# Patient Record
Sex: FEMALE | Race: WHITE | Employment: OTHER | URBAN - METROPOLITAN AREA
[De-identification: names, ages, dates, MRNs, and addresses within clinical notes are randomized per-mention and may not be internally consistent; named-entity substitution may affect disease eponyms.]

---

## 2017-02-23 ENCOUNTER — HOSPITAL ENCOUNTER (OUTPATIENT)
Dept: LAB | Age: 77
Discharge: HOME | End: 2017-02-23
Payer: MEDICARE

## 2017-02-23 DIAGNOSIS — D50.8 OTHER IRON DEFICIENCY ANEMIA: ICD-10-CM

## 2017-02-23 LAB
ALBUMIN SERPL BCP-MCNC: 3.5 G/DL (ref 3.2–4.6)
ALBUMIN/GLOB SERPL: 1 {RATIO} (ref 1.2–3.5)
ALP SERPL-CCNC: 78 U/L (ref 50–136)
ALT SERPL-CCNC: 17 U/L (ref 12–65)
ANION GAP BLD CALC-SCNC: 5 MMOL/L (ref 7–16)
AST SERPL W P-5'-P-CCNC: 17 U/L (ref 15–37)
BASOPHILS # BLD AUTO: 0 K/UL (ref 0–0.2)
BASOPHILS # BLD: 0 % (ref 0–2)
BILIRUB SERPL-MCNC: 0.5 MG/DL (ref 0.2–1.1)
BUN SERPL-MCNC: 15 MG/DL (ref 8–23)
CALCIUM SERPL-MCNC: 9 MG/DL (ref 8.3–10.4)
CHLORIDE SERPL-SCNC: 104 MMOL/L (ref 98–107)
CO2 SERPL-SCNC: 31 MMOL/L (ref 23–32)
CREAT SERPL-MCNC: 1.03 MG/DL (ref 0.6–1)
DIFFERENTIAL METHOD BLD: ABNORMAL
EOSINOPHIL # BLD: 0.1 K/UL (ref 0–0.8)
EOSINOPHIL NFR BLD: 1 % (ref 0.5–7.8)
ERYTHROCYTE [DISTWIDTH] IN BLOOD BY AUTOMATED COUNT: 14 % (ref 11.9–14.6)
FERRITIN SERPL-MCNC: 7 NG/ML (ref 8–388)
GLOBULIN SER CALC-MCNC: 3.5 G/DL (ref 2.3–3.5)
GLUCOSE SERPL-MCNC: 89 MG/DL (ref 65–100)
HCT VFR BLD AUTO: 36.6 % (ref 35.8–46.3)
HGB BLD-MCNC: 11.8 G/DL (ref 11.7–15.4)
IRON SATN MFR SERPL: 20 %
IRON SERPL-MCNC: 91 UG/DL (ref 35–150)
LYMPHOCYTES # BLD AUTO: 30 % (ref 13–44)
LYMPHOCYTES # BLD: 1.8 K/UL (ref 0.5–4.6)
MCH RBC QN AUTO: 30.2 PG (ref 26.1–32.9)
MCHC RBC AUTO-ENTMCNC: 32.2 G/DL (ref 31.4–35)
MCV RBC AUTO: 93.6 FL (ref 79.6–97.8)
MONOCYTES # BLD: 0.5 K/UL (ref 0.1–1.3)
MONOCYTES NFR BLD AUTO: 9 % (ref 4–12)
NEUTS SEG # BLD: 3.5 K/UL (ref 1.7–8.2)
NEUTS SEG NFR BLD AUTO: 59 % (ref 43–78)
NRBC # BLD: 0 K/UL (ref 0–0.2)
PLATELET # BLD AUTO: 233 K/UL (ref 150–450)
PMV BLD AUTO: 10.4 FL (ref 10.8–14.1)
POTASSIUM SERPL-SCNC: 3.8 MMOL/L (ref 3.5–5.1)
PROT SERPL-MCNC: 7 G/DL (ref 6.3–8.2)
RBC # BLD AUTO: 3.91 M/UL (ref 4.05–5.25)
SODIUM SERPL-SCNC: 140 MMOL/L (ref 136–145)
TIBC SERPL-MCNC: 455 UG/DL (ref 250–450)
WBC # BLD AUTO: 6 K/UL (ref 4.3–11.1)

## 2017-02-23 PROCEDURE — 36415 COLL VENOUS BLD VENIPUNCTURE: CPT | Performed by: INTERNAL MEDICINE

## 2017-02-23 PROCEDURE — 82728 ASSAY OF FERRITIN: CPT | Performed by: INTERNAL MEDICINE

## 2017-02-23 PROCEDURE — 83540 ASSAY OF IRON: CPT | Performed by: INTERNAL MEDICINE

## 2017-02-23 PROCEDURE — 85025 COMPLETE CBC W/AUTO DIFF WBC: CPT | Performed by: INTERNAL MEDICINE

## 2017-02-23 PROCEDURE — 80053 COMPREHEN METABOLIC PANEL: CPT | Performed by: INTERNAL MEDICINE

## 2017-04-26 PROBLEM — R60.0 BILATERAL LEG EDEMA: Status: ACTIVE | Noted: 2017-04-26

## 2017-04-26 PROBLEM — R73.01 IFG (IMPAIRED FASTING GLUCOSE): Status: ACTIVE | Noted: 2017-04-26

## 2017-10-26 PROBLEM — E66.01 MORBID OBESITY (HCC): Status: ACTIVE | Noted: 2017-10-26

## 2018-04-26 PROBLEM — M17.0 PRIMARY OSTEOARTHRITIS OF BOTH KNEES: Status: ACTIVE | Noted: 2018-04-26

## 2018-04-26 PROBLEM — F51.01 PRIMARY INSOMNIA: Status: ACTIVE | Noted: 2018-04-26

## 2019-07-17 PROBLEM — N18.30 STAGE 3 CHRONIC KIDNEY DISEASE (HCC): Status: ACTIVE | Noted: 2019-07-17

## 2022-03-19 PROBLEM — R73.01 IFG (IMPAIRED FASTING GLUCOSE): Status: ACTIVE | Noted: 2017-04-26

## 2022-03-19 PROBLEM — R60.0 BILATERAL LEG EDEMA: Status: ACTIVE | Noted: 2017-04-26

## 2022-03-19 PROBLEM — M17.0 PRIMARY OSTEOARTHRITIS OF BOTH KNEES: Status: ACTIVE | Noted: 2018-04-26

## 2022-03-19 PROBLEM — E66.01 MORBID OBESITY (HCC): Status: ACTIVE | Noted: 2017-10-26

## 2022-03-20 PROBLEM — N18.30 STAGE 3 CHRONIC KIDNEY DISEASE (HCC): Status: ACTIVE | Noted: 2019-07-17

## 2022-03-20 PROBLEM — F51.01 PRIMARY INSOMNIA: Status: ACTIVE | Noted: 2018-04-26

## 2022-07-06 ENCOUNTER — OFFICE VISIT (OUTPATIENT)
Dept: INTERNAL MEDICINE CLINIC | Facility: CLINIC | Age: 82
End: 2022-07-06
Payer: MEDICARE

## 2022-07-06 VITALS
DIASTOLIC BLOOD PRESSURE: 70 MMHG | HEART RATE: 66 BPM | RESPIRATION RATE: 16 BRPM | SYSTOLIC BLOOD PRESSURE: 128 MMHG | WEIGHT: 202 LBS | OXYGEN SATURATION: 98 % | BODY MASS INDEX: 38.14 KG/M2 | HEIGHT: 61 IN

## 2022-07-06 DIAGNOSIS — E78.2 MIXED HYPERLIPIDEMIA: ICD-10-CM

## 2022-07-06 DIAGNOSIS — R60.0 BILATERAL LEG EDEMA: ICD-10-CM

## 2022-07-06 DIAGNOSIS — I10 BENIGN ESSENTIAL HYPERTENSION: ICD-10-CM

## 2022-07-06 DIAGNOSIS — Z00.00 MEDICARE ANNUAL WELLNESS VISIT, SUBSEQUENT: Primary | ICD-10-CM

## 2022-07-06 DIAGNOSIS — J30.1 SEASONAL ALLERGIC RHINITIS DUE TO POLLEN: ICD-10-CM

## 2022-07-06 PROBLEM — N18.30 STAGE 3 CHRONIC KIDNEY DISEASE (HCC): Status: RESOLVED | Noted: 2019-07-17 | Resolved: 2022-07-06

## 2022-07-06 LAB
ANION GAP SERPL CALC-SCNC: 5 MMOL/L (ref 7–16)
BUN SERPL-MCNC: 18 MG/DL (ref 8–23)
CALCIUM SERPL-MCNC: 9.7 MG/DL (ref 8.3–10.4)
CHLORIDE SERPL-SCNC: 106 MMOL/L (ref 98–107)
CO2 SERPL-SCNC: 30 MMOL/L (ref 21–32)
CREAT SERPL-MCNC: 0.9 MG/DL (ref 0.6–1)
GLUCOSE SERPL-MCNC: 104 MG/DL (ref 65–100)
POTASSIUM SERPL-SCNC: 4.4 MMOL/L (ref 3.5–5.1)
SODIUM SERPL-SCNC: 141 MMOL/L (ref 136–145)

## 2022-07-06 PROCEDURE — G0439 PPPS, SUBSEQ VISIT: HCPCS | Performed by: FAMILY MEDICINE

## 2022-07-06 PROCEDURE — 1123F ACP DISCUSS/DSCN MKR DOCD: CPT | Performed by: FAMILY MEDICINE

## 2022-07-06 RX ORDER — POTASSIUM CHLORIDE 750 MG/1
10 TABLET, EXTENDED RELEASE ORAL DAILY
Qty: 90 TABLET | Refills: 1 | Status: SHIPPED | OUTPATIENT
Start: 2022-07-06

## 2022-07-06 RX ORDER — AMLODIPINE BESYLATE 5 MG/1
5 TABLET ORAL DAILY
Qty: 90 TABLET | Refills: 1 | Status: SHIPPED | OUTPATIENT
Start: 2022-07-06

## 2022-07-06 RX ORDER — FUROSEMIDE 20 MG/1
20 TABLET ORAL DAILY
Qty: 90 TABLET | Refills: 1 | Status: SHIPPED | OUTPATIENT
Start: 2022-07-06

## 2022-07-06 RX ORDER — LOSARTAN POTASSIUM AND HYDROCHLOROTHIAZIDE 12.5; 1 MG/1; MG/1
1 TABLET ORAL DAILY
Qty: 90 TABLET | Refills: 1 | Status: SHIPPED | OUTPATIENT
Start: 2022-07-06

## 2022-07-06 RX ORDER — ROSUVASTATIN CALCIUM 10 MG/1
10 TABLET, COATED ORAL DAILY
Qty: 90 TABLET | Refills: 1 | Status: SHIPPED | OUTPATIENT
Start: 2022-07-06

## 2022-07-06 RX ORDER — CETIRIZINE HYDROCHLORIDE 10 MG/1
10 TABLET ORAL DAILY PRN
Qty: 90 TABLET | Refills: 1 | Status: SHIPPED | OUTPATIENT
Start: 2022-07-06

## 2022-07-06 ASSESSMENT — PATIENT HEALTH QUESTIONNAIRE - PHQ9
SUM OF ALL RESPONSES TO PHQ9 QUESTIONS 1 & 2: 0
2. FEELING DOWN, DEPRESSED OR HOPELESS: 0
SUM OF ALL RESPONSES TO PHQ QUESTIONS 1-9: 0
1. LITTLE INTEREST OR PLEASURE IN DOING THINGS: 0

## 2022-07-06 ASSESSMENT — LIFESTYLE VARIABLES: HOW OFTEN DO YOU HAVE A DRINK CONTAINING ALCOHOL: NEVER

## 2022-07-06 NOTE — PATIENT INSTRUCTIONS
Personalized Preventive Plan for Tyler Gardiner - 7/6/2022  Medicare offers a range of preventive health benefits. Some of the tests and screenings are paid in full while other may be subject to a deductible, co-insurance, and/or copay. Some of these benefits include a comprehensive review of your medical history including lifestyle, illnesses that may run in your family, and various assessments and screenings as appropriate. After reviewing your medical record and screening and assessments performed today your provider may have ordered immunizations, labs, imaging, and/or referrals for you. A list of these orders (if applicable) as well as your Preventive Care list are included within your After Visit Summary for your review. Other Preventive Recommendations:    · A preventive eye exam performed by an eye specialist is recommended every 1-2 years to screen for glaucoma; cataracts, macular degeneration, and other eye disorders. · A preventive dental visit is recommended every 6 months. · Try to get at least 150 minutes of exercise per week or 10,000 steps per day on a pedometer . · Order or download the FREE \"Exercise & Physical Activity: Your Everyday Guide\" from [employer]. Call 6-786.609.4778 or search [employer] online. · You need 2503-4098 mg of calcium and 6298-9698 IU of vitamin D per day. It is possible to meet your calcium requirement with diet alone, but a vitamin D supplement is usually necessary to meet this goal.  · When exposed to the sun, use a sunscreen that protects against both UVA and UVB radiation with an SPF of 30 or greater. Reapply every 2 to 3 hours or after sweating, drying off with a towel, or swimming. · Always wear a seat belt when traveling in a car. Always wear a helmet when riding a bicycle or motorcycle.

## 2022-07-06 NOTE — PROGRESS NOTES
Medicare Annual Wellness Visit    Alexia Bryson is here for Hypertension and Medicare AWV    Assessment & Plan   Medicare annual wellness visit, subsequent  Benign essential hypertension  -     amLODIPine (NORVASC) 5 MG tablet; Take 1 tablet by mouth daily TAKE 1 TABLET DAILY, Disp-90 tablet, R-1Normal  -     losartan-hydroCHLOROthiazide (HYZAAR) 100-12.5 MG per tablet; Take 1 tablet by mouth daily TAKE 1 TABLET DAILY, Disp-90 tablet, R-1Normal  -     potassium chloride (KLOR-CON M) 10 MEQ extended release tablet; Take 1 tablet by mouth daily, Disp-90 tablet, R-1Normal  -     Basic Metabolic Panel; Future  Mixed hyperlipidemia  -     rosuvastatin (CRESTOR) 10 MG tablet; Take 1 tablet by mouth daily TAKE 1 TABLET DAILY, Disp-90 tablet, R-1Normal  Bilateral leg edema  -     furosemide (LASIX) 20 MG tablet; Take 1 tablet by mouth daily, Disp-90 tablet, R-1Normal  Seasonal allergic rhinitis due to pollen  -     cetirizine (ZYRTEC) 10 MG tablet; Take 1 tablet by mouth daily as needed for Allergies, Disp-90 tablet, R-1Normal         Chronic medical problems stable. Refills and labs as ordered. Recommendations for Preventive Services Due: see orders and patient instructions/AVS.  Recommended screening schedule for the next 5-10 years is provided to the patient in written form: see Patient Instructions/AVS.     Return for Medicare Annual Wellness Visit in 1 year. Subjective   Patient's complete Health Risk Assessment and screening values have been reviewed and are found in Flowsheets. The following problems were reviewed today and where indicated follow up appointments were made and/or referrals ordered.     Positive Risk Factor Screenings with Interventions:               General Health and ACP:  General  In general, how would you say your health is?: Fair  In the past 7 days, have you experienced any of the following: New or Increased Pain, New or Increased Fatigue, Loneliness, Social Isolation, Stress or Anger?: No  Do you get the social and emotional support that you need?: Yes  Do you have a Living Will?: Yes    Advance Directives     Power of  Living Will ACP-Advance Directive ACP-Power of     Not on File Not on File Not on File Not on File      ·     Health Habits/Nutrition:     Physical Activity: Inactive    Days of Exercise per Week: 0 days    Minutes of Exercise per Session: 0 min     Have you lost any weight without trying in the past 3 months?: No  Body mass index: (!) 38.8  Have you seen the dentist within the past year?: (!) No    Health Habits/Nutrition Interventions:  · Dental exam overdue:  patient encouraged to make appointment with his/her dentist   Encourage healthy eating, exercise. Suggest decreasing carbohydrate intake and increasing fresh vegetables. Will continue to periodically follow progress of weight loss. Objective   Vitals:    07/06/22 0922   BP: 128/70   Site: Left Upper Arm   Position: Sitting   Pulse: 66   Resp: 16   SpO2: 98%   Weight: 202 lb (91.6 kg)   Height: 5' 0.5\" (1.537 m)      Body mass index is 38.8 kg/m². Physical Exam  Vitals reviewed. Cardiovascular:      Rate and Rhythm: Normal rate and regular rhythm. Comments: -chronic LE edema  Pulmonary:      Effort: Pulmonary effort is normal.      Breath sounds: Normal breath sounds. Skin:     General: Skin is warm and dry. Neurological:      Mental Status: She is alert. No Known Allergies  Prior to Visit Medications    Medication Sig Taking?  Authorizing Provider   amLODIPine (NORVASC) 5 MG tablet Take 1 tablet by mouth daily TAKE 1 TABLET DAILY Yes Nicho VERMA Brothers, DO   furosemide (LASIX) 20 MG tablet Take 1 tablet by mouth daily Yes Ricky García, DO   losartan-hydroCHLOROthiazide (HYZAAR) 100-12.5 MG per tablet Take 1 tablet by mouth daily TAKE 1 TABLET DAILY Yes Nicho García, DO   potassium chloride (KLOR-CON M) 10 MEQ extended release tablet Take 1 tablet

## 2022-09-29 ENCOUNTER — NURSE TRIAGE (OUTPATIENT)
Dept: OTHER | Facility: CLINIC | Age: 82
End: 2022-09-29

## 2022-09-29 NOTE — TELEPHONE ENCOUNTER
Received call from [facility] at [facility] with Red Flag Complaint. Subjective: Caller states \"having abdominal pain, did worsen last night. Has had a couple of days. \"     Current Symptoms: right side abdominal pain. Pain down low. Throbbing pain. Last night pain was worse. Moving around makes it worse  Denies vomiting or diarrhea  Able to stand up and walk. Onset: couple days  ago; worsening    Associated Symptoms: NA    Pain Severity: 7/10; throbbing; constant    Temperature: denies     What has been tried: tylenol yesterday    LMP: NA Pregnant: NA    Recommended disposition: Go to ED/UCC Now (Or to Office with PCP Approval)    Care advice provided, patient verbalizes understanding; denies any other questions or concerns; instructed to call back for any new or worsening symptoms. Writer provided warm transfer to [facility] at [address] [place] [address] for 2nd level triage. Attention Provider: Thank you for allowing me to participate in the care of your patient. The patient was connected to triage in response to information provided to the ECC/PSC. Please do not respond through this encounter as the response is not directed to a shared pool.     Reminders:     Call [facility] Provider Office    Care Advice - both instruction and documentation    Routing - PCP (and NP for 2nd level triage)    PLEASE DELETE ALL RED TEXT PRIOR TO SIGNING NOTE      Reason for Disposition   Constant abdominal pain lasting > 2 hours    Protocols used: Abdominal Pain - Female-ADULT-[place]

## 2022-12-08 ENCOUNTER — NURSE ONLY (OUTPATIENT)
Dept: INTERNAL MEDICINE CLINIC | Facility: CLINIC | Age: 82
End: 2022-12-08
Payer: MEDICARE

## 2022-12-08 DIAGNOSIS — Z23 ENCOUNTER FOR IMMUNIZATION: Primary | ICD-10-CM

## 2022-12-08 PROCEDURE — G0008 ADMIN INFLUENZA VIRUS VAC: HCPCS | Performed by: FAMILY MEDICINE

## 2022-12-08 PROCEDURE — 90694 VACC AIIV4 NO PRSRV 0.5ML IM: CPT | Performed by: FAMILY MEDICINE

## 2022-12-24 DIAGNOSIS — E78.2 MIXED HYPERLIPIDEMIA: ICD-10-CM

## 2022-12-27 RX ORDER — ROSUVASTATIN CALCIUM 10 MG/1
TABLET, COATED ORAL
Qty: 90 TABLET | Refills: 1 | Status: SHIPPED | OUTPATIENT
Start: 2022-12-27

## 2023-01-06 ENCOUNTER — OFFICE VISIT (OUTPATIENT)
Dept: INTERNAL MEDICINE CLINIC | Facility: CLINIC | Age: 83
End: 2023-01-06
Payer: MEDICARE

## 2023-01-06 VITALS
HEART RATE: 72 BPM | RESPIRATION RATE: 16 BRPM | DIASTOLIC BLOOD PRESSURE: 70 MMHG | OXYGEN SATURATION: 95 % | WEIGHT: 197 LBS | SYSTOLIC BLOOD PRESSURE: 120 MMHG | BODY MASS INDEX: 37.19 KG/M2 | HEIGHT: 61 IN

## 2023-01-06 DIAGNOSIS — R60.0 BILATERAL LEG EDEMA: ICD-10-CM

## 2023-01-06 DIAGNOSIS — E78.2 MIXED HYPERLIPIDEMIA: ICD-10-CM

## 2023-01-06 DIAGNOSIS — I10 BENIGN ESSENTIAL HYPERTENSION: Primary | ICD-10-CM

## 2023-01-06 DIAGNOSIS — J30.1 SEASONAL ALLERGIC RHINITIS DUE TO POLLEN: ICD-10-CM

## 2023-01-06 DIAGNOSIS — R73.01 IFG (IMPAIRED FASTING GLUCOSE): ICD-10-CM

## 2023-01-06 DIAGNOSIS — M17.0 PRIMARY OSTEOARTHRITIS OF BOTH KNEES: ICD-10-CM

## 2023-01-06 PROBLEM — F51.01 PRIMARY INSOMNIA: Status: RESOLVED | Noted: 2018-04-26 | Resolved: 2023-01-06

## 2023-01-06 PROCEDURE — 1123F ACP DISCUSS/DSCN MKR DOCD: CPT | Performed by: FAMILY MEDICINE

## 2023-01-06 PROCEDURE — 3078F DIAST BP <80 MM HG: CPT | Performed by: FAMILY MEDICINE

## 2023-01-06 PROCEDURE — 3074F SYST BP LT 130 MM HG: CPT | Performed by: FAMILY MEDICINE

## 2023-01-06 PROCEDURE — 99214 OFFICE O/P EST MOD 30 MIN: CPT | Performed by: FAMILY MEDICINE

## 2023-01-06 RX ORDER — LOSARTAN POTASSIUM AND HYDROCHLOROTHIAZIDE 12.5; 1 MG/1; MG/1
1 TABLET ORAL DAILY
Qty: 90 TABLET | Refills: 1 | Status: SHIPPED | OUTPATIENT
Start: 2023-01-06

## 2023-01-06 RX ORDER — AMLODIPINE BESYLATE 5 MG/1
5 TABLET ORAL DAILY
Qty: 90 TABLET | Refills: 1 | Status: SHIPPED | OUTPATIENT
Start: 2023-01-06

## 2023-01-06 RX ORDER — CETIRIZINE HYDROCHLORIDE 10 MG/1
10 TABLET ORAL DAILY PRN
Qty: 90 TABLET | Refills: 1 | Status: SHIPPED | OUTPATIENT
Start: 2023-01-06

## 2023-01-06 RX ORDER — FUROSEMIDE 20 MG/1
20 TABLET ORAL DAILY
Qty: 90 TABLET | Refills: 1 | Status: SHIPPED | OUTPATIENT
Start: 2023-01-06

## 2023-01-06 RX ORDER — POTASSIUM CHLORIDE 750 MG/1
10 TABLET, EXTENDED RELEASE ORAL DAILY
Qty: 90 TABLET | Refills: 1 | Status: SHIPPED | OUTPATIENT
Start: 2023-01-06

## 2023-01-06 RX ORDER — ROSUVASTATIN CALCIUM 10 MG/1
TABLET, COATED ORAL
Qty: 90 TABLET | Refills: 1 | Status: SHIPPED | OUTPATIENT
Start: 2023-01-06

## 2023-01-06 ASSESSMENT — PATIENT HEALTH QUESTIONNAIRE - PHQ9
2. FEELING DOWN, DEPRESSED OR HOPELESS: 0
SUM OF ALL RESPONSES TO PHQ QUESTIONS 1-9: 0
SUM OF ALL RESPONSES TO PHQ9 QUESTIONS 1 & 2: 0
SUM OF ALL RESPONSES TO PHQ QUESTIONS 1-9: 0
SUM OF ALL RESPONSES TO PHQ QUESTIONS 1-9: 0
1. LITTLE INTEREST OR PLEASURE IN DOING THINGS: 0
SUM OF ALL RESPONSES TO PHQ QUESTIONS 1-9: 0

## 2023-01-06 NOTE — PROGRESS NOTES
Sridhar Rubalcava DO  Diplomate of the [employer] [facility]      Inder Emery (: 1940) is a [de-identified] y.o. female, here for evaluation of the following chief complaint(s):  Hypertension (/)       ASSESSMENT/PLAN:  1. Benign essential hypertension  -     amLODIPine (NORVASC) 5 MG tablet; Take 1 tablet by mouth daily TAKE 1 TABLET DAILY, Disp-90 tablet, R-1Normal  -     losartan-hydroCHLOROthiazide (HYZAAR) 100-12.5 MG per tablet; Take 1 tablet by mouth daily TAKE 1 TABLET DAILY, Disp-90 tablet, R-1Normal  -     potassium chloride (KLOR-CON M) 10 MEQ extended release tablet; Take 1 tablet by mouth daily, Disp-90 tablet, R-1Normal  -     Basic Metabolic Panel; Future  -     CBC; Future  2. Seasonal allergic rhinitis due to pollen  -     cetirizine (ZYRTEC) 10 MG tablet; Take 1 tablet by mouth daily as needed for Allergies, Disp-90 tablet, R-1Normal  3. Bilateral leg edema  -     furosemide (LASIX) 20 MG tablet; Take 1 tablet by mouth daily, Disp-90 tablet, R-1Normal  4. Mixed hyperlipidemia  -     rosuvastatin (CRESTOR) 10 MG tablet; TAKE 1 TABLET DAILY, Disp-90 tablet, R-1Normal  -     Hepatic Function Panel; Future  -     Lipid Panel; Future  5. IFG (impaired fasting glucose)  -     Hemoglobin A1C; Future  6. Primary osteoarthritis of both knees       Tolerating medication well for HTN. Achieving desired therapeutic response with   Key Anti-Hypertensive Meds            amLODIPine (NORVASC) 5 MG tablet (Taking)    Sig - Route: Take 1 tablet by mouth daily TAKE 1 TABLET DAILY - Oral    furosemide (LASIX) 20 MG tablet (Taking)    Sig - Route: Take 1 tablet by mouth daily - Oral    losartan-hydroCHLOROthiazide (HYZAAR) 100-12.5 MG per tablet (Taking)    Sig - Route: Take 1 tablet by mouth daily TAKE 1 TABLET DAILY - Oral         --will continue. Will periodically review and adjust if needed. Encourage home monitoring.    Will recheck lipids and continue with rosuvastatin as tolerating well and providing good clinical benefit. Will recheck A1c, continue to periodically monitor. Encourage healthy eating/exercise. May use Tylenol on prn basis for severe knee pain. SUBJECTIVE/OBJECTIVE:  HPI:  HTN: Home BP Monitoring: no. taking medications as instructed, no medication side effects noted. She denies chest pain, palpitations, exertional pain or pressure. Leg edema remains stable, takes lasix on prn basis. Tolerating rosuvastatin without significant adverse effects. IFG: She denies any symptoms of diabetes. Has not had any symptoms of polyuria, polydipsia, or hypoglycemia. Continues to have severe knee pain and stiffness, difficult to go from sitting to standing. Uses walker at home. Would like to try to get lift chair if possible. ROS negative except as noted above today. Social History     Tobacco Use    Smoking status: Never    Smokeless tobacco: Former    Tobacco comments:     Quit smoking: dip snuff   Vaping Use    Vaping Use: Never used   Substance Use Topics    Alcohol use: No     Alcohol/week: 0.0 standard drinks    Drug use: No     Vitals:    01/06/23 0904   BP: 120/70   Site: Left Upper Arm   Position: Sitting   Pulse: 72   Resp: 16   SpO2: 95%   Weight: 197 lb (89.4 kg)   Height: 5' 0.5\" (1.537 m)      Body mass index is 37.84 kg/m². Physical Exam  Vitals reviewed. HENT:      Head: Normocephalic and atraumatic. Mouth/Throat:      Mouth: Mucous membranes are moist.      Pharynx: Oropharynx is clear. Eyes:      Extraocular Movements: Extraocular movements intact. Conjunctiva/sclera: Conjunctivae normal.      Pupils: Pupils are equal, round, and reactive to light. Cardiovascular:      Rate and Rhythm: Normal rate and regular rhythm. Pulmonary:      Effort: Pulmonary effort is normal.      Breath sounds: Normal breath sounds. Abdominal:      General: Abdomen is flat. Palpations: Abdomen is soft.    Musculoskeletal:         General: Normal range of motion. Cervical back: Normal range of motion and neck supple. Comments: +stiff/antalgic gait with cane   Skin:     General: Skin is warm and dry. Neurological:      General: No focal deficit present. Mental Status: She is alert. An electronic signature was used to authenticate this note.   Desire Falling, DO

## 2023-01-10 ENCOUNTER — NURSE ONLY (OUTPATIENT)
Dept: INTERNAL MEDICINE CLINIC | Facility: CLINIC | Age: 83
End: 2023-01-10

## 2023-01-10 DIAGNOSIS — I10 BENIGN ESSENTIAL HYPERTENSION: ICD-10-CM

## 2023-01-10 DIAGNOSIS — R73.01 IFG (IMPAIRED FASTING GLUCOSE): ICD-10-CM

## 2023-01-10 DIAGNOSIS — E78.2 MIXED HYPERLIPIDEMIA: ICD-10-CM

## 2023-01-10 LAB
ALBUMIN SERPL-MCNC: 3.9 G/DL (ref 3.2–4.6)
ALBUMIN/GLOB SERPL: 1.3 (ref 0.4–1.6)
ALP SERPL-CCNC: 70 U/L (ref 50–136)
ALT SERPL-CCNC: 21 U/L (ref 12–65)
ANION GAP SERPL CALC-SCNC: 5 MMOL/L (ref 2–11)
AST SERPL-CCNC: 17 U/L (ref 15–37)
BILIRUB DIRECT SERPL-MCNC: 0.2 MG/DL
BILIRUB SERPL-MCNC: 0.6 MG/DL (ref 0.2–1.1)
BUN SERPL-MCNC: 29 MG/DL (ref 8–23)
CALCIUM SERPL-MCNC: 9.8 MG/DL (ref 8.3–10.4)
CHLORIDE SERPL-SCNC: 101 MMOL/L (ref 101–110)
CHOLEST SERPL-MCNC: 163 MG/DL
CO2 SERPL-SCNC: 32 MMOL/L (ref 21–32)
CREAT SERPL-MCNC: 1.2 MG/DL (ref 0.6–1)
ERYTHROCYTE [DISTWIDTH] IN BLOOD BY AUTOMATED COUNT: 13.5 % (ref 11.9–14.6)
GLOBULIN SER CALC-MCNC: 3.1 G/DL (ref 2.8–4.5)
GLUCOSE SERPL-MCNC: 81 MG/DL (ref 65–100)
HCT VFR BLD AUTO: 36.6 % (ref 35.8–46.3)
HDLC SERPL-MCNC: 64 MG/DL (ref 40–60)
HDLC SERPL: 2.5
HGB BLD-MCNC: 11.5 G/DL (ref 11.7–15.4)
LDLC SERPL CALC-MCNC: 84 MG/DL
MCH RBC QN AUTO: 32.6 PG (ref 26.1–32.9)
MCHC RBC AUTO-ENTMCNC: 31.4 G/DL (ref 31.4–35)
MCV RBC AUTO: 103.7 FL (ref 82–102)
NRBC # BLD: 0 K/UL (ref 0–0.2)
PLATELET # BLD AUTO: 176 K/UL (ref 150–450)
PMV BLD AUTO: 11.4 FL (ref 9.4–12.3)
POTASSIUM SERPL-SCNC: 4.3 MMOL/L (ref 3.5–5.1)
PROT SERPL-MCNC: 7 G/DL (ref 6.3–8.2)
RBC # BLD AUTO: 3.53 M/UL (ref 4.05–5.2)
SODIUM SERPL-SCNC: 138 MMOL/L (ref 133–143)
TRIGL SERPL-MCNC: 75 MG/DL (ref 35–150)
VLDLC SERPL CALC-MCNC: 15 MG/DL (ref 6–23)
WBC # BLD AUTO: 4 K/UL (ref 4.3–11.1)

## 2023-01-11 DIAGNOSIS — N28.9 FUNCTION KIDNEY DECREASED: Primary | ICD-10-CM

## 2023-01-11 LAB
EST. AVERAGE GLUCOSE BLD GHB EST-MCNC: 126 MG/DL
HBA1C MFR BLD: 6 % (ref 4.8–5.6)

## 2023-02-07 ENCOUNTER — NURSE ONLY (OUTPATIENT)
Dept: INTERNAL MEDICINE CLINIC | Facility: CLINIC | Age: 83
End: 2023-02-07

## 2023-02-07 DIAGNOSIS — D64.9 ANEMIA, UNSPECIFIED TYPE: ICD-10-CM

## 2023-02-07 DIAGNOSIS — N28.9 FUNCTION KIDNEY DECREASED: ICD-10-CM

## 2023-02-08 LAB
ANION GAP SERPL CALC-SCNC: 8 MMOL/L (ref 2–11)
BUN SERPL-MCNC: 19 MG/DL (ref 8–23)
CALCIUM SERPL-MCNC: 9.7 MG/DL (ref 8.3–10.4)
CHLORIDE SERPL-SCNC: 104 MMOL/L (ref 101–110)
CO2 SERPL-SCNC: 26 MMOL/L (ref 21–32)
CREAT SERPL-MCNC: 1 MG/DL (ref 0.6–1)
FERRITIN SERPL-MCNC: 40 NG/ML (ref 8–388)
GLUCOSE SERPL-MCNC: 100 MG/DL (ref 65–100)
IRON SERPL-MCNC: 124 UG/DL (ref 35–150)
POTASSIUM SERPL-SCNC: 4.2 MMOL/L (ref 3.5–5.1)
SODIUM SERPL-SCNC: 138 MMOL/L (ref 133–143)
TIBC SERPL-MCNC: 418 UG/DL (ref 250–450)

## 2023-07-12 ENCOUNTER — OFFICE VISIT (OUTPATIENT)
Dept: INTERNAL MEDICINE CLINIC | Facility: CLINIC | Age: 83
End: 2023-07-12
Payer: MEDICARE

## 2023-07-12 VITALS
BODY MASS INDEX: 37 KG/M2 | RESPIRATION RATE: 16 BRPM | WEIGHT: 196 LBS | OXYGEN SATURATION: 98 % | HEIGHT: 61 IN | DIASTOLIC BLOOD PRESSURE: 60 MMHG | HEART RATE: 70 BPM | SYSTOLIC BLOOD PRESSURE: 124 MMHG

## 2023-07-12 DIAGNOSIS — R60.0 BILATERAL LEG EDEMA: ICD-10-CM

## 2023-07-12 DIAGNOSIS — E78.2 MIXED HYPERLIPIDEMIA: ICD-10-CM

## 2023-07-12 DIAGNOSIS — J30.1 SEASONAL ALLERGIC RHINITIS DUE TO POLLEN: ICD-10-CM

## 2023-07-12 DIAGNOSIS — R07.81 PLEURITIC CHEST PAIN: Primary | ICD-10-CM

## 2023-07-12 DIAGNOSIS — I10 BENIGN ESSENTIAL HYPERTENSION: ICD-10-CM

## 2023-07-12 DIAGNOSIS — Z00.00 MEDICARE ANNUAL WELLNESS VISIT, SUBSEQUENT: ICD-10-CM

## 2023-07-12 LAB
ALBUMIN SERPL-MCNC: 3.9 G/DL (ref 3.2–4.6)
ALBUMIN/GLOB SERPL: 1.2 (ref 0.4–1.6)
ALP SERPL-CCNC: 73 U/L (ref 50–136)
ALT SERPL-CCNC: 15 U/L (ref 12–65)
ANION GAP SERPL CALC-SCNC: 6 MMOL/L (ref 2–11)
AST SERPL-CCNC: 18 U/L (ref 15–37)
BILIRUB SERPL-MCNC: 0.7 MG/DL (ref 0.2–1.1)
BUN SERPL-MCNC: 14 MG/DL (ref 8–23)
CALCIUM SERPL-MCNC: 10.2 MG/DL (ref 8.3–10.4)
CHLORIDE SERPL-SCNC: 106 MMOL/L (ref 101–110)
CO2 SERPL-SCNC: 29 MMOL/L (ref 21–32)
CREAT SERPL-MCNC: 1 MG/DL (ref 0.6–1)
ERYTHROCYTE [DISTWIDTH] IN BLOOD BY AUTOMATED COUNT: 13.4 % (ref 11.9–14.6)
GLOBULIN SER CALC-MCNC: 3.2 G/DL (ref 2.8–4.5)
GLUCOSE SERPL-MCNC: 93 MG/DL (ref 65–100)
HCT VFR BLD AUTO: 34.6 % (ref 35.8–46.3)
HGB BLD-MCNC: 11 G/DL (ref 11.7–15.4)
MCH RBC QN AUTO: 32.6 PG (ref 26.1–32.9)
MCHC RBC AUTO-ENTMCNC: 31.8 G/DL (ref 31.4–35)
MCV RBC AUTO: 102.7 FL (ref 82–102)
NRBC # BLD: 0 K/UL (ref 0–0.2)
PLATELET # BLD AUTO: 170 K/UL (ref 150–450)
PMV BLD AUTO: 10.6 FL (ref 9.4–12.3)
POTASSIUM SERPL-SCNC: 4.3 MMOL/L (ref 3.5–5.1)
PROT SERPL-MCNC: 7.1 G/DL (ref 6.3–8.2)
RBC # BLD AUTO: 3.37 M/UL (ref 4.05–5.2)
SODIUM SERPL-SCNC: 141 MMOL/L (ref 133–143)
WBC # BLD AUTO: 5.3 K/UL (ref 4.3–11.1)

## 2023-07-12 PROCEDURE — 99214 OFFICE O/P EST MOD 30 MIN: CPT | Performed by: FAMILY MEDICINE

## 2023-07-12 PROCEDURE — 3078F DIAST BP <80 MM HG: CPT | Performed by: FAMILY MEDICINE

## 2023-07-12 PROCEDURE — 1123F ACP DISCUSS/DSCN MKR DOCD: CPT | Performed by: FAMILY MEDICINE

## 2023-07-12 PROCEDURE — G0439 PPPS, SUBSEQ VISIT: HCPCS | Performed by: FAMILY MEDICINE

## 2023-07-12 PROCEDURE — 3074F SYST BP LT 130 MM HG: CPT | Performed by: FAMILY MEDICINE

## 2023-07-12 RX ORDER — FUROSEMIDE 20 MG/1
20 TABLET ORAL DAILY
Qty: 90 TABLET | Refills: 1 | Status: SHIPPED | OUTPATIENT
Start: 2023-07-12

## 2023-07-12 RX ORDER — METHYLPREDNISOLONE 4 MG/1
TABLET ORAL
Qty: 1 KIT | Refills: 0 | Status: SHIPPED | OUTPATIENT
Start: 2023-07-12 | End: 2023-07-12 | Stop reason: SDUPTHER

## 2023-07-12 RX ORDER — CETIRIZINE HYDROCHLORIDE 10 MG/1
10 TABLET ORAL DAILY PRN
Qty: 90 TABLET | Refills: 1 | Status: SHIPPED | OUTPATIENT
Start: 2023-07-12

## 2023-07-12 RX ORDER — AMLODIPINE BESYLATE 5 MG/1
5 TABLET ORAL DAILY
Qty: 90 TABLET | Refills: 1 | Status: SHIPPED | OUTPATIENT
Start: 2023-07-12

## 2023-07-12 RX ORDER — LOSARTAN POTASSIUM AND HYDROCHLOROTHIAZIDE 12.5; 1 MG/1; MG/1
1 TABLET ORAL DAILY
Qty: 90 TABLET | Refills: 1 | Status: SHIPPED | OUTPATIENT
Start: 2023-07-12

## 2023-07-12 RX ORDER — METHYLPREDNISOLONE 4 MG/1
TABLET ORAL
Qty: 1 KIT | Refills: 0 | Status: SHIPPED | OUTPATIENT
Start: 2023-07-12

## 2023-07-12 RX ORDER — POTASSIUM CHLORIDE 750 MG/1
10 TABLET, EXTENDED RELEASE ORAL DAILY
Qty: 90 TABLET | Refills: 1 | Status: SHIPPED | OUTPATIENT
Start: 2023-07-12

## 2023-07-12 RX ORDER — ROSUVASTATIN CALCIUM 10 MG/1
TABLET, COATED ORAL
Qty: 90 TABLET | Refills: 1 | Status: SHIPPED | OUTPATIENT
Start: 2023-07-12

## 2023-07-12 SDOH — ECONOMIC STABILITY: HOUSING INSECURITY
IN THE LAST 12 MONTHS, WAS THERE A TIME WHEN YOU DID NOT HAVE A STEADY PLACE TO SLEEP OR SLEPT IN A SHELTER (INCLUDING NOW)?: NO

## 2023-07-12 SDOH — ECONOMIC STABILITY: FOOD INSECURITY: WITHIN THE PAST 12 MONTHS, YOU WORRIED THAT YOUR FOOD WOULD RUN OUT BEFORE YOU GOT MONEY TO BUY MORE.: NEVER TRUE

## 2023-07-12 SDOH — ECONOMIC STABILITY: FOOD INSECURITY: WITHIN THE PAST 12 MONTHS, THE FOOD YOU BOUGHT JUST DIDN'T LAST AND YOU DIDN'T HAVE MONEY TO GET MORE.: NEVER TRUE

## 2023-07-12 SDOH — ECONOMIC STABILITY: INCOME INSECURITY: HOW HARD IS IT FOR YOU TO PAY FOR THE VERY BASICS LIKE FOOD, HOUSING, MEDICAL CARE, AND HEATING?: NOT HARD AT ALL

## 2023-07-12 ASSESSMENT — LIFESTYLE VARIABLES
HOW MANY STANDARD DRINKS CONTAINING ALCOHOL DO YOU HAVE ON A TYPICAL DAY: PATIENT DOES NOT DRINK
HOW OFTEN DO YOU HAVE A DRINK CONTAINING ALCOHOL: NEVER

## 2023-07-12 ASSESSMENT — PATIENT HEALTH QUESTIONNAIRE - PHQ9
SUM OF ALL RESPONSES TO PHQ QUESTIONS 1-9: 0
1. LITTLE INTEREST OR PLEASURE IN DOING THINGS: 0
SUM OF ALL RESPONSES TO PHQ9 QUESTIONS 1 & 2: 0
SUM OF ALL RESPONSES TO PHQ QUESTIONS 1-9: 0
SUM OF ALL RESPONSES TO PHQ QUESTIONS 1-9: 0
2. FEELING DOWN, DEPRESSED OR HOPELESS: 0
SUM OF ALL RESPONSES TO PHQ QUESTIONS 1-9: 0

## 2023-07-12 NOTE — PROGRESS NOTES
Medicare Annual Wellness Visit    Ashly Nguyen is here for Hypertension and Medicare AWV    Assessment & Plan   Pleuritic chest pain  -     XR CHEST PA LAT (2 VIEWS); Future  -     methylPREDNISolone (MEDROL DOSEPACK) 4 MG tablet; Take by mouth., Disp-1 kit, R-0Normal  Benign essential hypertension  -     amLODIPine (NORVASC) 5 MG tablet; Take 1 tablet by mouth daily TAKE 1 TABLET DAILY, Disp-90 tablet, R-1Normal  -     losartan-hydroCHLOROthiazide (HYZAAR) 100-12.5 MG per tablet; Take 1 tablet by mouth daily TAKE 1 TABLET DAILY, Disp-90 tablet, R-1Normal  -     potassium chloride (KLOR-CON M) 10 MEQ extended release tablet; Take 1 tablet by mouth daily, Disp-90 tablet, R-1Normal  -     CBC; Future  -     Comprehensive Metabolic Panel; Future  Seasonal allergic rhinitis due to pollen  -     cetirizine (ZYRTEC) 10 MG tablet; Take 1 tablet by mouth daily as needed for Allergies, Disp-90 tablet, R-1Normal  Bilateral leg edema  -     furosemide (LASIX) 20 MG tablet; Take 1 tablet by mouth daily, Disp-90 tablet, R-1Normal  Mixed hyperlipidemia  -     rosuvastatin (CRESTOR) 10 MG tablet; TAKE 1 TABLET DAILY, Disp-90 tablet, R-1Normal  Medicare annual wellness visit, subsequent    Will check CXR. -Instructed on how to take the steroids properly as well as potential side effects of the medication. Advised to let me know for any problems. Tolerating medication well for HTN. Achieving desired therapeutic response with   Key Anti-Hypertensive Meds            amLODIPine (NORVASC) 5 MG tablet (Taking)    Sig - Route: Take 1 tablet by mouth daily TAKE 1 TABLET DAILY - Oral    losartan-hydroCHLOROthiazide (HYZAAR) 100-12.5 MG per tablet (Taking)    Sig - Route: Take 1 tablet by mouth daily TAKE 1 TABLET DAILY - Oral    furosemide (LASIX) 20 MG tablet (Taking)    Sig - Route: Take 1 tablet by mouth daily - Oral         --will continue. Will periodically review and adjust if needed. Encourage home monitoring.    Continue

## 2023-07-13 ENCOUNTER — TELEPHONE (OUTPATIENT)
Dept: INTERNAL MEDICINE CLINIC | Facility: CLINIC | Age: 83
End: 2023-07-13

## 2023-07-13 DIAGNOSIS — R07.81 PLEURITIC CHEST PAIN: ICD-10-CM

## 2023-07-14 DIAGNOSIS — K44.9 HIATAL HERNIA: Primary | ICD-10-CM

## 2023-07-14 LAB
FERRITIN SERPL-MCNC: 53 NG/ML (ref 8–388)
IRON SERPL-MCNC: 96 UG/DL (ref 35–150)

## 2023-07-21 ENCOUNTER — HOSPITAL ENCOUNTER (OUTPATIENT)
Dept: CT IMAGING | Age: 83
End: 2023-07-21
Attending: FAMILY MEDICINE
Payer: MEDICARE

## 2023-07-21 DIAGNOSIS — K44.9 HIATAL HERNIA: ICD-10-CM

## 2023-07-21 PROCEDURE — 74160 CT ABDOMEN W/CONTRAST: CPT

## 2023-07-21 PROCEDURE — 2580000003 HC RX 258: Performed by: FAMILY MEDICINE

## 2023-07-21 PROCEDURE — 6360000004 HC RX CONTRAST MEDICATION: Performed by: FAMILY MEDICINE

## 2023-07-21 RX ORDER — 0.9 % SODIUM CHLORIDE 0.9 %
100 INTRAVENOUS SOLUTION INTRAVENOUS
Status: COMPLETED | OUTPATIENT
Start: 2023-07-21 | End: 2023-07-21

## 2023-07-21 RX ORDER — SODIUM CHLORIDE 0.9 % (FLUSH) 0.9 %
10 SYRINGE (ML) INJECTION
Status: COMPLETED | OUTPATIENT
Start: 2023-07-21 | End: 2023-07-21

## 2023-07-21 RX ADMIN — SODIUM CHLORIDE, PRESERVATIVE FREE 10 ML: 5 INJECTION INTRAVENOUS at 15:44

## 2023-07-21 RX ADMIN — SODIUM CHLORIDE 100 ML: 9 INJECTION, SOLUTION INTRAVENOUS at 15:44

## 2023-07-21 RX ADMIN — DIATRIZOATE MEGLUMINE AND DIATRIZOATE SODIUM 15 ML: 660; 100 LIQUID ORAL; RECTAL at 15:44

## 2023-07-21 RX ADMIN — IOPAMIDOL 100 ML: 755 INJECTION, SOLUTION INTRAVENOUS at 15:44

## 2023-08-17 ENCOUNTER — OFFICE VISIT (OUTPATIENT)
Dept: INTERNAL MEDICINE CLINIC | Facility: CLINIC | Age: 83
End: 2023-08-17
Payer: MEDICARE

## 2023-08-17 VITALS
BODY MASS INDEX: 36.25 KG/M2 | TEMPERATURE: 99.1 F | HEART RATE: 81 BPM | OXYGEN SATURATION: 96 % | DIASTOLIC BLOOD PRESSURE: 70 MMHG | HEIGHT: 61 IN | SYSTOLIC BLOOD PRESSURE: 104 MMHG | WEIGHT: 192 LBS

## 2023-08-17 DIAGNOSIS — R35.0 FREQUENCY OF MICTURITION: ICD-10-CM

## 2023-08-17 DIAGNOSIS — R30.0 DYSURIA: ICD-10-CM

## 2023-08-17 DIAGNOSIS — M54.50 ACUTE BILATERAL LOW BACK PAIN WITHOUT SCIATICA: Primary | ICD-10-CM

## 2023-08-17 DIAGNOSIS — R31.29 OTHER MICROSCOPIC HEMATURIA: ICD-10-CM

## 2023-08-17 DIAGNOSIS — M54.50 ACUTE BILATERAL LOW BACK PAIN WITHOUT SCIATICA: ICD-10-CM

## 2023-08-17 LAB
BILIRUBIN, URINE, POC: NEGATIVE
BLOOD URINE, POC: ABNORMAL
GLUCOSE URINE, POC: NEGATIVE
KETONES, URINE, POC: NEGATIVE
LEUKOCYTE ESTERASE, URINE, POC: NEGATIVE
NITRITE, URINE, POC: NEGATIVE
PH, URINE, POC: 6 (ref 4.6–8)
PROTEIN,URINE, POC: ABNORMAL
SPECIFIC GRAVITY, URINE, POC: 1.02 (ref 1–1.03)
URINALYSIS CLARITY, POC: CLEAR
URINALYSIS COLOR, POC: YELLOW
UROBILINOGEN, POC: ABNORMAL

## 2023-08-17 PROCEDURE — 1123F ACP DISCUSS/DSCN MKR DOCD: CPT | Performed by: NURSE PRACTITIONER

## 2023-08-17 PROCEDURE — 99214 OFFICE O/P EST MOD 30 MIN: CPT | Performed by: NURSE PRACTITIONER

## 2023-08-17 PROCEDURE — 3078F DIAST BP <80 MM HG: CPT | Performed by: NURSE PRACTITIONER

## 2023-08-17 PROCEDURE — 81003 URINALYSIS AUTO W/O SCOPE: CPT | Performed by: NURSE PRACTITIONER

## 2023-08-17 PROCEDURE — 3074F SYST BP LT 130 MM HG: CPT | Performed by: NURSE PRACTITIONER

## 2023-08-17 RX ORDER — CEPHALEXIN 500 MG/1
500 CAPSULE ORAL 2 TIMES DAILY
Qty: 10 CAPSULE | Refills: 0 | Status: SHIPPED | OUTPATIENT
Start: 2023-08-17 | End: 2023-08-22

## 2023-08-17 RX ORDER — TRAMADOL HYDROCHLORIDE 50 MG/1
50 TABLET ORAL EVERY 8 HOURS PRN
Qty: 21 TABLET | Refills: 0 | Status: SHIPPED | OUTPATIENT
Start: 2023-08-17 | End: 2023-08-24

## 2023-08-17 ASSESSMENT — ENCOUNTER SYMPTOMS
BOWEL INCONTINENCE: 0
ABDOMINAL PAIN: 0

## 2023-08-17 NOTE — PROGRESS NOTES
Kayleen Ortega (:  1940) is a 82 y.o. female,Established patient, here for evaluation of the following chief complaint(s):  Back Pain and Neck Pain         ASSESSMENT/PLAN:  1. Acute bilateral low back pain without sciatica  -     XR LUMBAR SPINE (MIN 4 VIEWS); Future  -     traMADol (ULTRAM) 50 MG tablet; Take 1 tablet by mouth every 8 hours as needed for Pain for up to 7 days. Intended supply: 7 days. Take lowest dose possible to manage pain Max Daily Amount: 150 mg, Disp-21 tablet, R-0Normal  -     [facility], [facility]  -     XR ABDOMEN (KUB) (SINGLE AP VIEW); Future  2. Frequency of micturition  -     AMB POC URINALYSIS DIP STICK AUTO W/O MICRO  3. Dysuria  -     AMB POC URINALYSIS DIP STICK AUTO W/O MICRO  -     Culture, Urine; Future  -     XR ABDOMEN (KUB) (SINGLE AP VIEW); Future  4. Other microscopic hematuria  -     XR ABDOMEN (KUB) (SINGLE AP VIEW); Future      No follow-ups on file. Low back pain for a week, worse with standing, ok if sitting in chair  Also with frequency of urination, trace blood, remote hx of stones  Culture urine, start keflex to cover infection  Had recent CT w/o stones, check KUB and lumbar xray  Hydrate well  Tramadol for pain, discussed med risks/side effects      Subjective   SUBJECTIVE/OBJECTIVE:  Patient is here for back pain for a week. She thought it was from sitting a lot in an uncomfortable chair. It felt like it was getting better but then started hurting again. She can hardly stand. Then she has difficulty getting going with walking. She uses cane. She has noted burning with urination and frequency and wonders if it is from UTI    She states no neck pain    Back Pain  This is a new problem. The current episode started 1 to 4 weeks ago. The problem has been waxing and waning since onset. The pain is present in the lumbar spine and gluteal. The pain does not radiate. The pain is severe.  The symptoms are aggravated by

## 2023-08-20 LAB
BACTERIA SPEC CULT: NORMAL
SERVICE CMNT-IMP: NORMAL

## 2023-08-23 ENCOUNTER — OFFICE VISIT (OUTPATIENT)
Dept: INTERNAL MEDICINE CLINIC | Facility: CLINIC | Age: 83
End: 2023-08-23
Payer: MEDICARE

## 2023-08-23 ENCOUNTER — HOSPITAL ENCOUNTER (OUTPATIENT)
Dept: PHYSICAL THERAPY | Age: 83
Setting detail: RECURRING SERIES
Discharge: HOME | End: 2023-08-26
Payer: MEDICARE

## 2023-08-23 VITALS
HEART RATE: 80 BPM | RESPIRATION RATE: 16 BRPM | OXYGEN SATURATION: 93 % | DIASTOLIC BLOOD PRESSURE: 80 MMHG | BODY MASS INDEX: 36.44 KG/M2 | WEIGHT: 193 LBS | HEIGHT: 61 IN | SYSTOLIC BLOOD PRESSURE: 138 MMHG

## 2023-08-23 DIAGNOSIS — M54.50 ACUTE BILATERAL LOW BACK PAIN WITHOUT SCIATICA: Primary | ICD-10-CM

## 2023-08-23 PROCEDURE — 1123F ACP DISCUSS/DSCN MKR DOCD: CPT | Performed by: FAMILY MEDICINE

## 2023-08-23 PROCEDURE — 97110 THERAPEUTIC EXERCISES: CPT

## 2023-08-23 PROCEDURE — 99213 OFFICE O/P EST LOW 20 MIN: CPT | Performed by: FAMILY MEDICINE

## 2023-08-23 PROCEDURE — 3075F SYST BP GE 130 - 139MM HG: CPT | Performed by: FAMILY MEDICINE

## 2023-08-23 PROCEDURE — 97530 THERAPEUTIC ACTIVITIES: CPT

## 2023-08-23 PROCEDURE — 97161 PT EVAL LOW COMPLEX 20 MIN: CPT

## 2023-08-23 PROCEDURE — 3079F DIAST BP 80-89 MM HG: CPT | Performed by: FAMILY MEDICINE

## 2023-08-23 ASSESSMENT — PAIN SCALES - GENERAL: PAINLEVEL_OUTOF10: 0

## 2023-08-23 NOTE — PROGRESS NOTES
Lavern Escobar  : 1940  Primary: Clare Berrios Ppo (Medicare Managed)  Secondary:  [address] @ [address] [address] [facility] [address]  Phone: [phone]  Fax: [phone] Plan Frequency: 2x/week for 4 weeks  Plan of Care/Certification Expiration Date: 23            >PT Visit Info:  Plan Frequency: 2x/week for 4 weeks  Plan of Care/Certification Expiration Date: 23  Total # of Visits to Date: 1  Progress Note Counter: 1      Visit Count: 1    OUTPATIENT PHYSICAL THERAPY:  OP NOTE TYPE: Treatment Note 2023       Episode (LBP) Appt Desk             Treatment Diagnoses:    Low back pain (M54.5)  Difficulty in walking, not elsewhere classified (R26.2)  Unsteadiness on feet (R26.81)  Muscle weakness (generalized) (M62.81)  Abnormal posture (R29.3)    Medical/Referring Diagnosis:   Acute bilateral low back pain without sciatica [M54.50]  Referring Physician: RAQUEL Al CNP, MD Orders: PT Eval and Treat  Date of Onset: Onset Date: 23       Allergies: Patient has no known allergies. Restrictions/Precautions:  Restrictions/Precautions: Fall Risk  No data recorded       Interventions Planned (Treatment may consist of any combination of the following):    Current Treatment Recommendations: Strengthening; ROM; Endurance training; Gait training; Neuromuscular re-education; Manual; Home exercise program; Modalities; Therapeutic activities       >Subjective Comments: See evaluation    >Initial:     0 (when sitting)/10     >Post Session:       0/10    Medications Last Reviewed: 2023    Updated Objective Findings: N/A    Treatment     GAIT TRAINING: (0 minutes): Gait training to improve and/or restore physical functioning as related to mobility, strength, balance and coordination. Ambulated with minimal visual, verbal, and tactile cueing related to stance phase, stride length, push off and heel strike.      THERAPEUTIC

## 2023-08-23 NOTE — THERAPY EVALUATION
functional mobility ; Decreased ADL status; Decreased ROM; Decreased body mechanics; Decreased strength; Decreased endurance; Decreased balance; Increased pain; Decreased posture         Therapy Prognosis:   Therapy Prognosis: Good         Initial Assessment Complexity:   Decision Making: Low Complexity      PLAN   Effective Dates: 8/23/23 TO Plan of Care/Certification Expiration Date: 11/21/23     Frequency/Duration: Plan Frequency: 2x/week for 4 weeks     Interventions Planned: (Treatment may consist of any combination of the following):    Current Treatment Recommendations: Strengthening; ROM; Endurance training; Gait training; Neuromuscular re-education; Manual; Home exercise program; Modalities; Therapeutic activities         GOALS: (Goals have been discussed and agreed upon with patient.)  Short-Term Functional Goals: Time Frame: 30 days  Patient will be independent with home exercise program without exacerbation of symptoms or cueing needed. Patient will be able to perform 5xSTS test in < 19 sec for improvement in functional lower extremity strength and decreased strain to lumbar spine. Patient will be able to perform TUG test in < 22 sec for improvement in functional mobility and decreasing risk of falls as her strength improves. Patient will improve her B hip extensor ROM to at least -5 degrees, for improvement in gait and reduced strain to lumbar spine. Discharge Goals: Time Frame: 90 days  Patient will demonstrate understanding of a home exercise program independently. Patient will be able to perform 5xSTS test in < 12 sec for improvement in functional lower extremity strength and improved ability to perform transfers. Patient will improve her B hip extensor ROM to at least neutral, for improvement in gait and reduced strain to lumbar spine. Patient will demonstrate improved BLE strength upon MMT to at least 4/5 for greater independence with ADL and occupational task performance.           Outcome

## 2023-08-30 ENCOUNTER — HOSPITAL ENCOUNTER (OUTPATIENT)
Dept: PHYSICAL THERAPY | Age: 83
Setting detail: RECURRING SERIES
Discharge: HOME | End: 2023-09-02
Payer: MEDICARE

## 2023-08-30 PROCEDURE — 97110 THERAPEUTIC EXERCISES: CPT

## 2023-08-30 PROCEDURE — 97112 NEUROMUSCULAR REEDUCATION: CPT

## 2023-08-30 NOTE — PROGRESS NOTES
Marquis Nahid Escobar  : 1940  Primary: Ronak Bishop Ppo (Medicare Managed)  Secondary:  Shabnam To  [facility]  [address] [facility] [address]  Phone: [phone]  Fax: [phone] Plan Frequency: 2x/week for 4 weeks  Plan of Care/Certification Expiration Date: 23            >PT Visit Info:  Plan Frequency: 2x/week for 4 weeks  Plan of Care/Certification Expiration Date: 23  Total # of Visits to Date: 2  Progress Note Counter: 2      Visit Count: 2    OUTPATIENT PHYSICAL THERAPY:  OP NOTE TYPE: Treatment Note 2023       Episode (LBP) Appt Desk             Treatment Diagnoses:    Low back pain (M54.5)  Difficulty in walking, not elsewhere classified (R26.2)  Unsteadiness on feet (R26.81)  Muscle weakness (generalized) (M62.81)  Abnormal posture (R29.3)    Medical/Referring Diagnosis:   Acute bilateral low back pain without sciatica [M54.50]  Referring Physician: RAQUEL Plunkett CNP, MD Orders: PT Eval and Treat  Date of Onset: Onset Date: 23       Allergies: Patient has no known allergies. Restrictions/Precautions:  Restrictions/Precautions: Fall Risk  No data recorded       Interventions Planned (Treatment may consist of any combination of the following):    Current Treatment Recommendations: Strengthening; ROM; Endurance training; Gait training; Neuromuscular re-education; Manual; Home exercise program; Modalities; Therapeutic activities       >Subjective Comments: She has been working on her exercises, but then she is sore the next day. >Initial:      (6-7)/10     >Post Session:        (tingly but not too bad)/10    Medications Last Reviewed: 2023    Updated Objective Findings: N/A    Treatment     GAIT TRAINING: (0 minutes): Gait training to improve and/or restore physical functioning as related to mobility, strength, balance and coordination.  Ambulated with minimal visual, verbal, and tactile cueing related to

## 2023-09-01 ENCOUNTER — HOSPITAL ENCOUNTER (OUTPATIENT)
Dept: PHYSICAL THERAPY | Age: 83
Setting detail: RECURRING SERIES
Discharge: HOME | End: 2023-09-04
Payer: MEDICARE

## 2023-09-01 PROCEDURE — 97110 THERAPEUTIC EXERCISES: CPT

## 2023-09-01 PROCEDURE — 97112 NEUROMUSCULAR REEDUCATION: CPT

## 2023-09-01 NOTE — PROGRESS NOTES
Donna Escobar  : 1940  Primary: Bisi Jo Ppo (Medicare Managed)  Secondary:  [address] [facility] [address]  Phone: [phone]  Fax: [phone] Plan Frequency: 2x/week for 4 weeks  Plan of Care/Certification Expiration Date: 23            >PT Visit Info:  Plan Frequency: 2x/week for 4 weeks  Plan of Care/Certification Expiration Date: 23  Total # of Visits to Date: 3  Progress Note Counter: 3      Visit Count: 3    OUTPATIENT PHYSICAL THERAPY:  OP NOTE TYPE: Treatment Note 2023       Episode (LBP) Appt Desk             Treatment Diagnoses:    Low back pain (M54.5)  Difficulty in walking, not elsewhere classified (R26.2)  Unsteadiness on feet (R26.81)  Muscle weakness (generalized) (M62.81)  Abnormal posture (R29.3)    Medical/Referring Diagnosis:   Acute bilateral low back pain without sciatica [M54.50]  Referring Physician: RAQUEL Jefferson CNP, MD Orders: PT Eval and Treat  Date of Onset: Onset Date: 23       Allergies: Patient has no known allergies. Restrictions/Precautions:  Restrictions/Precautions: Fall Risk  No data recorded       Interventions Planned (Treatment may consist of any combination of the following):    Current Treatment Recommendations: Strengthening; ROM; Endurance training; Gait training; Neuromuscular re-education; Manual; Home exercise program; Modalities; Therapeutic activities       >Subjective Comments: Sore for about a day after last visit, couldn't really move too much afterwards. >Initial:      (5-6)/10     >Post Session:       0/10    Medications Last Reviewed: 2023    Updated Objective Findings: N/A    Treatment     GAIT TRAINING: (0 minutes): Gait training to improve and/or restore physical functioning as related to mobility, strength, balance and coordination.  Ambulated with minimal visual, verbal, and tactile cueing related to stance phase,

## 2023-09-06 ENCOUNTER — HOSPITAL ENCOUNTER (OUTPATIENT)
Dept: PHYSICAL THERAPY | Age: 83
Setting detail: RECURRING SERIES
Discharge: HOME | End: 2023-09-09
Payer: MEDICARE

## 2023-09-06 PROCEDURE — 97112 NEUROMUSCULAR REEDUCATION: CPT

## 2023-09-06 PROCEDURE — 97110 THERAPEUTIC EXERCISES: CPT

## 2023-09-06 ASSESSMENT — PAIN SCALES - GENERAL: PAINLEVEL_OUTOF10: 0

## 2023-09-06 NOTE — PROGRESS NOTES
Lavern Escobar  : 1940  Primary: Clare Berrios Ppo (Medicare Managed)  Secondary:  [address] [facility]  [facility] [address]  Phone: [phone]  Fax: [phone] Plan Frequency: 2x/week for 4 weeks  Plan of Care/Certification Expiration Date: 23            >PT Visit Info:  Plan Frequency: 2x/week for 4 weeks  Plan of Care/Certification Expiration Date: 23  Total # of Visits to Date: 4  Progress Note Counter: 4      Visit Count: 4    OUTPATIENT PHYSICAL THERAPY:  OP NOTE TYPE: Treatment Note 2023       Episode (LBP) Appt Desk             Treatment Diagnoses:    Low back pain (M54.5)  Difficulty in walking, not elsewhere classified (R26.2)  Unsteadiness on feet (R26.81)  Muscle weakness (generalized) (M62.81)  Abnormal posture (R29.3)    Medical/Referring Diagnosis:   Acute bilateral low back pain without sciatica [M54.50]  Referring Physician: RAQUEL Al CNP, MD Orders: PT Eval and Treat  Date of Onset: Onset Date: 23       Allergies: Patient has no known allergies. Restrictions/Precautions:  Restrictions/Precautions: Fall Risk  No data recorded       Interventions Planned (Treatment may consist of any combination of the following):    Current Treatment Recommendations: Strengthening; ROM; Endurance training; Gait training; Neuromuscular re-education; Manual; Home exercise program; Modalities; Therapeutic activities       >Subjective Comments: Notes she's just been hurting. Limited on her subjective information. >Initial:     0/10     >Post Session:       0/10    Medications Last Reviewed: 2023    Updated Objective Findings: N/A    Treatment     GAIT TRAINING: (0 minutes): Gait training to improve and/or restore physical functioning as related to mobility, strength, balance and coordination.  Ambulated with minimal visual, verbal, and tactile cueing related to stance phase, stride length, push

## 2023-09-08 ENCOUNTER — HOSPITAL ENCOUNTER (OUTPATIENT)
Dept: PHYSICAL THERAPY | Age: 83
Setting detail: RECURRING SERIES
Discharge: HOME | End: 2023-09-11
Payer: MEDICARE

## 2023-09-08 PROCEDURE — 97112 NEUROMUSCULAR REEDUCATION: CPT

## 2023-09-08 PROCEDURE — 97110 THERAPEUTIC EXERCISES: CPT

## 2023-09-08 ASSESSMENT — PAIN SCALES - GENERAL: PAINLEVEL_OUTOF10: 0

## 2023-09-08 NOTE — PROGRESS NOTES
Jose Trippuggs  : 1940  Primary: Polly Lockhart Ppo (Medicare Managed)  Secondary:  [address] @ [address] [place] [address] Way  [address] [place] [address]  Phone: [phone]  Fax: [phone] Plan Frequency: 2x/week for 4 weeks  Plan of Care/Certification Expiration Date: 23            >PT Visit Info:  Plan Frequency: 2x/week for 4 weeks  Plan of Care/Certification Expiration Date: 23  Total # of Visits to Date: 5  Progress Note Counter: 5      Visit Count: 5    OUTPATIENT PHYSICAL THERAPY:  OP NOTE TYPE: Treatment Note 2023       Episode (LBP) Appt Desk             Treatment Diagnoses:    Low back pain (M54.5)  Difficulty in walking, not elsewhere classified (R26.2)  Unsteadiness on feet (R26.81)  Muscle weakness (generalized) (M62.81)  Abnormal posture (R29.3)    Medical/Referring Diagnosis:   Acute bilateral low back pain without sciatica [M54.50]  Referring Physician: RAQUEL Alfred CNP, MD Orders: PT Eval and Treat  Date of Onset: Onset Date: 23       Allergies: Patient has no known allergies. Restrictions/Precautions:  Restrictions/Precautions: Fall Risk  No data recorded       Interventions Planned (Treatment may consist of any combination of the following):    Current Treatment Recommendations: Strengthening; ROM; Endurance training; Gait training; Neuromuscular re-education; Manual; Home exercise program; Modalities; Therapeutic activities       >Subjective Comments: feels shaky this morning, she did eat breakfast (slice of pound cake) and coffee.     >Initial:     0/10       >Post Session:        (none stated but feeling shaky still)/10    Medications Last Reviewed: 2023    Updated Objective Findings:   Blood pressure taken first thing in sitting: right upper arm - 128/69 mmHg    Treatment     GAIT TRAINING: (0 minutes): Gait training to improve and/or restore physical functioning as related to mobility, strength,

## 2023-09-13 ENCOUNTER — HOSPITAL ENCOUNTER (OUTPATIENT)
Dept: PHYSICAL THERAPY | Age: 83
Setting detail: RECURRING SERIES
End: 2023-09-13
Payer: MEDICARE

## 2023-09-13 NOTE — PROGRESS NOTES
Trinh Escobar  : 1940  Primary: Dipti Segura Ppo  Secondary:  [address] @ [address] [address]  Phone: [phone]  Fax: [phone] Plan Frequency: 2x/week for 4 weeks    Plan of Care/Certification Expiration Date: 23      PT Visit Info: Total # of Visits to Date: 5  Progress Note Counter: 5  Canceled Appointment: 1         OUTPATIENT PHYSICAL THERAPY 2023     Appt Desk   Episode   MyChart      Ms. Escobar was a same-day cancellation for today's appointment due to not feeling well.      Cancellation Number: 1       Elliot Chakraborty, PT, DPT    Future Appointments   Date Time Provider [address]   2023 10:00 AM Elliot Chakraborty PT [facility]   9/15/2023 10:00 AM Elliot Chakraborty PT [facility]   2023 10:00 AM Elliot Chakraborty PT [facility]   2023 10:00 AM Elliot Chakraborty, PT [facility]   2023 11:00 AM Maria Del Carmen García, DO [facility]

## 2023-09-15 ENCOUNTER — HOSPITAL ENCOUNTER (OUTPATIENT)
Dept: PHYSICAL THERAPY | Age: 83
Setting detail: RECURRING SERIES
Discharge: HOME | End: 2023-09-18
Payer: MEDICARE

## 2023-09-15 PROCEDURE — 97140 MANUAL THERAPY 1/> REGIONS: CPT

## 2023-09-15 PROCEDURE — 97110 THERAPEUTIC EXERCISES: CPT

## 2023-09-15 ASSESSMENT — PAIN SCALES - GENERAL: PAINLEVEL_OUTOF10: 10

## 2023-09-15 NOTE — PROGRESS NOTES
B HHA 2\" 2x10 B, B HHA   Rows in // bars     1x20, red tubing >>>   B Shld Ext in // bars     1x20, red tubing >>>     HEP   [facility] Access Code: [email or web address]  Exercises: lying prone, supine glute set, heel slide with sheet, STS with hands at thighs  9/6: s/l clams, SKTC stretch, bridges, hooklying marches    Treatment/Session Summary:    >Treatment Assessment: Trialed manual tx today to reduce mms tightness and improve pain, no changes in pain following. Pt noting back pain stays intense and sore. Cont with exercises to her tolerance, she has dec O2 sat during session with quick return to [facility] [address] [facility]. Sx of pain improved with standing exercises. Discussed pt moving up her next MD appt to inquire about ruling out kidney stones / involvement as she has a hx of this. Communication/Consultation: PT encouraged patient to performing HEP consistently. Equipment provided: None  Recommendations/Intent for next treatment session: Next visit will focus on advancements to more challenging activities. Progress repetitions, weight, and complexity of functional movement per pt tolerance and as indicated. Progress note next visit.     >Total Treatment Billable Duration: 44 minutes  Time In: 1000  Time Out: 1045    Erik Olguin, PT, DPT    Charge Capture  Post Session Pain  PT Visit Info  [facility] Portal  MD Guidelines  Scanned Media  Benefits  MyChart    Future Appointments   Date Time Provider [id number]  [facility]   9/20/2023 10:00 AM Erik Olguin [facility]   9/22/2023 10:00 AM Erik Olguin PT [facility]   11/27/2023 11:00 AM Simi García, DO [facility]

## 2023-09-18 ENCOUNTER — HOSPITAL ENCOUNTER (OUTPATIENT)
Dept: CT IMAGING | Age: 83
Discharge: HOME | End: 2023-09-21
Payer: MEDICARE

## 2023-09-18 ENCOUNTER — OFFICE VISIT (OUTPATIENT)
Dept: INTERNAL MEDICINE CLINIC | Facility: CLINIC | Age: 83
End: 2023-09-18
Payer: MEDICARE

## 2023-09-18 VITALS
OXYGEN SATURATION: 97 % | SYSTOLIC BLOOD PRESSURE: 120 MMHG | BODY MASS INDEX: 35.87 KG/M2 | WEIGHT: 190 LBS | HEIGHT: 61 IN | HEART RATE: 72 BPM | TEMPERATURE: 98 F | DIASTOLIC BLOOD PRESSURE: 78 MMHG

## 2023-09-18 DIAGNOSIS — R31.29 OTHER MICROSCOPIC HEMATURIA: ICD-10-CM

## 2023-09-18 DIAGNOSIS — M54.50 ACUTE BILATERAL LOW BACK PAIN WITHOUT SCIATICA: Primary | ICD-10-CM

## 2023-09-18 DIAGNOSIS — R31.29 OTHER MICROSCOPIC HEMATURIA: Primary | ICD-10-CM

## 2023-09-18 LAB
BILIRUBIN, URINE, POC: NEGATIVE
BLOOD URINE, POC: ABNORMAL
GLUCOSE URINE, POC: NEGATIVE
KETONES, URINE, POC: NEGATIVE
LEUKOCYTE ESTERASE, URINE, POC: NEGATIVE
NITRITE, URINE, POC: NEGATIVE
PH, URINE, POC: 5.5 (ref 4.6–8)
PROTEIN,URINE, POC: ABNORMAL
SPECIFIC GRAVITY, URINE, POC: 1.03 (ref 1–1.03)
URINALYSIS CLARITY, POC: ABNORMAL
URINALYSIS COLOR, POC: YELLOW
UROBILINOGEN, POC: ABNORMAL

## 2023-09-18 PROCEDURE — 3078F DIAST BP <80 MM HG: CPT | Performed by: NURSE PRACTITIONER

## 2023-09-18 PROCEDURE — 99214 OFFICE O/P EST MOD 30 MIN: CPT | Performed by: NURSE PRACTITIONER

## 2023-09-18 PROCEDURE — 3074F SYST BP LT 130 MM HG: CPT | Performed by: NURSE PRACTITIONER

## 2023-09-18 PROCEDURE — 1123F ACP DISCUSS/DSCN MKR DOCD: CPT | Performed by: NURSE PRACTITIONER

## 2023-09-18 PROCEDURE — 81003 URINALYSIS AUTO W/O SCOPE: CPT | Performed by: NURSE PRACTITIONER

## 2023-09-18 PROCEDURE — 74176 CT ABD & PELVIS W/O CONTRAST: CPT

## 2023-09-18 RX ORDER — POTASSIUM CHLORIDE 750 MG/1
10 TABLET, EXTENDED RELEASE ORAL DAILY
Qty: 90 TABLET | Refills: 1 | Status: CANCELLED | OUTPATIENT
Start: 2023-09-18

## 2023-09-18 RX ORDER — ROSUVASTATIN CALCIUM 10 MG/1
TABLET, COATED ORAL
Qty: 90 TABLET | Refills: 1 | Status: CANCELLED | OUTPATIENT
Start: 2023-09-18

## 2023-09-18 RX ORDER — ACETAMINOPHEN 500 MG
500 TABLET ORAL EVERY 6 HOURS PRN
COMMUNITY

## 2023-09-18 RX ORDER — AMLODIPINE BESYLATE 5 MG/1
5 TABLET ORAL DAILY
Qty: 90 TABLET | Refills: 1 | Status: CANCELLED | OUTPATIENT
Start: 2023-09-18

## 2023-09-18 RX ORDER — FUROSEMIDE 20 MG/1
20 TABLET ORAL DAILY
Qty: 90 TABLET | Refills: 1 | Status: CANCELLED | OUTPATIENT
Start: 2023-09-18

## 2023-09-18 RX ORDER — LOSARTAN POTASSIUM AND HYDROCHLOROTHIAZIDE 12.5; 1 MG/1; MG/1
1 TABLET ORAL DAILY
Qty: 90 TABLET | Refills: 1 | Status: CANCELLED | OUTPATIENT
Start: 2023-09-18

## 2023-09-18 ASSESSMENT — ENCOUNTER SYMPTOMS: BACK PAIN: 1

## 2023-09-18 NOTE — PROGRESS NOTES
Sveta Dutton (:  1940) is a 82 y.o. female,Established patient, here for evaluation of the following chief complaint(s):  Back Pain         ASSESSMENT/PLAN:  1. Acute bilateral low back pain without sciatica  -     AMB POC URINALYSIS DIP STICK AUTO W/O MICRO  2. Other microscopic hematuria  -     CT KIDNEY WO CONTRAST; Future      No follow-ups on file. Still with trace hematuria  Low back pain, no pain with CVA palpation  Still hx of stone and concern for stone, get ct stone protocol  Refer urology for further work up of hematuria  No oxygen significant change with exercise, 95-98% with ambulating in office  No shortness of breath, encourage to continue PT for strength, balance, and back pain        Subjective   SUBJECTIVE/OBJECTIVE:  Patient is here for follow up. No changes in pain in back. She has been going to PT 2x a week. On Friday she had some lower oxygen level with exercise, 90%, but recovered   The back pain is in middle and left lower back. Back Pain  The current episode started more than 1 month ago. The problem is unchanged. The pain is present in the lumbar spine. The pain does not radiate. Review of Systems   Musculoskeletal:  Positive for back pain. Objective   Physical Exam  Vitals reviewed. Constitutional:       Appearance: Normal appearance. She is obese. HENT:      Head: Normocephalic. Right Ear: External ear normal.      Left Ear: External ear normal.   Eyes:      Extraocular Movements: Extraocular movements intact. Pupils: Pupils are equal, round, and reactive to light. Cardiovascular:      Rate and Rhythm: Normal rate and regular rhythm. Pulmonary:      Effort: Pulmonary effort is normal.   Abdominal:      Palpations: Abdomen is soft. Tenderness: There is no right CVA tenderness or left CVA tenderness. Musculoskeletal:      Cervical back: Neck supple. Lumbar back: Tenderness present. No bony tenderness.  Decreased range of

## 2023-09-20 ENCOUNTER — HOSPITAL ENCOUNTER (OUTPATIENT)
Dept: PHYSICAL THERAPY | Age: 83
Setting detail: RECURRING SERIES
Discharge: HOME | End: 2023-09-23
Payer: MEDICARE

## 2023-09-20 PROCEDURE — 97110 THERAPEUTIC EXERCISES: CPT

## 2023-09-20 ASSESSMENT — PAIN SCALES - GENERAL: PAINLEVEL_OUTOF10: 0

## 2023-09-20 NOTE — PROGRESS NOTES
mat table, tactile cues at glutes       Clamshells Hooklying: 2x10, simultaneous    S/L: reg 2x10 B Hooklying: 2x12, simultaneous    S/L: reg 2x10 B      Marching Hooklying: 4x5, cues to inc height of lift      Standing in // bars: 4x5, B HHA Hooklying: 3x6, cues to inc height of lift      Standing in // bars: 4x5, B HHA - asynchronously         Standing in // bars: 2x10, B HHA - asynchronously         Standing in // bars: 2x10, B HHA - asynchronously         2x10, B HHA - alternating LEs   SKTC - supine 3x30\"H B 3x30\"H B      Piriformis stretch - supine with therapist manually performing  3x30\"H B      Hamstring Stretch - manually performed   4x30\"H B     Hip Abd - Std 2x10 B, B HHA 2x12 B, B HHA 2x12 B, B HHA 2x12 B, B HHA 3x10 B, B HHA   Hip Ext - Std 2x10 B, B HHA 2x12 B, B HHA 2x12 B, B HHA 2x12 B, B HHA 3x10 B, B HHA   Side Stepping in // bars 3 laps, 1 HHA 3 laps, 1 HHA 1 lap then no HHA 2+3rd >>>  3 laps, B HHA RTB at knees   HSC Std: 1x15, B HHA Std: 1x15, B HHA Std: 1x20, B HHA     Fwd Step Ups   2\" 2x10 B, B HHA 2\" 2x10 B, B HHA 2\" 2x12 B, B HHA   Lat Step Ups   2\" 1x10 B, B HHA 2\" 2x10 B, B HHA 2\" 2x10 B, B HHA   Rows in // bars   1x20, red tubing >>> 2x15, red tubing   B Shld Ext in // bars   1x20, red tubing >>> 2x15, red tubing   Double Leg Press     2x10 40# - emphasis on controlled lowering  Seat at 10             HEP   [employer] Access Code: [email or web address]  Exercises: lying prone, supine glute set, heel slide with sheet, STS with hands at thighs  9/6: s/l clams, SKTC stretch, bridges, hooklying marches 9/20: std hip abd + ext; std marching; side stepping RTB at knees    Treatment/Session Summary:    >Treatment Assessment: Demonstrating improved energy levels today with less difficulty noted when performing standing hip abd/ext and step ups. Able to perform leg press today with seat all the way back due to limited knee flex ROM.   Communication/Consultation: PT encouraged patient to performing HEP

## 2023-09-21 LAB
BACTERIA SPEC CULT: NORMAL
SERVICE CMNT-IMP: NORMAL

## 2023-09-22 ENCOUNTER — APPOINTMENT (OUTPATIENT)
Dept: PHYSICAL THERAPY | Age: 83
End: 2023-09-22
Payer: MEDICARE

## 2023-10-15 ENCOUNTER — HOSPITAL ENCOUNTER (OUTPATIENT)
Age: 83
Setting detail: OBSERVATION
LOS: 1 days | Discharge: HOME | End: 2023-10-18
Attending: STUDENT IN AN ORGANIZED HEALTH CARE EDUCATION/TRAINING PROGRAM | Admitting: INTERNAL MEDICINE
Payer: MEDICARE

## 2023-10-15 DIAGNOSIS — I26.99 PULMONARY EMBOLISM WITHOUT ACUTE COR PULMONALE, UNSPECIFIED CHRONICITY, UNSPECIFIED PULMONARY EMBOLISM TYPE (HCC): Primary | ICD-10-CM

## 2023-10-15 PROBLEM — R60.0 BILATERAL LEG EDEMA: Status: ACTIVE | Noted: 2017-04-26

## 2023-10-15 PROBLEM — R73.01 IFG (IMPAIRED FASTING GLUCOSE): Status: ACTIVE | Noted: 2017-04-26

## 2023-10-15 PROBLEM — E66.01 MORBID OBESITY (HCC): Status: ACTIVE | Noted: 2017-10-26

## 2023-10-15 PROCEDURE — 6370000000 HC RX 637 (ALT 250 FOR IP): Performed by: INTERNAL MEDICINE

## 2023-10-15 PROCEDURE — G0378 HOSPITAL OBSERVATION PER HR: HCPCS

## 2023-10-15 PROCEDURE — 2580000003 HC RX 258: Performed by: INTERNAL MEDICINE

## 2023-10-15 RX ORDER — ONDANSETRON 2 MG/ML
4 INJECTION INTRAMUSCULAR; INTRAVENOUS EVERY 6 HOURS PRN
Status: DISCONTINUED | OUTPATIENT
Start: 2023-10-15 | End: 2023-10-18 | Stop reason: HOSPADM

## 2023-10-15 RX ORDER — AMLODIPINE BESYLATE 5 MG/1
5 TABLET ORAL DAILY
Status: DISCONTINUED | OUTPATIENT
Start: 2023-10-15 | End: 2023-10-18 | Stop reason: HOSPADM

## 2023-10-15 RX ORDER — SODIUM CHLORIDE 0.9 % (FLUSH) 0.9 %
5-40 SYRINGE (ML) INJECTION EVERY 12 HOURS SCHEDULED
Status: DISCONTINUED | OUTPATIENT
Start: 2023-10-15 | End: 2023-10-18 | Stop reason: HOSPADM

## 2023-10-15 RX ORDER — POTASSIUM CHLORIDE 20 MEQ/1
10 TABLET, EXTENDED RELEASE ORAL DAILY
Status: DISCONTINUED | OUTPATIENT
Start: 2023-10-16 | End: 2023-10-18 | Stop reason: HOSPADM

## 2023-10-15 RX ORDER — ACETAMINOPHEN 325 MG/1
650 TABLET ORAL EVERY 6 HOURS PRN
Status: DISCONTINUED | OUTPATIENT
Start: 2023-10-15 | End: 2023-10-18 | Stop reason: HOSPADM

## 2023-10-15 RX ORDER — HYDROCHLOROTHIAZIDE 25 MG/1
12.5 TABLET ORAL DAILY
Status: DISCONTINUED | OUTPATIENT
Start: 2023-10-16 | End: 2023-10-18 | Stop reason: HOSPADM

## 2023-10-15 RX ORDER — CETIRIZINE HYDROCHLORIDE 10 MG/1
10 TABLET ORAL DAILY PRN
Status: DISCONTINUED | OUTPATIENT
Start: 2023-10-15 | End: 2023-10-18 | Stop reason: HOSPADM

## 2023-10-15 RX ORDER — ROSUVASTATIN CALCIUM 10 MG/1
10 TABLET, COATED ORAL NIGHTLY
Status: DISCONTINUED | OUTPATIENT
Start: 2023-10-15 | End: 2023-10-18 | Stop reason: HOSPADM

## 2023-10-15 RX ORDER — POLYETHYLENE GLYCOL 3350 17 G/17G
17 POWDER, FOR SOLUTION ORAL DAILY PRN
Status: DISCONTINUED | OUTPATIENT
Start: 2023-10-15 | End: 2023-10-18 | Stop reason: HOSPADM

## 2023-10-15 RX ORDER — LOSARTAN POTASSIUM 50 MG/1
100 TABLET ORAL DAILY
Status: DISCONTINUED | OUTPATIENT
Start: 2023-10-16 | End: 2023-10-18 | Stop reason: HOSPADM

## 2023-10-15 RX ORDER — FUROSEMIDE 20 MG/1
20 TABLET ORAL DAILY
Status: DISCONTINUED | OUTPATIENT
Start: 2023-10-16 | End: 2023-10-18 | Stop reason: HOSPADM

## 2023-10-15 RX ORDER — LOSARTAN POTASSIUM AND HYDROCHLOROTHIAZIDE 12.5; 1 MG/1; MG/1
1 TABLET ORAL DAILY
Status: DISCONTINUED | OUTPATIENT
Start: 2023-10-15 | End: 2023-10-15 | Stop reason: CLARIF

## 2023-10-15 RX ORDER — ONDANSETRON 4 MG/1
4 TABLET, ORALLY DISINTEGRATING ORAL EVERY 8 HOURS PRN
Status: DISCONTINUED | OUTPATIENT
Start: 2023-10-15 | End: 2023-10-18 | Stop reason: HOSPADM

## 2023-10-15 RX ORDER — ACETAMINOPHEN 650 MG/1
650 SUPPOSITORY RECTAL EVERY 6 HOURS PRN
Status: DISCONTINUED | OUTPATIENT
Start: 2023-10-15 | End: 2023-10-18 | Stop reason: HOSPADM

## 2023-10-15 RX ORDER — SODIUM CHLORIDE 0.9 % (FLUSH) 0.9 %
5-40 SYRINGE (ML) INJECTION PRN
Status: DISCONTINUED | OUTPATIENT
Start: 2023-10-15 | End: 2023-10-18 | Stop reason: HOSPADM

## 2023-10-15 RX ORDER — SODIUM CHLORIDE 9 MG/ML
INJECTION, SOLUTION INTRAVENOUS PRN
Status: DISCONTINUED | OUTPATIENT
Start: 2023-10-15 | End: 2023-10-18 | Stop reason: HOSPADM

## 2023-10-15 RX ADMIN — AMLODIPINE BESYLATE 5 MG: 5 TABLET ORAL at 15:27

## 2023-10-15 RX ADMIN — APIXABAN 10 MG: 5 TABLET, FILM COATED ORAL at 20:55

## 2023-10-15 RX ADMIN — ROSUVASTATIN 10 MG: 10 TABLET, FILM COATED ORAL at 20:55

## 2023-10-15 RX ADMIN — APIXABAN 10 MG: 5 TABLET, FILM COATED ORAL at 15:28

## 2023-10-15 RX ADMIN — SODIUM CHLORIDE, PRESERVATIVE FREE 10 ML: 5 INJECTION INTRAVENOUS at 20:58

## 2023-10-15 ASSESSMENT — PAIN SCALES - GENERAL: PAINLEVEL_OUTOF10: 0

## 2023-10-15 NOTE — PROGRESS NOTES
If you need to see a   -  just ask the nurse to call us. We look forward to serving your family!!         Amador Adrian

## 2023-10-15 NOTE — CARE COORDINATION
Pt chart reviewed for discharge planning. CM met with pt and family at bedside, verified demographic information/ health insurance. Pt lives with family in one level home, is independent with ADLs, ambulates with a cane or walker when needed, and drives . PCP was confirmed, last seen in the office three weeks ago. Pt reports no outside services in the home at this time. CM will follow pt plan of care and assist with supportive care referrals pending pt clinical progress. Please consult case management if specific needs arise. 10/15/23 6695   Service Assessment   Patient Orientation Alert and Oriented   Cognition Alert   History Provided By Patient   Primary [facility]   Patient's Healthcare Decision Maker is: Legal Next of Kin   PCP Verified by CM Yes   Last Visit to PCP Within last 3 months   Prior Functional Level Independent in ADLs/IADLs   Current Functional Level Independent in ADLs/IADLs   Can patient return to prior living arrangement Yes   Ability to make needs known: Good   Family able to assist with home care needs: Yes   Would you like for me to discuss the discharge plan with any other family members/significant others, and if so, who? No   Financial Resources Medicare   Community Resources None   Social/Functional History   Lives With Family   Type of [address] One level   Home Equipment Cane;Walker, standard   Receives Help From Family   ADL Assistance Independent   Ambulation Assistance Independent   Active  Yes   Occupation Retired   Discharge Planning   Type of [address] Prior To Admission None   Potential Assistance Needed N/A   DME Ordered?  Cane;Walker   Potential Assistance Purchasing Medications No   Type of Home Care Services None   Patient expects to be discharged to: House   Follow Up Appointment: Best Day/Time  Wednesday AM   One/Two Story Residence One story   History of falls? 0   Condition of Participation: Discharge Planning   The Plan for Transition of Care is related to the following treatment goals: Pt will return home at discharge.

## 2023-10-15 NOTE — H&P
unspecified chronicity, unspecified pulmonary embolism type [facility])  Patient currently not tachycardic. She is saturating 94-98% on room air on my exam.  However, she does have history of dyspnea on exertion that has been worsening.  -Start Eliquis with initiation dosing and transition to maintenance dosing  -Monitor off oxygen  -Follow-up echo    Hypertension  No documented vitals as of now. We will continue with home medications and monitor  -Continue with amlodipine   -Continue with losartan/hydrochlorothiazide 100 mg / 12.5 mg daily    Lower extremity edema  Patient on Lasix. We will continue home dosing for now with potassium    Hyperlipidemia  Continue with Crestor    Obesity  BMI noted to be 36.5. Patient counseled regarding weight loss. Complicates all aspects of care. PT/OT evals and PPD needed/ordered? No  Diet: ADULT DIET; Regular  VTE prophylaxis: Already on anticoagulation  Code status: DNR      Non-peripheral Lines and Tubes (if present):             Hospital Problems:  Principal Problem:    Pulmonary embolism without acute cor pulmonale, unspecified chronicity, unspecified pulmonary embolism type ([address])  Active Problems:    Mixed hyperlipidemia    Benign essential hypertension    Bilateral leg edema    IFG (impaired fasting glucose)    Morbid obesity ([address])  Resolved Problems:    * No resolved hospital problems.  *      Past History:     Past Medical History:   Diagnosis Date    Benign essential hypertension 2/11/2015    Bilateral leg edema 4/26/2017    Cancer ([address])     skin    Hypercholesteremia 2/11/2015    IFG (impaired fasting glucose) 4/26/2017    Iron deficiency anemia 5/12/2016    Mixed hyperlipidemia 2/11/2015    Morbid obesity ([address]) 10/26/2017    Nipple discharge     not current as of 7/17/13    Primary insomnia 4/26/2018    Primary osteoarthritis of both knees 4/26/2018    Stage 3 chronic kidney disease ([address]) 7/17/2019       Past Surgical History:   Procedure Laterality Date MALIGNANT SKIN LESION EXCISION      OTHER SURGICAL HISTORY      skin cancer        Social History     Tobacco Use    Smoking status: Never    Smokeless tobacco: Former    Tobacco comments:     Quit smoking: dip snuff   Substance Use Topics    Alcohol use: No     Alcohol/week: 0.0 standard drinks of alcohol      Social History     Substance and Sexual Activity   Drug Use No       Family History   Problem Relation Age of Onset    No Known Problems Sister     Ovarian Cancer Neg Hx     Cancer Sister         lung    Breast Cancer Neg Hx     Hypertension Mother     Arrhythmia Mother     Cancer Sister     Cancer Brother         lung    Cancer Father         Immunization History   Administered Date(s) Administered    Influenza Trivalent 10/01/2015    Influenza Virus Vaccine 11/21/2008, 11/12/2014, 10/01/2015    Influenza, FLUAD, (age 72 y+), Adjuvanted, 0.5mL 09/22/2020, 12/08/2022    Influenza, High Dose (Fluzone 65 yrs and older) 11/03/2015, 11/07/2016, 10/26/2017, 10/25/2018    Influenza, Triv, inactivated, subunit, adjuvanted, IM (Fluad 65 yrs and older) 10/18/2019    Pneumococcal, PCV-13, PREVNAR 15, (age 6w+), IM, 0.5mL 05/27/2015    Pneumococcal, PPSV23, PNEUMOVAX 21, (age 2y+), SC/IM, 0.5mL 10/01/2015, 10/26/2017    Td vaccine (adult) 11/21/2008     No Known Allergies  Prior to Admit Medications:  Current Outpatient Medications   Medication Instructions    acetaminophen (TYLENOL) 500 mg, Oral, EVERY 6 HOURS PRN    amLODIPine (NORVASC) 5 mg, Oral, DAILY, TAKE 1 TABLET DAILY    cetirizine (ZYRTEC) 10 mg, Oral, DAILY PRN    diclofenac sodium (VOLTAREN) 4 g, Topical, 4 TIMES DAILY    furosemide (LASIX) 20 mg, Oral, DAILY    losartan-hydroCHLOROthiazide (HYZAAR) 100-12.5 MG per tablet 1 tablet, Oral, DAILY, TAKE 1 TABLET DAILY    potassium chloride (KLOR-CON M) 10 MEQ extended release tablet 10 mEq, Oral, DAILY    rosuvastatin (CRESTOR) 10 MG tablet TAKE 1 TABLET DAILY    vitamin D (CHOLECALCIFEROL) 25 MCG (1000 UT) TABS

## 2023-10-15 NOTE — PROGRESS NOTES
Pt arrived to room 605 via EMS, walked to bed with assist X 1. On 2L oxygen with O2 sats of 98%.  Hospitialist notified of patient's arrival. 199.9

## 2023-10-15 NOTE — ACP (ADVANCE CARE PLANNING)
[facility] Hospitalist Service  At the heart of better care     Advance Care Planning   Admit Date:  10/15/2023 12:52 PM   Name:  Katelyn Wolf   Age:  82 y.o. Sex:  female  :  1940   MRN:  [id number]   Room:  [facility] [address] has capacity to make her own decisions:   Yes    If pt unable to make decisions, POA/surrogate decision maker:  Daughter    Other people present:   None and Daughter    Patient / surrogate decision-maker directed code status:  DNR/DNI    Other ACP topics discussed, if applicable:   None    Patient or surrogate consented to discussion of the current conditions, workup, management plans, prognosis, and the risk for further deterioration. Time spent: 16 minutes in direct discussion.       Signed:  Eliana Thomas MD

## 2023-10-15 NOTE — PLAN OF CARE
Problem: Discharge Planning  Goal: Discharge to home or other facility with appropriate resources  Outcome: Progressing  Flowsheets (Taken 10/15/2023 2164 by Markus Clifford RN)  Discharge to home or other facility with appropriate resources: Identify barriers to discharge with patient and caregiver     Problem: ABCDS Injury Assessment  Goal: Absence of physical injury  Outcome: Progressing     Problem: Safety - Adult  Goal: Free from fall injury  Outcome: Progressing

## 2023-10-16 ENCOUNTER — APPOINTMENT (OUTPATIENT)
Dept: ULTRASOUND IMAGING | Age: 83
End: 2023-10-16
Attending: STUDENT IN AN ORGANIZED HEALTH CARE EDUCATION/TRAINING PROGRAM
Payer: MEDICARE

## 2023-10-16 ENCOUNTER — APPOINTMENT (OUTPATIENT)
Dept: NON INVASIVE DIAGNOSTICS | Age: 83
End: 2023-10-16
Attending: INTERNAL MEDICINE
Payer: MEDICARE

## 2023-10-16 LAB
ANION GAP SERPL CALC-SCNC: 5 MMOL/L (ref 2–11)
APPEARANCE UR: ABNORMAL
BACTERIA URNS QL MICRO: ABNORMAL /HPF
BASOPHILS # BLD: 0 K/UL (ref 0–0.2)
BASOPHILS NFR BLD: 0 % (ref 0–2)
BILIRUB UR QL: ABNORMAL
BUN SERPL-MCNC: 10 MG/DL (ref 8–23)
CALCIUM SERPL-MCNC: 9.3 MG/DL (ref 8.3–10.4)
CASTS URNS QL MICRO: 0 /LPF
CHLORIDE SERPL-SCNC: 109 MMOL/L (ref 101–110)
CO2 SERPL-SCNC: 31 MMOL/L (ref 21–32)
COLOR UR: ABNORMAL
CREAT SERPL-MCNC: 0.9 MG/DL (ref 0.6–1)
CRYSTALS URNS QL MICRO: 0 /LPF
DIFFERENTIAL METHOD BLD: ABNORMAL
ECHO AO ASC DIAM: 3.6 CM
ECHO AO ASCENDING AORTA INDEX: 1.86 CM/M2
ECHO AO ROOT DIAM: 3.7 CM
ECHO AO ROOT INDEX: 1.91 CM/M2
ECHO AV AREA PEAK VELOCITY: 2.5 CM2
ECHO AV AREA VTI: 2.9 CM2
ECHO AV AREA/BSA PEAK VELOCITY: 1.3 CM2/M2
ECHO AV AREA/BSA VTI: 1.5 CM2/M2
ECHO AV MEAN GRADIENT: 6 MMHG
ECHO AV MEAN VELOCITY: 1.2 M/S
ECHO AV PEAK GRADIENT: 13 MMHG
ECHO AV PEAK VELOCITY: 1.8 M/S
ECHO AV VELOCITY RATIO: 0.72
ECHO AV VTI: 38.8 CM
ECHO BSA: 1.99 M2
ECHO EST RA PRESSURE: 8 MMHG
ECHO IVC PROX: 2.4 CM
ECHO LA AREA 2C: 23.8 CM2
ECHO LA AREA 4C: 23.5 CM2
ECHO LA DIAMETER INDEX: 1.55 CM/M2
ECHO LA DIAMETER: 3 CM
ECHO LA MAJOR AXIS: 7.5 CM
ECHO LA MINOR AXIS: 7.1 CM
ECHO LA TO AORTIC ROOT RATIO: 0.81
ECHO LA VOL 2C: 67 ML (ref 22–52)
ECHO LA VOL 4C: 58 ML (ref 22–52)
ECHO LA VOL BP: 64 ML (ref 22–52)
ECHO LA VOL/BSA BIPLANE: 33 ML/M2 (ref 16–34)
ECHO LA VOLUME INDEX A2C: 35 ML/M2 (ref 16–34)
ECHO LA VOLUME INDEX A4C: 30 ML/M2 (ref 16–34)
ECHO LV E' LATERAL VELOCITY: 11 CM/S
ECHO LV E' SEPTAL VELOCITY: 7 CM/S
ECHO LV FRACTIONAL SHORTENING: 33 % (ref 28–44)
ECHO LV INTERNAL DIMENSION DIASTOLE INDEX: 2.22 CM/M2
ECHO LV INTERNAL DIMENSION DIASTOLIC: 4.3 CM (ref 3.9–5.3)
ECHO LV INTERNAL DIMENSION SYSTOLIC INDEX: 1.49 CM/M2
ECHO LV INTERNAL DIMENSION SYSTOLIC: 2.9 CM
ECHO LV IVSD: 0.9 CM (ref 0.6–0.9)
ECHO LV MASS 2D: 114.2 G (ref 67–162)
ECHO LV MASS INDEX 2D: 58.8 G/M2 (ref 43–95)
ECHO LV POSTERIOR WALL DIASTOLIC: 0.8 CM (ref 0.6–0.9)
ECHO LV RELATIVE WALL THICKNESS RATIO: 0.37
ECHO LVOT AREA: 3.5 CM2
ECHO LVOT AV VTI INDEX: 0.83
ECHO LVOT DIAM: 2.1 CM
ECHO LVOT MEAN GRADIENT: 4 MMHG
ECHO LVOT PEAK GRADIENT: 7 MMHG
ECHO LVOT PEAK VELOCITY: 1.3 M/S
ECHO LVOT STROKE VOLUME INDEX: 57.5 ML/M2
ECHO LVOT SV: 111.5 ML
ECHO LVOT VTI: 32.2 CM
ECHO MV A VELOCITY: 1.29 M/S
ECHO MV AREA VTI: 2.9 CM2
ECHO MV E DECELERATION TIME (DT): 172 MS
ECHO MV E VELOCITY: 1.31 M/S
ECHO MV E/A RATIO: 1.02
ECHO MV E/E' LATERAL: 11.91
ECHO MV E/E' RATIO (AVERAGED): 15.31
ECHO MV E/E' SEPTAL: 18.71
ECHO MV LVOT VTI INDEX: 1.19
ECHO MV MAX VELOCITY: 1.3 M/S
ECHO MV MEAN GRADIENT: 3 MMHG
ECHO MV MEAN VELOCITY: 0.9 M/S
ECHO MV PEAK GRADIENT: 7 MMHG
ECHO MV VTI: 38.2 CM
ECHO PV ACCELERATION TIME (AT): 55 MS
ECHO PV MAX VELOCITY: 1.1 M/S
ECHO PV PEAK GRADIENT: 5 MMHG
ECHO RIGHT VENTRICULAR SYSTOLIC PRESSURE (RVSP): 53 MMHG
ECHO RV BASAL DIMENSION: 5 CM
ECHO RV FREE WALL PEAK S': 19 CM/S
ECHO RV INTERNAL DIMENSION: 3.3 CM
ECHO RV LONGITUDINAL DIMENSION: 7.6 CM
ECHO RV MID DIMENSION: 2.9 CM
ECHO RV TAPSE: 3 CM (ref 1.7–?)
ECHO TV REGURGITANT MAX VELOCITY: 3.35 M/S
ECHO TV REGURGITANT PEAK GRADIENT: 45 MMHG
EOSINOPHIL # BLD: 0.1 K/UL (ref 0–0.8)
EOSINOPHIL NFR BLD: 2 % (ref 0.5–7.8)
EPI CELLS #/AREA URNS HPF: ABNORMAL /HPF
ERYTHROCYTE [DISTWIDTH] IN BLOOD BY AUTOMATED COUNT: 13.2 % (ref 11.9–14.6)
GLUCOSE SERPL-MCNC: 99 MG/DL (ref 65–100)
GLUCOSE UR STRIP.AUTO-MCNC: NEGATIVE MG/DL
HCT VFR BLD AUTO: 32.6 % (ref 35.8–46.3)
HGB BLD-MCNC: 10.2 G/DL (ref 11.7–15.4)
HGB UR QL STRIP: ABNORMAL
IMM GRANULOCYTES # BLD AUTO: 0 K/UL (ref 0–0.5)
IMM GRANULOCYTES NFR BLD AUTO: 1 % (ref 0–5)
KETONES UR QL STRIP.AUTO: NEGATIVE MG/DL
LEUKOCYTE ESTERASE UR QL STRIP.AUTO: ABNORMAL
LYMPHOCYTES # BLD: 1.3 K/UL (ref 0.5–4.6)
LYMPHOCYTES NFR BLD: 30 % (ref 13–44)
MCH RBC QN AUTO: 32.6 PG (ref 26.1–32.9)
MCHC RBC AUTO-ENTMCNC: 31.3 G/DL (ref 31.4–35)
MCV RBC AUTO: 104.2 FL (ref 82–102)
MONOCYTES # BLD: 0.4 K/UL (ref 0.1–1.3)
MONOCYTES NFR BLD: 9 % (ref 4–12)
MUCOUS THREADS URNS QL MICRO: 0 /LPF
NEUTS SEG # BLD: 2.5 K/UL (ref 1.7–8.2)
NEUTS SEG NFR BLD: 58 % (ref 43–78)
NITRITE UR QL STRIP.AUTO: NEGATIVE
NRBC # BLD: 0 K/UL (ref 0–0.2)
OTHER OBSERVATIONS: ABNORMAL
PH UR STRIP: 5 (ref 5–9)
PLATELET # BLD AUTO: 151 K/UL (ref 150–450)
PMV BLD AUTO: 10.5 FL (ref 9.4–12.3)
POTASSIUM SERPL-SCNC: 4.1 MMOL/L (ref 3.5–5.1)
PROT UR STRIP-MCNC: 100 MG/DL
RBC # BLD AUTO: 3.13 M/UL (ref 4.05–5.2)
RBC #/AREA URNS HPF: >100 /HPF
SODIUM SERPL-SCNC: 145 MMOL/L (ref 133–143)
SP GR UR REFRACTOMETRY: 1.03 (ref 1–1.02)
URINE CULTURE IF INDICATED: ABNORMAL
UROBILINOGEN UR QL STRIP.AUTO: 1 EU/DL (ref 0.2–1)
WBC # BLD AUTO: 4.3 K/UL (ref 4.3–11.1)
WBC URNS QL MICRO: >100 /HPF
YEAST URNS QL MICRO: ABNORMAL

## 2023-10-16 PROCEDURE — 80048 BASIC METABOLIC PNL TOTAL CA: CPT

## 2023-10-16 PROCEDURE — 93970 EXTREMITY STUDY: CPT

## 2023-10-16 PROCEDURE — 85025 COMPLETE CBC W/AUTO DIFF WBC: CPT

## 2023-10-16 PROCEDURE — 97162 PT EVAL MOD COMPLEX 30 MIN: CPT

## 2023-10-16 PROCEDURE — 6360000002 HC RX W HCPCS: Performed by: HOSPITALIST

## 2023-10-16 PROCEDURE — 93306 TTE W/DOPPLER COMPLETE: CPT | Performed by: INTERNAL MEDICINE

## 2023-10-16 PROCEDURE — 36415 COLL VENOUS BLD VENIPUNCTURE: CPT

## 2023-10-16 PROCEDURE — 6370000000 HC RX 637 (ALT 250 FOR IP): Performed by: INTERNAL MEDICINE

## 2023-10-16 PROCEDURE — 97165 OT EVAL LOW COMPLEX 30 MIN: CPT

## 2023-10-16 PROCEDURE — G0378 HOSPITAL OBSERVATION PER HR: HCPCS

## 2023-10-16 PROCEDURE — 97530 THERAPEUTIC ACTIVITIES: CPT

## 2023-10-16 PROCEDURE — 87086 URINE CULTURE/COLONY COUNT: CPT

## 2023-10-16 PROCEDURE — 2580000003 HC RX 258: Performed by: HOSPITALIST

## 2023-10-16 PROCEDURE — 81001 URINALYSIS AUTO W/SCOPE: CPT

## 2023-10-16 PROCEDURE — 97112 NEUROMUSCULAR REEDUCATION: CPT

## 2023-10-16 PROCEDURE — 6370000000 HC RX 637 (ALT 250 FOR IP): Performed by: HOSPITALIST

## 2023-10-16 PROCEDURE — 93306 TTE W/DOPPLER COMPLETE: CPT

## 2023-10-16 PROCEDURE — 2580000003 HC RX 258: Performed by: INTERNAL MEDICINE

## 2023-10-16 PROCEDURE — 99222 1ST HOSP IP/OBS MODERATE 55: CPT | Performed by: NURSE PRACTITIONER

## 2023-10-16 RX ORDER — POLYETHYLENE GLYCOL 3350 17 G/17G
17 POWDER, FOR SOLUTION ORAL DAILY
Status: DISCONTINUED | OUTPATIENT
Start: 2023-10-16 | End: 2023-10-18 | Stop reason: HOSPADM

## 2023-10-16 RX ORDER — SODIUM CHLORIDE, SODIUM LACTATE, POTASSIUM CHLORIDE, CALCIUM CHLORIDE 600; 310; 30; 20 MG/100ML; MG/100ML; MG/100ML; MG/100ML
INJECTION, SOLUTION INTRAVENOUS CONTINUOUS
Status: DISCONTINUED | OUTPATIENT
Start: 2023-10-16 | End: 2023-10-17

## 2023-10-16 RX ORDER — KETOROLAC TROMETHAMINE 15 MG/ML
15 INJECTION, SOLUTION INTRAMUSCULAR; INTRAVENOUS EVERY 6 HOURS PRN
Status: DISCONTINUED | OUTPATIENT
Start: 2023-10-16 | End: 2023-10-18 | Stop reason: HOSPADM

## 2023-10-16 RX ORDER — PROCHLORPERAZINE EDISYLATE 5 MG/ML
10 INJECTION INTRAMUSCULAR; INTRAVENOUS EVERY 6 HOURS PRN
Status: DISCONTINUED | OUTPATIENT
Start: 2023-10-16 | End: 2023-10-18 | Stop reason: HOSPADM

## 2023-10-16 RX ADMIN — ONDANSETRON 4 MG: 4 TABLET, ORALLY DISINTEGRATING ORAL at 07:27

## 2023-10-16 RX ADMIN — SODIUM CHLORIDE, PRESERVATIVE FREE 10 ML: 5 INJECTION INTRAVENOUS at 21:40

## 2023-10-16 RX ADMIN — APIXABAN 10 MG: 5 TABLET, FILM COATED ORAL at 09:10

## 2023-10-16 RX ADMIN — KETOROLAC TROMETHAMINE 15 MG: 15 INJECTION, SOLUTION INTRAMUSCULAR; INTRAVENOUS at 09:30

## 2023-10-16 RX ADMIN — KETOROLAC TROMETHAMINE 15 MG: 15 INJECTION, SOLUTION INTRAMUSCULAR; INTRAVENOUS at 16:02

## 2023-10-16 RX ADMIN — SODIUM CHLORIDE, POTASSIUM CHLORIDE, SODIUM LACTATE AND CALCIUM CHLORIDE: 600; 310; 30; 20 INJECTION, SOLUTION INTRAVENOUS at 10:23

## 2023-10-16 RX ADMIN — POTASSIUM CHLORIDE 10 MEQ: 1500 TABLET, EXTENDED RELEASE ORAL at 09:11

## 2023-10-16 RX ADMIN — ROSUVASTATIN 10 MG: 10 TABLET, FILM COATED ORAL at 21:39

## 2023-10-16 RX ADMIN — POLYETHYLENE GLYCOL 3350 17 G: 17 POWDER, FOR SOLUTION ORAL at 10:20

## 2023-10-16 RX ADMIN — AMLODIPINE BESYLATE 5 MG: 5 TABLET ORAL at 09:11

## 2023-10-16 RX ADMIN — SODIUM CHLORIDE, PRESERVATIVE FREE 10 ML: 5 INJECTION INTRAVENOUS at 09:16

## 2023-10-16 RX ADMIN — LOSARTAN POTASSIUM 100 MG: 50 TABLET, FILM COATED ORAL at 09:11

## 2023-10-16 RX ADMIN — ACETAMINOPHEN 650 MG: 325 TABLET ORAL at 07:26

## 2023-10-16 RX ADMIN — APIXABAN 10 MG: 5 TABLET, FILM COATED ORAL at 21:39

## 2023-10-16 ASSESSMENT — PAIN DESCRIPTION - ORIENTATION
ORIENTATION: LEFT

## 2023-10-16 ASSESSMENT — PAIN DESCRIPTION - DESCRIPTORS
DESCRIPTORS: ACHING
DESCRIPTORS: THROBBING
DESCRIPTORS: ACHING;SHARP

## 2023-10-16 ASSESSMENT — PAIN SCALES - GENERAL
PAINLEVEL_OUTOF10: 0
PAINLEVEL_OUTOF10: 8
PAINLEVEL_OUTOF10: 8
PAINLEVEL_OUTOF10: 0
PAINLEVEL_OUTOF10: 3

## 2023-10-16 ASSESSMENT — PAIN DESCRIPTION - LOCATION
LOCATION: BACK;FLANK
LOCATION: FLANK
LOCATION: FLANK

## 2023-10-16 ASSESSMENT — PAIN - FUNCTIONAL ASSESSMENT
PAIN_FUNCTIONAL_ASSESSMENT: ACTIVITIES ARE NOT PREVENTED
PAIN_FUNCTIONAL_ASSESSMENT: PREVENTS OR INTERFERES SOME ACTIVE ACTIVITIES AND ADLS
PAIN_FUNCTIONAL_ASSESSMENT: PREVENTS OR INTERFERES SOME ACTIVE ACTIVITIES AND ADLS

## 2023-10-16 NOTE — PROGRESS NOTES
Pt resting in bed at present time without complaints. Daughter at bedside. Hourly rounds completed, all needs met this shift. Bed locked and low, call light within reach. Will give report to oncoming night shift nurse.

## 2023-10-16 NOTE — PROGRESS NOTES
LV Mass 2D 114.2 67 - 162 g    LV Mass 2D Index 58.8 43 - 95 g/m2    MV E/A 1.02     E/E' Ratio (Averaged) 15.31     E/E' Lateral 11.91     E/E' Septal 18.71     LA Volume Index BP 33 16 - 34 ml/m2    LVOT Stroke Volume Index 57.5 mL/m2    LA Volume Index 2C 35 (A) 16 - 34 mL/m2    LA Volume Index 4C 30 16 - 34 mL/m2    LA Size Index 1.55 cm/m2    LA/AO Root Ratio 0.81     Ao Root Index 1.91 cm/m2    Ascending Aorta Index 1.86 cm/m2    AV Velocity Ratio 0.72     LVOT:AV VTI Index 0.83     SHAQ/BSA VTI 1.5 cm2/m2    SHAQ/BSA Peak Velocity 1.3 cm2/m2    MV:LVOT VTI Index 1.19     RVSP 53 mmHg       Current Meds:  Current Facility-Administered Medications   Medication Dose Route Frequency    ketorolac (TORADOL) injection 15 mg  15 mg IntraVENous Q6H PRN    polyethylene glycol (GLYCOLAX) packet 17 g  17 g Oral Daily    lactated ringers IV soln infusion   IntraVENous Continuous    prochlorperazine (COMPAZINE) injection 10 mg  10 mg IntraVENous Q6H PRN    sodium chloride flush 0.9 % injection 5-40 mL  5-40 mL IntraVENous 2 times per day    sodium chloride flush 0.9 % injection 5-40 mL  5-40 mL IntraVENous PRN    0.9 % sodium chloride infusion   IntraVENous PRN    ondansetron (ZOFRAN-ODT) disintegrating tablet 4 mg  4 mg Oral Q8H PRN    Or    ondansetron (ZOFRAN) injection 4 mg  4 mg IntraVENous Q6H PRN    polyethylene glycol (GLYCOLAX) packet 17 g  17 g Oral Daily PRN    acetaminophen (TYLENOL) tablet 650 mg  650 mg Oral Q6H PRN    Or    acetaminophen (TYLENOL) suppository 650 mg  650 mg Rectal Q6H PRN    apixaban (ELIQUIS) tablet 10 mg  10 mg Oral BID    Followed by    Herbie Ground ON 10/22/2023] apixaban (ELIQUIS) tablet 5 mg  5 mg Oral BID    amLODIPine (NORVASC) tablet 5 mg  5 mg Oral Daily    cetirizine (ZYRTEC) tablet 10 mg  10 mg Oral Daily PRN    [Held by provider] furosemide (LASIX) tablet 20 mg  20 mg Oral Daily    potassium chloride (KLOR-CON M) extended release tablet 10 mEq  10 mEq Oral Daily    rosuvastatin

## 2023-10-16 NOTE — CONSULTS
Urology Consult    Requesting MD:     Patient: Mari Fernandez MRN: [id number]  SSN: [id number]    YOB: 1940  Age: 82 y.o. Sex: female      Subjective:      Mari Fernandez is a 82 y.o. female with medical history of hypertension, hyperlipidemia who presented with left-sided acute flank pain that started last night. Patient reports that she was in her normal state of health when she felt like she may have had a kidney stone. She reported intermittent left-sided flank pain associated with some nausea. She went to a freestanding ER earlier this morning which she was evaluated with CT. She was also noted to be hypoxic as per ER records requiring 2 L nasal cannula to maintain saturation greater than 92%. Initial CT abdomen pelvis did not show any acute pathology. However, CTA chest did show pulmonary embolisms on the right lung involving upper lobe and right lower lobe segmental and subsegmental arteries. Given patient's oxygen requirement, request was made to transfer to [facility] for further evaluation management. Patient reports some dyspnea and fatigue has been worsening over the past several months. She has not participated physical therapy to get stronger. Patient denies any recent long travel history including long plane rides, car rides, train rides. She has no family history of clotting disorders. She denies any recent fevers, chills, chest pain, headaches, blurred vision. Patient currently lives with her son at home. She is a never smoker, nondrinker, nondrug user. She is retired  with no history of chemical exposures. Urology consulted for L flank pain. Patient seen with daughter at bedside. Reports on going left sided flank pain. Endorses history of previous kidney stones.      Past Medical History:   Diagnosis Date    Benign essential hypertension 2/11/2015    Bilateral leg edema 4/26/2017    Cancer ([facility])     skin    Hypercholesteremia next 2-4 days. Lindsay Carr.  Chadwick Church MD

## 2023-10-16 NOTE — CARE COORDINATION
Pt chart reviewed. During IDT rounds, reported PT/OT eval. are pending for [facility] vs [facility]. CM met with pt at bedside to get choices for home health per PT/OT evaluation for home health at discharge. Pt is agreeable with [facility] [place]. CM sent referral to [facility] and will continue to follow pt for care.

## 2023-10-16 NOTE — PROGRESS NOTES
Pt resting in bed at present time without complaints. Daughter at bedside. MD notified for pt's sodium lab result. Hourly rounds completed, all needs met this shift. Bed locked and low, call light within reach. Will give report to oncoming day shift nurse.

## 2023-10-16 NOTE — PROGRESS NOTES
ACUTE PHYSICAL THERAPY GOALS:   (Developed with and agreed upon by patient and/or caregiver.)  Pt will perform supine to/from sit with mod I in 7 treatment days. Pt will perform sit to/from stand with mod I and LRAD in 7 treatment days. Pt will ambulate 150 ft with mod I and LRAD in 7 treatment days. Pt will be independent with HEP in 7 days. PHYSICAL THERAPY Initial Assessment and AM  (Link to Caseload Tracking: PT Visit Days : 1  Acknowledge Orders  Time In/Out  PT Charge Capture  Rehab Caseload Tracker    Yonatan Moreno is a 82 y.o. female   PRIMARY DIAGNOSIS: Pulmonary embolism without acute cor pulmonale, unspecified chronicity, unspecified pulmonary embolism type (HCC)  Pulmonary embolism without acute cor pulmonale, unspecified chronicity, unspecified pulmonary embolism type ([address]) [I26.99]       Reason for Referral: Generalized Muscle Weakness (M62.81)  Other lack of cordination (R27.8)  Difficulty in walking, Not elsewhere classified (R26.2)  Other abnormalities of gait and mobility (R26.89)  Observation: Payor: Pavan Hammonds / Plan: Ronak Bishop PPO / Product Type: Medicare /     ASSESSMENT:     REHAB RECOMMENDATIONS:   Recommendation to date pending progress:  Setting:  Home Health Therapy    Equipment:    None     ASSESSMENT:  Ms. Kerrie Nunez is a 82 y.o. female admitted with PE, now on Eliquis. Upon PT evaluation, pt exhibits reduced activity endurance, impaired respiratory reserve, impaired balance, and reduced gross strength resulting in limited independence with functional mobility. At baseline, pt is mod I for all mobility with RW. Pt is now requiring SBA to ambulate with RW, with SpO2 maintained around 88-89% throughout mobility tasks. Pt will require HHPT at discharge. Pt will continue to benefit from skilled PT to address above impairments and maximize functional independence prior to discharge.   .     [facility] [] [] []    Supine to Sit [] [] [] [x] [] [] [] [] [] [] []    Scooting [] [] [] [x] [] [] [] [] [] [] []    Sit to Supine [] [] [] [] [] [] [] [] [] [x] []    Transfers    Sit to Stand [] [] [] [x] [x] [] [] [] [] [] []    Bed to Chair [] [] [] [x] [] [] [] [] [] [] []    Stand to Sit [] [] [] [x] [] [] [] [] [] [] []     [] [] [] [] [] [] [] [] [] [] []    I=Independent, Mod I=Modified Independent, S=Supervision, SBA=Standby Assistance, CGA=Contact Guard Assistance,   Min=Minimal Assistance, Mod=Moderate Assistance, Max=Maximal Assistance, Total=Total Assistance, NT=Not Tested    GAIT: I Mod I S SBA CGA Min Mod Max Total  NT x2 Comments:   Level of Assistance [] [] [] [x] [] [] [] [] [] [] []    Distance 150 feet    DME Rolling Walker    Gait Quality Decreased burke , Decreased step clearance, Decreased step length, Trunk sway increased, and Wide base of support    Weightbearing Status Restrictions/Precautions  Restrictions/Precautions: Fall Risk    Stairs      I=Independent, Mod I=Modified Independent, S=Supervision, SBA=Standby Assistance, CGA=Contact Guard Assistance,   Min=Minimal Assistance, Mod=Moderate Assistance, Max=Maximal Assistance, Total=Total Assistance, NT=Not Tested    PLAN:   FREQUENCY AND DURATION: 3 times/week for duration of hospital stay or until stated goals are met, whichever comes first.    THERAPY PROGNOSIS: Good    PROBLEM LIST:   (Skilled intervention is medically necessary to address:)  Decreased Activity Tolerance  Decreased Balance  Decreased Gait Ability  Decreased Strength  Decreased Transfer Abilities  Increased Pain INTERVENTIONS PLANNED:   (Benefits and precautions of physical therapy have been discussed with the patient.)  Therapeutic Activity  Therapeutic Exercise/HEP  Neuromuscular Re-education  Gait Training  Education       TREATMENT:   EVALUATION: MODERATE COMPLEXITY: (Untimed Charge)    TREATMENT:   Therapeutic Activity (23 Minutes):  Therapeutic activity included

## 2023-10-16 NOTE — PROGRESS NOTES
Pt admitted today from  [facility]. Pt has exertional SOB, was on 2L oxygen on arrival and now on room air with oxygen sats 95%. Pt denies needs at this time, NAD, family at bedside. Hourly rounds completed. Bed in low and locked position, call light and personal items within reach. Will give bedside report to oncoming nurse.

## 2023-10-17 LAB
ANION GAP SERPL CALC-SCNC: 4 MMOL/L (ref 2–11)
BASOPHILS # BLD: 0 K/UL (ref 0–0.2)
BASOPHILS NFR BLD: 0 % (ref 0–2)
BUN SERPL-MCNC: 10 MG/DL (ref 8–23)
CALCIUM SERPL-MCNC: 9 MG/DL (ref 8.3–10.4)
CHLORIDE SERPL-SCNC: 107 MMOL/L (ref 101–110)
CO2 SERPL-SCNC: 32 MMOL/L (ref 21–32)
CREAT SERPL-MCNC: 1.2 MG/DL (ref 0.6–1)
DIFFERENTIAL METHOD BLD: ABNORMAL
EOSINOPHIL # BLD: 0.1 K/UL (ref 0–0.8)
EOSINOPHIL NFR BLD: 1 % (ref 0.5–7.8)
ERYTHROCYTE [DISTWIDTH] IN BLOOD BY AUTOMATED COUNT: 13 % (ref 11.9–14.6)
GLUCOSE SERPL-MCNC: 135 MG/DL (ref 65–100)
HCT VFR BLD AUTO: 32.9 % (ref 35.8–46.3)
HGB BLD-MCNC: 10.4 G/DL (ref 11.7–15.4)
IMM GRANULOCYTES # BLD AUTO: 0 K/UL (ref 0–0.5)
IMM GRANULOCYTES NFR BLD AUTO: 1 % (ref 0–5)
LYMPHOCYTES # BLD: 1.2 K/UL (ref 0.5–4.6)
LYMPHOCYTES NFR BLD: 23 % (ref 13–44)
MCH RBC QN AUTO: 32.6 PG (ref 26.1–32.9)
MCHC RBC AUTO-ENTMCNC: 31.6 G/DL (ref 31.4–35)
MCV RBC AUTO: 103.1 FL (ref 82–102)
MONOCYTES # BLD: 0.4 K/UL (ref 0.1–1.3)
MONOCYTES NFR BLD: 7 % (ref 4–12)
NEUTS SEG # BLD: 3.6 K/UL (ref 1.7–8.2)
NEUTS SEG NFR BLD: 68 % (ref 43–78)
NRBC # BLD: 0 K/UL (ref 0–0.2)
PLATELET # BLD AUTO: 152 K/UL (ref 150–450)
PMV BLD AUTO: 10.5 FL (ref 9.4–12.3)
POTASSIUM SERPL-SCNC: 4 MMOL/L (ref 3.5–5.1)
RBC # BLD AUTO: 3.19 M/UL (ref 4.05–5.2)
SODIUM SERPL-SCNC: 143 MMOL/L (ref 133–143)
WBC # BLD AUTO: 5.2 K/UL (ref 4.3–11.1)

## 2023-10-17 PROCEDURE — 2580000003 HC RX 258: Performed by: INTERNAL MEDICINE

## 2023-10-17 PROCEDURE — 6370000000 HC RX 637 (ALT 250 FOR IP): Performed by: HOSPITALIST

## 2023-10-17 PROCEDURE — 97530 THERAPEUTIC ACTIVITIES: CPT

## 2023-10-17 PROCEDURE — G0378 HOSPITAL OBSERVATION PER HR: HCPCS

## 2023-10-17 PROCEDURE — 2580000003 HC RX 258: Performed by: HOSPITALIST

## 2023-10-17 PROCEDURE — 6370000000 HC RX 637 (ALT 250 FOR IP): Performed by: INTERNAL MEDICINE

## 2023-10-17 PROCEDURE — 97110 THERAPEUTIC EXERCISES: CPT

## 2023-10-17 PROCEDURE — 85025 COMPLETE CBC W/AUTO DIFF WBC: CPT

## 2023-10-17 PROCEDURE — 36415 COLL VENOUS BLD VENIPUNCTURE: CPT

## 2023-10-17 PROCEDURE — 80048 BASIC METABOLIC PNL TOTAL CA: CPT

## 2023-10-17 RX ORDER — SENNA AND DOCUSATE SODIUM 50; 8.6 MG/1; MG/1
2 TABLET, FILM COATED ORAL DAILY
Status: DISCONTINUED | OUTPATIENT
Start: 2023-10-17 | End: 2023-10-18 | Stop reason: HOSPADM

## 2023-10-17 RX ADMIN — AMLODIPINE BESYLATE 5 MG: 5 TABLET ORAL at 08:45

## 2023-10-17 RX ADMIN — APIXABAN 10 MG: 5 TABLET, FILM COATED ORAL at 08:45

## 2023-10-17 RX ADMIN — LOSARTAN POTASSIUM 100 MG: 50 TABLET, FILM COATED ORAL at 08:45

## 2023-10-17 RX ADMIN — DOCUSATE SODIUM 50 MG AND SENNOSIDES 8.6 MG 2 TABLET: 8.6; 5 TABLET, FILM COATED ORAL at 10:07

## 2023-10-17 RX ADMIN — SODIUM CHLORIDE, PRESERVATIVE FREE 10 ML: 5 INJECTION INTRAVENOUS at 08:45

## 2023-10-17 RX ADMIN — APIXABAN 10 MG: 5 TABLET, FILM COATED ORAL at 20:45

## 2023-10-17 RX ADMIN — SODIUM CHLORIDE, PRESERVATIVE FREE 10 ML: 5 INJECTION INTRAVENOUS at 20:46

## 2023-10-17 RX ADMIN — SODIUM CHLORIDE, POTASSIUM CHLORIDE, SODIUM LACTATE AND CALCIUM CHLORIDE: 600; 310; 30; 20 INJECTION, SOLUTION INTRAVENOUS at 01:24

## 2023-10-17 RX ADMIN — POTASSIUM CHLORIDE 10 MEQ: 1500 TABLET, EXTENDED RELEASE ORAL at 08:45

## 2023-10-17 RX ADMIN — POLYETHYLENE GLYCOL 3350 17 G: 17 POWDER, FOR SOLUTION ORAL at 08:44

## 2023-10-17 RX ADMIN — ROSUVASTATIN 10 MG: 10 TABLET, FILM COATED ORAL at 20:46

## 2023-10-17 ASSESSMENT — PAIN SCALES - GENERAL: PAINLEVEL_OUTOF10: 0

## 2023-10-17 NOTE — PROGRESS NOTES
Hospitalist Progress Note   Admit Date:  10/15/2023 12:52 PM   Name:  Ashley Dukes   Age:  82 y.o. Sex:  female  :  1940   MRN:  [id number]   Room:  [facility]/    Presenting/Chief Complaint: No chief complaint on file. Reason(s) for Admission: Pulmonary embolism without acute cor pulmonale, unspecified chronicity, unspecified pulmonary embolism type [facility]) [I26.99]     Hospital Course:   Ashley Dukes is a 82 y.o. female with medical history of hypertension, dyslipidemia presented to the ER with left-sided acute flank pain that started last night. She went to a freestanding ER and had CT abdomen and pelvis which showed mild left hydronephrosis and mildly dilated ureter. She was also found to be hypoxic and needing 2 L oxygen nasal cannula. She had a CTA chest which showed pulmonary embolism in the right lung involving the right upper and lower lobes. Patient was admitted to [facility] for further evaluation management. She was started on Eliquis. Duplex ultrasound bilateral lower extremities negative. Echocardiogram shows preserved left ventricular ejection fraction with EF of 60 to 65%. Normal wall thickness. Abnormal diastolic function. Mildly dilated right atrium. .  Urology was consulted. She is able to void well and has normal renal function and therefore no acute intervention indicated per urology. Subjective & 24hr Events: 10/17  Patient is resting in bed. No fever no chills. No chest pain. Flank pain improved. No nausea no vomiting. Reports she has not had a bowel movement in the last 2 to 3 days. Assessment & Plan: This is a 21-year-old female with:    Acute PE without acute cor pulmonale present on admission  Continue Eliquis. Monitor for bleeding. Hemoglobin stable at 10.4 from 10.2 yesterday. Duplex ultrasound bilateral lower extremities negative. Echo shows preserved left ventricular ejection fraction with EF of 60 to 65%.   Normal wall Oral Q6H PRN    Or    acetaminophen (TYLENOL) suppository 650 mg  650 mg Rectal Q6H PRN    apixaban (ELIQUIS) tablet 10 mg  10 mg Oral BID    Followed by    Darrold Camera ON 10/22/2023] apixaban (ELIQUIS) tablet 5 mg  5 mg Oral BID    amLODIPine (NORVASC) tablet 5 mg  5 mg Oral Daily    cetirizine (ZYRTEC) tablet 10 mg  10 mg Oral Daily PRN    [Held by provider] furosemide (LASIX) tablet 20 mg  20 mg Oral Daily    potassium chloride (KLOR-CON M) extended release tablet 10 mEq  10 mEq Oral Daily    rosuvastatin (CRESTOR) tablet 10 mg  10 mg Oral Nightly    losartan (COZAAR) tablet 100 mg  100 mg Oral Daily    And    [Held by provider] hydroCHLOROthiazide (HYDRODIURIL) tablet 12.5 mg  12.5 mg Oral Daily       Signed:  Dianelys Rivas MD    Part of this note may have been written by using a voice dictation software. The note has been proof read but may still contain some grammatical/other typographical errors.

## 2023-10-17 NOTE — PROGRESS NOTES
Pt resting in bed at present time without complaints. Reinforced patient to call before getting up. Hourly rounds completed, all needs met this shift. Bed locked and low, call light within reach. Will give report to oncoming night shift nurse.

## 2023-10-17 NOTE — PROGRESS NOTES
ACUTE PHYSICAL THERAPY GOALS:   (Developed with and agreed upon by patient and/or caregiver.)  Pt will perform supine to/from sit with mod I in 7 treatment days. Pt will perform sit to/from stand with mod I and LRAD in 7 treatment days. Pt will ambulate 150 ft with mod I and LRAD in 7 treatment days. Pt will be independent with HEP in 7 days. PHYSICAL THERAPY: Daily Note AM   (Link to Caseload Tracking: PT Visit Days : 2  Time In/Out PT Charge Capture  Rehab Caseload Tracker  Orders    Andrés Giraldo is a 82 y.o. female   PRIMARY DIAGNOSIS: Pulmonary embolism without acute cor pulmonale, unspecified chronicity, unspecified pulmonary embolism type (HCC)  Pulmonary embolism without acute cor pulmonale, unspecified chronicity, unspecified pulmonary embolism type ([address]) [I26.99]       Observation: Payor: Puma Ribera / Plan: June Araujo PPO / Product Type: Medicare /     ASSESSMENT:     REHAB RECOMMENDATIONS:   Recommendation to date pending progress:  Setting:  Home Health Therapy    Equipment:    None     ASSESSMENT:  Ms. Scotty Zamora presents sitting up in chair on contact, agreeable to therapy, daughter in room. She ambulated 450' using rolling walker and SBA. She ambulated on RA with sats 92% during ambulation. She returned to chair and performed below LE exercises. She was given an IS and instructed in use with pt demonstrating proper technique. She was left up with needs in reach. Good progress towards goals. Will continue with POC. SUBJECTIVE:   Ms. Scotty Zamora states, \"My knee bothers me but I'm not having surgery on it. \"     Social/Functional Lives With: Family  Type of Home: House  Home Layout: One level  Home Equipment: noel Ley  Receives Help From: Family  ADL Assistance: Independent  Ambulation Assistance: Independent  Active : Yes  Occupation: Retired  OBJECTIVE:     PAIN: VITALS / O2: Bianca Breaker / Slater Bishop / [place]:   Pre Treatment: 0         Post

## 2023-10-18 ENCOUNTER — TELEPHONE (OUTPATIENT)
Dept: INTERNAL MEDICINE CLINIC | Facility: CLINIC | Age: 83
End: 2023-10-18

## 2023-10-18 VITALS
SYSTOLIC BLOOD PRESSURE: 127 MMHG | RESPIRATION RATE: 19 BRPM | TEMPERATURE: 97.7 F | BODY MASS INDEX: 31.65 KG/M2 | DIASTOLIC BLOOD PRESSURE: 59 MMHG | HEIGHT: 65 IN | OXYGEN SATURATION: 93 % | WEIGHT: 190 LBS | HEART RATE: 93 BPM

## 2023-10-18 PROBLEM — J96.01 ACUTE HYPOXEMIC RESPIRATORY FAILURE (HCC): Status: ACTIVE | Noted: 2023-10-18

## 2023-10-18 LAB
ANION GAP SERPL CALC-SCNC: 3 MMOL/L (ref 2–11)
BASOPHILS # BLD: 0 K/UL (ref 0–0.2)
BASOPHILS NFR BLD: 0 % (ref 0–2)
BUN SERPL-MCNC: 9 MG/DL (ref 8–23)
CALCIUM SERPL-MCNC: 8.9 MG/DL (ref 8.3–10.4)
CHLORIDE SERPL-SCNC: 110 MMOL/L (ref 101–110)
CO2 SERPL-SCNC: 31 MMOL/L (ref 21–32)
CREAT SERPL-MCNC: 1 MG/DL (ref 0.6–1)
DIFFERENTIAL METHOD BLD: ABNORMAL
EOSINOPHIL # BLD: 0.1 K/UL (ref 0–0.8)
EOSINOPHIL NFR BLD: 2 % (ref 0.5–7.8)
ERYTHROCYTE [DISTWIDTH] IN BLOOD BY AUTOMATED COUNT: 13.1 % (ref 11.9–14.6)
GLUCOSE SERPL-MCNC: 93 MG/DL (ref 65–100)
HCT VFR BLD AUTO: 30.2 % (ref 35.8–46.3)
HGB BLD-MCNC: 9.5 G/DL (ref 11.7–15.4)
IMM GRANULOCYTES # BLD AUTO: 0 K/UL (ref 0–0.5)
IMM GRANULOCYTES NFR BLD AUTO: 1 % (ref 0–5)
LYMPHOCYTES # BLD: 1.1 K/UL (ref 0.5–4.6)
LYMPHOCYTES NFR BLD: 29 % (ref 13–44)
MCH RBC QN AUTO: 33.1 PG (ref 26.1–32.9)
MCHC RBC AUTO-ENTMCNC: 31.5 G/DL (ref 31.4–35)
MCV RBC AUTO: 105.2 FL (ref 82–102)
MONOCYTES # BLD: 0.4 K/UL (ref 0.1–1.3)
MONOCYTES NFR BLD: 10 % (ref 4–12)
NEUTS SEG # BLD: 2.2 K/UL (ref 1.7–8.2)
NEUTS SEG NFR BLD: 58 % (ref 43–78)
NRBC # BLD: 0 K/UL (ref 0–0.2)
PLATELET # BLD AUTO: 141 K/UL (ref 150–450)
PMV BLD AUTO: 10.5 FL (ref 9.4–12.3)
POTASSIUM SERPL-SCNC: 4.3 MMOL/L (ref 3.5–5.1)
RBC # BLD AUTO: 2.87 M/UL (ref 4.05–5.2)
SODIUM SERPL-SCNC: 144 MMOL/L (ref 133–143)
WBC # BLD AUTO: 3.8 K/UL (ref 4.3–11.1)

## 2023-10-18 PROCEDURE — G0378 HOSPITAL OBSERVATION PER HR: HCPCS

## 2023-10-18 PROCEDURE — 2580000003 HC RX 258: Performed by: INTERNAL MEDICINE

## 2023-10-18 PROCEDURE — 94761 N-INVAS EAR/PLS OXIMETRY MLT: CPT

## 2023-10-18 PROCEDURE — 85025 COMPLETE CBC W/AUTO DIFF WBC: CPT

## 2023-10-18 PROCEDURE — 6370000000 HC RX 637 (ALT 250 FOR IP): Performed by: HOSPITALIST

## 2023-10-18 PROCEDURE — 36415 COLL VENOUS BLD VENIPUNCTURE: CPT

## 2023-10-18 PROCEDURE — 80048 BASIC METABOLIC PNL TOTAL CA: CPT

## 2023-10-18 PROCEDURE — 97530 THERAPEUTIC ACTIVITIES: CPT

## 2023-10-18 PROCEDURE — 6370000000 HC RX 637 (ALT 250 FOR IP): Performed by: INTERNAL MEDICINE

## 2023-10-18 PROCEDURE — 2700000000 HC OXYGEN THERAPY PER DAY

## 2023-10-18 PROCEDURE — 97110 THERAPEUTIC EXERCISES: CPT

## 2023-10-18 RX ADMIN — AMLODIPINE BESYLATE 5 MG: 5 TABLET ORAL at 09:09

## 2023-10-18 RX ADMIN — POLYETHYLENE GLYCOL 3350 17 G: 17 POWDER, FOR SOLUTION ORAL at 09:09

## 2023-10-18 RX ADMIN — POTASSIUM CHLORIDE 10 MEQ: 1500 TABLET, EXTENDED RELEASE ORAL at 09:09

## 2023-10-18 RX ADMIN — LOSARTAN POTASSIUM 100 MG: 50 TABLET, FILM COATED ORAL at 09:09

## 2023-10-18 RX ADMIN — APIXABAN 10 MG: 5 TABLET, FILM COATED ORAL at 09:09

## 2023-10-18 RX ADMIN — SODIUM CHLORIDE, PRESERVATIVE FREE 10 ML: 5 INJECTION INTRAVENOUS at 09:10

## 2023-10-18 ASSESSMENT — PAIN SCALES - GENERAL: PAINLEVEL_OUTOF10: 0

## 2023-10-18 NOTE — DISCHARGE SUMMARY
Hospitalist Discharge Summary   Admit Date:  10/15/2023 12:52 PM   DC Note date: 10/18/2023  Name:  Gerson Perez   Age:  82 y.o. Sex:  female  :  1940   MRN:  [id number]   Room:  [facility]  PCP:  Barndin English DO    Presenting Complaint: No chief complaint on file. Initial Admission Diagnosis: Pulmonary embolism without acute cor pulmonale, unspecified chronicity, unspecified pulmonary embolism type (HCC) [I26.99]     Problem List for this Hospitalization (present on admission):    Principal Problem:    Pulmonary embolism without acute cor pulmonale, unspecified chronicity, unspecified pulmonary embolism type ([address])  Active Problems:    Mixed hyperlipidemia    Benign essential hypertension    Bilateral leg edema    IFG (impaired fasting glucose)    Morbid obesity ([address])    Acute hypoxemic respiratory failure ([address])  Resolved Problems:    * No resolved hospital problems. [facility] Course:  Mrs. Tio Byrd is a nice 81 y/o WF with a h/o HTN and HLD who was admitted to our service on 10/15 with acute hypoxemic respiratory failure 2/2 R sided PE. She presented to a freestanding ER earlier that day with c/o L flank pain. CT a/p showed a large hiatal hernia, b/l inguinal hernias, mild L hydroureter without evidence of stone. CT chest was done due to acute hypoxemic respiratory failure and showed acute RULE and RLL PEs. She was admitted to our service and started on Eliquis. She was evaluated by Urology and felt no intervention was warranted. Echo was unremarkable. She is on RA and requires 2L O2 with exertion based on her ambulatory oximetry. I discussed with patient and daughter the risks/benefits of Eliquis and signs/symptoms that would prompt return to ER or call to PCP and they are all understanding. Her hospital course was otherwise unremarkable and she is medically stable for discharge home with [facility] today. Disposition: Home with Home Health  Diet: ADULT DIET;  Regular  Code Status: REQUESTS  Reflexed from [id number]       Culture       10,000 to 50,000 COLONIES/mL MIXED SKIN VIANEY ISOLATED                  All Labs from Last 24 Hrs:  Recent Results (from the past 24 hour(s))   CBC with Auto Differential    Collection Time: 10/18/23  5:14 AM   Result Value Ref Range    WBC 3.8 (L) 4.3 - 11.1 K/uL    RBC 2.87 (L) 4.05 - 5.2 M/uL    Hemoglobin 9.5 (L) 11.7 - 15.4 g/dL    Hematocrit 30.2 (L) 35.8 - 46.3 %    .2 (H) 82 - 102 FL    MCH 33.1 (H) 26.1 - 32.9 PG    MCHC 31.5 31.4 - 35.0 g/dL    RDW 13.1 11.9 - 14.6 %    Platelets 503 (L) 009 - 450 K/uL    MPV 10.5 9.4 - 12.3 FL    nRBC 0.00 0.0 - 0.2 K/uL    Differential Type AUTOMATED      Neutrophils % 58 43 - 78 %    Lymphocytes % 29 13 - 44 %    Monocytes % 10 4.0 - 12.0 %    Eosinophils % 2 0.5 - 7.8 %    Basophils % 0 0.0 - 2.0 %    Immature Granulocytes 1 0.0 - 5.0 %    Neutrophils Absolute 2.2 1.7 - 8.2 K/UL    Lymphocytes Absolute 1.1 0.5 - 4.6 K/UL    Monocytes Absolute 0.4 0.1 - 1.3 K/UL    Eosinophils Absolute 0.1 0.0 - 0.8 K/UL    Basophils Absolute 0.0 0.0 - 0.2 K/UL    Absolute Immature Granulocyte 0.0 0.0 - 0.5 K/UL   Basic Metabolic Panel w/ Reflex to MG    Collection Time: 10/18/23  5:14 AM   Result Value Ref Range    Sodium 144 (H) 133 - 143 mmol/L    Potassium 4.3 3.5 - 5.1 mmol/L    Chloride 110 101 - 110 mmol/L    CO2 31 21 - 32 mmol/L    Anion Gap 3 2 - 11 mmol/L    Glucose 93 65 - 100 mg/dL    BUN 9 8 - 23 MG/DL    Creatinine 1.00 0.6 - 1.0 MG/DL    Est, Glom Filt Rate 57 (L) >60 ml/min/1.73m2    Calcium 8.9 8.3 - 10.4 MG/DL       No Known Allergies  Immunization History   Administered Date(s) Administered    Influenza Trivalent 10/01/2015    Influenza Virus Vaccine 11/21/2008, 11/12/2014, 10/01/2015    Influenza, FLUAD, (age 72 y+), Adjuvanted, 0.5mL 09/22/2020, 12/08/2022    Influenza, High Dose (Fluzone 65 yrs and older) 11/03/2015, 11/07/2016, 10/26/2017, 10/25/2018    Influenza, Triv, inactivated, subunit, adjuvanted,

## 2023-10-18 NOTE — CARE COORDINATION
Pt is for discharge home today with Darin RYDER. Referral called/faxed to [facility] for follow up home care as ordered. CM provided pt with oxygen cannula through [address] (ambulation rec's for 2L) and  Eliquis Voucher at bedside. No additional CM orders received or supportive care needs expressed at this time. 10/15/23 5340   Service Assessment   Patient Orientation Alert and Oriented   Cognition Alert   History Provided By Patient   Primary [facility]   Patient's Healthcare Decision Maker is: Legal Next of Kin   PCP Verified by CM Yes   Last Visit to PCP Within last 3 months   Prior Functional Level Independent in ADLs/IADLs   Current Functional Level Independent in ADLs/IADLs   Can patient return to prior living arrangement Yes   Ability to make needs known: Good   Family able to assist with home care needs: Yes   Would you like for me to discuss the discharge plan with any other family members/significant others, and if so, who? No   Financial Resources Medicare   Community Resources None   Social/Functional History   Lives With Family   Type of [address] [place] [address]  One level   Home Equipment Cane;Walker, standard   Receives Help From Family   ADL Assistance Independent   Ambulation Assistance Independent   Active  Yes   Occupation Retired   Discharge Planning   Type of [address] [place] [address] Dr Prior To Admission None   Potential Assistance Needed N/A   DME Ordered? Cane;Walker   Potential Assistance Purchasing Medications No   Type of Home Care Services None   Patient expects to be discharged to: House   Follow Up Appointment: Best Day/Time  Wednesday AM   One/Two Story Residence One story   History of falls?  0   Services At/After Discharge   Transition of Care Consult (CM Consult) Home Health;Discharge Planning   Internal Home Health No   Reason Outside Agency Chosen Script used patient chose alternate agency

## 2023-10-18 NOTE — PROGRESS NOTES
10/18/23 0757   Resting (Room Air)   SpO2 92   HR 77   Resting (On O2)   SpO2 95   HR 76   O2 Device Nasal cannula   O2 Flow Rate (l/min) 2 l/min   During Walk (Room Air)   SpO2 84   HR 95   Walk/Assistance Device Ambulation   Rate of Dyspnea 0   During Walk (On O2)   SpO2 91   HR 95   O2 Device Nasal cannula   O2 Flow Rate (l/min) 2 l/min   Walk/Assistance Device Ambulation   Rate of Dyspnea 0   After Walk   SpO2 93   HR 86   O2 Device Nasal cannula   O2 Flow Rate (l/min) 1 l/min   Rate of Dyspnea 0   Does the Patient Qualify for Home O2 Yes   Liter Flow at Rest 1   Liter Flow on Exertion 2     Patient initially on RA left on 1L until HR decreases back to resting baseline. Needs 2L while ambulating.

## 2023-10-18 NOTE — PROGRESS NOTES
ACUTE PHYSICAL THERAPY GOALS:   (Developed with and agreed upon by patient and/or caregiver.)  Pt will perform supine to/from sit with mod I in 7 treatment days. Pt will perform sit to/from stand with mod I and LRAD in 7 treatment days. Pt will ambulate 150 ft with mod I and LRAD in 7 treatment days. Pt will be independent with HEP in 7 days. PHYSICAL THERAPY: Daily Note AM   (Link to Caseload Tracking: PT Visit Days : 2  Time In/Out PT Charge Capture  Rehab Caseload Tracker  Orders    Yuli Malone is a 82 y.o. female   PRIMARY DIAGNOSIS: Pulmonary embolism without acute cor pulmonale, unspecified chronicity, unspecified pulmonary embolism type (HCC)  Pulmonary embolism without acute cor pulmonale, unspecified chronicity, unspecified pulmonary embolism type ([address]) [I26.99]       Observation: Payor: Teofilo Sprain / Plan: Polly Lockhart PPO / Product Type: Medicare /     ASSESSMENT:     REHAB RECOMMENDATIONS:   Recommendation to date pending progress:  Setting:  Home Health Therapy    Equipment:    None     ASSESSMENT:  Ms. Rosa De presents sitting up in chair on contact, agreeable to therapy, daughter in room. She ambulated 450' using rolling walker and SBA. Her sats were 93-94% throughout ambulation. She returned to room, rested, then performed standing LE exercises as below. She sat back down and her sats were 95%. She is making good progress and is expecting to go home later today. SUBJECTIVE:   Ms. Rosa De states, \"I'm going home. \"     Social/Functional Lives With: Family  Type of Home: House  Home Layout: One level  Home Equipment: noel Hall  Receives Help From: Family  ADL Assistance: Independent  Ambulation Assistance: Independent  Active : Yes  Occupation: Retired  OBJECTIVE:     PAIN: VITALS / O2: Equilla Camps / Florecita Galo / DRAINS:   Pre Treatment: 0         Post Treatment: 0 Vitals        Oxygen

## 2023-10-18 NOTE — PROGRESS NOTES
Pt  bed in lowest position, wheels locked, and call light within reach. Hourly rounds completed. VSS. IV is patent and dressing is clean, dry, and intact. Pt walked from chair to bed with help of daughter.

## 2023-10-18 NOTE — TELEPHONE ENCOUNTER
Care Transitions Initial Follow Up Call    Outreach made within 2 business days of discharge: Yes    Patient: Kaylee Jamison Patient : 1940   MRN: [id number]  Reason for Admission: PE  Discharge Date: 10/18/23       Spoke with: patient    Discharge department/facility: [facility]    TCM Interactive Patient Contact:  Was patient able to fill all prescriptions: Yes  Was patient instructed to bring all medications to the follow-up visit: Yes  Is patient taking all medications as directed in the discharge summary?  Yes  Does patient understand their discharge instructions: Yes  Does patient have questions or concerns that need addressed prior to 7-14 day follow up office visit: no    Scheduled appointment with PCP within 7-14 days    Follow Up  Future Appointments   Date Time Provider [address] [place] [address]   10/25/2023 10:00 AM Ezell Gottron, PT [place]   2023  9:00 AM RAQUEL Wang CNP [facility]   2023 11:00 AM Piyush García,  [facility]       Gorman Claude, MA

## 2023-10-19 LAB
BACTERIA SPEC CULT: NORMAL
BACTERIA SPEC CULT: NORMAL
SERVICE CMNT-IMP: NORMAL

## 2023-11-01 ENCOUNTER — TELEPHONE (OUTPATIENT)
Dept: INTERNAL MEDICINE CLINIC | Facility: CLINIC | Age: 83
End: 2023-11-01

## 2023-11-01 ENCOUNTER — OFFICE VISIT (OUTPATIENT)
Dept: INTERNAL MEDICINE CLINIC | Facility: CLINIC | Age: 83
End: 2023-11-01

## 2023-11-01 VITALS
TEMPERATURE: 98.9 F | HEIGHT: 65 IN | HEART RATE: 70 BPM | WEIGHT: 187 LBS | SYSTOLIC BLOOD PRESSURE: 122 MMHG | DIASTOLIC BLOOD PRESSURE: 70 MMHG | BODY MASS INDEX: 31.16 KG/M2 | OXYGEN SATURATION: 93 %

## 2023-11-01 DIAGNOSIS — R31.29 OTHER MICROSCOPIC HEMATURIA: ICD-10-CM

## 2023-11-01 DIAGNOSIS — Z09 HOSPITAL DISCHARGE FOLLOW-UP: ICD-10-CM

## 2023-11-01 DIAGNOSIS — D64.9 ANEMIA, UNSPECIFIED TYPE: ICD-10-CM

## 2023-11-01 DIAGNOSIS — I26.99 ACUTE PULMONARY EMBOLISM WITHOUT ACUTE COR PULMONALE, UNSPECIFIED PULMONARY EMBOLISM TYPE (HCC): Primary | ICD-10-CM

## 2023-11-01 LAB
ALBUMIN SERPL-MCNC: 4 G/DL (ref 3.2–4.6)
ALBUMIN/GLOB SERPL: 1.4 (ref 0.4–1.6)
ALP SERPL-CCNC: 71 U/L (ref 50–136)
ALT SERPL-CCNC: 14 U/L (ref 12–65)
ANION GAP SERPL CALC-SCNC: 4 MMOL/L (ref 2–11)
AST SERPL-CCNC: 16 U/L (ref 15–37)
BASOPHILS # BLD: 0 K/UL (ref 0–0.2)
BASOPHILS NFR BLD: 1 % (ref 0–2)
BILIRUB SERPL-MCNC: 0.7 MG/DL (ref 0.2–1.1)
BUN SERPL-MCNC: 21 MG/DL (ref 8–23)
CALCIUM SERPL-MCNC: 9.8 MG/DL (ref 8.3–10.4)
CHLORIDE SERPL-SCNC: 106 MMOL/L (ref 101–110)
CO2 SERPL-SCNC: 30 MMOL/L (ref 21–32)
CREAT SERPL-MCNC: 1.2 MG/DL (ref 0.6–1)
DIFFERENTIAL METHOD BLD: ABNORMAL
EOSINOPHIL # BLD: 0.1 K/UL (ref 0–0.8)
EOSINOPHIL NFR BLD: 1 % (ref 0.5–7.8)
ERYTHROCYTE [DISTWIDTH] IN BLOOD BY AUTOMATED COUNT: 13 % (ref 11.9–14.6)
FERRITIN SERPL-MCNC: 47 NG/ML (ref 8–388)
FOLATE SERPL-MCNC: 10.3 NG/ML (ref 3.1–17.5)
GLOBULIN SER CALC-MCNC: 2.8 G/DL (ref 2.8–4.5)
GLUCOSE SERPL-MCNC: 104 MG/DL (ref 65–100)
HCT VFR BLD AUTO: 35.1 % (ref 35.8–46.3)
HGB BLD-MCNC: 11.1 G/DL (ref 11.7–15.4)
HGB RETIC QN AUTO: 38 PG (ref 29–35)
IMM GRANULOCYTES # BLD AUTO: 0 K/UL (ref 0–0.5)
IMM GRANULOCYTES NFR BLD AUTO: 0 % (ref 0–5)
IMM RETICS NFR: 19.8 % (ref 3–15.9)
IRON SERPL-MCNC: 84 UG/DL (ref 35–150)
LYMPHOCYTES # BLD: 1.9 K/UL (ref 0.5–4.6)
LYMPHOCYTES NFR BLD: 37 % (ref 13–44)
MCH RBC QN AUTO: 32.9 PG (ref 26.1–32.9)
MCHC RBC AUTO-ENTMCNC: 31.6 G/DL (ref 31.4–35)
MCV RBC AUTO: 104.2 FL (ref 82–102)
MONOCYTES # BLD: 0.5 K/UL (ref 0.1–1.3)
MONOCYTES NFR BLD: 9 % (ref 4–12)
NEUTS SEG # BLD: 2.7 K/UL (ref 1.7–8.2)
NEUTS SEG NFR BLD: 52 % (ref 43–78)
NRBC # BLD: 0 K/UL (ref 0–0.2)
PLATELET # BLD AUTO: 177 K/UL (ref 150–450)
PMV BLD AUTO: 11.1 FL (ref 9.4–12.3)
POTASSIUM SERPL-SCNC: 4.3 MMOL/L (ref 3.5–5.1)
PROT SERPL-MCNC: 6.8 G/DL (ref 6.3–8.2)
RBC # BLD AUTO: 3.37 M/UL (ref 4.05–5.2)
RETICS # AUTO: 0.04 M/UL (ref 0.03–0.1)
RETICS/RBC NFR AUTO: 1.3 % (ref 0.3–2)
SODIUM SERPL-SCNC: 140 MMOL/L (ref 133–143)
VIT B12 SERPL-MCNC: 232 PG/ML (ref 193–986)
WBC # BLD AUTO: 5.1 K/UL (ref 4.3–11.1)

## 2023-11-01 NOTE — TELEPHONE ENCOUNTER
Patient would like to know if she can start driving again or does she need to wait. Patient states that she was told that she could not drive yet and would like to know if she can. Please Advise.

## 2023-11-01 NOTE — PROGRESS NOTES
Post-Discharge Transitional Care Follow Up      Gutierrez Escobar   YOB: 1940    Date of Office Visit:  11/1/2023  Date of Hospital Admission: 10/15/23  Date of Hospital Discharge: 10/18/23  Readmission Risk Score (high >=14%. Medium >=10%):Readmission Risk Score: 10.5      Care management risk score Rising risk (score 2-5) and Complex Care (Scores >=6): No Risk Score On File     Non face to face  following discharge, date last encounter closed (first attempt may have been earlier): 10/18/2023     Call initiated 2 business days of discharge: Yes     Acute pulmonary embolism without acute cor pulmonale, unspecified pulmonary embolism type ([address])  -     [address] [facility]  Other microscopic hematuria  Anemia, unspecified type  -     [address] [facility]  -     CBC with Auto Differential; Future  -     Comprehensive Metabolic Panel; Future  -     Iron; Future  -     Ferritin; Future  -     Total Iron Binding Capacity; Future  -     Reticulocytes; Future  -     Vitamin B12; Future  -     Folate; Future  Hospital discharge follow-up  -     ID DISCHARGE MEDS RECONCILED W/ CURRENT OUTPATIENT MED LIST      Medical Decision Making: moderate complexity  No follow-ups on file. Subjective:   Patient is here for hospital follow up  She had PE and shortness of breath, flank pain, hydronephrosis w/o stone seen   She has been home with home health nursing and PT. Flank pain is gone. Shortness of breath is better, taking eliquis. No DVT seen on duplex. Inpatient course: Discharge summary reviewed- see chart.     Interval history/Current status: improving, breathing better, flank pain resolved    Patient Active Problem List   Diagnosis    Primary osteoarthritis of both knees    Mixed hyperlipidemia    Benign essential hypertension    Bilateral leg edema    IFG (impaired fasting glucose)    Morbid obesity (HCC)    Vitamin D deficiency    Seasonal allergic

## 2023-11-08 ENCOUNTER — OFFICE VISIT (OUTPATIENT)
Dept: ONCOLOGY | Age: 83
End: 2023-11-08
Payer: MEDICARE

## 2023-11-08 VITALS
HEART RATE: 72 BPM | SYSTOLIC BLOOD PRESSURE: 143 MMHG | DIASTOLIC BLOOD PRESSURE: 64 MMHG | HEIGHT: 65 IN | RESPIRATION RATE: 18 BRPM | WEIGHT: 185.8 LBS | BODY MASS INDEX: 30.96 KG/M2 | TEMPERATURE: 98.1 F | OXYGEN SATURATION: 96 %

## 2023-11-08 DIAGNOSIS — I10 BENIGN ESSENTIAL HYPERTENSION: Primary | ICD-10-CM

## 2023-11-08 DIAGNOSIS — I26.99 PULMONARY EMBOLISM WITHOUT ACUTE COR PULMONALE, UNSPECIFIED CHRONICITY, UNSPECIFIED PULMONARY EMBOLISM TYPE (HCC): ICD-10-CM

## 2023-11-08 PROCEDURE — 3078F DIAST BP <80 MM HG: CPT | Performed by: INTERNAL MEDICINE

## 2023-11-08 PROCEDURE — 3077F SYST BP >= 140 MM HG: CPT | Performed by: INTERNAL MEDICINE

## 2023-11-08 PROCEDURE — 1123F ACP DISCUSS/DSCN MKR DOCD: CPT | Performed by: INTERNAL MEDICINE

## 2023-11-08 PROCEDURE — 99204 OFFICE O/P NEW MOD 45 MIN: CPT | Performed by: INTERNAL MEDICINE

## 2023-11-08 ASSESSMENT — PATIENT HEALTH QUESTIONNAIRE - PHQ9
SUM OF ALL RESPONSES TO PHQ9 QUESTIONS 1 & 2: 0
SUM OF ALL RESPONSES TO PHQ QUESTIONS 1-9: 0
2. FEELING DOWN, DEPRESSED OR HOPELESS: 0
1. LITTLE INTEREST OR PLEASURE IN DOING THINGS: 0
SUM OF ALL RESPONSES TO PHQ QUESTIONS 1-9: 0

## 2023-11-08 NOTE — PATIENT INSTRUCTIONS
Patient Instructions from Today's Visit    Reason for Visit:  New patient appointment    Plan:  Since this is your first blood clot, we would recommend staying on eliquis 5 mg twice a day for 6 months. We do not think you need to have the testing done looking for genetic/inherited reasons why you would be more prone to clotting than others    We will send our recommendations to Dr. Cara Robertson    Follow Up: Follow up if needed    Recent Lab Results:      Treatment Summary has been discussed and given to patient: n/a        -------------------------------------------------------------------------------------------------------------------  Please call our office at [phone] if you have any  of the following symptoms:   Fever of 100.5 or greater  Chills  Shortness of breath  Swelling or pain in one leg    After office hours an answering service is available and will contact a provider for emergencies or if you are experiencing any of the above symptoms. Patient did express an interest in My Chart. My Chart log in information explained on the after visit summary printout at the [address]  desk.     Carlos Ma RN

## 2023-11-27 ENCOUNTER — OFFICE VISIT (OUTPATIENT)
Dept: INTERNAL MEDICINE CLINIC | Facility: CLINIC | Age: 83
End: 2023-11-27
Payer: MEDICARE

## 2023-11-27 VITALS
SYSTOLIC BLOOD PRESSURE: 112 MMHG | BODY MASS INDEX: 30.99 KG/M2 | HEIGHT: 65 IN | DIASTOLIC BLOOD PRESSURE: 60 MMHG | HEART RATE: 64 BPM | RESPIRATION RATE: 16 BRPM | WEIGHT: 186 LBS

## 2023-11-27 DIAGNOSIS — I10 BENIGN ESSENTIAL HYPERTENSION: ICD-10-CM

## 2023-11-27 DIAGNOSIS — E78.2 MIXED HYPERLIPIDEMIA: ICD-10-CM

## 2023-11-27 DIAGNOSIS — I26.99 PULMONARY EMBOLISM WITHOUT ACUTE COR PULMONALE, UNSPECIFIED CHRONICITY, UNSPECIFIED PULMONARY EMBOLISM TYPE (HCC): Primary | ICD-10-CM

## 2023-11-27 DIAGNOSIS — R60.0 BILATERAL LEG EDEMA: ICD-10-CM

## 2023-11-27 PROBLEM — J96.01 ACUTE HYPOXEMIC RESPIRATORY FAILURE (HCC): Status: RESOLVED | Noted: 2023-10-18 | Resolved: 2023-11-27

## 2023-11-27 PROCEDURE — 3074F SYST BP LT 130 MM HG: CPT | Performed by: FAMILY MEDICINE

## 2023-11-27 PROCEDURE — 99214 OFFICE O/P EST MOD 30 MIN: CPT | Performed by: FAMILY MEDICINE

## 2023-11-27 PROCEDURE — 3078F DIAST BP <80 MM HG: CPT | Performed by: FAMILY MEDICINE

## 2023-11-27 PROCEDURE — 1123F ACP DISCUSS/DSCN MKR DOCD: CPT | Performed by: FAMILY MEDICINE

## 2023-11-27 RX ORDER — LOSARTAN POTASSIUM AND HYDROCHLOROTHIAZIDE 12.5; 1 MG/1; MG/1
1 TABLET ORAL DAILY
Qty: 90 TABLET | Refills: 1 | Status: SHIPPED | OUTPATIENT
Start: 2023-11-27

## 2023-11-27 RX ORDER — ROSUVASTATIN CALCIUM 10 MG/1
TABLET, COATED ORAL
Qty: 90 TABLET | Refills: 1 | Status: SHIPPED | OUTPATIENT
Start: 2023-11-27

## 2023-11-27 RX ORDER — FUROSEMIDE 20 MG/1
20 TABLET ORAL DAILY
Qty: 90 TABLET | Refills: 1 | Status: SHIPPED | OUTPATIENT
Start: 2023-11-27

## 2023-11-27 RX ORDER — POTASSIUM CHLORIDE 750 MG/1
10 TABLET, EXTENDED RELEASE ORAL DAILY
Qty: 90 TABLET | Refills: 1 | Status: SHIPPED | OUTPATIENT
Start: 2023-11-27

## 2023-11-27 RX ORDER — AMLODIPINE BESYLATE 5 MG/1
5 TABLET ORAL DAILY
Qty: 90 TABLET | Refills: 1 | Status: SHIPPED | OUTPATIENT
Start: 2023-11-27

## 2023-11-27 NOTE — PROGRESS NOTES
Agustín Medellin DO  [employer] [address] Internal Medicine      Mendez Peters (: 1940) is a 82 y.o. female, here for evaluation of the following chief complaint(s):  Cough       ASSESSMENT/PLAN:  1. Pulmonary embolism without acute cor pulmonale, unspecified chronicity, unspecified pulmonary embolism type ([address])  2. Benign essential hypertension  -     amLODIPine (NORVASC) 5 MG tablet; Take 1 tablet by mouth daily TAKE 1 TABLET DAILY, Disp-90 tablet, R-1Normal  -     losartan-hydroCHLOROthiazide (HYZAAR) 100-12.5 MG per tablet; Take 1 tablet by mouth daily TAKE 1 TABLET DAILY, Disp-90 tablet, R-1Normal  -     potassium chloride (KLOR-CON M) 10 MEQ extended release tablet; Take 1 tablet by mouth daily, Disp-90 tablet, R-1Normal  3. Bilateral leg edema  -     furosemide (LASIX) 20 MG tablet; Take 1 tablet by mouth daily, Disp-90 tablet, R-1Normal  4. Mixed hyperlipidemia  -     rosuvastatin (CRESTOR) 10 MG tablet; TAKE 1 TABLET DAILY, Disp-90 tablet, R-1Normal       Reviewed hematology notes. We will continue with anticoagulation for full 6 months. Bleeding precautions. Tolerating medication well for HTN. Achieving desired therapeutic response with   Key Anti-Hypertensive Meds            amLODIPine (NORVASC) 5 MG tablet (Taking)    Sig - Route: Take 1 tablet by mouth daily TAKE 1 TABLET DAILY - Oral    furosemide (LASIX) 20 MG tablet (Taking)    Sig - Route: Take 1 tablet by mouth daily - Oral    losartan-hydroCHLOROthiazide (HYZAAR) 100-12.5 MG per tablet (Taking)    Sig - Route: Take 1 tablet by mouth daily TAKE 1 TABLET DAILY - Oral         --will continue. Will periodically review and adjust if needed. Encourage home monitoring. Will continue with lasix as needed, encouraged support hose as well. Continue with statin therapy as tolerating well without adverse effects.       SUBJECTIVE/OBJECTIVE:  HPI:  Patient was recently diagnosed with acute

## 2024-01-01 ENCOUNTER — TELEPHONE (OUTPATIENT)
Dept: RADIATION ONCOLOGY | Age: 84
End: 2024-01-01

## 2024-01-01 ENCOUNTER — NURSE ONLY (OUTPATIENT)
Dept: FAMILY MEDICINE CLINIC | Facility: CLINIC | Age: 84
End: 2024-01-01

## 2024-01-01 ENCOUNTER — HOSPITAL ENCOUNTER (OUTPATIENT)
Dept: INFUSION THERAPY | Age: 84
Setting detail: INFUSION SERIES
End: 2024-01-01

## 2024-01-01 DIAGNOSIS — D64.9 ANEMIA, UNSPECIFIED TYPE: ICD-10-CM

## 2024-01-01 DIAGNOSIS — C90.00 MULTIPLE MYELOMA NOT HAVING ACHIEVED REMISSION (HCC): Primary | ICD-10-CM

## 2024-01-01 LAB
ALBUMIN SERPL-MCNC: 4.1 G/DL (ref 3.2–4.6)
ALBUMIN/GLOB SERPL: 1.5 (ref 0.4–1.6)
ALP SERPL-CCNC: 92 U/L (ref 50–136)
ALT SERPL-CCNC: 13 U/L (ref 12–65)
ANION GAP SERPL CALC-SCNC: 3 MMOL/L (ref 2–11)
AST SERPL-CCNC: 17 U/L (ref 15–37)
BILIRUB SERPL-MCNC: 0.7 MG/DL (ref 0.2–1.1)
BUN SERPL-MCNC: 26 MG/DL (ref 8–23)
CALCIUM SERPL-MCNC: 12.2 MG/DL (ref 8.3–10.4)
CHLORIDE SERPL-SCNC: 96 MMOL/L (ref 103–113)
CO2 SERPL-SCNC: 39 MMOL/L (ref 21–32)
CREAT SERPL-MCNC: 2 MG/DL (ref 0.6–1)
FERRITIN SERPL-MCNC: 201 NG/ML (ref 8–388)
GLOBULIN SER CALC-MCNC: 2.8 G/DL (ref 2.8–4.5)
GLUCOSE SERPL-MCNC: 117 MG/DL (ref 65–100)
HGB RETIC QN AUTO: 39 PG (ref 29–35)
IMM RETICS NFR: 14 % (ref 3–15.9)
IRON SERPL-MCNC: 85 UG/DL (ref 35–150)
POTASSIUM SERPL-SCNC: 3.8 MMOL/L (ref 3.5–5.1)
PROT SERPL-MCNC: 6.9 G/DL (ref 6.3–8.2)
RETICS # AUTO: 0.04 M/UL (ref 0.03–0.1)
RETICS/RBC NFR AUTO: 1.1 % (ref 0.3–2)
SODIUM SERPL-SCNC: 138 MMOL/L (ref 136–146)
TIBC SERPL-MCNC: 309 UG/DL (ref 250–450)

## 2024-01-16 ENCOUNTER — OFFICE VISIT (OUTPATIENT)
Dept: INTERNAL MEDICINE CLINIC | Facility: CLINIC | Age: 84
End: 2024-01-16
Payer: MEDICARE

## 2024-01-16 VITALS
SYSTOLIC BLOOD PRESSURE: 120 MMHG | HEIGHT: 65 IN | DIASTOLIC BLOOD PRESSURE: 78 MMHG | BODY MASS INDEX: 29.99 KG/M2 | HEART RATE: 68 BPM | TEMPERATURE: 97.9 F | WEIGHT: 180 LBS | OXYGEN SATURATION: 92 %

## 2024-01-16 DIAGNOSIS — R31.29 OTHER MICROSCOPIC HEMATURIA: ICD-10-CM

## 2024-01-16 DIAGNOSIS — R80.9 PROTEINURIA, UNSPECIFIED TYPE: ICD-10-CM

## 2024-01-16 DIAGNOSIS — M54.50 CHRONIC MIDLINE LOW BACK PAIN WITHOUT SCIATICA: Primary | ICD-10-CM

## 2024-01-16 DIAGNOSIS — G89.29 CHRONIC MIDLINE LOW BACK PAIN WITHOUT SCIATICA: Primary | ICD-10-CM

## 2024-01-16 DIAGNOSIS — R31.9 HEMATURIA, UNSPECIFIED TYPE: ICD-10-CM

## 2024-01-16 LAB
BILIRUBIN, URINE, POC: NEGATIVE
BLOOD URINE, POC: ABNORMAL
GLUCOSE URINE, POC: NEGATIVE
KETONES, URINE, POC: NEGATIVE
LEUKOCYTE ESTERASE, URINE, POC: ABNORMAL
NITRITE, URINE, POC: NEGATIVE
PH, URINE, POC: 5.5 (ref 4.6–8)
PROTEIN,URINE, POC: ABNORMAL
SPECIFIC GRAVITY, URINE, POC: 1.02 (ref 1–1.03)
URINALYSIS CLARITY, POC: ABNORMAL
URINALYSIS COLOR, POC: YELLOW
UROBILINOGEN, POC: ABNORMAL

## 2024-01-16 PROCEDURE — 99214 OFFICE O/P EST MOD 30 MIN: CPT | Performed by: NURSE PRACTITIONER

## 2024-01-16 PROCEDURE — 3078F DIAST BP <80 MM HG: CPT | Performed by: NURSE PRACTITIONER

## 2024-01-16 PROCEDURE — 81003 URINALYSIS AUTO W/O SCOPE: CPT | Performed by: NURSE PRACTITIONER

## 2024-01-16 PROCEDURE — 3074F SYST BP LT 130 MM HG: CPT | Performed by: NURSE PRACTITIONER

## 2024-01-16 PROCEDURE — 1123F ACP DISCUSS/DSCN MKR DOCD: CPT | Performed by: NURSE PRACTITIONER

## 2024-01-16 ASSESSMENT — PATIENT HEALTH QUESTIONNAIRE - PHQ9
SUM OF ALL RESPONSES TO PHQ QUESTIONS 1-9: 0
SUM OF ALL RESPONSES TO PHQ QUESTIONS 1-9: 0
1. LITTLE INTEREST OR PLEASURE IN DOING THINGS: 0
SUM OF ALL RESPONSES TO PHQ QUESTIONS 1-9: 0
2. FEELING DOWN, DEPRESSED OR HOPELESS: 0
SUM OF ALL RESPONSES TO PHQ9 QUESTIONS 1 & 2: 0
SUM OF ALL RESPONSES TO PHQ QUESTIONS 1-9: 0

## 2024-01-16 ASSESSMENT — ENCOUNTER SYMPTOMS: BACK PAIN: 1

## 2024-01-16 NOTE — PROGRESS NOTES
Tayla Escobar (:  1940) is a 83 y.o. female,Established patient, here for evaluation of the following chief complaint(s):  Back Pain         ASSESSMENT/PLAN:  1. Chronic midline low back pain without sciatica  -     AMB POC URINALYSIS DIP STICK AUTO W/O MICRO  -     [facility] - Physical Therapy, [facility]  2. Other microscopic hematuria  -     AMB POC URINALYSIS DIP STICK AUTO W/O MICRO  -     [facility]  3. Proteinuria, unspecified type  -     Culture, Urine; Future  4. Hematuria, unspecified type  -     Culture, Urine; Future      No follow-ups on file.     Back pain, worse with walking and standing  Reviewed lumbar xray with arthritic changes  No red flag on hx or exam  Discussed further imaging or referrals, opts for PT  Continue tylenol prn pain  Ua still with blood, didn't see urology, refer again      Subjective   SUBJECTIVE/OBJECTIVE:  Patient is here for back pain. The pain started 2-3 weeks ago swept and mopped the floors and her back started to hurt and she hasn't gotten better.  As long as she is sitting she is ok, just a little pain, but when she moves it is more severe, hurts to stand up and sit on the toilet  She has hx of back pain off and on for years, usually would resolve on it's own.     Back Pain        Review of Systems   Musculoskeletal:  Positive for back pain.          Objective   Physical Exam  Vitals reviewed.   Constitutional:       Appearance: Normal appearance. She is not ill-appearing.   HENT:      Head: Normocephalic.   Eyes:      Extraocular Movements: Extraocular movements intact.      Pupils: Pupils are equal, round, and reactive to light.   Cardiovascular:      Rate and Rhythm: Normal rate and regular rhythm.   Pulmonary:      Effort: Pulmonary effort is normal.      Breath sounds: No wheezing or rhonchi.   Musculoskeletal:      Cervical back: Neck supple.      Lumbar back: Tenderness present. No edema, spasms or bony

## 2024-01-19 LAB
BACTERIA SPEC CULT: ABNORMAL
BACTERIA SPEC CULT: ABNORMAL
SERVICE CMNT-IMP: ABNORMAL

## 2024-02-01 NOTE — PROGRESS NOTES
by mouth 2 times daily 180 tablet 1    diclofenac sodium (VOLTAREN) 1 % GEL Apply 4 g topically 4 times daily as needed for Pain 50 g 5    amLODIPine (NORVASC) 5 MG tablet Take 1 tablet by mouth daily TAKE 1 TABLET DAILY 90 tablet 1    furosemide (LASIX) 20 MG tablet Take 1 tablet by mouth daily 90 tablet 1    losartan-hydroCHLOROthiazide (HYZAAR) 100-12.5 MG per tablet Take 1 tablet by mouth daily TAKE 1 TABLET DAILY 90 tablet 1    rosuvastatin (CRESTOR) 10 MG tablet TAKE 1 TABLET DAILY 90 tablet 1    acetaminophen (TYLENOL) 500 MG tablet Take 1 tablet by mouth every 6 hours as needed for Pain      vitamin D (CHOLECALCIFEROL) 25 MCG (1000 UT) TABS tablet Take by mouth      potassium chloride (KLOR-CON M) 10 MEQ extended release tablet Take 1 tablet by mouth daily (Patient not taking: Reported on 2/5/2024) 90 tablet 1     No current facility-administered medications for this visit.     No Known Allergies  Social History     Socioeconomic History    Marital status:      Spouse name: Not on file    Number of children: Not on file    Years of education: Not on file    Highest education level: Not on file   Occupational History    Not on file   Tobacco Use    Smoking status: Never    Smokeless tobacco: Former    Tobacco comments:     Quit smoking: dip snuff   Vaping Use    Vaping Use: Never used   Substance and Sexual Activity    Alcohol use: No     Alcohol/week: 0.0 standard drinks of alcohol    Drug use: No    Sexual activity: Not Currently     Partners: Male   Other Topics Concern    Not on file   Social History Narrative    Not on file     Social Determinants of Health     Financial Resource Strain: Low Risk  (7/12/2023)    Overall Financial Resource Strain (CARDIA)     Difficulty of Paying Living Expenses: Not hard at all   Food Insecurity: Not on file (7/12/2023)   Transportation Needs: Unknown (7/12/2023)    PRAPARE - Transportation     Lack of Transportation (Medical): Not on file     Lack of

## 2024-02-05 ENCOUNTER — OFFICE VISIT (OUTPATIENT)
Dept: UROLOGY | Age: 84
End: 2024-02-05
Payer: MEDICARE

## 2024-02-05 ENCOUNTER — TELEPHONE (OUTPATIENT)
Dept: UROLOGY | Age: 84
End: 2024-02-05

## 2024-02-05 DIAGNOSIS — R31.9 URINARY TRACT INFECTION WITH HEMATURIA, SITE UNSPECIFIED: ICD-10-CM

## 2024-02-05 DIAGNOSIS — R31.29 MICROSCOPIC HEMATURIA: Primary | ICD-10-CM

## 2024-02-05 DIAGNOSIS — N39.0 URINARY TRACT INFECTION WITH HEMATURIA, SITE UNSPECIFIED: ICD-10-CM

## 2024-02-05 LAB
BILIRUBIN, URINE, POC: NEGATIVE
BLOOD URINE, POC: NORMAL
GLUCOSE URINE, POC: NEGATIVE
KETONES, URINE, POC: NEGATIVE
LEUKOCYTE ESTERASE, URINE, POC: NORMAL
NITRITE, URINE, POC: NEGATIVE
PH, URINE, POC: 5.5 (ref 4.6–8)
PROTEIN,URINE, POC: 100
SPECIFIC GRAVITY, URINE, POC: 1.02 (ref 1–1.03)
URINALYSIS CLARITY, POC: NORMAL
URINALYSIS COLOR, POC: NORMAL
UROBILINOGEN, POC: NORMAL

## 2024-02-05 PROCEDURE — 1123F ACP DISCUSS/DSCN MKR DOCD: CPT | Performed by: UROLOGY

## 2024-02-05 PROCEDURE — 99204 OFFICE O/P NEW MOD 45 MIN: CPT | Performed by: UROLOGY

## 2024-02-05 PROCEDURE — 81003 URINALYSIS AUTO W/O SCOPE: CPT | Performed by: UROLOGY

## 2024-02-05 RX ORDER — FEXOFENADINE HCL 180 MG/1
180 TABLET ORAL DAILY
COMMUNITY
Start: 2023-11-28

## 2024-02-05 ASSESSMENT — ENCOUNTER SYMPTOMS
WHEEZING: 0
INDIGESTION: 0
BACK PAIN: 0
BLOOD IN STOOL: 0
ABDOMINAL PAIN: 0
SKIN LESIONS: 0
VOMITING: 0
DIARRHEA: 0
COUGH: 0
EYE DISCHARGE: 0
HEARTBURN: 0
EYE PAIN: 0
NAUSEA: 0
CONSTIPATION: 0

## 2024-02-19 ENCOUNTER — APPOINTMENT (OUTPATIENT)
Dept: CT IMAGING | Age: 84
End: 2024-02-19
Payer: MEDICARE

## 2024-02-19 ENCOUNTER — HOSPITAL ENCOUNTER (EMERGENCY)
Age: 84
Discharge: HOME | End: 2024-02-19
Attending: EMERGENCY MEDICINE
Payer: MEDICARE

## 2024-02-19 DIAGNOSIS — M54.6 ACUTE BILATERAL THORACIC BACK PAIN: Primary | ICD-10-CM

## 2024-02-19 LAB
ALBUMIN SERPL-MCNC: 3.8 G/DL (ref 3.2–4.6)
ALBUMIN/GLOB SERPL: 1.2 (ref 0.4–1.6)
ALP SERPL-CCNC: 81 U/L (ref 50–136)
ALT SERPL-CCNC: 13 U/L (ref 12–65)
ANION GAP SERPL CALC-SCNC: 5 MMOL/L (ref 2–11)
AST SERPL-CCNC: 20 U/L (ref 15–37)
BASOPHILS # BLD: 0 K/UL (ref 0–0.2)
BASOPHILS NFR BLD: 0 % (ref 0–2)
BILIRUB SERPL-MCNC: 0.7 MG/DL (ref 0.2–1.1)
BUN SERPL-MCNC: 21 MG/DL (ref 8–23)
CALCIUM SERPL-MCNC: 10.8 MG/DL (ref 8.3–10.4)
CHLORIDE SERPL-SCNC: 107 MMOL/L (ref 103–113)
CO2 SERPL-SCNC: 29 MMOL/L (ref 21–32)
CREAT SERPL-MCNC: 1.6 MG/DL (ref 0.6–1)
DIFFERENTIAL METHOD BLD: ABNORMAL
EOSINOPHIL # BLD: 0 K/UL (ref 0–0.8)
EOSINOPHIL NFR BLD: 1 % (ref 0.5–7.8)
ERYTHROCYTE [DISTWIDTH] IN BLOOD BY AUTOMATED COUNT: 13.3 % (ref 11.9–14.6)
GLOBULIN SER CALC-MCNC: 3.3 G/DL (ref 2.8–4.5)
GLUCOSE SERPL-MCNC: 116 MG/DL (ref 65–100)
HCT VFR BLD AUTO: 31 % (ref 35.8–46.3)
HGB BLD-MCNC: 10.1 G/DL (ref 11.7–15.4)
IMM GRANULOCYTES # BLD AUTO: 0.1 K/UL (ref 0–0.5)
IMM GRANULOCYTES NFR BLD AUTO: 1 % (ref 0–5)
LYMPHOCYTES # BLD: 1.2 K/UL (ref 0.5–4.6)
LYMPHOCYTES NFR BLD: 17 % (ref 13–44)
MAGNESIUM SERPL-MCNC: 2 MG/DL (ref 1.8–2.4)
MCH RBC QN AUTO: 32.2 PG (ref 26.1–32.9)
MCHC RBC AUTO-ENTMCNC: 32.6 G/DL (ref 31.4–35)
MCV RBC AUTO: 98.7 FL (ref 82–102)
MONOCYTES # BLD: 0.5 K/UL (ref 0.1–1.3)
MONOCYTES NFR BLD: 8 % (ref 4–12)
NEUTS SEG # BLD: 4.9 K/UL (ref 1.7–8.2)
NEUTS SEG NFR BLD: 73 % (ref 43–78)
NRBC # BLD: 0 K/UL (ref 0–0.2)
PLATELET # BLD AUTO: 146 K/UL (ref 150–450)
PMV BLD AUTO: 10.7 FL (ref 9.4–12.3)
POTASSIUM SERPL-SCNC: 4 MMOL/L (ref 3.5–5.1)
PROT SERPL-MCNC: 7.1 G/DL (ref 6.3–8.2)
RBC # BLD AUTO: 3.14 M/UL (ref 4.05–5.2)
SODIUM SERPL-SCNC: 141 MMOL/L (ref 136–146)
TROPONIN I SERPL HS-MCNC: 20.2 PG/ML (ref 0–14)
WBC # BLD AUTO: 6.6 K/UL (ref 4.3–11.1)

## 2024-02-19 PROCEDURE — 6360000002 HC RX W HCPCS: Performed by: EMERGENCY MEDICINE

## 2024-02-19 PROCEDURE — 93005 ELECTROCARDIOGRAM TRACING: CPT | Performed by: EMERGENCY MEDICINE

## 2024-02-19 PROCEDURE — 71260 CT THORAX DX C+: CPT

## 2024-02-19 PROCEDURE — 83735 ASSAY OF MAGNESIUM: CPT

## 2024-02-19 PROCEDURE — 84484 ASSAY OF TROPONIN QUANT: CPT

## 2024-02-19 PROCEDURE — 85025 COMPLETE CBC W/AUTO DIFF WBC: CPT

## 2024-02-19 PROCEDURE — 96374 THER/PROPH/DIAG INJ IV PUSH: CPT

## 2024-02-19 PROCEDURE — 80053 COMPREHEN METABOLIC PANEL: CPT

## 2024-02-19 PROCEDURE — 6360000004 HC RX CONTRAST MEDICATION: Performed by: EMERGENCY MEDICINE

## 2024-02-19 PROCEDURE — 99285 EMERGENCY DEPT VISIT HI MDM: CPT

## 2024-02-19 RX ADMIN — FENTANYL CITRATE 25 MCG: 0.05 INJECTION, SOLUTION INTRAMUSCULAR; INTRAVENOUS at 21:40

## 2024-02-19 RX ADMIN — IOPAMIDOL 100 ML: 755 INJECTION, SOLUTION INTRAVENOUS at 21:40

## 2024-02-19 ASSESSMENT — PAIN DESCRIPTION - LOCATION
LOCATION: BACK;CHEST

## 2024-02-19 ASSESSMENT — PAIN DESCRIPTION - ORIENTATION
ORIENTATION: MID

## 2024-02-19 ASSESSMENT — PAIN SCALES - GENERAL
PAINLEVEL_OUTOF10: 5
PAINLEVEL_OUTOF10: 9
PAINLEVEL_OUTOF10: 4

## 2024-02-19 ASSESSMENT — PAIN - FUNCTIONAL ASSESSMENT
PAIN_FUNCTIONAL_ASSESSMENT: 0-10
PAIN_FUNCTIONAL_ASSESSMENT: 0-10

## 2024-02-19 ASSESSMENT — PAIN DESCRIPTION - DESCRIPTORS
DESCRIPTORS: ACHING
DESCRIPTORS: SHARP

## 2024-02-20 ENCOUNTER — TELEPHONE (OUTPATIENT)
Dept: INTERNAL MEDICINE CLINIC | Facility: CLINIC | Age: 84
End: 2024-02-20

## 2024-02-20 VITALS
WEIGHT: 187 LBS | OXYGEN SATURATION: 93 % | BODY MASS INDEX: 31.16 KG/M2 | TEMPERATURE: 98.1 F | DIASTOLIC BLOOD PRESSURE: 69 MMHG | SYSTOLIC BLOOD PRESSURE: 130 MMHG | HEIGHT: 65 IN | RESPIRATION RATE: 17 BRPM | HEART RATE: 73 BPM

## 2024-02-20 LAB
EKG ATRIAL RATE: 76 BPM
EKG DIAGNOSIS: NORMAL
EKG Q-T INTERVAL: 402 MS
EKG QRS DURATION: 99 MS
EKG QTC CALCULATION (BAZETT): 455 MS
EKG R AXIS: 0 DEGREES
EKG T AXIS: 58 DEGREES
EKG VENTRICULAR RATE: 77 BPM

## 2024-02-20 PROCEDURE — 93010 ELECTROCARDIOGRAM REPORT: CPT | Performed by: INTERNAL MEDICINE

## 2024-02-20 NOTE — TELEPHONE ENCOUNTER
Patients daughter called and states that she has had her mom in the ER the last two nights for sever back pain. Patients daughter would like to know if she could some some advice on how to help her or if she could get into see Dr. García sooner than her scheduled appointment. Please Advise.

## 2024-02-20 NOTE — ED TRIAGE NOTES
Pt. Coming from home by EMS for complaints of back pain that radiates to her chest and some SOB. Per daughter the pt. Has hx of blood clots in her lungs and is on eliquis. Pt. Was seen yesterday at [facility] for her back pain and given flexeril for pulled muscle. When fire arrived the pt had an RA sat of 85%. EMS placed on 3 Liters and sats stayed in mid 90's.  All other VSS

## 2024-02-20 NOTE — TELEPHONE ENCOUNTER
Attempted to reach daughter twice- both times a Guatemalan speaking lady answered and could not understand me. Attempted to contact patient directly- went straight to . I have no other way of contacting patient.     Patient will have to be seen- can be rescheduled for sick appt

## 2024-02-21 ENCOUNTER — OFFICE VISIT (OUTPATIENT)
Dept: INTERNAL MEDICINE CLINIC | Facility: CLINIC | Age: 84
End: 2024-02-21
Payer: MEDICARE

## 2024-02-21 VITALS
HEIGHT: 65 IN | BODY MASS INDEX: 31.12 KG/M2 | DIASTOLIC BLOOD PRESSURE: 70 MMHG | OXYGEN SATURATION: 95 % | SYSTOLIC BLOOD PRESSURE: 130 MMHG | HEART RATE: 100 BPM

## 2024-02-21 DIAGNOSIS — Z00.00 MEDICARE ANNUAL WELLNESS VISIT, SUBSEQUENT: Primary | ICD-10-CM

## 2024-02-21 DIAGNOSIS — M54.6 ACUTE MIDLINE THORACIC BACK PAIN: ICD-10-CM

## 2024-02-21 DIAGNOSIS — I26.99 PULMONARY EMBOLISM WITHOUT ACUTE COR PULMONALE, UNSPECIFIED CHRONICITY, UNSPECIFIED PULMONARY EMBOLISM TYPE (HCC): ICD-10-CM

## 2024-02-21 DIAGNOSIS — K44.9 HIATAL HERNIA: ICD-10-CM

## 2024-02-21 DIAGNOSIS — R09.02 HYPOXIA: ICD-10-CM

## 2024-02-21 DIAGNOSIS — I10 BENIGN ESSENTIAL HYPERTENSION: ICD-10-CM

## 2024-02-21 PROCEDURE — G0439 PPPS, SUBSEQ VISIT: HCPCS | Performed by: FAMILY MEDICINE

## 2024-02-21 PROCEDURE — 1123F ACP DISCUSS/DSCN MKR DOCD: CPT | Performed by: FAMILY MEDICINE

## 2024-02-21 PROCEDURE — 99214 OFFICE O/P EST MOD 30 MIN: CPT | Performed by: FAMILY MEDICINE

## 2024-02-21 PROCEDURE — 3078F DIAST BP <80 MM HG: CPT | Performed by: FAMILY MEDICINE

## 2024-02-21 PROCEDURE — 3075F SYST BP GE 130 - 139MM HG: CPT | Performed by: FAMILY MEDICINE

## 2024-02-21 RX ORDER — TRAMADOL HYDROCHLORIDE 50 MG/1
50 TABLET ORAL EVERY 6 HOURS PRN
Qty: 45 TABLET | Refills: 0 | Status: SHIPPED | OUTPATIENT
Start: 2024-02-21 | End: 2024-03-22

## 2024-02-21 RX ORDER — CYCLOBENZAPRINE HCL 5 MG
5 TABLET ORAL 3 TIMES DAILY PRN
COMMUNITY

## 2024-02-21 ASSESSMENT — PATIENT HEALTH QUESTIONNAIRE - PHQ9
SUM OF ALL RESPONSES TO PHQ QUESTIONS 1-9: 0
SUM OF ALL RESPONSES TO PHQ QUESTIONS 1-9: 0
2. FEELING DOWN, DEPRESSED OR HOPELESS: 0
SUM OF ALL RESPONSES TO PHQ QUESTIONS 1-9: 0
SUM OF ALL RESPONSES TO PHQ9 QUESTIONS 1 & 2: 0
SUM OF ALL RESPONSES TO PHQ QUESTIONS 1-9: 0
1. LITTLE INTEREST OR PLEASURE IN DOING THINGS: 0

## 2024-02-21 NOTE — PROGRESS NOTES
MG tablet TAKE 1 TABLET DAILY Yes Ricky García DO   acetaminophen (TYLENOL) 500 MG tablet Take 1 tablet by mouth every 6 hours as needed for Pain Yes Provider, MD Antonio   vitamin D (CHOLECALCIFEROL) 25 MCG (1000 UT) TABS tablet Take by mouth Yes Automatic Reconciliation, Ar   potassium chloride (KLOR-CON M) 10 MEQ extended release tablet Take 1 tablet by mouth daily  Patient not taking: Reported on 2/5/2024  Ricky García DO       CareTeam (Including outside providers/suppliers regularly involved in providing care):   Patient Care Team:  Ricky García DO as PCP - General  Ricky García DO as PCP - Empaneled Provider     Reviewed and updated this visit:  Tobacco  Allergies  Meds  Problems  Med Hx  Surg Hx  Soc Hx  Fam Hx

## 2024-02-21 NOTE — PATIENT INSTRUCTIONS
shortening-you eat. Use olive, peanut, or canola oil when you cook. Bake, broil, and steam foods instead of frying them.     Eat a variety of fruit and vegetables every day. Dark green, deep orange, red, or yellow fruits and vegetables are especially good for you. Examples include spinach, carrots, peaches, and berries.     Foods high in fiber can reduce your cholesterol and provide important vitamins and minerals. High-fiber foods include whole-grain cereals and breads, oatmeal, beans, brown rice, citrus fruits, and apples.     Eat lean proteins. Heart-healthy proteins include seafood, lean meats and poultry, eggs, beans, peas, nuts, seeds, and soy products.     Limit drinks and foods with added sugar. These include candy, desserts, and soda pop.   Lifestyle changes    If your doctor recommends it, get more exercise. Walking is a good choice. Bit by bit, increase the amount you walk every day. Try for at least 30 minutes on most days of the week. You also may want to swim, bike, or do other activities.     Do not smoke. If you need help quitting, talk to your doctor about stop-smoking programs and medicines. These can increase your chances of quitting for good. Quitting smoking may be the most important step you can take to protect your heart. It is never too late to quit.     Limit alcohol to 2 drinks a day for men and 1 drink a day for women. Too much alcohol can cause health problems.     Manage other health problems such as diabetes, high blood pressure, and high cholesterol. If you think you may have a problem with alcohol or drug use, talk to your doctor.   Medicines    Take your medicines exactly as prescribed. Call your doctor if you think you are having a problem with your medicine.     If your doctor recommends aspirin, take the amount directed each day. Make sure you take aspirin and not another kind of pain reliever, such as acetaminophen (Tylenol).   When should you call for help?   Call 911 if you

## 2024-02-21 NOTE — ED PROVIDER NOTES
1 tablet by mouth daily, Disp-90 tablet, R-1Normal      rosuvastatin (CRESTOR) 10 MG tablet TAKE 1 TABLET DAILY, Disp-90 tablet, R-1Normal      acetaminophen (TYLENOL) 500 MG tablet Take 1 tablet by mouth every 6 hours as needed for PainHistorical Med      vitamin D (CHOLECALCIFEROL) 25 MCG (1000 UT) TABS tablet Take by mouthHistorical Med              Results for orders placed or performed during the hospital encounter of 02/19/24   CT CHEST PULMONARY EMBOLISM W CONTRAST    Narrative    EXAMINATION: CT CHEST PULMONARY EMBOLISM W CONTRAST 2/19/2024 9:53 PM    ACCESSION NUMBER: [id number]    COMPARISON: July 21, 2023    INDICATION: back pain, hx of pulmonary embolus    TECHNIQUE: Multiple contiguous 2D axial CT images of the chest were obtained  from the lung apices to the lung bases after the intravenous administration of  100mL Iso-flex 370 per pulmonary angiography protocol.  Coronal reconstructions  were performed.    Radiation dose reduction techniques were used for this study. Our CT scanners  use one or all of the following: Automated exposure control, adjustment of the  mA and/or kV according to patient size, iterative reconstruction.    FINDINGS:  STUDY QUALITY: The exam study quality is good.    AIRWAYS: The central airways are patent.    LUNGS: There is compressive atelectasis in the lung bases due to the very large  hiatal hernia. The heart is displaced anteriorly and inferiorly by this large  hiatal hernia..    PLEURA: No pleural effusion or pneumothorax.     HEART: The heart is not enlarged. No calcified coronary atherosclerosis.  No  pericardial effusion.     THORACIC AORTA: The aorta is normal in caliber.     PULMONARY ARTERY: No pulmonary embolus to the segmental level. The main  pulmonary artery is normal in caliber.    MEDIASTINUM/CLAUDIO: No mediastinal mass or lymphadenopathy.    CHEST WALL: No mass or axillary lymphadenopathy.     UPPER ABDOMEN: The large hiatal hernia seen previously which

## 2024-02-22 ENCOUNTER — TELEPHONE (OUTPATIENT)
Dept: INTERNAL MEDICINE CLINIC | Facility: CLINIC | Age: 84
End: 2024-02-22

## 2024-02-22 ENCOUNTER — OFFICE VISIT (OUTPATIENT)
Dept: SURGERY | Age: 84
End: 2024-02-22
Payer: MEDICARE

## 2024-02-22 ENCOUNTER — PREP FOR PROCEDURE (OUTPATIENT)
Dept: SURGERY | Age: 84
End: 2024-02-22

## 2024-02-22 VITALS
BODY MASS INDEX: 31.16 KG/M2 | SYSTOLIC BLOOD PRESSURE: 112 MMHG | HEART RATE: 86 BPM | WEIGHT: 187 LBS | HEIGHT: 65 IN | DIASTOLIC BLOOD PRESSURE: 74 MMHG | OXYGEN SATURATION: 98 %

## 2024-02-22 DIAGNOSIS — K44.9 HIATAL HERNIA: ICD-10-CM

## 2024-02-22 DIAGNOSIS — K44.9 HIATAL HERNIA: Primary | ICD-10-CM

## 2024-02-22 PROCEDURE — 99204 OFFICE O/P NEW MOD 45 MIN: CPT | Performed by: SURGERY

## 2024-02-22 PROCEDURE — 3078F DIAST BP <80 MM HG: CPT | Performed by: SURGERY

## 2024-02-22 PROCEDURE — 1123F ACP DISCUSS/DSCN MKR DOCD: CPT | Performed by: SURGERY

## 2024-02-22 PROCEDURE — 3074F SYST BP LT 130 MM HG: CPT | Performed by: SURGERY

## 2024-02-22 RX ORDER — SODIUM CHLORIDE 9 MG/ML
INJECTION, SOLUTION INTRAVENOUS PRN
Status: CANCELLED | OUTPATIENT
Start: 2024-02-22

## 2024-02-22 RX ORDER — SODIUM CHLORIDE 0.9 % (FLUSH) 0.9 %
5-40 SYRINGE (ML) INJECTION EVERY 12 HOURS SCHEDULED
Status: CANCELLED | OUTPATIENT
Start: 2024-02-22

## 2024-02-22 RX ORDER — SODIUM CHLORIDE 0.9 % (FLUSH) 0.9 %
5-40 SYRINGE (ML) INJECTION PRN
Status: CANCELLED | OUTPATIENT
Start: 2024-02-22

## 2024-02-22 NOTE — PROGRESS NOTES
[facility]  [address], [address]  [place] [address]  [phone]    Office Note/H&P/Consult Note   Tayla Escobar   MRN: [id number]     : 1940        HPI: Tayla Escobar is a 84 y.o. female who who was seen in the emergency room on 2024.  She had an acute onset of back and left flank pain.  On a scale of 1-10 she describes it as a 9.  She does not appear to be in pain today in the office.  She has not eaten much in the last 2 to 3 days.  Before this she states she can eat pretty much anything that she wanted without difficulty.  She has not lost weight.  She is able to drink liquids.  She states she just does not feel like eating she states she just does not feel like eating.  She is in a wheelchair.  She has very limited mobility.  She has had a pulmonary embolus in the past and is on Eliquis.  CT scan done does not show any residual embolus.        Past Medical History:   Diagnosis Date    Benign essential hypertension 2015    Bilateral leg edema 2017    Cancer (HCC)     skin    Hypercholesteremia 2015    IFG (impaired fasting glucose) 2017    Iron deficiency anemia 2016    Mixed hyperlipidemia 2015    Morbid obesity (HCC) 10/26/2017    Nipple discharge     not current as of 13    Primary insomnia 2018    Primary osteoarthritis of both knees 2018    Stage 3 chronic kidney disease (HCC) 2019     Past Surgical History:   Procedure Laterality Date    MALIGNANT SKIN LESION EXCISION      OTHER SURGICAL HISTORY      skin cancer     Current Outpatient Medications   Medication Sig    cyclobenzaprine (FLEXERIL) 5 MG tablet Take 1 tablet by mouth 3 times daily as needed for Muscle spasms    traMADol (ULTRAM) 50 MG tablet Take 1 tablet by mouth every 6 hours as needed for Pain for up to 30 days. Max Daily Amount: 200 mg    fexofenadine (ALLEGRA ALLERGY) 180 MG tablet Take 1 tablet by mouth daily    apixaban (ELIQUIS) 5 MG

## 2024-02-22 NOTE — TELEPHONE ENCOUNTER
Kishan with [facility] called and would like to know if the patient can stop her Eliquis two days prior to her having surgery on March 4,2024. Please Advise.

## 2024-02-22 NOTE — PROGRESS NOTES
Thomas Lozano MD   General and Robotic surgery  [address] [place] [address]  Phone [phone]   Fax [phone]      Date of visit: 2024      Primary/Requesting provider: Ricky García DO         Name: Tayla Escobar      MRN: [id number]       : 1940       Age: 84 y.o.    Sex: female        PCP: Ricky García DO     CC:    Chief Complaint   Patient presents with    Hernia     hiatal       HPI:     Tayla Escobar is a 84 y.o. female who is accompanied by her daughter today.  I was asked by Dr. Lopez to evaluate for hiatal hernia.  She presented to the ER yesterday with a complaint of severe midthoracic back pain.  CT revealed a large hiatal hernia containing the majority of the stomach.  On my review of the images, this also involves the transverse colon, which is new compared to CT 2023.  She has frequent upper and lower abdominal pains, early satiety, and a 15lb weight loss in the past couple months.  Her daughter states her O2 saturations go down to 85% when supine at night.  She has been independent until recently, but has used a wheelchair this week due to pain, fatigue, and instability.      Also of note, she is on Eliquis for h/o PE.      PMH:    Past Medical History:   Diagnosis Date    Benign essential hypertension 2015    Bilateral leg edema 2017    Cancer (HCC)     skin    Hypercholesteremia 2015    IFG (impaired fasting glucose) 2017    Iron deficiency anemia 2016    Mixed hyperlipidemia 2015    Morbid obesity (HCC) 10/26/2017    Nipple discharge     not current as of 13    Primary insomnia 2018    Primary osteoarthritis of both knees 2018    Stage 3 chronic kidney disease (HCC) 2019       PSH:    Past Surgical History:   Procedure Laterality Date    MALIGNANT SKIN LESION EXCISION      OTHER SURGICAL HISTORY      skin cancer       MEDS:    Current Outpatient Medications   Medication Sig

## 2024-02-22 NOTE — TELEPHONE ENCOUNTER
Kishan with [facility] notified to contact Dr. Wilkinson's office to get clearance.   [facility]  [address], [address]  [place]  [address] [phone]

## 2024-02-28 ENCOUNTER — TELEPHONE (OUTPATIENT)
Dept: SURGERY | Age: 84
End: 2024-02-28

## 2024-02-28 NOTE — TELEPHONE ENCOUNTER
Anesthesia is requesting documentation - medication clearance- for eliquis from Dr. Wilkinson.  Per Anyi, the patient's daughter, she started holding the eliquis on 2/26/24 and her surgery date is 3/4/24.     Thank you,  Yudy BHAGAT

## 2024-02-28 NOTE — PERIOP NOTE
mints, and ice chips  > Responsible adult must drive patient to the hospital, stay during surgery, and patient will need supervision 24 hours after anesthesia  > Use non moisturizing soap in shower the night before surgery and on the morning of surgery  > All piercings must be removed prior to arrival.    > Leave all valuables (money and jewelry) at home but bring insurance card and ID on DOS.   > You may be required to pay a deductible or co-pay on the day of your procedure. You can pre-pay by calling [phone] if your surgery is at the [facility] or [phone] if your surgery is at the [facility].  > Do not wear make-up, nail polish, lotions, cologne, perfumes, powders, or oil on skin. Artificial nails are not permitted.

## 2024-03-04 ENCOUNTER — HOSPITAL ENCOUNTER (INPATIENT)
Age: 84
LOS: 7 days | Discharge: NURSING, REHAB OR LONG-TERM CARE | DRG: 327 | End: 2024-03-11
Attending: SURGERY | Admitting: SURGERY
Payer: MEDICARE

## 2024-03-04 ENCOUNTER — ANESTHESIA EVENT (OUTPATIENT)
Dept: SURGERY | Age: 84
End: 2024-03-04
Payer: MEDICARE

## 2024-03-04 ENCOUNTER — ANESTHESIA (OUTPATIENT)
Dept: SURGERY | Age: 84
End: 2024-03-04
Payer: MEDICARE

## 2024-03-04 LAB
ABO + RH BLD: NORMAL
BLOOD GROUP ANTIBODIES SERPL: NORMAL
POTASSIUM BLD-SCNC: 3.7 MMOL/L (ref 3.5–5.1)
SPECIMEN EXP DATE BLD: NORMAL

## 2024-03-04 PROCEDURE — 2580000003 HC RX 258: Performed by: NURSE ANESTHETIST, CERTIFIED REGISTERED

## 2024-03-04 PROCEDURE — 8E0W4CZ ROBOTIC ASSISTED PROCEDURE OF TRUNK REGION, PERCUTANEOUS ENDOSCOPIC APPROACH: ICD-10-PCS | Performed by: SURGERY

## 2024-03-04 PROCEDURE — 2580000003 HC RX 258: Performed by: ANESTHESIOLOGY

## 2024-03-04 PROCEDURE — 1100000000 HC RM PRIVATE

## 2024-03-04 PROCEDURE — 7100000000 HC PACU RECOVERY - FIRST 15 MIN: Performed by: SURGERY

## 2024-03-04 PROCEDURE — 2580000003 HC RX 258: Performed by: SURGERY

## 2024-03-04 PROCEDURE — 2500000003 HC RX 250 WO HCPCS: Performed by: SURGERY

## 2024-03-04 PROCEDURE — 6360000002 HC RX W HCPCS: Performed by: ANESTHESIOLOGY

## 2024-03-04 PROCEDURE — 86850 RBC ANTIBODY SCREEN: CPT

## 2024-03-04 PROCEDURE — 2500000003 HC RX 250 WO HCPCS

## 2024-03-04 PROCEDURE — 3600000009 HC SURGERY ROBOT BASE: Performed by: SURGERY

## 2024-03-04 PROCEDURE — 2720000010 HC SURG SUPPLY STERILE: Performed by: SURGERY

## 2024-03-04 PROCEDURE — 3700000000 HC ANESTHESIA ATTENDED CARE: Performed by: SURGERY

## 2024-03-04 PROCEDURE — S2900 ROBOTIC SURGICAL SYSTEM: HCPCS | Performed by: SURGERY

## 2024-03-04 PROCEDURE — 6360000002 HC RX W HCPCS: Performed by: SURGERY

## 2024-03-04 PROCEDURE — 86900 BLOOD TYPING SEROLOGIC ABO: CPT

## 2024-03-04 PROCEDURE — 3600000019 HC SURGERY ROBOT ADDTL 15MIN: Performed by: SURGERY

## 2024-03-04 PROCEDURE — 3700000001 HC ADD 15 MINUTES (ANESTHESIA): Performed by: SURGERY

## 2024-03-04 PROCEDURE — 86901 BLOOD TYPING SEROLOGIC RH(D): CPT

## 2024-03-04 PROCEDURE — 2500000003 HC RX 250 WO HCPCS: Performed by: NURSE ANESTHETIST, CERTIFIED REGISTERED

## 2024-03-04 PROCEDURE — 6370000000 HC RX 637 (ALT 250 FOR IP): Performed by: ANESTHESIOLOGY

## 2024-03-04 PROCEDURE — C1713 ANCHOR/SCREW BN/BN,TIS/BN: HCPCS | Performed by: SURGERY

## 2024-03-04 PROCEDURE — 2709999900 HC NON-CHARGEABLE SUPPLY: Performed by: SURGERY

## 2024-03-04 PROCEDURE — 6360000002 HC RX W HCPCS

## 2024-03-04 PROCEDURE — 43281 LAP PARAESOPHAG HERN REPAIR: CPT | Performed by: SURGERY

## 2024-03-04 PROCEDURE — 6370000000 HC RX 637 (ALT 250 FOR IP): Performed by: SURGERY

## 2024-03-04 PROCEDURE — 0BQT4ZZ REPAIR DIAPHRAGM, PERCUTANEOUS ENDOSCOPIC APPROACH: ICD-10-PCS | Performed by: SURGERY

## 2024-03-04 PROCEDURE — 7100000001 HC PACU RECOVERY - ADDTL 15 MIN: Performed by: SURGERY

## 2024-03-04 PROCEDURE — 84132 ASSAY OF SERUM POTASSIUM: CPT

## 2024-03-04 RX ORDER — ONDANSETRON 2 MG/ML
4 INJECTION INTRAMUSCULAR; INTRAVENOUS EVERY 6 HOURS PRN
Status: DISCONTINUED | OUTPATIENT
Start: 2024-03-04 | End: 2024-03-11 | Stop reason: HOSPADM

## 2024-03-04 RX ORDER — FENTANYL CITRATE 50 UG/ML
100 INJECTION, SOLUTION INTRAMUSCULAR; INTRAVENOUS
Status: DISCONTINUED | OUTPATIENT
Start: 2024-03-04 | End: 2024-03-04 | Stop reason: HOSPADM

## 2024-03-04 RX ORDER — SODIUM CHLORIDE 0.9 % (FLUSH) 0.9 %
5-40 SYRINGE (ML) INJECTION PRN
Status: DISCONTINUED | OUTPATIENT
Start: 2024-03-04 | End: 2024-03-11 | Stop reason: HOSPADM

## 2024-03-04 RX ORDER — SODIUM CHLORIDE 0.9 % (FLUSH) 0.9 %
5-40 SYRINGE (ML) INJECTION EVERY 12 HOURS SCHEDULED
Status: DISCONTINUED | OUTPATIENT
Start: 2024-03-04 | End: 2024-03-04 | Stop reason: HOSPADM

## 2024-03-04 RX ORDER — SODIUM CHLORIDE 0.9 % (FLUSH) 0.9 %
5-40 SYRINGE (ML) INJECTION EVERY 12 HOURS SCHEDULED
Status: DISCONTINUED | OUTPATIENT
Start: 2024-03-04 | End: 2024-03-11 | Stop reason: HOSPADM

## 2024-03-04 RX ORDER — OXYCODONE HYDROCHLORIDE 5 MG/1
5 TABLET ORAL
Status: DISCONTINUED | OUTPATIENT
Start: 2024-03-04 | End: 2024-03-04 | Stop reason: HOSPADM

## 2024-03-04 RX ORDER — SODIUM CHLORIDE 9 MG/ML
INJECTION, SOLUTION INTRAVENOUS PRN
Status: DISCONTINUED | OUTPATIENT
Start: 2024-03-04 | End: 2024-03-04 | Stop reason: HOSPADM

## 2024-03-04 RX ORDER — LIDOCAINE HYDROCHLORIDE 10 MG/ML
1 INJECTION, SOLUTION INFILTRATION; PERINEURAL
Status: DISCONTINUED | OUTPATIENT
Start: 2024-03-04 | End: 2024-03-04 | Stop reason: HOSPADM

## 2024-03-04 RX ORDER — PHENYLEPHRINE HYDROCHLORIDE 10 MG/ML
INJECTION INTRAVENOUS PRN
Status: DISCONTINUED | OUTPATIENT
Start: 2024-03-04 | End: 2024-03-04 | Stop reason: SDUPTHER

## 2024-03-04 RX ORDER — OXYCODONE HYDROCHLORIDE 5 MG/1
5 TABLET ORAL EVERY 4 HOURS PRN
Status: DISCONTINUED | OUTPATIENT
Start: 2024-03-04 | End: 2024-03-11 | Stop reason: HOSPADM

## 2024-03-04 RX ORDER — HYDROCHLOROTHIAZIDE 25 MG/1
12.5 TABLET ORAL DAILY
Status: DISCONTINUED | OUTPATIENT
Start: 2024-03-05 | End: 2024-03-11 | Stop reason: HOSPADM

## 2024-03-04 RX ORDER — SODIUM CHLORIDE 0.9 % (FLUSH) 0.9 %
5-40 SYRINGE (ML) INJECTION PRN
Status: DISCONTINUED | OUTPATIENT
Start: 2024-03-04 | End: 2024-03-04 | Stop reason: HOSPADM

## 2024-03-04 RX ORDER — SUCCINYLCHOLINE CHLORIDE 20 MG/ML
INJECTION INTRAMUSCULAR; INTRAVENOUS PRN
Status: DISCONTINUED | OUTPATIENT
Start: 2024-03-04 | End: 2024-03-04 | Stop reason: SDUPTHER

## 2024-03-04 RX ORDER — HYDROMORPHONE HYDROCHLORIDE 2 MG/ML
0.5 INJECTION, SOLUTION INTRAMUSCULAR; INTRAVENOUS; SUBCUTANEOUS EVERY 5 MIN PRN
Status: DISCONTINUED | OUTPATIENT
Start: 2024-03-04 | End: 2024-03-05

## 2024-03-04 RX ORDER — POTASSIUM CHLORIDE 7.45 MG/ML
10 INJECTION INTRAVENOUS PRN
Status: DISCONTINUED | OUTPATIENT
Start: 2024-03-04 | End: 2024-03-11 | Stop reason: HOSPADM

## 2024-03-04 RX ORDER — SODIUM CHLORIDE, SODIUM LACTATE, POTASSIUM CHLORIDE, CALCIUM CHLORIDE 600; 310; 30; 20 MG/100ML; MG/100ML; MG/100ML; MG/100ML
INJECTION, SOLUTION INTRAVENOUS CONTINUOUS PRN
Status: DISCONTINUED | OUTPATIENT
Start: 2024-03-04 | End: 2024-03-04 | Stop reason: SDUPTHER

## 2024-03-04 RX ORDER — PROPOFOL 10 MG/ML
INJECTION, EMULSION INTRAVENOUS PRN
Status: DISCONTINUED | OUTPATIENT
Start: 2024-03-04 | End: 2024-03-04 | Stop reason: SDUPTHER

## 2024-03-04 RX ORDER — DEXAMETHASONE SODIUM PHOSPHATE 4 MG/ML
INJECTION, SOLUTION INTRA-ARTICULAR; INTRALESIONAL; INTRAMUSCULAR; INTRAVENOUS; SOFT TISSUE PRN
Status: DISCONTINUED | OUTPATIENT
Start: 2024-03-04 | End: 2024-03-04 | Stop reason: SDUPTHER

## 2024-03-04 RX ORDER — ONDANSETRON 2 MG/ML
INJECTION INTRAMUSCULAR; INTRAVENOUS PRN
Status: DISCONTINUED | OUTPATIENT
Start: 2024-03-04 | End: 2024-03-04 | Stop reason: SDUPTHER

## 2024-03-04 RX ORDER — MIDAZOLAM HYDROCHLORIDE 2 MG/2ML
2 INJECTION, SOLUTION INTRAMUSCULAR; INTRAVENOUS
Status: DISCONTINUED | OUTPATIENT
Start: 2024-03-04 | End: 2024-03-04 | Stop reason: HOSPADM

## 2024-03-04 RX ORDER — EPHEDRINE SULFATE 5 MG/ML
INJECTION INTRAVENOUS PRN
Status: DISCONTINUED | OUTPATIENT
Start: 2024-03-04 | End: 2024-03-04 | Stop reason: SDUPTHER

## 2024-03-04 RX ORDER — ROCURONIUM BROMIDE 10 MG/ML
INJECTION, SOLUTION INTRAVENOUS PRN
Status: DISCONTINUED | OUTPATIENT
Start: 2024-03-04 | End: 2024-03-04 | Stop reason: SDUPTHER

## 2024-03-04 RX ORDER — SODIUM CHLORIDE, SODIUM LACTATE, POTASSIUM CHLORIDE, CALCIUM CHLORIDE 600; 310; 30; 20 MG/100ML; MG/100ML; MG/100ML; MG/100ML
INJECTION, SOLUTION INTRAVENOUS CONTINUOUS
Status: DISCONTINUED | OUTPATIENT
Start: 2024-03-04 | End: 2024-03-04 | Stop reason: HOSPADM

## 2024-03-04 RX ORDER — SODIUM CHLORIDE, SODIUM LACTATE, POTASSIUM CHLORIDE, CALCIUM CHLORIDE 600; 310; 30; 20 MG/100ML; MG/100ML; MG/100ML; MG/100ML
INJECTION, SOLUTION INTRAVENOUS CONTINUOUS
Status: DISCONTINUED | OUTPATIENT
Start: 2024-03-04 | End: 2024-03-06

## 2024-03-04 RX ORDER — HYDROMORPHONE HYDROCHLORIDE 2 MG/ML
0.25 INJECTION, SOLUTION INTRAMUSCULAR; INTRAVENOUS; SUBCUTANEOUS EVERY 5 MIN PRN
Status: COMPLETED | OUTPATIENT
Start: 2024-03-04 | End: 2024-03-04

## 2024-03-04 RX ORDER — ACETAMINOPHEN 325 MG/1
650 TABLET ORAL EVERY 6 HOURS
Status: DISCONTINUED | OUTPATIENT
Start: 2024-03-04 | End: 2024-03-11 | Stop reason: HOSPADM

## 2024-03-04 RX ORDER — LIDOCAINE HYDROCHLORIDE 20 MG/ML
INJECTION, SOLUTION EPIDURAL; INFILTRATION; INTRACAUDAL; PERINEURAL PRN
Status: DISCONTINUED | OUTPATIENT
Start: 2024-03-04 | End: 2024-03-04 | Stop reason: SDUPTHER

## 2024-03-04 RX ORDER — SODIUM CHLORIDE 9 MG/ML
INJECTION, SOLUTION INTRAVENOUS PRN
Status: DISCONTINUED | OUTPATIENT
Start: 2024-03-04 | End: 2024-03-11 | Stop reason: HOSPADM

## 2024-03-04 RX ORDER — ONDANSETRON 4 MG/1
4 TABLET, ORALLY DISINTEGRATING ORAL EVERY 8 HOURS PRN
Status: DISCONTINUED | OUTPATIENT
Start: 2024-03-04 | End: 2024-03-11 | Stop reason: HOSPADM

## 2024-03-04 RX ORDER — ENOXAPARIN SODIUM 100 MG/ML
30 INJECTION SUBCUTANEOUS DAILY
Status: DISCONTINUED | OUTPATIENT
Start: 2024-03-05 | End: 2024-03-05

## 2024-03-04 RX ORDER — BUPIVACAINE HYDROCHLORIDE AND EPINEPHRINE 5; 5 MG/ML; UG/ML
INJECTION, SOLUTION EPIDURAL; INTRACAUDAL; PERINEURAL PRN
Status: DISCONTINUED | OUTPATIENT
Start: 2024-03-04 | End: 2024-03-04 | Stop reason: ALTCHOICE

## 2024-03-04 RX ORDER — AMLODIPINE BESYLATE 5 MG/1
5 TABLET ORAL DAILY
Status: DISCONTINUED | OUTPATIENT
Start: 2024-03-05 | End: 2024-03-11 | Stop reason: HOSPADM

## 2024-03-04 RX ORDER — AMLODIPINE BESYLATE 5 MG/1
5 TABLET ORAL ONCE
Status: COMPLETED | OUTPATIENT
Start: 2024-03-04 | End: 2024-03-04

## 2024-03-04 RX ORDER — LOSARTAN POTASSIUM 50 MG/1
100 TABLET ORAL DAILY
Status: DISCONTINUED | OUTPATIENT
Start: 2024-03-05 | End: 2024-03-11 | Stop reason: HOSPADM

## 2024-03-04 RX ORDER — HYDROMORPHONE HYDROCHLORIDE 2 MG/ML
0.25 INJECTION, SOLUTION INTRAMUSCULAR; INTRAVENOUS; SUBCUTANEOUS EVERY 10 MIN PRN
Status: DISCONTINUED | OUTPATIENT
Start: 2024-03-04 | End: 2024-03-05

## 2024-03-04 RX ADMIN — PHENYLEPHRINE HYDROCHLORIDE 200 MCG: 10 INJECTION INTRAVENOUS at 15:54

## 2024-03-04 RX ADMIN — EPHEDRINE SULFATE 5 MG: 5 INJECTION INTRAVENOUS at 14:45

## 2024-03-04 RX ADMIN — FENTANYL CITRATE 25 MCG: 50 INJECTION INTRAMUSCULAR; INTRAVENOUS at 14:31

## 2024-03-04 RX ADMIN — DEXAMETHASONE SODIUM PHOSPHATE 4 MG: 4 INJECTION, SOLUTION INTRAMUSCULAR; INTRAVENOUS at 14:37

## 2024-03-04 RX ADMIN — EPHEDRINE SULFATE 5 MG: 5 INJECTION INTRAVENOUS at 15:54

## 2024-03-04 RX ADMIN — SODIUM CHLORIDE, SODIUM LACTATE, POTASSIUM CHLORIDE, AND CALCIUM CHLORIDE: 600; 310; 30; 20 INJECTION, SOLUTION INTRAVENOUS at 15:47

## 2024-03-04 RX ADMIN — HYDROMORPHONE HYDROCHLORIDE 0.25 MG: 2 INJECTION, SOLUTION INTRAMUSCULAR; INTRAVENOUS; SUBCUTANEOUS at 17:22

## 2024-03-04 RX ADMIN — EPHEDRINE SULFATE 10 MG: 5 INJECTION INTRAVENOUS at 15:15

## 2024-03-04 RX ADMIN — ROCURONIUM BROMIDE 10 MG: 50 INJECTION, SOLUTION INTRAVENOUS at 15:17

## 2024-03-04 RX ADMIN — HYDROMORPHONE HYDROCHLORIDE 0.25 MG: 2 INJECTION, SOLUTION INTRAMUSCULAR; INTRAVENOUS; SUBCUTANEOUS at 17:27

## 2024-03-04 RX ADMIN — FENTANYL CITRATE 25 MCG: 50 INJECTION INTRAMUSCULAR; INTRAVENOUS at 16:15

## 2024-03-04 RX ADMIN — SODIUM CHLORIDE, SODIUM LACTATE, POTASSIUM CHLORIDE, AND CALCIUM CHLORIDE: 600; 310; 30; 20 INJECTION, SOLUTION INTRAVENOUS at 14:41

## 2024-03-04 RX ADMIN — Medication 100 MG: at 14:31

## 2024-03-04 RX ADMIN — PHENYLEPHRINE HYDROCHLORIDE 200 MCG: 10 INJECTION INTRAVENOUS at 15:38

## 2024-03-04 RX ADMIN — AMLODIPINE BESYLATE 5 MG: 5 TABLET ORAL at 13:21

## 2024-03-04 RX ADMIN — SODIUM CHLORIDE, SODIUM LACTATE, POTASSIUM CHLORIDE, AND CALCIUM CHLORIDE: 600; 310; 30; 20 INJECTION, SOLUTION INTRAVENOUS at 13:22

## 2024-03-04 RX ADMIN — PHENYLEPHRINE HYDROCHLORIDE 200 MCG: 10 INJECTION INTRAVENOUS at 15:30

## 2024-03-04 RX ADMIN — SODIUM CHLORIDE, POTASSIUM CHLORIDE, SODIUM LACTATE AND CALCIUM CHLORIDE: 600; 310; 30; 20 INJECTION, SOLUTION INTRAVENOUS at 21:55

## 2024-03-04 RX ADMIN — PROPOFOL 100 MG: 10 INJECTION, EMULSION INTRAVENOUS at 14:31

## 2024-03-04 RX ADMIN — EPHEDRINE SULFATE 5 MG: 5 INJECTION INTRAVENOUS at 15:38

## 2024-03-04 RX ADMIN — PHENYLEPHRINE HYDROCHLORIDE 100 MCG: 10 INJECTION INTRAVENOUS at 16:28

## 2024-03-04 RX ADMIN — PHENYLEPHRINE HYDROCHLORIDE 100 MCG: 10 INJECTION INTRAVENOUS at 16:15

## 2024-03-04 RX ADMIN — EPHEDRINE SULFATE 5 MG: 5 INJECTION INTRAVENOUS at 14:42

## 2024-03-04 RX ADMIN — Medication 2000 MG: at 14:35

## 2024-03-04 RX ADMIN — ROCURONIUM BROMIDE 30 MG: 50 INJECTION, SOLUTION INTRAVENOUS at 14:38

## 2024-03-04 RX ADMIN — SUGAMMADEX 200 MG: 100 INJECTION, SOLUTION INTRAVENOUS at 16:52

## 2024-03-04 RX ADMIN — PHENYLEPHRINE HYDROCHLORIDE 200 MCG: 10 INJECTION INTRAVENOUS at 14:45

## 2024-03-04 RX ADMIN — ACETAMINOPHEN 650 MG: 325 TABLET ORAL at 22:22

## 2024-03-04 RX ADMIN — SODIUM CHLORIDE, PRESERVATIVE FREE 10 ML: 5 INJECTION INTRAVENOUS at 21:57

## 2024-03-04 RX ADMIN — ONDANSETRON 4 MG: 2 INJECTION INTRAMUSCULAR; INTRAVENOUS at 14:37

## 2024-03-04 RX ADMIN — PHENYLEPHRINE HYDROCHLORIDE 200 MCG: 10 INJECTION INTRAVENOUS at 15:16

## 2024-03-04 RX ADMIN — FENTANYL CITRATE 25 MCG: 50 INJECTION INTRAMUSCULAR; INTRAVENOUS at 16:59

## 2024-03-04 RX ADMIN — HYDROMORPHONE HYDROCHLORIDE 0.5 MG: 2 INJECTION, SOLUTION INTRAMUSCULAR; INTRAVENOUS; SUBCUTANEOUS at 17:48

## 2024-03-04 RX ADMIN — FENTANYL CITRATE 25 MCG: 50 INJECTION INTRAMUSCULAR; INTRAVENOUS at 16:50

## 2024-03-04 RX ADMIN — LIDOCAINE HYDROCHLORIDE 40 MG: 20 INJECTION, SOLUTION EPIDURAL; INFILTRATION; INTRACAUDAL; PERINEURAL at 14:31

## 2024-03-04 RX ADMIN — FENTANYL CITRATE 25 MCG: 50 INJECTION INTRAMUSCULAR; INTRAVENOUS at 15:13

## 2024-03-04 RX ADMIN — HYDROMORPHONE HYDROCHLORIDE 0.25 MG: 2 INJECTION, SOLUTION INTRAMUSCULAR; INTRAVENOUS; SUBCUTANEOUS at 17:32

## 2024-03-04 RX ADMIN — FENTANYL CITRATE 25 MCG: 50 INJECTION INTRAMUSCULAR; INTRAVENOUS at 17:08

## 2024-03-04 RX ADMIN — HYDROMORPHONE HYDROCHLORIDE 0.25 MG: 2 INJECTION, SOLUTION INTRAMUSCULAR; INTRAVENOUS; SUBCUTANEOUS at 17:17

## 2024-03-04 ASSESSMENT — PAIN DESCRIPTION - DESCRIPTORS
DESCRIPTORS: SHARP
DESCRIPTORS: ACHING;SORE
DESCRIPTORS: STABBING

## 2024-03-04 ASSESSMENT — PAIN SCALES - GENERAL
PAINLEVEL_OUTOF10: 0
PAINLEVEL_OUTOF10: 10
PAINLEVEL_OUTOF10: 10
PAINLEVEL_OUTOF10: 0
PAINLEVEL_OUTOF10: 7

## 2024-03-04 ASSESSMENT — PAIN DESCRIPTION - LOCATION
LOCATION: ABDOMEN
LOCATION: ABDOMEN

## 2024-03-04 ASSESSMENT — PAIN - FUNCTIONAL ASSESSMENT: PAIN_FUNCTIONAL_ASSESSMENT: 0-10

## 2024-03-04 NOTE — OP NOTE
safely possible, taking care to identify and protect both vagus nerves during the dissection.  After a high mobilization was complete, there were 4 cm of intraabdominal esophagus.  Crura were reapproximated with 0 ethibond figure of 8 sutures tied intracorporally, taking care not to make the closure excessively tight, or create angulation on the esophagus.        Multiple gastropexy sutures of 0 ethibond were placed between the fundus of the stomach and diaphragm, and between the body of the stomach and anterior abdominal wall, to fix the stomach in the normal anatomic position.  Hemostasis was checked.  Penrose and suture materials were removed.  Marcaine was injected at each port site in the preperitoneal space.  Instruments were removed and the robot was undocked.  Pneumoperitoneum was released and trochars were removed.  Skin was closed with 4-0 monocryl subcuticular sutures and dermabond.  All counts were correct at the end of the case.  The patient tolerated the procedure well and was transported to the recovery room in stable condition.      Electronically signed by Thomas Lozano MD on 3/4/2024 at 4:56 PM

## 2024-03-04 NOTE — H&P
The patient was examined, any pertinent labs and imaging were reviewed, and there are no changes to the current H&P.  Risks, benefits, alternatives, and details of surgery were discussed and consent was signed.

## 2024-03-04 NOTE — ANESTHESIA PROCEDURE NOTES
Arterial Line:    An arterial line was placed using ultrasound guidance and surface landmarks, in the OR for the following indication(s): continuous blood pressure monitoring and blood sampling needed.    A 20 gauge (size), 1 and 3/4 inch (length), Arrow (type) catheter was placed, Seldinger technique used, into the left radial artery, secured by Tegaderm and tape.  Anesthesia type: General    Events:  patient tolerated procedure well with no complications.    Additional notes:  Potential access sites were examined with ultrasound and the acceptable patent access site was selected.  The needle path and vessel access were visualized in real time using ultrasonography, an image of the wire in the vessel was recorded for the permanent record.  Risks/benefits/alternatives discussed including damage to muscle, nerve, or a vascular structure as well as bleeding and infection.  First attempt without ultrasound unsuccessful.  Successful on first attempt with ultrasound.  No complications.3/4/2024 2:53 PM3/4/2024 3:01 PM  Anesthesiologist: Brodie Muller MD  Performed: Anesthesiologist   Preanesthetic Checklist  Completed: patient identified, IV checked, risks and benefits discussed, equipment checked, pre-op evaluation, timeout performed, anesthesia consent given, oxygen available and monitors applied/VS acknowledged

## 2024-03-04 NOTE — ANESTHESIA PRE PROCEDURE
Department of Anesthesiology  Preprocedure Note       Name:  Tayla Escobar   Age:  84 y.o.  :  1940                                          MRN:  [id number]         Date:  3/4/2024      Surgeon: Surgeon(s):  Thomas Lozano MD    Procedure: Procedure(s):  ROBOTIC HIATAL HERNIA REPAIR    Medications prior to admission:   Prior to Admission medications    Medication Sig Start Date End Date Taking? Authorizing Provider   cyclobenzaprine (FLEXERIL) 5 MG tablet Take 1 tablet by mouth 3 times daily as needed for Muscle spasms    ProviderAntonio MD   traMADol (ULTRAM) 50 MG tablet Take 1 tablet by mouth every 6 hours as needed for Pain for up to 30 days. Max Daily Amount: 200 mg 2/21/24 3/22/24  Ricky García DO   fexofenadine (ALLEGRA ALLERGY) 180 MG tablet Take 1 tablet by mouth daily 23   ProviderAntonio MD   apixaban (ELIQUIS) 5 MG TABS tablet Take 1 tablet by mouth 2 times daily 24   Yudy Alvarez APRN - CNP   diclofenac sodium (VOLTAREN) 1 % GEL Apply 4 g topically 4 times daily as needed for Pain 24   Yudy Alvarez APRN - CNP   amLODIPine (NORVASC) 5 MG tablet Take 1 tablet by mouth daily TAKE 1 TABLET DAILY 23   Ricky García DO   furosemide (LASIX) 20 MG tablet Take 1 tablet by mouth daily  Patient not taking: Reported on 3/4/2024 11/27/23   Ricky García DO   losartan-hydroCHLOROthiazide (HYZAAR) 100-12.5 MG per tablet Take 1 tablet by mouth daily TAKE 1 TABLET DAILY 23   Ricky García DO   potassium chloride (KLOR-CON M) 10 MEQ extended release tablet Take 1 tablet by mouth daily  Patient not taking: Reported on 23   Ricky García DO   rosuvastatin (CRESTOR) 10 MG tablet TAKE 1 TABLET DAILY 23   Ricky García DO   acetaminophen (TYLENOL) 500 MG tablet Take 1 tablet by mouth every 6 hours as needed for Pain    Antonio Mora MD   vitamin D (CHOLECALCIFEROL) 25 MCG (1000 UT)

## 2024-03-05 PROBLEM — E44.0 MODERATE PROTEIN-CALORIE MALNUTRITION (HCC): Status: ACTIVE | Noted: 2024-03-05

## 2024-03-05 LAB
ANION GAP SERPL CALC-SCNC: 6 MMOL/L (ref 2–11)
BASOPHILS # BLD: 0 K/UL (ref 0–0.2)
BASOPHILS NFR BLD: 0 % (ref 0–2)
BUN SERPL-MCNC: 48 MG/DL (ref 8–23)
CALCIUM SERPL-MCNC: 11.7 MG/DL (ref 8.3–10.4)
CHLORIDE SERPL-SCNC: 101 MMOL/L (ref 103–113)
CO2 SERPL-SCNC: 31 MMOL/L (ref 21–32)
CREAT SERPL-MCNC: 1.2 MG/DL (ref 0.6–1)
DIFFERENTIAL METHOD BLD: ABNORMAL
EOSINOPHIL # BLD: 0 K/UL (ref 0–0.8)
EOSINOPHIL NFR BLD: 0 % (ref 0.5–7.8)
ERYTHROCYTE [DISTWIDTH] IN BLOOD BY AUTOMATED COUNT: 12.6 % (ref 11.9–14.6)
GLUCOSE SERPL-MCNC: 126 MG/DL (ref 65–100)
HCT VFR BLD AUTO: 31.9 % (ref 35.8–46.3)
HGB BLD-MCNC: 10.4 G/DL (ref 11.7–15.4)
IMM GRANULOCYTES # BLD AUTO: 0.1 K/UL (ref 0–0.5)
IMM GRANULOCYTES NFR BLD AUTO: 1 % (ref 0–5)
LYMPHOCYTES # BLD: 0.6 K/UL (ref 0.5–4.6)
LYMPHOCYTES NFR BLD: 5 % (ref 13–44)
MCH RBC QN AUTO: 32.4 PG (ref 26.1–32.9)
MCHC RBC AUTO-ENTMCNC: 32.6 G/DL (ref 31.4–35)
MCV RBC AUTO: 99.4 FL (ref 82–102)
MONOCYTES # BLD: 0.5 K/UL (ref 0.1–1.3)
MONOCYTES NFR BLD: 4 % (ref 4–12)
NEUTS SEG # BLD: 10.1 K/UL (ref 1.7–8.2)
NEUTS SEG NFR BLD: 90 % (ref 43–78)
NRBC # BLD: 0 K/UL (ref 0–0.2)
PLATELET # BLD AUTO: 149 K/UL (ref 150–450)
PMV BLD AUTO: 11.7 FL (ref 9.4–12.3)
POTASSIUM SERPL-SCNC: 4.5 MMOL/L (ref 3.5–5.1)
RBC # BLD AUTO: 3.21 M/UL (ref 4.05–5.2)
SODIUM SERPL-SCNC: 138 MMOL/L (ref 136–146)
WBC # BLD AUTO: 11.2 K/UL (ref 4.3–11.1)

## 2024-03-05 PROCEDURE — 51798 US URINE CAPACITY MEASURE: CPT

## 2024-03-05 PROCEDURE — 85025 COMPLETE CBC W/AUTO DIFF WBC: CPT

## 2024-03-05 PROCEDURE — 80048 BASIC METABOLIC PNL TOTAL CA: CPT

## 2024-03-05 PROCEDURE — 97112 NEUROMUSCULAR REEDUCATION: CPT

## 2024-03-05 PROCEDURE — 51701 INSERT BLADDER CATHETER: CPT

## 2024-03-05 PROCEDURE — 6360000002 HC RX W HCPCS: Performed by: SURGERY

## 2024-03-05 PROCEDURE — 2580000003 HC RX 258: Performed by: NURSE PRACTITIONER

## 2024-03-05 PROCEDURE — 97161 PT EVAL LOW COMPLEX 20 MIN: CPT

## 2024-03-05 PROCEDURE — 97530 THERAPEUTIC ACTIVITIES: CPT

## 2024-03-05 PROCEDURE — 2500000003 HC RX 250 WO HCPCS: Performed by: SURGERY

## 2024-03-05 PROCEDURE — 97165 OT EVAL LOW COMPLEX 30 MIN: CPT

## 2024-03-05 PROCEDURE — 2580000003 HC RX 258: Performed by: SURGERY

## 2024-03-05 PROCEDURE — 2700000000 HC OXYGEN THERAPY PER DAY

## 2024-03-05 PROCEDURE — 6370000000 HC RX 637 (ALT 250 FOR IP): Performed by: SURGERY

## 2024-03-05 PROCEDURE — 1100000000 HC RM PRIVATE

## 2024-03-05 PROCEDURE — 36415 COLL VENOUS BLD VENIPUNCTURE: CPT

## 2024-03-05 PROCEDURE — 2500000003 HC RX 250 WO HCPCS

## 2024-03-05 RX ORDER — ENOXAPARIN SODIUM 100 MG/ML
40 INJECTION SUBCUTANEOUS DAILY
Status: DISCONTINUED | OUTPATIENT
Start: 2024-03-05 | End: 2024-03-11 | Stop reason: HOSPADM

## 2024-03-05 RX ADMIN — AMLODIPINE BESYLATE 5 MG: 5 TABLET ORAL at 08:50

## 2024-03-05 RX ADMIN — HYDROCHLOROTHIAZIDE 12.5 MG: 25 TABLET ORAL at 08:51

## 2024-03-05 RX ADMIN — HYDROMORPHONE HYDROCHLORIDE 0.5 MG: 1 INJECTION, SOLUTION INTRAMUSCULAR; INTRAVENOUS; SUBCUTANEOUS at 05:23

## 2024-03-05 RX ADMIN — ACETAMINOPHEN 650 MG: 325 TABLET ORAL at 05:23

## 2024-03-05 RX ADMIN — ENOXAPARIN SODIUM 40 MG: 100 INJECTION SUBCUTANEOUS at 08:50

## 2024-03-05 RX ADMIN — SODIUM CHLORIDE, PRESERVATIVE FREE 10 ML: 5 INJECTION INTRAVENOUS at 08:51

## 2024-03-05 RX ADMIN — SODIUM CHLORIDE, PRESERVATIVE FREE 10 ML: 5 INJECTION INTRAVENOUS at 21:00

## 2024-03-05 RX ADMIN — ACETAMINOPHEN 650 MG: 325 TABLET ORAL at 15:35

## 2024-03-05 RX ADMIN — TUBERCULIN PURIFIED PROTEIN DERIVATIVE 5 UNITS: 5 INJECTION, SOLUTION INTRADERMAL at 10:45

## 2024-03-05 RX ADMIN — SODIUM CHLORIDE, POTASSIUM CHLORIDE, SODIUM LACTATE AND CALCIUM CHLORIDE: 600; 310; 30; 20 INJECTION, SOLUTION INTRAVENOUS at 10:49

## 2024-03-05 RX ADMIN — ACETAMINOPHEN 650 MG: 325 TABLET ORAL at 10:39

## 2024-03-05 RX ADMIN — OXYCODONE HYDROCHLORIDE 5 MG: 5 TABLET ORAL at 09:12

## 2024-03-05 RX ADMIN — LOSARTAN POTASSIUM 100 MG: 50 TABLET, FILM COATED ORAL at 08:51

## 2024-03-05 RX ADMIN — ACETAMINOPHEN 650 MG: 325 TABLET ORAL at 20:23

## 2024-03-05 ASSESSMENT — PAIN DESCRIPTION - FREQUENCY: FREQUENCY: INTERMITTENT

## 2024-03-05 ASSESSMENT — PAIN SCALES - GENERAL
PAINLEVEL_OUTOF10: 3
PAINLEVEL_OUTOF10: 10
PAINLEVEL_OUTOF10: 4
PAINLEVEL_OUTOF10: 0

## 2024-03-05 ASSESSMENT — PAIN DESCRIPTION - ORIENTATION
ORIENTATION: MID
ORIENTATION: ANTERIOR

## 2024-03-05 ASSESSMENT — PAIN DESCRIPTION - DESCRIPTORS: DESCRIPTORS: SHARP

## 2024-03-05 ASSESSMENT — PAIN - FUNCTIONAL ASSESSMENT: PAIN_FUNCTIONAL_ASSESSMENT: PREVENTS OR INTERFERES SOME ACTIVE ACTIVITIES AND ADLS

## 2024-03-05 ASSESSMENT — PAIN DESCRIPTION - PAIN TYPE
TYPE: SURGICAL PAIN
TYPE: SURGICAL PAIN

## 2024-03-05 ASSESSMENT — PAIN DESCRIPTION - LOCATION
LOCATION: ABDOMEN

## 2024-03-05 NOTE — ACP (ADVANCE CARE PLANNING)
Advance Care Planning     General Advance Care Planning (ACP) Conversation    Date of Conversation: 2/22/2024  Conducted with: Patient with Decision Making Capacity    Healthcare Decision Maker:    Primary Decision Maker: Anyi Felix - Child - [phone]  Click here to complete Healthcare Decision Makers including selection of the Healthcare Decision Maker Relationship (ie \"Primary\").      Content/Action Overview:  Has ACP document(s) on file - reflects the patient's care preferences  Reviewed DNR/DNI and patient elects Full Code (Attempt Resuscitation)        Length of Voluntary ACP Conversation in minutes:  <16 minutes (Non-Billable)    Suzanne Abdul RN

## 2024-03-05 NOTE — THERAPY EVALUATION
Activities  Decreased Activity Tolerance  Decreased Balance  Decreased Gait Ability  Decreased Safety Awareness  Decreased Strength  Decreased Transfer Abilities  Increased Pain INTERVENTIONS PLANNED:   (Benefits and precautions of physical therapy have been discussed with the patient.)  Self Care Training  Therapeutic Activity  Therapeutic Exercise/HEP  Neuromuscular Re-education  Gait Training  Education       TREATMENT:   EVALUATION: LOW COMPLEXITY: (Untimed Charge)    TREATMENT:   Therapeutic Activity (23 Minutes): Therapeutic activity included Scooting, Transfer Training, Sitting balance , and Standing balance to improve functional Activity tolerance, Balance, Coordination, Mobility, Strength, and ROM.    TREATMENT GRID:  N/A    AFTER TREATMENT PRECAUTIONS: Bed/Chair Locked, Call light within reach, Chair, Needs within reach, RN notified, and Visitors at bedside    INTERDISCIPLINARY COLLABORATION:  RN/ PCT and OT/ PRATT    EDUCATION: Education Given To: Patient;Family  Education Provided: Role of Therapy;Plan of Care;Transfer Training;Equipment  Education Method: Verbal  Education Outcome: Verbalized understanding;Demonstrated understanding;Continued education needed    TIME IN/OUT:  Time In: 1135  Time Out: 1205  Minutes: 30    ALICIA CLANCY PT

## 2024-03-05 NOTE — ANESTHESIA POSTPROCEDURE EVALUATION
Department of Anesthesiology  Postprocedure Note    Patient: Tayla Escobar  MRN: [id number]  YOB: 1940  Date of evaluation: 3/4/2024    Procedure Summary       Date: 03/04/24 Room / Location: [facility] MAIN OR  / [facility] MAIN OR    Anesthesia Start: 1404 Anesthesia Stop: 1709    Procedure: ROBOTIC HIATAL HERNIA REPAIR (Abdomen) Diagnosis:       Hiatal hernia      (Hiatal hernia [K44.9])    Providers: Thomas Lozano MD Responsible Provider: Ralph Atwood IV, MD    Anesthesia Type: general ASA Status: 4            Anesthesia Type: No value filed.    Aliyah Phase I: Aliyah Score: 8    Aliyah Phase II:      Anesthesia Post Evaluation    Patient location during evaluation: PACU  Patient participation: waiting for patient participation  Level of consciousness: responsive to verbal stimuli and responsive to physical stimuli  Airway patency: patent  Nausea & Vomiting: no nausea and no vomiting  Cardiovascular status: hemodynamically stable  Respiratory status: acceptable, nonlabored ventilation and spontaneous ventilation  Hydration status: euvolemic  Comments: BP (!) 111/55   Pulse 68   Temp 98.2 °F (36.8 °C) (Temporal)   Resp 15   Ht 1.549 m (5' 1\")   Wt 77.1 kg (170 lb)   SpO2 98%   BMI 32.12 kg/m²     Multimodal analgesia pain management approach  Pain management: adequate and satisfactory to patient    No notable events documented.

## 2024-03-06 ENCOUNTER — APPOINTMENT (OUTPATIENT)
Dept: GENERAL RADIOLOGY | Age: 84
DRG: 327 | End: 2024-03-06
Attending: SURGERY
Payer: MEDICARE

## 2024-03-06 LAB
MM INDURATION, POC: 0 MM (ref 0–5)
PPD, POC: NEGATIVE

## 2024-03-06 PROCEDURE — 2700000000 HC OXYGEN THERAPY PER DAY

## 2024-03-06 PROCEDURE — 94760 N-INVAS EAR/PLS OXIMETRY 1: CPT

## 2024-03-06 PROCEDURE — 6370000000 HC RX 637 (ALT 250 FOR IP): Performed by: NURSE PRACTITIONER

## 2024-03-06 PROCEDURE — 71045 X-RAY EXAM CHEST 1 VIEW: CPT

## 2024-03-06 PROCEDURE — 2580000003 HC RX 258: Performed by: NURSE PRACTITIONER

## 2024-03-06 PROCEDURE — 6370000000 HC RX 637 (ALT 250 FOR IP): Performed by: SURGERY

## 2024-03-06 PROCEDURE — 6360000002 HC RX W HCPCS: Performed by: SURGERY

## 2024-03-06 PROCEDURE — 1100000000 HC RM PRIVATE

## 2024-03-06 PROCEDURE — 2580000003 HC RX 258: Performed by: SURGERY

## 2024-03-06 PROCEDURE — 2500000003 HC RX 250 WO HCPCS: Performed by: SURGERY

## 2024-03-06 RX ORDER — DOCUSATE SODIUM 100 MG/1
100 CAPSULE, LIQUID FILLED ORAL DAILY
Status: DISCONTINUED | OUTPATIENT
Start: 2024-03-06 | End: 2024-03-07

## 2024-03-06 RX ORDER — POLYETHYLENE GLYCOL 3350 17 G/17G
17 POWDER, FOR SOLUTION ORAL 2 TIMES DAILY
Status: DISCONTINUED | OUTPATIENT
Start: 2024-03-06 | End: 2024-03-06

## 2024-03-06 RX ADMIN — ENOXAPARIN SODIUM 40 MG: 100 INJECTION SUBCUTANEOUS at 08:57

## 2024-03-06 RX ADMIN — SODIUM CHLORIDE, POTASSIUM CHLORIDE, SODIUM LACTATE AND CALCIUM CHLORIDE: 600; 310; 30; 20 INJECTION, SOLUTION INTRAVENOUS at 05:27

## 2024-03-06 RX ADMIN — ACETAMINOPHEN 650 MG: 325 TABLET ORAL at 11:33

## 2024-03-06 RX ADMIN — OXYCODONE HYDROCHLORIDE 5 MG: 5 TABLET ORAL at 16:54

## 2024-03-06 RX ADMIN — ACETAMINOPHEN 650 MG: 325 TABLET ORAL at 21:44

## 2024-03-06 RX ADMIN — HYDROMORPHONE HYDROCHLORIDE 0.5 MG: 1 INJECTION, SOLUTION INTRAMUSCULAR; INTRAVENOUS; SUBCUTANEOUS at 04:28

## 2024-03-06 RX ADMIN — OXYCODONE HYDROCHLORIDE 5 MG: 5 TABLET ORAL at 00:08

## 2024-03-06 RX ADMIN — SODIUM CHLORIDE, PRESERVATIVE FREE 20 ML: 5 INJECTION INTRAVENOUS at 08:58

## 2024-03-06 RX ADMIN — DOCUSATE SODIUM 100 MG: 100 CAPSULE, LIQUID FILLED ORAL at 16:47

## 2024-03-06 RX ADMIN — ACETAMINOPHEN 650 MG: 325 TABLET ORAL at 04:22

## 2024-03-06 RX ADMIN — POLYETHYLENE GLYCOL 3350 17 G: 17 POWDER, FOR SOLUTION ORAL at 13:53

## 2024-03-06 RX ADMIN — OXYCODONE HYDROCHLORIDE 5 MG: 5 TABLET ORAL at 21:44

## 2024-03-06 RX ADMIN — AMLODIPINE BESYLATE 5 MG: 5 TABLET ORAL at 08:57

## 2024-03-06 RX ADMIN — ACETAMINOPHEN 650 MG: 325 TABLET ORAL at 16:47

## 2024-03-06 RX ADMIN — HYDROCHLOROTHIAZIDE 12.5 MG: 25 TABLET ORAL at 08:58

## 2024-03-06 ASSESSMENT — PAIN SCALES - GENERAL
PAINLEVEL_OUTOF10: 7
PAINLEVEL_OUTOF10: 0
PAINLEVEL_OUTOF10: 0
PAINLEVEL_OUTOF10: 9
PAINLEVEL_OUTOF10: 5

## 2024-03-06 ASSESSMENT — PAIN DESCRIPTION - FREQUENCY: FREQUENCY: INTERMITTENT

## 2024-03-06 ASSESSMENT — PAIN DESCRIPTION - ORIENTATION
ORIENTATION: ANTERIOR
ORIENTATION: MID
ORIENTATION: MID

## 2024-03-06 ASSESSMENT — PAIN DESCRIPTION - DESCRIPTORS
DESCRIPTORS: PRESSURE
DESCRIPTORS: SORE
DESCRIPTORS: ACHING;SHARP

## 2024-03-06 ASSESSMENT — PAIN - FUNCTIONAL ASSESSMENT: PAIN_FUNCTIONAL_ASSESSMENT: PREVENTS OR INTERFERES SOME ACTIVE ACTIVITIES AND ADLS

## 2024-03-06 ASSESSMENT — PAIN DESCRIPTION - LOCATION
LOCATION: ABDOMEN

## 2024-03-06 ASSESSMENT — PAIN DESCRIPTION - PAIN TYPE: TYPE: SURGICAL PAIN

## 2024-03-07 LAB
ANION GAP SERPL CALC-SCNC: 3 MMOL/L (ref 2–11)
BASOPHILS # BLD: 0 K/UL (ref 0–0.2)
BASOPHILS NFR BLD: 0 % (ref 0–2)
BUN SERPL-MCNC: 26 MG/DL (ref 8–23)
CALCIUM SERPL-MCNC: 11.5 MG/DL (ref 8.3–10.4)
CHLORIDE SERPL-SCNC: 97 MMOL/L (ref 103–113)
CO2 SERPL-SCNC: 37 MMOL/L (ref 21–32)
CREAT SERPL-MCNC: 1.2 MG/DL (ref 0.6–1)
DIFFERENTIAL METHOD BLD: ABNORMAL
EOSINOPHIL # BLD: 0.2 K/UL (ref 0–0.8)
EOSINOPHIL NFR BLD: 3 % (ref 0.5–7.8)
ERYTHROCYTE [DISTWIDTH] IN BLOOD BY AUTOMATED COUNT: 13.1 % (ref 11.9–14.6)
GLUCOSE SERPL-MCNC: 123 MG/DL (ref 65–100)
HCT VFR BLD AUTO: 33.2 % (ref 35.8–46.3)
HGB BLD-MCNC: 10.6 G/DL (ref 11.7–15.4)
IMM GRANULOCYTES # BLD AUTO: 0 K/UL (ref 0–0.5)
IMM GRANULOCYTES NFR BLD AUTO: 1 % (ref 0–5)
LYMPHOCYTES # BLD: 1.1 K/UL (ref 0.5–4.6)
LYMPHOCYTES NFR BLD: 15 % (ref 13–44)
MCH RBC QN AUTO: 32.7 PG (ref 26.1–32.9)
MCHC RBC AUTO-ENTMCNC: 31.9 G/DL (ref 31.4–35)
MCV RBC AUTO: 102.5 FL (ref 82–102)
MM INDURATION, POC: 0 MM (ref 0–5)
MONOCYTES # BLD: 0.6 K/UL (ref 0.1–1.3)
MONOCYTES NFR BLD: 8 % (ref 4–12)
NEUTS SEG # BLD: 5.5 K/UL (ref 1.7–8.2)
NEUTS SEG NFR BLD: 73 % (ref 43–78)
NRBC # BLD: 0 K/UL (ref 0–0.2)
PLATELET # BLD AUTO: 158 K/UL (ref 150–450)
PMV BLD AUTO: 11.7 FL (ref 9.4–12.3)
POTASSIUM SERPL-SCNC: 3.9 MMOL/L (ref 3.5–5.1)
PPD, POC: NEGATIVE
RBC # BLD AUTO: 3.24 M/UL (ref 4.05–5.2)
SODIUM SERPL-SCNC: 137 MMOL/L (ref 136–146)
WBC # BLD AUTO: 7.5 K/UL (ref 4.3–11.1)

## 2024-03-07 PROCEDURE — 94760 N-INVAS EAR/PLS OXIMETRY 1: CPT

## 2024-03-07 PROCEDURE — 2580000003 HC RX 258: Performed by: SURGERY

## 2024-03-07 PROCEDURE — 6370000000 HC RX 637 (ALT 250 FOR IP): Performed by: NURSE PRACTITIONER

## 2024-03-07 PROCEDURE — 36415 COLL VENOUS BLD VENIPUNCTURE: CPT

## 2024-03-07 PROCEDURE — 1100000000 HC RM PRIVATE

## 2024-03-07 PROCEDURE — 80048 BASIC METABOLIC PNL TOTAL CA: CPT

## 2024-03-07 PROCEDURE — 6370000000 HC RX 637 (ALT 250 FOR IP): Performed by: SURGERY

## 2024-03-07 PROCEDURE — 97535 SELF CARE MNGMENT TRAINING: CPT

## 2024-03-07 PROCEDURE — 6360000002 HC RX W HCPCS: Performed by: SURGERY

## 2024-03-07 PROCEDURE — 85025 COMPLETE CBC W/AUTO DIFF WBC: CPT

## 2024-03-07 PROCEDURE — 97530 THERAPEUTIC ACTIVITIES: CPT

## 2024-03-07 PROCEDURE — 2700000000 HC OXYGEN THERAPY PER DAY

## 2024-03-07 RX ORDER — POLYETHYLENE GLYCOL 3350 17 G/17G
17 POWDER, FOR SOLUTION ORAL DAILY
Status: DISCONTINUED | OUTPATIENT
Start: 2024-03-07 | End: 2024-03-11 | Stop reason: HOSPADM

## 2024-03-07 RX ORDER — SENNOSIDES A AND B 8.6 MG/1
1 TABLET, FILM COATED ORAL NIGHTLY
Status: DISCONTINUED | OUTPATIENT
Start: 2024-03-07 | End: 2024-03-11 | Stop reason: HOSPADM

## 2024-03-07 RX ADMIN — POLYETHYLENE GLYCOL 3350 17 G: 17 POWDER, FOR SOLUTION ORAL at 11:14

## 2024-03-07 RX ADMIN — LOSARTAN POTASSIUM 100 MG: 50 TABLET, FILM COATED ORAL at 08:03

## 2024-03-07 RX ADMIN — ENOXAPARIN SODIUM 40 MG: 100 INJECTION SUBCUTANEOUS at 08:02

## 2024-03-07 RX ADMIN — OXYCODONE HYDROCHLORIDE 5 MG: 5 TABLET ORAL at 05:52

## 2024-03-07 RX ADMIN — AMLODIPINE BESYLATE 5 MG: 5 TABLET ORAL at 08:03

## 2024-03-07 RX ADMIN — ACETAMINOPHEN 650 MG: 325 TABLET ORAL at 04:30

## 2024-03-07 RX ADMIN — OXYCODONE HYDROCHLORIDE 5 MG: 5 TABLET ORAL at 14:51

## 2024-03-07 RX ADMIN — SODIUM CHLORIDE, PRESERVATIVE FREE 10 ML: 5 INJECTION INTRAVENOUS at 08:04

## 2024-03-07 RX ADMIN — SODIUM CHLORIDE, PRESERVATIVE FREE 10 ML: 5 INJECTION INTRAVENOUS at 22:13

## 2024-03-07 RX ADMIN — ACETAMINOPHEN 650 MG: 325 TABLET ORAL at 15:56

## 2024-03-07 RX ADMIN — HYDROCHLOROTHIAZIDE 12.5 MG: 25 TABLET ORAL at 08:04

## 2024-03-07 RX ADMIN — ACETAMINOPHEN 650 MG: 325 TABLET ORAL at 11:14

## 2024-03-07 RX ADMIN — DOCUSATE SODIUM 100 MG: 100 CAPSULE, LIQUID FILLED ORAL at 08:03

## 2024-03-07 RX ADMIN — SENNOSIDES 8.6 MG: 8.6 TABLET, FILM COATED ORAL at 22:13

## 2024-03-07 RX ADMIN — ACETAMINOPHEN 650 MG: 325 TABLET ORAL at 22:13

## 2024-03-07 ASSESSMENT — PAIN DESCRIPTION - LOCATION: LOCATION: ABDOMEN

## 2024-03-07 ASSESSMENT — PAIN SCALES - GENERAL: PAINLEVEL_OUTOF10: 6

## 2024-03-07 ASSESSMENT — PAIN DESCRIPTION - ORIENTATION: ORIENTATION: ANTERIOR

## 2024-03-08 LAB
MM INDURATION, POC: 0 MM (ref 0–5)
PPD, POC: NEGATIVE
SARS-COV-2 RDRP RESP QL NAA+PROBE: NOT DETECTED
SOURCE: NORMAL

## 2024-03-08 PROCEDURE — 6370000000 HC RX 637 (ALT 250 FOR IP): Performed by: SURGERY

## 2024-03-08 PROCEDURE — 87635 SARS-COV-2 COVID-19 AMP PRB: CPT

## 2024-03-08 PROCEDURE — 97530 THERAPEUTIC ACTIVITIES: CPT

## 2024-03-08 PROCEDURE — 6360000002 HC RX W HCPCS: Performed by: SURGERY

## 2024-03-08 PROCEDURE — 6370000000 HC RX 637 (ALT 250 FOR IP): Performed by: NURSE PRACTITIONER

## 2024-03-08 PROCEDURE — 1100000000 HC RM PRIVATE

## 2024-03-08 PROCEDURE — 2580000003 HC RX 258: Performed by: SURGERY

## 2024-03-08 PROCEDURE — 97112 NEUROMUSCULAR REEDUCATION: CPT

## 2024-03-08 RX ADMIN — ACETAMINOPHEN 650 MG: 325 TABLET ORAL at 21:05

## 2024-03-08 RX ADMIN — ENOXAPARIN SODIUM 40 MG: 100 INJECTION SUBCUTANEOUS at 08:55

## 2024-03-08 RX ADMIN — ACETAMINOPHEN 650 MG: 325 TABLET ORAL at 08:56

## 2024-03-08 RX ADMIN — ACETAMINOPHEN 650 MG: 325 TABLET ORAL at 03:18

## 2024-03-08 RX ADMIN — LOSARTAN POTASSIUM 100 MG: 50 TABLET, FILM COATED ORAL at 08:56

## 2024-03-08 RX ADMIN — POLYETHYLENE GLYCOL 3350 17 G: 17 POWDER, FOR SOLUTION ORAL at 08:55

## 2024-03-08 RX ADMIN — HYDROCHLOROTHIAZIDE 12.5 MG: 25 TABLET ORAL at 08:55

## 2024-03-08 RX ADMIN — AMLODIPINE BESYLATE 5 MG: 5 TABLET ORAL at 08:57

## 2024-03-08 RX ADMIN — SENNOSIDES 8.6 MG: 8.6 TABLET, FILM COATED ORAL at 21:05

## 2024-03-08 RX ADMIN — SODIUM CHLORIDE, PRESERVATIVE FREE 10 ML: 5 INJECTION INTRAVENOUS at 21:05

## 2024-03-08 RX ADMIN — ACETAMINOPHEN 650 MG: 325 TABLET ORAL at 15:58

## 2024-03-08 ASSESSMENT — PAIN SCALES - GENERAL: PAINLEVEL_OUTOF10: 0

## 2024-03-09 PROCEDURE — 2580000003 HC RX 258: Performed by: SURGERY

## 2024-03-09 PROCEDURE — 94760 N-INVAS EAR/PLS OXIMETRY 1: CPT

## 2024-03-09 PROCEDURE — 6370000000 HC RX 637 (ALT 250 FOR IP): Performed by: NURSE PRACTITIONER

## 2024-03-09 PROCEDURE — 2700000000 HC OXYGEN THERAPY PER DAY

## 2024-03-09 PROCEDURE — 6360000002 HC RX W HCPCS: Performed by: SURGERY

## 2024-03-09 PROCEDURE — 1100000000 HC RM PRIVATE

## 2024-03-09 PROCEDURE — 6370000000 HC RX 637 (ALT 250 FOR IP): Performed by: SURGERY

## 2024-03-09 RX ADMIN — SODIUM CHLORIDE, PRESERVATIVE FREE 10 ML: 5 INJECTION INTRAVENOUS at 20:09

## 2024-03-09 RX ADMIN — ACETAMINOPHEN 650 MG: 325 TABLET ORAL at 20:09

## 2024-03-09 RX ADMIN — SENNOSIDES 8.6 MG: 8.6 TABLET, FILM COATED ORAL at 20:09

## 2024-03-09 RX ADMIN — SODIUM CHLORIDE, PRESERVATIVE FREE 10 ML: 5 INJECTION INTRAVENOUS at 20:07

## 2024-03-09 RX ADMIN — SODIUM CHLORIDE, PRESERVATIVE FREE 10 ML: 5 INJECTION INTRAVENOUS at 09:00

## 2024-03-09 RX ADMIN — ACETAMINOPHEN 650 MG: 325 TABLET ORAL at 09:17

## 2024-03-09 RX ADMIN — ACETAMINOPHEN 650 MG: 325 TABLET ORAL at 03:40

## 2024-03-09 RX ADMIN — ENOXAPARIN SODIUM 40 MG: 100 INJECTION SUBCUTANEOUS at 09:17

## 2024-03-09 ASSESSMENT — PAIN SCALES - GENERAL
PAINLEVEL_OUTOF10: 2
PAINLEVEL_OUTOF10: 0

## 2024-03-10 PROCEDURE — 6370000000 HC RX 637 (ALT 250 FOR IP): Performed by: NURSE PRACTITIONER

## 2024-03-10 PROCEDURE — 6370000000 HC RX 637 (ALT 250 FOR IP): Performed by: SURGERY

## 2024-03-10 PROCEDURE — 2580000003 HC RX 258: Performed by: SURGERY

## 2024-03-10 PROCEDURE — 6360000002 HC RX W HCPCS: Performed by: SURGERY

## 2024-03-10 PROCEDURE — 2700000000 HC OXYGEN THERAPY PER DAY

## 2024-03-10 PROCEDURE — 1100000000 HC RM PRIVATE

## 2024-03-10 RX ADMIN — ACETAMINOPHEN 650 MG: 325 TABLET ORAL at 03:36

## 2024-03-10 RX ADMIN — SODIUM CHLORIDE, PRESERVATIVE FREE 10 ML: 5 INJECTION INTRAVENOUS at 21:55

## 2024-03-10 RX ADMIN — ACETAMINOPHEN 650 MG: 325 TABLET ORAL at 14:07

## 2024-03-10 RX ADMIN — ACETAMINOPHEN 650 MG: 325 TABLET ORAL at 08:22

## 2024-03-10 RX ADMIN — POLYETHYLENE GLYCOL 3350 17 G: 17 POWDER, FOR SOLUTION ORAL at 08:23

## 2024-03-10 RX ADMIN — LOSARTAN POTASSIUM 100 MG: 50 TABLET, FILM COATED ORAL at 08:23

## 2024-03-10 RX ADMIN — ACETAMINOPHEN 650 MG: 325 TABLET ORAL at 21:06

## 2024-03-10 RX ADMIN — ENOXAPARIN SODIUM 40 MG: 100 INJECTION SUBCUTANEOUS at 08:23

## 2024-03-10 RX ADMIN — HYDROCHLOROTHIAZIDE 12.5 MG: 25 TABLET ORAL at 08:23

## 2024-03-10 RX ADMIN — SENNOSIDES 8.6 MG: 8.6 TABLET, FILM COATED ORAL at 21:06

## 2024-03-10 RX ADMIN — AMLODIPINE BESYLATE 5 MG: 5 TABLET ORAL at 08:23

## 2024-03-10 RX ADMIN — SODIUM CHLORIDE, PRESERVATIVE FREE 10 ML: 5 INJECTION INTRAVENOUS at 08:26

## 2024-03-10 ASSESSMENT — PAIN SCALES - GENERAL
PAINLEVEL_OUTOF10: 5
PAINLEVEL_OUTOF10: 2

## 2024-03-10 ASSESSMENT — PAIN DESCRIPTION - ORIENTATION
ORIENTATION: MID;ANTERIOR
ORIENTATION: ANTERIOR
ORIENTATION: ANTERIOR

## 2024-03-10 ASSESSMENT — PAIN SCALES - WONG BAKER
WONGBAKER_NUMERICALRESPONSE: 2

## 2024-03-10 ASSESSMENT — PAIN DESCRIPTION - ONSET: ONSET: ON-GOING

## 2024-03-10 ASSESSMENT — PAIN DESCRIPTION - LOCATION
LOCATION: ABDOMEN

## 2024-03-10 ASSESSMENT — PAIN DESCRIPTION - DESCRIPTORS
DESCRIPTORS: SORE
DESCRIPTORS: SORE

## 2024-03-10 ASSESSMENT — PAIN - FUNCTIONAL ASSESSMENT
PAIN_FUNCTIONAL_ASSESSMENT: PREVENTS OR INTERFERES SOME ACTIVE ACTIVITIES AND ADLS
PAIN_FUNCTIONAL_ASSESSMENT: PREVENTS OR INTERFERES SOME ACTIVE ACTIVITIES AND ADLS

## 2024-03-10 ASSESSMENT — PAIN DESCRIPTION - PAIN TYPE: TYPE: SURGICAL PAIN

## 2024-03-10 ASSESSMENT — PAIN DESCRIPTION - FREQUENCY: FREQUENCY: CONTINUOUS

## 2024-03-10 NOTE — PLAN OF CARE
Problem: Pain  Goal: Verbalizes/displays adequate comfort level or baseline comfort level  3/8/2024 0928 by Nhi Hdez RN  Outcome: Progressing  3/8/2024 0011 by Precious Colindres RN  Outcome: Progressing  Flowsheets (Taken 3/7/2024 1952)  Verbalizes/displays adequate comfort level or baseline comfort level:   Encourage patient to monitor pain and request assistance   Assess pain using appropriate pain scale   Administer analgesics based on type and severity of pain and evaluate response   Implement non-pharmacological measures as appropriate and evaluate response   Consider cultural and social influences on pain and pain management   Notify Licensed Independent Practitioner if interventions unsuccessful or patient reports new pain     Problem: Safety - Adult  Goal: Free from fall injury  3/8/2024 0928 by Nhi Hdez RN  Outcome: Progressing  3/8/2024 0011 by Precious Colindres RN  Outcome: Progressing  Flowsheets (Taken 3/7/2024 1952)  Free From Fall Injury:   Instruct family/caregiver on patient safety   Based on caregiver fall risk screen, instruct family/caregiver to ask for assistance with transferring infant if caregiver noted to have fall risk factors     Problem: Discharge Planning  Goal: Discharge to home or other facility with appropriate resources  3/8/2024 0928 by Nhi Hdez, RN  Outcome: Progressing  3/8/2024 0011 by Precious Colindres RN  Outcome: Progressing  Flowsheets (Taken 3/7/2024 1952)  Discharge to home or other facility with appropriate resources:   Identify barriers to discharge with patient and caregiver   Arrange for needed discharge resources and transportation as appropriate   Identify discharge learning needs (meds, wound care, etc)   Arrange for interpreters to assist at discharge as needed   Refer to discharge planning if patient needs post-hospital services based on physician order or complex needs related to functional status, cognitive ability or social support 
  Problem: Pain  Goal: Verbalizes/displays adequate comfort level or baseline comfort level  Outcome: Progressing     Problem: Safety - Adult  Goal: Free from fall injury  Outcome: Progressing     Problem: Discharge Planning  Goal: Discharge to home or other facility with appropriate resources  Outcome: Progressing     Problem: Skin/Tissue Integrity  Goal: Absence of new skin breakdown  Description: 1.  Monitor for areas of redness and/or skin breakdown  2.  Assess vascular access sites hourly  3.  Every 4-6 hours minimum:  Change oxygen saturation probe site  4.  Every 4-6 hours:  If on nasal continuous positive airway pressure, respiratory therapy assess nares and determine need for appliance change or resting period.  Outcome: Progressing     Problem: ABCDS Injury Assessment  Goal: Absence of physical injury  Outcome: Progressing     
  Problem: Pain  Goal: Verbalizes/displays adequate comfort level or baseline comfort level  Outcome: Progressing     Problem: Safety - Adult  Goal: Free from fall injury  Outcome: Progressing     Problem: Discharge Planning  Goal: Discharge to home or other facility with appropriate resources  Outcome: Progressing     Problem: Skin/Tissue Integrity  Goal: Absence of new skin breakdown  Description: 1.  Monitor for areas of redness and/or skin breakdown  2.  Assess vascular access sites hourly  3.  Every 4-6 hours minimum:  Change oxygen saturation probe site  4.  Every 4-6 hours:  If on nasal continuous positive airway pressure, respiratory therapy assess nares and determine need for appliance change or resting period.  Outcome: Progressing     Problem: ABCDS Injury Assessment  Goal: Absence of physical injury  Outcome: Progressing     
Progressing     Problem: ABCDS Injury Assessment  Goal: Absence of physical injury  3/9/2024 0725 by Nhi Hdez, RN  Outcome: Progressing  3/8/2024 2218 by Precious Colindres, RN  Outcome: Progressing

## 2024-03-11 VITALS
RESPIRATION RATE: 16 BRPM | HEART RATE: 82 BPM | TEMPERATURE: 97.4 F | HEIGHT: 61 IN | SYSTOLIC BLOOD PRESSURE: 116 MMHG | OXYGEN SATURATION: 98 % | BODY MASS INDEX: 33.42 KG/M2 | WEIGHT: 177 LBS | DIASTOLIC BLOOD PRESSURE: 64 MMHG

## 2024-03-11 PROBLEM — K44.9 HIATAL HERNIA: Status: RESOLVED | Noted: 2024-02-22 | Resolved: 2024-03-11

## 2024-03-11 PROCEDURE — 6370000000 HC RX 637 (ALT 250 FOR IP): Performed by: SURGERY

## 2024-03-11 PROCEDURE — 6360000002 HC RX W HCPCS: Performed by: SURGERY

## 2024-03-11 PROCEDURE — 2580000003 HC RX 258: Performed by: SURGERY

## 2024-03-11 PROCEDURE — 6370000000 HC RX 637 (ALT 250 FOR IP): Performed by: NURSE PRACTITIONER

## 2024-03-11 RX ORDER — SENNOSIDES A AND B 8.6 MG/1
1 TABLET, FILM COATED ORAL NIGHTLY
Qty: 30 TABLET | Refills: 0
Start: 2024-03-11 | End: 2024-04-10

## 2024-03-11 RX ORDER — POLYETHYLENE GLYCOL 3350 17 G/17G
17 POWDER, FOR SOLUTION ORAL DAILY
Qty: 1 EACH | Refills: 0
Start: 2024-03-11 | End: 2024-04-10

## 2024-03-11 RX ADMIN — ACETAMINOPHEN 650 MG: 325 TABLET ORAL at 10:32

## 2024-03-11 RX ADMIN — AMLODIPINE BESYLATE 5 MG: 5 TABLET ORAL at 10:34

## 2024-03-11 RX ADMIN — SODIUM CHLORIDE, PRESERVATIVE FREE 10 ML: 5 INJECTION INTRAVENOUS at 10:47

## 2024-03-11 RX ADMIN — ENOXAPARIN SODIUM 40 MG: 100 INJECTION SUBCUTANEOUS at 10:35

## 2024-03-11 RX ADMIN — HYDROCHLOROTHIAZIDE 12.5 MG: 25 TABLET ORAL at 10:33

## 2024-03-11 RX ADMIN — POLYETHYLENE GLYCOL 3350 17 G: 17 POWDER, FOR SOLUTION ORAL at 10:31

## 2024-03-11 RX ADMIN — LOSARTAN POTASSIUM 100 MG: 50 TABLET, FILM COATED ORAL at 10:34

## 2024-03-11 RX ADMIN — ACETAMINOPHEN 650 MG: 325 TABLET ORAL at 03:30

## 2024-03-11 ASSESSMENT — PAIN SCALES - GENERAL: PAINLEVEL_OUTOF10: 0

## 2024-03-11 ASSESSMENT — PAIN SCALES - WONG BAKER: WONGBAKER_NUMERICALRESPONSE: 0

## 2024-03-11 NOTE — PROGRESS NOTES
ACUTE OCCUPATIONAL THERAPY GOALS:   (Developed with and agreed upon by patient and/or caregiver.)  1. Patient will perform lower body dressing with min assist.  2. Patient will perform upper and lower body bathing with min assist.  3. Patient will perform toilet transfers with contact guard assist.  4. Patient will participate in 30 + minutes of ADL/ therapeutic exercise/therapeutic activity with min rest breaks to increase activity tolerance for self care.  5. Patient will perform standing grooming tasks x5 mins with contact guard assist.  6. Patient will perform ADL functional mobility in room with contact guard assist.    Goals to be achieved in 7 days.      OCCUPATIONAL THERAPY Initial Assessment and Daily Note       OT Visit Days: 1  Acknowledge Orders  Time  OT Charge Capture  Rehab Caseload Tracker      Tayla Escobar is a 84 y.o. female   PRIMARY DIAGNOSIS: Hiatal hernia  Hiatal hernia [K44.9]  Procedure(s) (LRB):  ROBOTIC HIATAL HERNIA REPAIR (N/A)  1 Day Post-Op  Reason for Referral: Generalized Muscle Weakness (M62.81)  Other lack of cordination (R27.8)  Difficulty in walking, Not elsewhere classified (R26.2)  Inpatient: Payor: [employer] MEDICARE / Plan: [facility] MEDICARE-ADVANTAGE PPO / Product Type: Medicare /     ASSESSMENT:     REHAB RECOMMENDATIONS:   Recommendation to date pending progress:  Setting:  Short-term Rehab    Equipment:    None     ASSESSMENT:  Tayla Escobar is a 84 y.o. admitted for hiatal hernia, s/p hernia repair POD #1. Patient states she lives with her son and reports prior level of function as independent with all ADLs, IADLs, and performs functional/community mobility with  the use of an assistive device (rolling walker). She has been using a wheelchair the past 2 weeks due to pain. Based on OT evaluation completed this date, her current level of function is stand by assist-max assist for ADL tasks and mod assist for sit to stand with rolling walker. Patient unable to take 
ACUTE PHYSICAL THERAPY GOALS:   (Developed with and agreed upon by patient and/or caregiver.)    (1.)Ms. Escobar will move from supine to sit and sit to supine  in bed with MINIMAL ASSIST within 7 treatment day(s).    (2.)Ms. Escobar will transfer from bed to chair and chair to bed with MINIMAL ASSIST using the least restrictive device within 7 treatment day(s).    (3.)Ms. Escobar will ambulate with MINIMAL ASSIST for 150 feet with the least restrictive device within 7 treatment day(s).  (4.)Ms. Escobar will perform HEP with SUPERVISION within 7 treatment days.    PHYSICAL THERAPY: Daily Note PM   (Link to Caseload Tracking: PT Visit Days : 2  Time In/Out PT Charge Capture  Rehab Caseload Tracker  Orders    Tayla Escobar is a 84 y.o. female   PRIMARY DIAGNOSIS: Hiatal hernia  Hiatal hernia [K44.9]  Procedure(s) (LRB):  ROBOTIC HIATAL HERNIA REPAIR (N/A)  3 Days Post-Op  Inpatient: Payor: [facility] MEDICARE / Plan: [employer] MEDICARE-ADVANTAGE PPO / Product Type: Medicare /     ASSESSMENT:     REHAB RECOMMENDATIONS:   Recommendation to date pending progress:  Setting:  Short-term Rehab    Equipment:    To Be Determined     ASSESSMENT:  Ms. Escobar is supine upon arrival and agreeable to mobility. She transfers from supine to sitting with minAx1-2 and extra time to position herself fully onto EOB. She then performs sit to stand from bed with Aiyana and ambulates short distance to sink using 2WW. She demonstrates fair- balance while performing simple grooming tasks at sink. Patient fatigues quickly and requires extended seated rest break to recover from fatigue following standing tasks. She then ambulates in hallway for about 60 ft using 2WW and demonstrates decreased step length, slowed speed, and forward trunk flexion throughout. Patient is fatigued following ambulation but overall tolerates session well, despite reports of abdominal pain. At end of session she is seated up in chair with all needs in reach. Good 
Admit Date: 3/4/2024    POD 1 Day Post-Op    Procedure:  Procedure(s):  ROBOTIC HIATAL HERNIA REPAIR    Subjective:     Patient has complaints of very little \"stomach pain.\"  Family at bedside  Tolerating CLD    Objective:       Vitals:    24 2235 24 0753   BP: (!) 111/55 117/66 123/64 131/66   Pulse: 68 71 72 77   Resp: 15 15 20 20   Temp:  97.6 °F (36.4 °C) 97.5 °F (36.4 °C) 97.7 °F (36.5 °C)   TempSrc:  Axillary Oral Axillary   SpO2: 98% 92% 92% 97%   Weight:       Height:           Temp (24hrs), Av.7 °F (36.5 °C), Min:97.5 °F (36.4 °C), Max:98.2 °F (36.8 °C)    Intake/Output Summary (Last 24 hours) at 3/5/2024 1012  Last data filed at 3/5/2024 0944  Gross per 24 hour   Intake 2641.49 ml   Output 1080 ml   Net 1561.49 ml   Purwick in place: 1005mml/24 hrs      Physical Exam:   General: quiet, alert NC in place  Respirations: shallow, weak effort  ABD: soft with trochar sites intact with glue/dry. No guarding with exam  Extr: no edema    Labs:   Recent Results (from the past 24 hour(s))   TYPE AND SCREEN    Collection Time: 24 12:56 PM   Result Value Ref Range    Crossmatch expiration date 2024,2359     ABO/Rh O POSITIVE     Antibody Screen NEG    POC Sodium and Potassium    Collection Time: 24  1:05 PM   Result Value Ref Range    POC Potassium 3.7 3.5 - 5.1 MMOL/L   Basic Metabolic Panel    Collection Time: 24  6:33 AM   Result Value Ref Range    Sodium 138 136 - 146 mmol/L    Potassium 4.5 3.5 - 5.1 mmol/L    Chloride 101 (L) 103 - 113 mmol/L    CO2 31 21 - 32 mmol/L    Anion Gap 6 2 - 11 mmol/L    Glucose 126 (H) 65 - 100 mg/dL    BUN 48 (H) 8 - 23 MG/DL    Creatinine 1.20 (H) 0.6 - 1.0 MG/DL    Est, Glom Filt Rate 45 (L) >60 ml/min/1.73m2    Calcium 11.7 (H) 8.3 - 10.4 MG/DL   CBC with Auto Differential    Collection Time: 24  6:33 AM   Result Value Ref Range    WBC 11.2 (H) 4.3 - 11.1 K/uL    RBC 3.21 (L) 4.05 - 5.2 M/uL    Hemoglobin 
Admit Date: 3/4/2024    POD 2 Days Post-Op    Procedure:  Procedure(s):  ROBOTIC HIATAL HERNIA REPAIR    Subjective:     Patient ambulated hallway with RN today  \"Hurts to cough\"  Family at bedside  Tolerating FLD with much encouragement  Garcia had to be replaced for inability to void after I&O cath after several hours    Objective:       Vitals:    24 1906 24 2255 24 0300 24 0818   BP: (!) 119/58 111/60 113/60 117/61   Pulse: 94 92 89 75   Resp: 18 19 19 18   Temp: 97.9 °F (36.6 °C) 98.2 °F (36.8 °C) 100.3 °F (37.9 °C) 97.4 °F (36.3 °C)   TempSrc: Temporal Oral Axillary Axillary   SpO2: 95%  97% 98%   Weight:       Height:           Temp (24hrs), Av.2 °F (36.8 °C), Min:97.4 °F (36.3 °C), Max:100.3 °F (37.9 °C)    Intake/Output Summary (Last 24 hours) at 3/6/2024 1021  Last data filed at 3/6/2024 0527  Gross per 24 hour   Intake 1698.88 ml   Output 1205 ml   Net 493.88 ml   Purwick in place: 1250mml/24 hrs      Physical Exam:   General: quiet, alert NC in place  Respirations: shallow, weak effort, fine crackles  ABD: soft with trochar sites intact with glue/dry. No guarding with exam  Extr: no edema    Labs:   No results found for this or any previous visit (from the past 24 hour(s)).          Principal Problem:    Hiatal hernia  Active Problems:    Moderate protein-calorie malnutrition (HCC)  Resolved Problems:    * No resolved hospital problems. *        Plan/Recommendations/Medical Decision Making:     POD2 Robo HH repair  Monitor and treat for pain  IS - instruction, encourage- CXR for fine crackles-->follow up result  Stopping IVF (was at 50ml/hr), encourage PO fluid intake  Garcia dc today- was re inserted yesterday for inability to void.  Bladder scan, I&O cath if necessary, use adult diaper or pure wick.   OOB to recliner yesterday. Maximize PT/OT efforts  PT/OT following  FLD --> will advance once flatus or BM  Miralax BID   Lovenox DVT prophy  Case Management: pt would benefit 
Admit Date: 3/4/2024    POD 3 Days Post-Op    Procedure:  Procedure(s):  ROBOTIC HIATAL HERNIA REPAIR    Subjective:     Sat in chair today  Alert, feeling better today, talking on the phone  Family at bedside  Tolerating Soft diet & nutritional supplements      Objective:       Vitals:    24 0216 24 0324 24 0553 24 0755   BP:  117/60  (!) 115/58   Pulse: 85 86  80   Resp: 16 19  22   Temp:  97.9 °F (36.6 °C)  97.5 °F (36.4 °C)   TempSrc:  Oral  Oral   SpO2: 93% 96%  96%   Weight:   85.8 kg (189 lb 1.6 oz)    Height:           Temp (24hrs), Av.8 °F (36.6 °C), Min:97.5 °F (36.4 °C), Max:98.1 °F (36.7 °C)    Intake/Output Summary (Last 24 hours) at 3/7/2024 0902  Last data filed at 3/7/2024 0755  Gross per 24 hour   Intake 854.53 ml   Output 1600 ml   Net -745.47 ml   Purwick in place: 1350mml/24 hrs      Physical Exam:   General: quiet, alert NC in place  Respirations: shallow, weak effort, fine crackles  ABD: soft with trochar sites intact with glue/dry. No tenderness or guarding with exam  Extr: no edema    Labs:   Recent Results (from the past 24 hour(s))   PLEASE READ & DOCUMENT PPD TEST IN 24 HRS    Collection Time: 24 11:53 AM   Result Value Ref Range    PPD, (POC) Negative     mm Induration 0 0 - 5 mm             Principal Problem:    Hiatal hernia  Active Problems:    Moderate protein-calorie malnutrition (HCC)  Resolved Problems:    * No resolved hospital problems. *        Plan/Recommendations/Medical Decision Making:     POD 3 Robo HH repair  Monitor and treat for pain  IS - instruction, encourage- CXR 3/6 ok  Tolerating PO intake, needs encouragement     Maximize PT/OT efforts  Soft diet tolerating  Miralax daily with nightly senakot, waiting for BM  Lovenox DVT prophy  Case Management: pt would benefit from short term rehab - maybe tomorrow.     Garcia 3/6 removed  MJ WU, APRN - CNP   
Comprehensive Nutrition Assessment    Type and Reason for Visit: Initial, Positive Nutrition Screen  Malnutrition Screening Tool: Malnutrition Screen  Have you recently lost weight without trying?: 2 to 13 pounds (1 point)  Have you been eating poorly because of a decreased appetite?: Yes (1 point)  Malnutrition Screening Tool Score: 2    Nutrition Recommendations/Plan:   Meals and Snacks:  Diet: Continue current diet and advance as medically appropriate  Nutrition Supplement Therapy:  Medical food supplement therapy:  Initiate Ensure High Protein three times per day (this provides 160 kcal and 16 grams protein per bottle)     Malnutrition Assessment:  Malnutrition Status: Moderate malnutrition  Context: Chronic Illness  Findings of clinical characteristics of malnutrition:   Energy Intake:  Mild decrease in energy intake (Comment) (declining intake over last year, only drinking Premier Protein TID for 2 weeks PTA)  Weight Loss:  Greater than 10% over 6 months (20# (10.5%))     Body Fat Loss:  Mild body fat loss Orbital, Triceps, Buccal region   Muscle Mass Loss:  Mild muscle mass loss Temples (temporalis), Clavicles (pectoralis & deltoids), Hand (interosseous)  Fluid Accumulation:  Unable to assess     Strength:  Not Performed     Nutrition Assessment:  Nutrition History: Daughter provides history. Baseline intake is a large breakfast, light lunch or will skip lunch, normal dinner. She states patient's appetite has been declining over the least year, but more so recently. She states that she was initially just eating less. For 2 weeks prior to admission for surgery only intake was 3 Premier Protein shakes per day and even those were making her sick. They both endorse weight loss with the most significant loss within the last 6 months.      Do You Have Any Cultural, Uatsdin, or Ethnic Food Preferences?: No   Weight History: 7/12/23 196#, 9/18/23 190#, 1/16/24 180#, 2/22/24 187#  Nutrition Background:       [facility] 
END OF SHIFT NOTE:    INTAKE/OUTPUT  03/05 0701 - 03/06 0700  In: 1938.9 [P.O.:658; I.V.:1280.9]  Out: 1205 [Urine:1205]  Voiding: No, forrest d/c'd @ 1500  Catheter: No  Drain:        Flatus: Patient does not have flatus present.    Stool: 0 occurrences.    Characteristics:           Stool Assessment  Last BM (including prior to admit):  (prior to admission)    Emesis: 0 occurrences.    Characteristics:        VITAL SIGNS  Patient Vitals for the past 12 hrs:   Temp Pulse Resp BP SpO2   03/06/24 1500 97.6 °F (36.4 °C) 77 -- 122/62 99 %   03/06/24 1106 97.6 °F (36.4 °C) 80 -- (!) 112/52 99 %   03/06/24 1046 -- 80 -- (!) 108/51 --   03/06/24 0818 97.4 °F (36.3 °C) 75 18 117/61 98 %       Pain Assessment  Pain Level: 0 (03/06/24 1754)  Pain Location: Abdomen  Patient's Stated Pain Goal: 0 - No pain    Ambulating  Yes with assistance    Shift report given to oncoming nurse at the bedside.    Coty Morris RN     
END OF SHIFT NOTE:    INTAKE/OUTPUT  03/06 0701 - 03/07 0700  In: 1054.5 [P.O.:680; I.V.:374.5]  Out: 1350 [Urine:1350]  Voiding: Yes  Catheter: Yes  Drain:   External Urinary Catheter (Active)   Site Assessment Clean,dry & intact 03/07/24 1020   Placement Replaced 03/07/24 1020   Securement Method Other (Comment) 03/07/24 1524   Catheter Care Catheter/Wick replaced;Suction Canister/Tubing changed 03/07/24 1020   Perineal Care Yes 03/07/24 1020   Suction 40 mmgHg continuous 03/07/24 1524   Urine Color Yellow 03/07/24 1524   Urine Appearance Clear 03/07/24 1524   Output (mL) 160 mL 03/07/24 1524               Flatus: Patient does have flatus present.    Stool:  occurrences.  0  Characteristics:           Stool Assessment  Last BM (including prior to admit):  (prior to admission)    Emesis:  occurrences.  0  Characteristics:        VITAL SIGNS  Patient Vitals for the past 12 hrs:   Temp Pulse Resp BP SpO2   03/07/24 1524 97.5 °F (36.4 °C) 80 24 106/64 99 %   03/07/24 1148 97.3 °F (36.3 °C) 77 24 110/61 98 %   03/07/24 0755 97.5 °F (36.4 °C) 80 22 (!) 115/58 96 %       Pain Assessment  Pain Level: 6 (03/07/24 0552)  Pain Location: Abdomen  Patient's Stated Pain Goal: 0 - No pain    Ambulating  Yes    Shift report given to oncoming nurse at the bedside.    Ariana Bentley RN     
END OF SHIFT NOTE:    INTAKE/OUTPUT  03/07 0701 - 03/08 0700  In: 100 [P.O.:100]  Out: 1070 [Urine:1070]  Voiding: Yes  Catheter: Yes - external with brief  Drain:   External Urinary Catheter (Active)   Site Assessment Clean,dry & intact 03/08/24 1615   Placement Replaced 03/08/24 1615   Securement Method Other (Comment) 03/08/24 1615   Catheter Care Catheter/Wick replaced;Suction Canister/Tubing changed 03/08/24 1615   Perineal Care Yes 03/08/24 1615   Suction 40 mmgHg continuous 03/07/24 1524   Urine Color Yellow 03/08/24 1615   Urine Appearance Clear 03/08/24 1615   Output (mL) 100 mL 03/08/24 1615               Flatus: Patient does have flatus present.    Stool: 0 occurrences.    Characteristics:           Stool Assessment  Last BM (including prior to admit): 03/03/24 (per chart)    Emesis: 0 occurrences.    Characteristics:        VITAL SIGNS  Patient Vitals for the past 12 hrs:   Temp Pulse Resp BP SpO2   03/08/24 1615 97.3 °F (36.3 °C) 80 20 109/67 96 %   03/08/24 1139 97.5 °F (36.4 °C) 86 25 110/67 95 %   03/08/24 0749 97.3 °F (36.3 °C) 73 18 105/62 96 %       Pain Assessment  Pain Level: 0 (03/08/24 0735)  Pain Location: Abdomen  Patient's Stated Pain Goal: 0 - No pain    Ambulating  Yes    Shift report given to oncoming nurse at the bedside.    Nhi Hdez RN     
END OF SHIFT NOTE:    INTAKE/OUTPUT  03/10 0701 - 03/11 0700  In: 240 [P.O.:240]  Out: 1150 [Urine:1150]  Voiding: Yes  Catheter: Yes  Drain:   External Urinary Catheter (Active)   Site Assessment Clean,dry & intact 03/10/24 1913   Placement Replaced 03/10/24 1010   Securement Method Securing device (Describe) 03/10/24 1010   Catheter Care Catheter/Wick replaced;Suction Canister/Tubing changed 03/10/24 1010   Perineal Care Yes 03/10/24 1010   Suction 40 mmgHg continuous 03/10/24 1913   Urine Color Yellow 03/10/24 1913   Urine Appearance Hazy 03/10/24 1913   Output (mL) 150 mL 03/10/24 2247               Flatus: Patient does have flatus present.    Stool: 0 occurrences.    Characteristics:  Stool Appearance: Unable to assess  Stool Color: Unable to assess  Stool Amount: Unable to assess  Stool Assessment  Incontinence: No  Stool Appearance: Unable to assess  Stool Color: Unable to assess  Stool Amount: Unable to assess  Stool Source: Rectum  Last BM (including prior to admit): 03/09/24 (Per patient)    Emesis: 0 occurrences.      VITAL SIGNS  Patient Vitals for the past 12 hrs:   Temp Pulse Resp BP SpO2   03/11/24 0333 98.1 °F (36.7 °C) 80 17 125/66 95 %   03/10/24 2247 97.9 °F (36.6 °C) 72 16 123/60 97 %   03/10/24 1913 97.2 °F (36.2 °C) 74 16 122/68 96 %       Pain Assessment  Pain Level: 0 (03/11/24 0330)  Pain Location: Abdomen  Patient's Stated Pain Goal: 0 - No pain    Ambulating  Yes    Shift report given to oncoming nurse at the bedside.    Shreya Pena RN    
END OF SHIFT NOTE:    Patient using IS as instructed. Achieved volume= 300 ml    INTAKE/OUTPUT  03/04 0701 - 03/05 0700  In: 2401.5 [I.V.:2401.5]  Out: 1080 [Urine:1005]  Voiding: No  Catheter: Yes  Drain:   Urinary Catheter 03/05/24 Garcia (Active)   Catheter Indications Urinary retention (acute or chronic), continuous bladder irrigation or bladder outlet obstruction 03/05/24 1322   Site Assessment No urethral drainage 03/05/24 1322   Urine Color Yellow 03/05/24 1322   Urine Appearance Clear 03/05/24 1322   Collection Container Standard 03/05/24 1322   Securement Method Securing device (Describe) 03/05/24 1322   Catheter Care  Perineal wipes 03/05/24 1322   Catheter Best Practices  Catheter secured to thigh;Tamper seal intact;Bag below bladder;Bag not on floor;Lack of dependent loop in tubing;Drainage bag less than half full 03/05/24 1322   Status Draining 03/05/24 1322   Output (mL) 150 mL 03/05/24 1814     Flatus: Patient does not have flatus present.    Stool: 0 occurrences.    Characteristics:           Stool Assessment  Last BM (including prior to admit):  (prior to admission)    Emesis: 0 occurrences.    Characteristics:        VITAL SIGNS  Patient Vitals for the past 12 hrs:   Temp Pulse Resp BP SpO2   03/05/24 1553 97.5 °F (36.4 °C) 77 16 (!) 117/57 95 %   03/05/24 1104 97.6 °F (36.4 °C) 85 30 119/74 97 %   03/05/24 0753 97.7 °F (36.5 °C) 77 20 131/66 97 %       Pain Assessment  Pain Level: 0 (03/05/24 1012)  Pain Location: Abdomen  Patient's Stated Pain Goal: 0 - No pain    Ambulating  Yes from bed to chair with assistance    Shift report given to oncoming nurse at the bedside.    Coty Morris RN     
General Surgery Progress Note    3/8/2024    Admit Date: 3/4/2024    Subjective:       Working with PT.  Pain controlled.  Tolerating diet.    Objective:     /67   Pulse 86   Temp 97.5 °F (36.4 °C) (Oral)   Resp 25   Ht 1.549 m (5' 1\")   Wt 80.5 kg (177 lb 6.4 oz)   SpO2 95%   BMI 33.52 kg/m²       Intake/Output Summary (Last 24 hours) at 3/8/2024 1244  Last data filed at 3/8/2024 1139  Gross per 24 hour   Intake 100 ml   Output 1220 ml   Net -1120 ml        Physical Exam  Constitutional:       General: No acute distress.     Appearance: Normal appearance.   HENT:      Head: Normocephalic and atraumatic.   Eyes:      Pupils: Pupils are equal, round, and reactive to light.   Cardiovascular:      Rate and Rhythm: Normal rate and regular rhythm.      Pulses: Normal pulses.      Heart sounds: Normal heart sounds.   Pulmonary:      Effort: Pulmonary effort is normal.      Breath sounds: Normal breath sounds.   Abdominal:      Palpations: Abdomen is soft, shaq ttp, incisions c/d/i   Musculoskeletal:         General: Normal range of motion.   Skin:     Findings: No rash.   Neurological:      Mental Status: Alert and oriented to person, place, and time.          Data Review   No results found for this or any previous visit (from the past 24 hour(s)).     XR CHEST PORTABLE  Narrative: Chest X-ray    INDICATION: Postop    COMPARISON:  None    TECHNIQUE: AP/PA view of the chest was obtained.    FINDINGS: Patient is status post hiatal hernia repair. Prominence left lower  cardiac segment. There is retrocardiac consolidation and or atelectasis. Small  effusions. Upper lobes are clear.    Left glenohumeral arthritis is noted.  Impression: Status post hiatal hernia repair. Retrocardiac consolidation and/or atelectasis.    Small effusions.    Follow-up is advised.           Principal Problem:    Hiatal hernia  Active Problems:    Moderate protein-calorie malnutrition (HCC)  Resolved Problems:    * No resolved hospital 
General Surgery Progress Note    3/9/2024    Admit Date: 3/4/2024    Subjective:     Awake in bed. No complaints. Working with PT.  Pain controlled.  Tolerating diet. Family at bedside.     Objective:     /64 Comment: recheck  Pulse 78   Temp 97.5 °F (36.4 °C) (Oral)   Resp 16   Ht 1.549 m (5' 1\")   Wt 79.5 kg (175 lb 4.8 oz)   SpO2 95%   BMI 33.12 kg/m²       Intake/Output Summary (Last 24 hours) at 3/9/2024 0957  Last data filed at 3/9/2024 0345  Gross per 24 hour   Intake --   Output 700 ml   Net -700 ml        Physical Exam  Constitutional:       General: No acute distress.     Appearance: Normal appearance.   HENT:      Head: Normocephalic and atraumatic.   Eyes:      Pupils: Pupils are equal, round, and reactive to light.   Cardiovascular:      Rate and Rhythm: Normal rate and regular rhythm.      Pulses: Normal pulses.      Heart sounds: Normal heart sounds.   Pulmonary:      Effort: Pulmonary effort is normal.      Breath sounds: Normal breath sounds. 3L O2 via NC  Abdominal:      Palpations: Abdomen is soft, shaq ttp, incisions c/d/i   Musculoskeletal:         General: Normal range of motion.   Skin:     Findings: No rash.   Neurological:      Mental Status: Alert and oriented to person, place, and time.          Data Review   Recent Results (from the past 24 hour(s))   COVID-19, Rapid    Collection Time: 03/08/24  1:15 PM    Specimen: Nasopharyngeal   Result Value Ref Range    Source NASAL      SARS-CoV-2, Rapid Not detected NOTD          XR CHEST PORTABLE  Narrative: Chest X-ray    INDICATION: Postop    COMPARISON:  None    TECHNIQUE: AP/PA view of the chest was obtained.    FINDINGS: Patient is status post hiatal hernia repair. Prominence left lower  cardiac segment. There is retrocardiac consolidation and or atelectasis. Small  effusions. Upper lobes are clear.    Left glenohumeral arthritis is noted.  Impression: Status post hiatal hernia repair. Retrocardiac consolidation and/or 
Medication hold clearance found in Encounters if needed for reference.  
Patient with no urine output since forrest was pulled after surgery. Bladder scan= 305cc of urine. Surgery on call notified. Order to straight cath x1 and to insert forrest if patient is still retaining fluid.   
Pt BP 95/56 this morning. Notified TOM Franco. Verbal order to hold BP meds this morning.     0920: BP recheck- 117/64   
Pt leaving via EMS  
Report called to Aiden Boston RN. Report given in SBAR format with all questions answered. Pt will be leaving on 2L NC via EMS which was scheduled for 1400. Pt vitals are stable and no signs of distress. Pt personal belongings have been taken by daughter. Awaiting EMS.  
Report received from CHANTEL Mcpherson  
Son at bedside to visit in PACU  
Spiritual Care Visit, follow up visit.    Visited with patient at bedside.    Prayed for patient's healing and health.    Visit by Jim Butcher, Staff . Maggi, Anna.NU., B.A.  
Spiritual Care Visit, initial visit.    Visited with patient at bedside.    Prayed for patient's healing and health.    Visit by Jim Butcher, Staff . Lisandra., Anna.NU., B.A.  
Straight cath performed by 3rd shift @ 0520. Has not been able to void. Bladder scan= 105 ml. Verbal order received from Joan ECHOLS NP to place indwelling forrest for retention.  
TRANSFER - IN REPORT:    Verbal report received from CHANTEL José on Tayla Escobar  being received from PACU for routine progression of patient care      Report consisted of patient's Situation, Background, Assessment and   Recommendations(SBAR).     Information from the following report(s) Nurse Handoff Report was reviewed with the receiving nurse.    Opportunity for questions and clarification was provided.      Assessment completed upon patient's arrival to unit and care assumed.    
TRANSFER - OUT REPORT:    Verbal report given to Shahrzad GOLDEN on Tayla Escobar  being transferred to [facility] for routine post-op       Report consisted of patient's Situation, Background, Assessment and   Recommendations(SBAR).     Information from the following report(s) Nurse Handoff Report, Adult Overview, Surgery Report, Intake/Output, MAR, and Cardiac Rhythm NSR  was reviewed with the receiving nurse.           Lines:   Peripheral IV 03/04/24 Left Antecubital (Active)   Site Assessment Clean, dry & intact 03/04/24 1819   Line Status Capped 03/04/24 1819   Line Care Connections checked and tightened 03/04/24 1819   Phlebitis Assessment No symptoms 03/04/24 1819   Infiltration Assessment 0 03/04/24 1819   Dressing Status Clean, dry & intact 03/04/24 1819   Dressing Type Transparent 03/04/24 1819       Peripheral IV 03/04/24 Right Hand (Active)   Site Assessment Clean, dry & intact 03/04/24 1819   Line Status Infusing 03/04/24 1819   Line Care Connections checked and tightened 03/04/24 1819   Phlebitis Assessment No symptoms 03/04/24 1819   Infiltration Assessment 0 03/04/24 1819   Dressing Status Clean, dry & intact 03/04/24 1819   Dressing Type Transparent 03/04/24 1819        Opportunity for questions and clarification was provided.      Patient transported with:  O2 @ 3lpm and Tech       
hernia  Active Problems:    Moderate protein-calorie malnutrition (HCC)  Resolved Problems:    * No resolved hospital problems. *        Assessment and Plan:  Progressing well s/p robotic repair of large hiatal hernia.    Cont IS, PT, await short term rehab - bed available on Monday.    Kimberly Frommel, FNP-BC  
Monitoring and Evaluation:      Food/Nutrient Intake Outcomes: Food and Nutrient Intake, Supplement Intake  Physical Signs/Symptoms Outcomes: GI Status, Meal Time Behavior, Weight    Discharge Planning:    Continue Oral Nutrition Supplement    BRAYDEN ODOM, RD      
Tested    PLAN:   FREQUENCY AND DURATION: 3 times/week for duration of hospital stay or until stated goals are met, whichever comes first.    TREATMENT:   TREATMENT:   Co-Treatment PT/OT necessary due to patient's decreased overall endurance/tolerance levels, as well as need for high level skilled assistance to complete functional transfers/mobility and functional tasks  Therapeutic Activity (23 Minutes): Therapeutic activity included Rolling, Supine to Sit, Scooting, Transfer Training, Ambulation on level ground, Sitting balance , and Standing balance to improve functional Activity tolerance, Balance, Mobility, and Strength.    TREATMENT GRID:  N/A    AFTER TREATMENT PRECAUTIONS: Bed/Chair Locked, Call light within reach, Chair, Needs within reach, and RN notified    INTERDISCIPLINARY COLLABORATION:  RN/ PCT, PT/ PTA, and OT/ PRATT    EDUCATION:      TIME IN/OUT:  Time In: 1107  Time Out: 1130  Minutes: 23    DORENE LAN, PTA   
[] [] [] [] [] [x] [] [] [] [] 1-2 with a rolling walker   I=Independent, Mod I=Modified Independent, S=Supervision/Setup, SBA=Standby Assistance, CGA=Contact Guard Assistance, Min=Minimal Assistance, Mod=Moderate Assistance, Max=Maximal Assistance, Total=Total Assistance, NT=Not Tested    PLAN:     FREQUENCY/DURATION   OT Plan of Care: 3 times/week for duration of hospital stay or until stated goals are met, whichever comes first.    TREATMENT:     TREATMENT:   Co-Treatment PT/OT necessary due to patient's decreased overall endurance/tolerance levels, as well as need for high level skilled assistance to complete functional transfers/mobility and functional tasks  Neuromuscular Re-education (24 Minutes): Patient participated in neuromuscular re-education including weight shifting, postural training, standing tolerance activity , and sitting balance activity   with minimal assistance, verbal cues, tactile cues, visual cues, education, and adaptive equipment to improve sitting balance, standing balance, posture, coordination, static balance, and dynamic balance in order to prepare for functional task, prepare for seated ADLs, prepare for standing ADLs, prepare for functional transfer, and increase safety awareness.     TREATMENT GRID:  N/A    AFTER TREATMENT PRECAUTIONS: Call light within reach, Chair, Needs within reach, RN notified, and Visitors at bedside    INTERDISCIPLINARY COLLABORATION:  RN/ PCT    EDUCATION:       TOTAL TREATMENT DURATION AND TIME:  Time In: 1107  Time Out: 1131  Minutes: 24    CHASITY Villarreal             
[] [] [] [] [x] [] [] [] [] Room and hallway w/ RW   I=Independent, Mod I=Modified Independent, S=Supervision/Setup, SBA=Standby Assistance, CGA=Contact Guard Assistance, Min=Minimal Assistance, Mod=Moderate Assistance, Max=Maximal Assistance, Total=Total Assistance, NT=Not Tested    PLAN:     FREQUENCY/DURATION   OT Plan of Care: 3 times/week for duration of hospital stay or until stated goals are met, whichever comes first.    TREATMENT:     TREATMENT:   Co-Treatment PT/OT necessary due to patient's decreased overall endurance/tolerance levels, as well as need for high level skilled assistance to complete functional transfers/mobility and functional tasks  Self Care (27 minutes): Patient participated in upper body dressing, lower body dressing, and grooming ADLs in unsupported sitting and standing with minimal verbal cueing to increase independence, decrease assistance required, increase activity tolerance, increase safety awareness, and maintain precautions. Patient also participated in functional mobility and energy conservation training to increase independence, decrease assistance required, increase activity tolerance, increase safety awareness, and maintain precautions.     TREATMENT GRID:  N/A    AFTER TREATMENT PRECAUTIONS: Call light within reach, Chair, Needs within reach, RN notified, and Visitors at bedside    INTERDISCIPLINARY COLLABORATION:  RN/ PCT    EDUCATION:       TOTAL TREATMENT DURATION AND TIME:  Time In: 1420  Time Out: 1447  Minutes: 27    Callie Tafoya OT

## 2024-03-11 NOTE — DISCHARGE INSTRUCTIONS
No bathtub or pool for 2 weeks. Ok to shower.  No weight lifting greater than 20 lbs for 4 weeks.      Leave the skin glue in place. They will fall off on their own in 7-10 days.    If not already scheduled, please call the office and schedule a 2 week postoperative follow up.    Take tylenol or ibuprofen (if not allergic) as needed for mild pain every 4-6 hours.   Percocet prescribed for mod to severe pain. This is a narcotic and can cause drowsiness, nausea and vomiting and constipation.  Take Colace 100mg BID (over the counter) if constipated.   Soft - bite sized diet      Hernia Repair: What to Expect at Home  Your Recovery  You are likely to have pain for the next few days. You may also feel tired and have less energy than normal. This is common.  You should feel better after a few days and will probably feel much better in 7 days.  For several weeks you may feel discomfort or pulling in the hernia repair when you move. You may have some bruising near the repair site and on your genitals. This is normal. If you have swelling in the genitals, you may be told to wear well-fitting briefs. [employer] bicycle shorts may also provide good support.  This care sheet gives you a general idea about how long it will take for you to recover. But each person recovers at a different pace. Follow the steps below to get better as quickly as possible.  How can you care for yourself at home?  Activity    Rest when you feel tired. Getting enough sleep will help you recover.     Try to walk each day. Start by walking a little more than you did the day before. Bit by bit, increase the amount you walk. Walking boosts blood flow and helps prevent pneumonia and constipation.     Avoid strenuous activities, such as biking, jogging, weight lifting, or aerobic exercise, until your doctor says it is okay.     Avoid lifting anything that would make you strain. This may include heavy grocery bags and milk containers, a heavy briefcase or

## 2024-03-11 NOTE — CARE COORDINATION
Chart reviewed by RNCM    Patient accepted at [facility]. Insurance authorization remains pending. CM following.  
Patient with discharge orders for today. Patient discharging to  [facility]. [facility] to provide transport. Pick-up time: 1400. Report line information given to primary RN. Patient has met all treatment goals and milestones for discharge. Patient/family in agreement with discharge plan. CM following until patient is discharged.      03/11/24 1110   Services At/After Discharge   Transition of Care Consult (CM Consult) Discharge Planning   Services At/After Discharge Skilled Nursing Facility (SNF);Transport   [place] Resource Information Provided? No   Mode of Transport at Discharge BLS   Confirm Follow Up Transport Family   Condition of Participation: Discharge Planning   The Plan for Transition of Care is related to the following treatment goals: Return to baseline   The Patient and/or Patient Representative was provided with a Choice of Provider? Patient;Patient Representative   Name of the Patient Representative who was provided with the Choice of Provider and agrees with the Discharge Plan?  Daughter   The Patient and/Or Patient Representative agree with the Discharge Plan? Yes   Freedom of Choice list was provided with basic dialogue that supports the patient's individualized plan of care/goals, treatment preferences, and shares the quality data associated with the providers?  Yes       
Pt chart reviewed. CM check on status of insurance authorization for SN ([facility]). CM advised by [facility], pt has been approved, however there are no beds available until Monday. No needs expressed at this time for case management. Discharge to be determined. CM will follow patient for care.  
RNCM: Patient accepted to [facility]. Facility to initiate insurance authorization today. CM following.  
None   Patient expects to be discharged to: House   One/Two Story Residence One story   History of falls? 0

## 2024-03-12 ENCOUNTER — CARE COORDINATION (OUTPATIENT)
Dept: CARE COORDINATION | Facility: CLINIC | Age: 84
End: 2024-03-12

## 2024-03-12 NOTE — CARE COORDINATION
Transition of care outreach postponed for 7 days due to patient's discharge to [facility].

## 2024-03-19 ENCOUNTER — CARE COORDINATION (OUTPATIENT)
Dept: CARE COORDINATION | Facility: CLINIC | Age: 84
End: 2024-03-19

## 2024-03-19 NOTE — CARE COORDINATION
Care Transitions Post-Acute Facility Update Call    3/19/2024    Patient: Tayla Escobar Patient : 1940   MRN: [id number]  Reason for Admission: Hiatal Hernia  Discharge Date: 3/11/24 RARS: Readmission Risk Score: 9.9         Care Transitions Post Acute Facility Update    Care Transitions Interventions      Post Acute Facility Update   Outreach to [facility] ([phone]) for update on patient status.  Per staff, patient remains admitted to their facility with no d/c plans conveyed at this time.  Will continue to update.  Patient in [facility] room 6.

## 2024-03-26 ENCOUNTER — CARE COORDINATION (OUTPATIENT)
Dept: CARE COORDINATION | Facility: CLINIC | Age: 84
End: 2024-03-26

## 2024-03-26 ENCOUNTER — TELEPHONE (OUTPATIENT)
Dept: INTERNAL MEDICINE CLINIC | Facility: CLINIC | Age: 84
End: 2024-03-26

## 2024-03-26 NOTE — CARE COORDINATION
Care Transitions Post-Acute Facility Update Call    3/26/2024    Patient: Tayla Escobar Patient : 1940   MRN: [id number]  Reason for Admission: Hiatal Hernia  Discharge Date: 3/11/24 RARS: Readmission Risk Score: 9.9         Care Transitions Post Acute Facility Update    Care Transitions Interventions      Post Acute Facility Update   Outreach to [facility] ([phone]) for update on patient status. Spoke with Paloma who reports patient was d/c to home on 3/26/24.  Staff member reports patient d/c with  but SW unable to take call at this time.  CTN notified.

## 2024-03-26 NOTE — TELEPHONE ENCOUNTER
[facility] home health nurse called and states patient's BP is 80/46 today    Non symptomatic. Nurse states patient took Losartan HCTZ Lasix, and Amlodipine all this morning.     Please advise.

## 2024-03-27 ENCOUNTER — CARE COORDINATION (OUTPATIENT)
Dept: CARE COORDINATION | Facility: CLINIC | Age: 84
End: 2024-03-27

## 2024-03-27 NOTE — CARE COORDINATION
Care Transitions Outreach Attempt    Call within 2 business days of discharge: Yes   Attempted to reach patient for transitions of care follow up. Unable to reach patient.    Patient: Tayla Escobar Patient : 1940 MRN: [id number]    Last Discharge Facility       Date Complaint Diagnosis Description Type Department Provider    3/4/24   Admission (Discharged) [id number] Khan MD              Was this an external facility discharge? Yes, 3/25/2024  Discharge [facility]: [facility]    Noted following upcoming appointments from discharge chart review:   [facility] follow up appointment(s):   Future Appointments   Date Time Provider Department [place]   3/28/2024  9:15 AM Thomas Lozano MD [facility] AMB   2024  1:30 PM Ricky García DO [facility] AMB     Non-BS  follow up appointment(s): [facility] -3/26/2024

## 2024-03-28 ENCOUNTER — OFFICE VISIT (OUTPATIENT)
Dept: SURGERY | Age: 84
End: 2024-03-28

## 2024-03-28 ENCOUNTER — TELEPHONE (OUTPATIENT)
Dept: INTERNAL MEDICINE CLINIC | Facility: CLINIC | Age: 84
End: 2024-03-28

## 2024-03-28 ENCOUNTER — CARE COORDINATION (OUTPATIENT)
Dept: CARE COORDINATION | Facility: CLINIC | Age: 84
End: 2024-03-28

## 2024-03-28 VITALS — WEIGHT: 177 LBS | BODY MASS INDEX: 33.42 KG/M2 | HEIGHT: 61 IN

## 2024-03-28 DIAGNOSIS — Z09 SURGICAL FOLLOW-UP CARE: Primary | ICD-10-CM

## 2024-03-28 PROCEDURE — 99024 POSTOP FOLLOW-UP VISIT: CPT | Performed by: SURGERY

## 2024-03-28 NOTE — CARE COORDINATION
offered: Yes.   Is follow up appointment scheduled within 7 days of discharge? Yes.    Follow Up  Future Appointments   Date Time Provider Department Center   4/4/2024  1:00 PM Yudy Alvarez APRN - CNP [facility] AMB   5/22/2024  1:30 PM Ricky García DO [facility] AMB       Care Transition Nurse provided contact information.  Plan for follow-up call in 5-7 days based on severity of symptoms and risk factors.  Plan for next call: symptom management    Lizzeth Agarwal RN

## 2024-03-28 NOTE — PROGRESS NOTES
each 0    senna (SENOKOT) 8.6 MG tablet Take 1 tablet by mouth nightly 30 tablet 0    cyclobenzaprine (FLEXERIL) 5 MG tablet Take 1 tablet by mouth 3 times daily as needed for Muscle spasms      fexofenadine (ALLEGRA ALLERGY) 180 MG tablet Take 1 tablet by mouth daily      apixaban (ELIQUIS) 5 MG TABS tablet Take 1 tablet by mouth 2 times daily 180 tablet 1    diclofenac sodium (VOLTAREN) 1 % GEL Apply 4 g topically 4 times daily as needed for Pain 50 g 5    amLODIPine (NORVASC) 5 MG tablet Take 1 tablet by mouth daily TAKE 1 TABLET DAILY 90 tablet 1    furosemide (LASIX) 20 MG tablet Take 1 tablet by mouth daily (Patient not taking: Reported on 3/4/2024) 90 tablet 1    losartan-hydroCHLOROthiazide (HYZAAR) 100-12.5 MG per tablet Take 1 tablet by mouth daily TAKE 1 TABLET DAILY 90 tablet 1    potassium chloride (KLOR-CON M) 10 MEQ extended release tablet Take 1 tablet by mouth daily (Patient not taking: Reported on 2/28/2024) 90 tablet 1    rosuvastatin (CRESTOR) 10 MG tablet TAKE 1 TABLET DAILY 90 tablet 1    acetaminophen (TYLENOL) 500 MG tablet Take 1 tablet by mouth every 6 hours as needed for Pain      vitamin D (CHOLECALCIFEROL) 25 MCG (1000 UT) TABS tablet Take by mouth       No current facility-administered medications for this visit.        ALLERGIES:      No Known Allergies    SH:    Social History     Tobacco Use    Smoking status: Never    Smokeless tobacco: Former     Types: Chew     Quit date: 2018    Tobacco comments:     Quit smoking: dip snuff   Vaping Use    Vaping Use: Never used   Substance Use Topics    Alcohol use: No     Alcohol/week: 0.0 standard drinks of alcohol    Drug use: No       FH:    Family History   Problem Relation Age of Onset    No Known Problems Sister     Ovarian Cancer Neg Hx     Cancer Sister         lung    Breast Cancer Neg Hx     Hypertension Mother     Arrhythmia Mother     Cancer Sister     Cancer Brother         lung    Cancer Father        Review of systems:  A 13pt

## 2024-03-28 NOTE — TELEPHONE ENCOUNTER
Daughter called questioning whether or not patient needed to be on the Eliquis still. States apparently at her last office visit with you you told her she could stop it but after her recent surgery the surgeon put her back on it.     Please advise.

## 2024-04-01 ENCOUNTER — TELEPHONE (OUTPATIENT)
Dept: INTERNAL MEDICINE CLINIC | Facility: CLINIC | Age: 84
End: 2024-04-01

## 2024-04-01 NOTE — TELEPHONE ENCOUNTER
[facility] and would like to know if they can use collagen on the patients wound. Please Advise.

## 2024-04-03 ENCOUNTER — CARE COORDINATION (OUTPATIENT)
Dept: CARE COORDINATION | Facility: CLINIC | Age: 84
End: 2024-04-03

## 2024-04-03 NOTE — CARE COORDINATION
Care Transitions Follow Up Call    Patient Current Location:  [place]: [place] [address]    [facility] Care Coordinator contacted the family by telephone to follow up after admission on 3/11/24.  Verified name and  with family as identifiers.    Patient: Tayla Escobar  Patient : 1940   MRN: [id number]  Reason for Admission: Hiatal Hernia  Discharge Date: 3/11/24 RARS: Readmission Risk Score: 9.9      Needs to be reviewed by the provider   Additional needs identified to be addressed with provider: No  none             Method of communication with provider: none.    Spoke with patient's daughter, Anyi who reports her mom is doing ok but not improving like she feels she should.  Patient is in PT weekly but otherwise inactive.  Daughter states she reminds her mom to do her exercises but mom has no motivation or interest in anything much.  Pt has lost a significant amount of weight as well.  Anyi plans to speak with PCP regarding these concerns at follow up on tomorrow 24.      Follow Up  Future Appointments   Date Time Provider Department Center   2024  1:00 PM Yudy Alvarez, RAQUEL - CNP [facility] AM[facility]   2024  1:30 PM Ricky García,  [facility] AMB       N Care Coordinator reviewed discharge instructions, medical action plan, and red flags with family and discussed any barriers to care and/or understanding of plan of care after discharge. Discussed appropriate site of care based on symptoms and resources available to patient including: PCP  Specialist  Urgent care clinics  [facility]  When to call 911  [facility] Messaging. The patient agrees to contact the PCP office for questions related to their healthcare.     Advance Care Planning:   reviewed and current.     Patients top risk factors for readmission:  s/p hiatal hernia repair  Interventions to address risk factors: Obtained and reviewed discharge summary and/or continuity of care documents and Education of

## 2024-04-04 ENCOUNTER — OFFICE VISIT (OUTPATIENT)
Dept: INTERNAL MEDICINE CLINIC | Facility: CLINIC | Age: 84
End: 2024-04-04
Payer: MEDICARE

## 2024-04-04 VITALS
HEIGHT: 61 IN | WEIGHT: 158 LBS | SYSTOLIC BLOOD PRESSURE: 114 MMHG | DIASTOLIC BLOOD PRESSURE: 70 MMHG | HEART RATE: 88 BPM | OXYGEN SATURATION: 98 % | BODY MASS INDEX: 29.83 KG/M2

## 2024-04-04 DIAGNOSIS — I10 BENIGN ESSENTIAL HYPERTENSION: Primary | ICD-10-CM

## 2024-04-04 DIAGNOSIS — Z86.711 HISTORY OF PULMONARY EMBOLUS (PE): ICD-10-CM

## 2024-04-04 DIAGNOSIS — L89.129: ICD-10-CM

## 2024-04-04 DIAGNOSIS — D64.9 ANEMIA, UNSPECIFIED TYPE: ICD-10-CM

## 2024-04-04 DIAGNOSIS — K44.9 HIATAL HERNIA: ICD-10-CM

## 2024-04-04 PROCEDURE — 3074F SYST BP LT 130 MM HG: CPT | Performed by: NURSE PRACTITIONER

## 2024-04-04 PROCEDURE — 1123F ACP DISCUSS/DSCN MKR DOCD: CPT | Performed by: NURSE PRACTITIONER

## 2024-04-04 PROCEDURE — 1111F DSCHRG MED/CURRENT MED MERGE: CPT | Performed by: NURSE PRACTITIONER

## 2024-04-04 PROCEDURE — 3078F DIAST BP <80 MM HG: CPT | Performed by: NURSE PRACTITIONER

## 2024-04-04 PROCEDURE — 99214 OFFICE O/P EST MOD 30 MIN: CPT | Performed by: NURSE PRACTITIONER

## 2024-04-04 NOTE — PROGRESS NOTES
Tayla Escobar (:  1940) is a 84 y.o. female,Established patient, here for evaluation of the following chief complaint(s):  Follow-Up from Hospital (Hernia/) and Skin Problem (Open wound )         ASSESSMENT/PLAN:  1. Benign essential hypertension  2. Hiatal hernia  3. History of pulmonary embolus (PE)  4. Anemia, unspecified type  -     CBC with Auto Differential; Future  -     Comprehensive Metabolic Panel; Future  -     Iron; Future  -     Ferritin; Future  -     Total Iron Binding Capacity; Future  -     Reticulocytes; Future  5. Pressure injury of skin of left upper back, unspecified injury stage      No follow-ups on file.     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 She was in hospital 3/4-3/11 and then rehab until 3/25. While in rehab she got open wound on her back.   She left the hospital with a bright red spot - she told the nurse at rehab about it but then it worsened.     She is home and home health nurse has started treating it - it looks a little better but family concerned it has tunneling. There was a lot of blood after sleeping last night.   They started a collagen type dressing.     Skin Problem  This is a new problem. The current episode started 1 to 4 weeks ago.       Review of Systems       Objective   Physical Exam  Vitals reviewed.   Constitutional:       Appearance: Normal appearance.   HENT:      Head: Normocephalic.      Right Ear: External ear

## 2024-04-08 DIAGNOSIS — N28.9 IMPAIRED RENAL FUNCTION: ICD-10-CM

## 2024-04-08 DIAGNOSIS — E83.52 HYPERCALCEMIA: Primary | ICD-10-CM

## 2024-04-10 ENCOUNTER — OFFICE VISIT (OUTPATIENT)
Dept: INTERNAL MEDICINE CLINIC | Facility: CLINIC | Age: 84
End: 2024-04-10
Payer: MEDICARE

## 2024-04-10 ENCOUNTER — APPOINTMENT (OUTPATIENT)
Dept: CT IMAGING | Age: 84
DRG: 682 | End: 2024-04-10
Payer: MEDICARE

## 2024-04-10 ENCOUNTER — NURSE ONLY (OUTPATIENT)
Dept: INTERNAL MEDICINE CLINIC | Facility: CLINIC | Age: 84
End: 2024-04-10

## 2024-04-10 ENCOUNTER — HOSPITAL ENCOUNTER (INPATIENT)
Age: 84
LOS: 24 days | Discharge: HOME HEALTH | DRG: 682 | End: 2024-05-04
Attending: EMERGENCY MEDICINE | Admitting: FAMILY MEDICINE
Payer: MEDICARE

## 2024-04-10 VITALS
SYSTOLIC BLOOD PRESSURE: 110 MMHG | HEART RATE: 86 BPM | BODY MASS INDEX: 26.82 KG/M2 | OXYGEN SATURATION: 99 % | HEIGHT: 65 IN | DIASTOLIC BLOOD PRESSURE: 64 MMHG | WEIGHT: 161 LBS

## 2024-04-10 DIAGNOSIS — Z87.19 HISTORY OF REPAIR OF HIATAL HERNIA: Primary | ICD-10-CM

## 2024-04-10 DIAGNOSIS — K59.00 CONSTIPATION, UNSPECIFIED CONSTIPATION TYPE: ICD-10-CM

## 2024-04-10 DIAGNOSIS — K20.90 ESOPHAGITIS: ICD-10-CM

## 2024-04-10 DIAGNOSIS — C90.00 MULTIPLE MYELOMA NOT HAVING ACHIEVED REMISSION (HCC): ICD-10-CM

## 2024-04-10 DIAGNOSIS — N17.9 AKI (ACUTE KIDNEY INJURY) (HCC): Primary | ICD-10-CM

## 2024-04-10 DIAGNOSIS — R11.0 NAUSEA: ICD-10-CM

## 2024-04-10 DIAGNOSIS — R10.13 EPIGASTRIC PAIN: ICD-10-CM

## 2024-04-10 DIAGNOSIS — Z98.890 HISTORY OF REPAIR OF HIATAL HERNIA: Primary | ICD-10-CM

## 2024-04-10 DIAGNOSIS — K31.1 GASTRIC OUTLET OBSTRUCTION: ICD-10-CM

## 2024-04-10 DIAGNOSIS — N28.9 IMPAIRED RENAL FUNCTION: ICD-10-CM

## 2024-04-10 DIAGNOSIS — E83.52 HYPERCALCEMIA: ICD-10-CM

## 2024-04-10 DIAGNOSIS — R60.9 EDEMA, UNSPECIFIED TYPE: ICD-10-CM

## 2024-04-10 DIAGNOSIS — R63.0 APPETITE IMPAIRED: ICD-10-CM

## 2024-04-10 LAB
25(OH)D3 SERPL-MCNC: 55.4 NG/ML (ref 30–100)
ALBUMIN SERPL-MCNC: 3.9 G/DL (ref 3.2–4.6)
ALBUMIN SERPL-MCNC: 4 G/DL (ref 3.2–4.6)
ALBUMIN/GLOB SERPL: 1.2 (ref 0.4–1.6)
ALBUMIN/GLOB SERPL: 1.5 (ref 0.4–1.6)
ALP SERPL-CCNC: 92 U/L (ref 50–136)
ALP SERPL-CCNC: 96 U/L (ref 50–136)
ALT SERPL-CCNC: 13 U/L (ref 12–65)
ALT SERPL-CCNC: 13 U/L (ref 12–65)
ANION GAP SERPL CALC-SCNC: 7 MMOL/L (ref 2–11)
ANION GAP SERPL CALC-SCNC: 9 MMOL/L (ref 2–11)
AST SERPL-CCNC: 20 U/L (ref 15–37)
AST SERPL-CCNC: 23 U/L (ref 15–37)
BASOPHILS # BLD: 0 K/UL (ref 0–0.2)
BASOPHILS # BLD: 0 K/UL (ref 0–0.2)
BASOPHILS NFR BLD: 0 % (ref 0–2)
BASOPHILS NFR BLD: 0 % (ref 0–2)
BILIRUB SERPL-MCNC: 0.7 MG/DL (ref 0.2–1.1)
BILIRUB SERPL-MCNC: 0.9 MG/DL (ref 0.2–1.1)
BILIRUB UR QL: NEGATIVE
BUN SERPL-MCNC: 41 MG/DL (ref 8–23)
BUN SERPL-MCNC: 42 MG/DL (ref 8–23)
CALCIUM SERPL-MCNC: 12 MG/DL (ref 8.3–10.4)
CALCIUM SERPL-MCNC: 12 MG/DL (ref 8.3–10.4)
CHLORIDE SERPL-SCNC: 89 MMOL/L (ref 103–113)
CHLORIDE SERPL-SCNC: 89 MMOL/L (ref 103–113)
CO2 SERPL-SCNC: 33 MMOL/L (ref 21–32)
CO2 SERPL-SCNC: 36 MMOL/L (ref 21–32)
CREAT SERPL-MCNC: 4.7 MG/DL (ref 0.6–1)
CREAT SERPL-MCNC: 4.9 MG/DL (ref 0.6–1)
DIFFERENTIAL METHOD BLD: ABNORMAL
DIFFERENTIAL METHOD BLD: ABNORMAL
EOSINOPHIL # BLD: 0.1 K/UL (ref 0–0.8)
EOSINOPHIL # BLD: 0.1 K/UL (ref 0–0.8)
EOSINOPHIL NFR BLD: 1 % (ref 0.5–7.8)
EOSINOPHIL NFR BLD: 1 % (ref 0.5–7.8)
ERYTHROCYTE [DISTWIDTH] IN BLOOD BY AUTOMATED COUNT: 12.5 % (ref 11.9–14.6)
ERYTHROCYTE [DISTWIDTH] IN BLOOD BY AUTOMATED COUNT: 13.2 % (ref 11.9–14.6)
GLOBULIN SER CALC-MCNC: 2.6 G/DL (ref 2.8–4.5)
GLOBULIN SER CALC-MCNC: 3.3 G/DL (ref 2.8–4.5)
GLUCOSE SERPL-MCNC: 118 MG/DL (ref 65–100)
GLUCOSE SERPL-MCNC: 119 MG/DL (ref 65–100)
GLUCOSE UR QL STRIP.AUTO: NEGATIVE MG/DL
HCT VFR BLD AUTO: 33.3 % (ref 35.8–46.3)
HCT VFR BLD AUTO: 35.1 % (ref 35.8–46.3)
HGB BLD-MCNC: 10.8 G/DL (ref 11.7–15.4)
HGB BLD-MCNC: 11.4 G/DL (ref 11.7–15.4)
IMM GRANULOCYTES # BLD AUTO: 0 K/UL (ref 0–0.5)
IMM GRANULOCYTES # BLD AUTO: 0 K/UL (ref 0–0.5)
IMM GRANULOCYTES NFR BLD AUTO: 0 % (ref 0–5)
IMM GRANULOCYTES NFR BLD AUTO: 1 % (ref 0–5)
KETONES UR-MCNC: NEGATIVE MG/DL
LEUKOCYTE ESTERASE UR QL STRIP: NEGATIVE
LIPASE SERPL-CCNC: 80 U/L (ref 73–393)
LYMPHOCYTES # BLD: 1.1 K/UL (ref 0.5–4.6)
LYMPHOCYTES # BLD: 1.2 K/UL (ref 0.5–4.6)
LYMPHOCYTES NFR BLD: 14 % (ref 13–44)
LYMPHOCYTES NFR BLD: 15 % (ref 13–44)
MAGNESIUM SERPL-MCNC: 1.8 MG/DL (ref 1.8–2.4)
MCH RBC QN AUTO: 32.3 PG (ref 26.1–32.9)
MCH RBC QN AUTO: 33.2 PG (ref 26.1–32.9)
MCHC RBC AUTO-ENTMCNC: 32.4 G/DL (ref 31.4–35)
MCHC RBC AUTO-ENTMCNC: 32.5 G/DL (ref 31.4–35)
MCV RBC AUTO: 102.5 FL (ref 82–102)
MCV RBC AUTO: 99.4 FL (ref 82–102)
MONOCYTES # BLD: 0.4 K/UL (ref 0.1–1.3)
MONOCYTES # BLD: 0.5 K/UL (ref 0.1–1.3)
MONOCYTES NFR BLD: 5 % (ref 4–12)
MONOCYTES NFR BLD: 6 % (ref 4–12)
NEUTS SEG # BLD: 6 K/UL (ref 1.7–8.2)
NEUTS SEG # BLD: 6.5 K/UL (ref 1.7–8.2)
NEUTS SEG NFR BLD: 78 % (ref 43–78)
NEUTS SEG NFR BLD: 79 % (ref 43–78)
NITRITE UR QL: NEGATIVE
NRBC # BLD: 0 K/UL (ref 0–0.2)
NRBC # BLD: 0 K/UL (ref 0–0.2)
PH UR: 5.5 (ref 5–9)
PLATELET # BLD AUTO: 136 K/UL (ref 150–450)
PLATELET # BLD AUTO: 149 K/UL (ref 150–450)
PMV BLD AUTO: 12 FL (ref 9.4–12.3)
PMV BLD AUTO: 12 FL (ref 9.4–12.3)
POTASSIUM SERPL-SCNC: 3.2 MMOL/L (ref 3.5–5.1)
POTASSIUM SERPL-SCNC: 3.2 MMOL/L (ref 3.5–5.1)
PROT SERPL-MCNC: 6.6 G/DL (ref 6.3–8.2)
PROT SERPL-MCNC: 7.2 G/DL (ref 6.3–8.2)
PROT UR QL: 100 MG/DL
RBC # BLD AUTO: 3.25 M/UL (ref 4.05–5.2)
RBC # BLD AUTO: 3.53 M/UL (ref 4.05–5.2)
RBC # UR STRIP: ABNORMAL
SERVICE CMNT-IMP: ABNORMAL
SODIUM SERPL-SCNC: 131 MMOL/L (ref 136–146)
SODIUM SERPL-SCNC: 132 MMOL/L (ref 136–146)
SP GR UR: 1.01 (ref 1–1.02)
UROBILINOGEN UR QL: 0.2 EU/DL (ref 0.2–1)
WBC # BLD AUTO: 7.8 K/UL (ref 4.3–11.1)
WBC # BLD AUTO: 8.2 K/UL (ref 4.3–11.1)

## 2024-04-10 PROCEDURE — 81003 URINALYSIS AUTO W/O SCOPE: CPT

## 2024-04-10 PROCEDURE — 3078F DIAST BP <80 MM HG: CPT | Performed by: NURSE PRACTITIONER

## 2024-04-10 PROCEDURE — 2500000003 HC RX 250 WO HCPCS: Performed by: FAMILY MEDICINE

## 2024-04-10 PROCEDURE — 83735 ASSAY OF MAGNESIUM: CPT

## 2024-04-10 PROCEDURE — 80053 COMPREHEN METABOLIC PANEL: CPT

## 2024-04-10 PROCEDURE — 74176 CT ABD & PELVIS W/O CONTRAST: CPT

## 2024-04-10 PROCEDURE — 6370000000 HC RX 637 (ALT 250 FOR IP): Performed by: FAMILY MEDICINE

## 2024-04-10 PROCEDURE — 1123F ACP DISCUSS/DSCN MKR DOCD: CPT | Performed by: NURSE PRACTITIONER

## 2024-04-10 PROCEDURE — 99285 EMERGENCY DEPT VISIT HI MDM: CPT

## 2024-04-10 PROCEDURE — 85025 COMPLETE CBC W/AUTO DIFF WBC: CPT

## 2024-04-10 PROCEDURE — 2580000003 HC RX 258: Performed by: FAMILY MEDICINE

## 2024-04-10 PROCEDURE — 6360000002 HC RX W HCPCS: Performed by: FAMILY MEDICINE

## 2024-04-10 PROCEDURE — 1100000003 HC PRIVATE W/ TELEMETRY

## 2024-04-10 PROCEDURE — 99213 OFFICE O/P EST LOW 20 MIN: CPT | Performed by: NURSE PRACTITIONER

## 2024-04-10 PROCEDURE — 83690 ASSAY OF LIPASE: CPT

## 2024-04-10 PROCEDURE — 2580000003 HC RX 258: Performed by: PHYSICIAN ASSISTANT

## 2024-04-10 PROCEDURE — 3074F SYST BP LT 130 MM HG: CPT | Performed by: NURSE PRACTITIONER

## 2024-04-10 RX ORDER — SODIUM CHLORIDE 0.9 % (FLUSH) 0.9 %
5-40 SYRINGE (ML) INJECTION PRN
Status: DISCONTINUED | OUTPATIENT
Start: 2024-04-10 | End: 2024-05-04 | Stop reason: HOSPADM

## 2024-04-10 RX ORDER — ACETAMINOPHEN 325 MG/1
650 TABLET ORAL EVERY 6 HOURS PRN
Status: DISCONTINUED | OUTPATIENT
Start: 2024-04-10 | End: 2024-05-04 | Stop reason: HOSPADM

## 2024-04-10 RX ORDER — TRAMADOL HYDROCHLORIDE 50 MG/1
50 TABLET ORAL EVERY 12 HOURS PRN
Status: DISCONTINUED | OUTPATIENT
Start: 2024-04-10 | End: 2024-05-04 | Stop reason: HOSPADM

## 2024-04-10 RX ORDER — BISACODYL 10 MG
10 SUPPOSITORY, RECTAL RECTAL DAILY PRN
Status: DISCONTINUED | OUTPATIENT
Start: 2024-04-10 | End: 2024-05-04 | Stop reason: HOSPADM

## 2024-04-10 RX ORDER — MAGNESIUM HYDROXIDE/ALUMINUM HYDROXICE/SIMETHICONE 120; 1200; 1200 MG/30ML; MG/30ML; MG/30ML
30 SUSPENSION ORAL EVERY 6 HOURS PRN
Status: DISCONTINUED | OUTPATIENT
Start: 2024-04-10 | End: 2024-05-04 | Stop reason: HOSPADM

## 2024-04-10 RX ORDER — PROCHLORPERAZINE EDISYLATE 5 MG/ML
10 INJECTION INTRAMUSCULAR; INTRAVENOUS EVERY 6 HOURS PRN
Status: DISCONTINUED | OUTPATIENT
Start: 2024-04-10 | End: 2024-04-13

## 2024-04-10 RX ORDER — HYDRALAZINE HYDROCHLORIDE 20 MG/ML
10 INJECTION INTRAMUSCULAR; INTRAVENOUS EVERY 6 HOURS PRN
Status: DISCONTINUED | OUTPATIENT
Start: 2024-04-10 | End: 2024-05-04 | Stop reason: HOSPADM

## 2024-04-10 RX ORDER — FAMOTIDINE 20 MG/1
10 TABLET, FILM COATED ORAL DAILY PRN
Status: DISCONTINUED | OUTPATIENT
Start: 2024-04-10 | End: 2024-05-04 | Stop reason: HOSPADM

## 2024-04-10 RX ORDER — ONDANSETRON 4 MG/1
4 TABLET, ORALLY DISINTEGRATING ORAL 3 TIMES DAILY PRN
Qty: 21 TABLET | Refills: 0 | Status: ON HOLD | OUTPATIENT
Start: 2024-04-10

## 2024-04-10 RX ORDER — POLYETHYLENE GLYCOL 3350 17 G/17G
17 POWDER, FOR SOLUTION ORAL DAILY PRN
Status: DISCONTINUED | OUTPATIENT
Start: 2024-04-10 | End: 2024-05-04 | Stop reason: HOSPADM

## 2024-04-10 RX ORDER — SENNOSIDES A AND B 8.6 MG/1
1 TABLET, FILM COATED ORAL NIGHTLY
Status: DISCONTINUED | OUTPATIENT
Start: 2024-04-10 | End: 2024-04-16

## 2024-04-10 RX ORDER — POTASSIUM CHLORIDE 20 MEQ/1
20 TABLET, EXTENDED RELEASE ORAL ONCE
Status: COMPLETED | OUTPATIENT
Start: 2024-04-10 | End: 2024-04-10

## 2024-04-10 RX ORDER — POLYETHYLENE GLYCOL 3350 17 G/17G
17 POWDER, FOR SOLUTION ORAL DAILY
Status: DISCONTINUED | OUTPATIENT
Start: 2024-04-10 | End: 2024-04-16

## 2024-04-10 RX ORDER — SODIUM CHLORIDE 9 MG/ML
INJECTION, SOLUTION INTRAVENOUS CONTINUOUS
Status: ACTIVE | OUTPATIENT
Start: 2024-04-10 | End: 2024-04-11

## 2024-04-10 RX ORDER — 0.9 % SODIUM CHLORIDE 0.9 %
1000 INTRAVENOUS SOLUTION INTRAVENOUS
Status: COMPLETED | OUTPATIENT
Start: 2024-04-10 | End: 2024-04-10

## 2024-04-10 RX ORDER — ACETAMINOPHEN 650 MG/1
650 SUPPOSITORY RECTAL EVERY 6 HOURS PRN
Status: DISCONTINUED | OUTPATIENT
Start: 2024-04-10 | End: 2024-05-01

## 2024-04-10 RX ORDER — SODIUM CHLORIDE 0.9 % (FLUSH) 0.9 %
5-40 SYRINGE (ML) INJECTION EVERY 12 HOURS SCHEDULED
Status: DISCONTINUED | OUTPATIENT
Start: 2024-04-10 | End: 2024-05-04 | Stop reason: HOSPADM

## 2024-04-10 RX ORDER — SODIUM CHLORIDE 9 MG/ML
INJECTION, SOLUTION INTRAVENOUS PRN
Status: DISCONTINUED | OUTPATIENT
Start: 2024-04-10 | End: 2024-05-04 | Stop reason: HOSPADM

## 2024-04-10 RX ORDER — ROSUVASTATIN CALCIUM 10 MG/1
10 TABLET, COATED ORAL NIGHTLY
Status: DISCONTINUED | OUTPATIENT
Start: 2024-04-10 | End: 2024-05-04 | Stop reason: HOSPADM

## 2024-04-10 RX ADMIN — POLYETHYLENE GLYCOL 3350 17 G: 17 POWDER, FOR SOLUTION ORAL at 23:49

## 2024-04-10 RX ADMIN — SENNOSIDES 8.6 MG: 8.6 TABLET, FILM COATED ORAL at 21:57

## 2024-04-10 RX ADMIN — SODIUM CHLORIDE: 9 INJECTION, SOLUTION INTRAVENOUS at 22:09

## 2024-04-10 RX ADMIN — SODIUM CHLORIDE 1000 ML: 9 INJECTION, SOLUTION INTRAVENOUS at 14:34

## 2024-04-10 RX ADMIN — SODIUM CHLORIDE, PRESERVATIVE FREE 10 ML: 5 INJECTION INTRAVENOUS at 21:57

## 2024-04-10 RX ADMIN — WATER 125 MG: 1 INJECTION INTRAMUSCULAR; INTRAVENOUS; SUBCUTANEOUS at 23:45

## 2024-04-10 RX ADMIN — ROSUVASTATIN CALCIUM 10 MG: 10 TABLET, FILM COATED ORAL at 21:57

## 2024-04-10 RX ADMIN — TUBERCULIN PURIFIED PROTEIN DERIVATIVE 5 UNITS: 5 INJECTION, SOLUTION INTRADERMAL at 21:57

## 2024-04-10 RX ADMIN — POTASSIUM CHLORIDE 20 MEQ: 1500 TABLET, EXTENDED RELEASE ORAL at 23:50

## 2024-04-10 ASSESSMENT — PAIN DESCRIPTION - LOCATION: LOCATION: BACK

## 2024-04-10 ASSESSMENT — PAIN - FUNCTIONAL ASSESSMENT: PAIN_FUNCTIONAL_ASSESSMENT: 0-10

## 2024-04-10 ASSESSMENT — PAIN DESCRIPTION - DESCRIPTORS: DESCRIPTORS: ACHING

## 2024-04-10 ASSESSMENT — PAIN SCALES - GENERAL: PAINLEVEL_OUTOF10: 10

## 2024-04-10 NOTE — PROGRESS NOTES
TRANSFER - IN REPORT:    Verbal report received from CHANTEL Conde on Tayla Escobar being received from Emergency Department for routine progression of patient care.      Report consisted of patient's Situation, Background, Assessment and   Recommendations(SBAR).     Information from the following report(s) Nurse Handoff Report, ED Encounter Summary, ED SBAR, MAR, and Recent Results was reviewed with the receiving nurse.    Opportunity for questions and clarification was provided.      Assessment will be completed upon patient's arrival to unit and care assumed.

## 2024-04-10 NOTE — ACP (ADVANCE CARE PLANNING)
[facility] Hospitalist Service  At the heart of better care     Advance Care Planning   Admit Date:  4/10/2024 12:06 PM   Name:  Tayla Escobar   Age:  84 y.o.  Sex:  female  :  1940   MRN:  [id number]   Room:  [place] [address]    Tayla Escobar has capacity to make her own decisions:   Yes    If pt unable to make decisions, POA/surrogate decision maker:  Daughter    Other people present:   Daughter    Patient / surrogate decision-maker directed code status:  Full Code    Patient or surrogate consented to discussion of the current conditions, workup, management plans, prognosis, and the risk for further deterioration.  Time spent: 17 minutes in direct discussion.      Signed:  TAMIKA CASAS DO

## 2024-04-10 NOTE — ED TRIAGE NOTES
Pt reports hiatal hernia surgery March 4th.  Pts family reports pt has not been eating, drinking, and has been more fatigued since.  Pt was seen by PCP who did blood work which showed decrease in kidney function and ws referred to ED.

## 2024-04-10 NOTE — PROGRESS NOTES
Tayla Escobar (:  1940) is a 84 y.o. female,Established patient, here for evaluation of the following chief complaint(s):  Nausea (Sx x 2 weeks)         ASSESSMENT/PLAN:  1. History of repair of hiatal hernia  -     [facility]  2. Nausea  -     [facility]  3. Appetite impaired  4. Constipation, unspecified constipation type      No follow-ups on file.     Patient not eating or drinking well the past week  16 oz yesterday of water  Not making much urine, urinated 1x and had BM  Feels she is full in her chest and can't eat/drink anymore  Labored talking but denies SOB   Discussed dehydration and recent lab with daughter  Recommend fluids in ER  Notified ER patient is coming      Subjective   SUBJECTIVE/OBJECTIVE:  Patient is here for nausea. She has been nauseated since rehab discharge after hiatal hernia surgery. She also hadn't had a BM in a week until today - she is on miralax and senna.   She goes to bed nauseated and wakes up nauseated. She is also very nervous. She worries about everything. Her daughter is helping care for her.   She is having trouble sleeping too. She is trying to eat but not much because of the nausea.           Review of Systems       Objective   Physical Exam  Vitals reviewed.   Constitutional:       Appearance: Normal appearance. She is not ill-appearing.   HENT:      Head: Normocephalic.      Right Ear: External ear normal.      Left Ear: External ear normal.   Eyes:      Extraocular Movements: Extraocular movements intact.      Pupils: Pupils are equal, round, and reactive to light.   Cardiovascular:      Rate and Rhythm: Normal rate and regular rhythm.   Pulmonary:      Effort: Pulmonary effort is normal.      Breath sounds: No wheezing or rhonchi.   Musculoskeletal:      Cervical back: Neck supple.   Neurological:      General: No focal deficit present.      Mental Status: She is alert.

## 2024-04-10 NOTE — H&P
Hospitalist History and Physical   Admit Date:  4/10/2024 12:06 PM   Name:  Tayla Escobar   Age:  84 y.o.  Sex:  female  :  1940   MRN:  [id number]   [facility]    Presenting/Chief Complaint: Post-op Problem     Reason(s) for Admission: ANTWON (acute kidney injury) (HCC) [N17.9]     History of Present Illness:   Tayla Escobar is a 84 y.o. female who presented to the ED after PCP discovered patient was in ANTWON. Since her hiatal hernia surgery , she has been eating poorly, drinking little, and feeling poorly.     Hx of HTN, HLD, PE in  now in her 6th month of Eliquis, renal stones    Creatine 4.9 from baseline 1.2  Assessment & Plan:     Active Problems:    ANTWON - Holding her ARB. Recently had amlodipine stopped due to hypotension which may have contributed to her current ANTWON. Dry on exam. IV fluids. Have nephrology to see in AM. Renal US.     Hyponatremia, hypokalemia - Supplement.     Nausea - First BM in one week today, so that alone may help with her nausea. PRN compazine.     Poor oral intake - BM this AM should help with appetite. Give one time dose of Solumedrol to see if will help stimulate appetite. Consult nutrition.     Superficial thoracic skin tear - Local wound care by nurses.     HLD - statin     Recent PE last year - CT PE protocol in Feb this year with no PE. Stopping her Eliquis.     Weak on exam. Have PT, OT to see.     Senna, colace for constipation.     PRN tramadol.       PT/OT evals and PPD ordered?  Therapy and PPD  Diet: ADULT DIET; Easy to Chew; Low Fat/Low Chol/High Fiber/2 gm Na  VTE prophylaxis: SCD's   Code status: Full Code      Non-peripheral Lines and Tubes (if present):             Hospital Problems:  Principal Problem:    ANTWON (acute kidney injury) (HCC)  Active Problems:    Mixed hyperlipidemia    Benign essential hypertension    Moderate protein-calorie malnutrition (HCC)  Resolved Problems:    * No resolved hospital problems. *        Objective:  POC Negative NEG mg/dL    Bilirubin, Urine, POC Negative NEG      Blood, UA POC MODERATE (A) NEG      URINE UROBILINOGEN POC 0.2 0.2 - 1.0 EU/dL    Nitrite, Urine, POC Negative NEG      Leukocyte Est, UA POC Negative NEG      Performed by: Amaya Anderson        No results for input(s): \"COVID19\" in the last 72 hours.    CT ABDOMEN PELVIS WO CONTRAST Additional Contrast? None    Result Date: 4/10/2024  CT OF THE ABDOMEN AND PELVIS INDICATION: Fatigue; not eating or drinking; recent hiatal hernia surgery. TECHNIQUE: Multiple 2D axial images were obtained through the abdomen and pelvis without IV contrast.  Oral contrast was used for bowel opacification.  Radiation dose reduction techniques were used for this study:  All CT scans performed at this facility use one or all of the following: Automated exposure control, adjustment of the mA and/or kVp according to patient's size, iterative reconstruction. COMPARISON: 9/18/2023 CT abdomen and pelvis. FINDINGS: - LUNG BASES: Small left pleural effusion. Mild bilateral dependent atelectasis. - LIVER: Normal in size and appearance.  - GALLBLADDER/BILE DUCTS: No gallstones or bile duct dilatation. - PANCREAS: Normal. - SPLEEN: Normal. - ADRENALS: Normal. - KIDNEYS/URETERS: No hydronephrosis or significant mass. - BLADDER: Normal. - REPRODUCTIVE ORGANS: No pelvic masses. - BOWEL: Status post hiatal hernia repair. Normal caliber large small bowel. - LYMPH NODES: No significant retroperitoneal, mesenteric, or pelvic adenopathy. - BONES: Degenerative changes of the spine and sacroiliac joints. - VASCULATURE: Mild atherosclerosis. - OTHER: No ascites. Bilateral lateral fat and bowel-containing ventral hernias. Mild diastases recti.     1.  Bilateral lateral fat and bowel-containing ventral hernias without evidence of obstruction. 2.  Mild diastases recti. 3.  Status post hiatal hernia repair. 4.  Small left pleural effusion.         Signed:  TAMIAK CASAS DO    Part of this note

## 2024-04-10 NOTE — ED NOTES
TRANSFER - OUT REPORT:    Verbal report given to CHANTEL Cook on Tayla Escobar  being transferred to 2 for routine progression of patient care       Report consisted of patient's Situation, Background, Assessment and   Recommendations(SBAR).     Information from the following report(s) ED Encounter Summary was reviewed with the receiving nurse.    [place] Fall Assessment:    Presents to emergency department  because of falls (Syncope, seizure, or loss of consciousness): No  Age > 70: Yes  Altered Mental Status, Intoxication with alcohol or substance confusion (Disorientation, impaired judgment, poor safety awaremess, or inability to follow instructions): No  Impaired Mobility: Ambulates or transfers with assistive devices or assistance; Unable to ambulate or transer.: No  Nursing Judgement: No          Lines:   Peripheral IV 04/10/24 Left Hand (Active)        Opportunity for questions and clarification was provided.      Patient transported with:  Amaya Armendariz RN  04/10/24 [id number]

## 2024-04-10 NOTE — ED PROVIDER NOTES
Emergency Department Provider Note       PCP: Ricky García DO   Age: 84 y.o.   Sex: female     DISPOSITION       No diagnosis found.    Medical Decision Making     84-year-old female 5 weeks status post hiatal hernia repair with decreased p.o. intake.  BUN/creatinine significantly elevated at 41 and 4.9.  CBC unremarkable magnesium and lipase is normal.  Cath urine showed blood but no white cells.  Patient was given IV fluids hospitalist was consulted for ANTWON, abdominal CT done without contrast shows no obvious small bowel obstruction there is evidence of small pleural effusion but she is postop,     1 or more acute illnesses that pose a threat to life or bodily function.   Shared medical decision making was utilized in creating the patients health plan today.    I independently ordered and reviewed each unique test.  I reviewed external records: provider visit note from PCP.  I reviewed external records: provider visit note from outside specialist.     I interpreted the CT Scan CT abdomen pelvis.  I interpreted the CBC CMP lipase magnesium urinalysis.              History     Patient to ER for evaluation of possible dehydration and constipation patient had hiatal hernia repair approximately 5 weeks ago by Dr. Lozano.  She has been on home O2 since the procedure.  She was seen by her primary care recently had labs done was told she drink more fluids patient still had difficulties with p.o. intake.  She has been taking laxatives and did have a bowel movement this morning.  She states she saw her primary care office again who sent her to the ER today for labs and CT scans.    Past Medical History:  No date: Acute respiratory failure with hypoxemia (HCC)  2/11/2015: Benign essential hypertension      Comment:  medication  4/26/2017: Bilateral leg edema  No date: Cancer (HCC)      Comment:  skin  No date: Hiatal hernia      Comment:  \"large\"  2/11/2015: Hypercholesteremia  4/26/2017: IFG (impaired fasting

## 2024-04-11 ENCOUNTER — CARE COORDINATION (OUTPATIENT)
Dept: CARE COORDINATION | Facility: CLINIC | Age: 84
End: 2024-04-11

## 2024-04-11 ENCOUNTER — APPOINTMENT (OUTPATIENT)
Dept: ULTRASOUND IMAGING | Age: 84
DRG: 682 | End: 2024-04-11
Payer: MEDICARE

## 2024-04-11 LAB
ANION GAP SERPL CALC-SCNC: 7 MMOL/L (ref 2–11)
BASOPHILS # BLD: 0 K/UL (ref 0–0.2)
BASOPHILS NFR BLD: 0 % (ref 0–2)
BUN SERPL-MCNC: 40 MG/DL (ref 8–23)
CALCIUM SERPL-MCNC: 10.5 MG/DL (ref 8.3–10.4)
CHLORIDE SERPL-SCNC: 98 MMOL/L (ref 103–113)
CO2 SERPL-SCNC: 29 MMOL/L (ref 21–32)
CREAT SERPL-MCNC: 4.8 MG/DL (ref 0.6–1)
DIFFERENTIAL METHOD BLD: ABNORMAL
EOSINOPHIL # BLD: 0 K/UL (ref 0–0.8)
EOSINOPHIL NFR BLD: 0 % (ref 0.5–7.8)
ERYTHROCYTE [DISTWIDTH] IN BLOOD BY AUTOMATED COUNT: 12.5 % (ref 11.9–14.6)
FERRITIN SERPL-MCNC: 211 NG/ML (ref 8–388)
GLUCOSE SERPL-MCNC: 118 MG/DL (ref 65–100)
HAV IGM SER QL: NONREACTIVE
HBV CORE IGM SER QL: NONREACTIVE
HBV SURFACE AG SER QL: NONREACTIVE
HCT VFR BLD AUTO: 26.9 % (ref 35.8–46.3)
HCV AB SER QL: NONREACTIVE
HGB BLD-MCNC: 8.8 G/DL (ref 11.7–15.4)
IMM GRANULOCYTES # BLD AUTO: 0.1 K/UL (ref 0–0.5)
IMM GRANULOCYTES NFR BLD AUTO: 1 % (ref 0–5)
IRON SATN MFR SERPL: 20 %
IRON SERPL-MCNC: 59 UG/DL (ref 35–150)
LYMPHOCYTES # BLD: 0.5 K/UL (ref 0.5–4.6)
LYMPHOCYTES NFR BLD: 8 % (ref 13–44)
MCH RBC QN AUTO: 32.4 PG (ref 26.1–32.9)
MCHC RBC AUTO-ENTMCNC: 32.7 G/DL (ref 31.4–35)
MCV RBC AUTO: 98.9 FL (ref 82–102)
MM INDURATION, POC: NORMAL MM (ref 0–5)
MONOCYTES # BLD: 0.1 K/UL (ref 0.1–1.3)
MONOCYTES NFR BLD: 2 % (ref 4–12)
NEUTS SEG # BLD: 6.3 K/UL (ref 1.7–8.2)
NEUTS SEG NFR BLD: 89 % (ref 43–78)
NRBC # BLD: 0 K/UL (ref 0–0.2)
PLATELET # BLD AUTO: 116 K/UL (ref 150–450)
PMV BLD AUTO: 11.5 FL (ref 9.4–12.3)
POTASSIUM SERPL-SCNC: 3.7 MMOL/L (ref 3.5–5.1)
PPD, POC: NEGATIVE
RBC # BLD AUTO: 2.72 M/UL (ref 4.05–5.2)
SODIUM SERPL-SCNC: 134 MMOL/L (ref 136–146)
TIBC SERPL-MCNC: 291 UG/DL (ref 250–450)
TRANSFERRIN SERPL-MCNC: 219 MG/DL (ref 202–364)
WBC # BLD AUTO: 7.1 K/UL (ref 4.3–11.1)

## 2024-04-11 PROCEDURE — 97166 OT EVAL MOD COMPLEX 45 MIN: CPT

## 2024-04-11 PROCEDURE — 83540 ASSAY OF IRON: CPT

## 2024-04-11 PROCEDURE — 36415 COLL VENOUS BLD VENIPUNCTURE: CPT

## 2024-04-11 PROCEDURE — 82728 ASSAY OF FERRITIN: CPT

## 2024-04-11 PROCEDURE — 51701 INSERT BLADDER CATHETER: CPT

## 2024-04-11 PROCEDURE — 80048 BASIC METABOLIC PNL TOTAL CA: CPT

## 2024-04-11 PROCEDURE — 82784 ASSAY IGA/IGD/IGG/IGM EACH: CPT

## 2024-04-11 PROCEDURE — 86160 COMPLEMENT ANTIGEN: CPT

## 2024-04-11 PROCEDURE — 86334 IMMUNOFIX E-PHORESIS SERUM: CPT

## 2024-04-11 PROCEDURE — 83516 IMMUNOASSAY NONANTIBODY: CPT

## 2024-04-11 PROCEDURE — 2580000003 HC RX 258: Performed by: FAMILY MEDICINE

## 2024-04-11 PROCEDURE — 84155 ASSAY OF PROTEIN SERUM: CPT

## 2024-04-11 PROCEDURE — 83521 IG LIGHT CHAINS FREE EACH: CPT

## 2024-04-11 PROCEDURE — 1100000003 HC PRIVATE W/ TELEMETRY

## 2024-04-11 PROCEDURE — 86235 NUCLEAR ANTIGEN ANTIBODY: CPT

## 2024-04-11 PROCEDURE — 86037 ANCA TITER EACH ANTIBODY: CPT

## 2024-04-11 PROCEDURE — 76770 US EXAM ABDO BACK WALL COMP: CPT

## 2024-04-11 PROCEDURE — 97535 SELF CARE MNGMENT TRAINING: CPT

## 2024-04-11 PROCEDURE — 92610 EVALUATE SWALLOWING FUNCTION: CPT

## 2024-04-11 PROCEDURE — 85025 COMPLETE CBC W/AUTO DIFF WBC: CPT

## 2024-04-11 PROCEDURE — 6370000000 HC RX 637 (ALT 250 FOR IP): Performed by: FAMILY MEDICINE

## 2024-04-11 PROCEDURE — 86225 DNA ANTIBODY NATIVE: CPT

## 2024-04-11 PROCEDURE — 80074 ACUTE HEPATITIS PANEL: CPT

## 2024-04-11 PROCEDURE — 51798 US URINE CAPACITY MEASURE: CPT

## 2024-04-11 PROCEDURE — 84165 PROTEIN E-PHORESIS SERUM: CPT

## 2024-04-11 PROCEDURE — 97530 THERAPEUTIC ACTIVITIES: CPT

## 2024-04-11 PROCEDURE — 97112 NEUROMUSCULAR REEDUCATION: CPT

## 2024-04-11 PROCEDURE — 84466 ASSAY OF TRANSFERRIN: CPT

## 2024-04-11 PROCEDURE — 97161 PT EVAL LOW COMPLEX 20 MIN: CPT

## 2024-04-11 RX ADMIN — ROSUVASTATIN CALCIUM 10 MG: 10 TABLET, FILM COATED ORAL at 20:59

## 2024-04-11 RX ADMIN — SODIUM CHLORIDE, PRESERVATIVE FREE 10 ML: 5 INJECTION INTRAVENOUS at 20:59

## 2024-04-11 RX ADMIN — ACETAMINOPHEN 650 MG: 325 TABLET ORAL at 10:39

## 2024-04-11 RX ADMIN — SODIUM CHLORIDE: 9 INJECTION, SOLUTION INTRAVENOUS at 06:23

## 2024-04-11 RX ADMIN — POLYETHYLENE GLYCOL 3350 17 G: 17 POWDER, FOR SOLUTION ORAL at 09:32

## 2024-04-11 RX ADMIN — SENNOSIDES 8.6 MG: 8.6 TABLET, FILM COATED ORAL at 20:59

## 2024-04-11 RX ADMIN — SODIUM CHLORIDE, PRESERVATIVE FREE 10 ML: 5 INJECTION INTRAVENOUS at 09:33

## 2024-04-11 RX ADMIN — SODIUM CHLORIDE: 9 INJECTION, SOLUTION INTRAVENOUS at 14:29

## 2024-04-11 RX ADMIN — SODIUM CHLORIDE: 9 INJECTION, SOLUTION INTRAVENOUS at 16:06

## 2024-04-11 ASSESSMENT — PAIN SCALES - GENERAL: PAINLEVEL_OUTOF10: 0

## 2024-04-11 NOTE — PROGRESS NOTES
ACUTE OCCUPATIONAL THERAPY GOALS:   (Developed with and agreed upon by patient and/or caregiver.)  1. Patient will complete full body bathing and dressing with min A, additional time, verbal cues and adaptive equipment as needed.   2. Patient will complete toileting with SBA.   3. Patient will complete functional transfers with SBA and adaptive equipment as needed.   4. Patient will tolerate at least 15 minutes of OT activity with less than 2 rest breaks while maintaining O2 sats >90%.   5. Patient will verbalize at least 3 energy conservation technique to utilize during ADL/IADL.   6. Patient will complete grooming in standing at sink level with SBA.  7. Patient will complete household distances with SBA and adaptive equipment as needed.    Timeframe: 7 visits      OCCUPATIONAL THERAPY Initial Assessment, Daily Note, and AM       OT Visit Days: 1  Acknowledge Orders  Time  OT Charge Capture  Rehab Caseload Tracker      Tayla Escobar is a 84 y.o. female   PRIMARY DIAGNOSIS: ANTWON (acute kidney injury) (HCC)  ANTWON (acute kidney injury) (HCC) [N17.9]       Reason for Referral: Generalized Muscle Weakness (M62.81)  Other lack of cordination (R27.8)  Difficulty in walking, Not elsewhere classified (R26.2)  Other abnormalities of gait and mobility (R26.89)  Dizziness and Giddiness (R42)  Inpatient: Payor: [employer] MEDICARE / Plan: [employer] MEDICARE-ADVANTAGE PPO / Product Type: Medicare /     ASSESSMENT:     REHAB RECOMMENDATIONS:   Recommendation to date pending progress:  Setting:  Home Health Therapy    Equipment:    To Be Determined  Has Rollator, rolling walker, bedside commode over toilet, walk in shower with bedside commode as chair, hospital bed with rails, lift chair.      ASSESSMENT:  Ms. Escobar is a pleasant 85 YO right hand dominant WF admitted with decreased PO intake, feeling poorly and diagnosed with above. Admits to not moving much since surgery in March. PMH includes HTN, HLD, PE in 2023, renal stones,

## 2024-04-11 NOTE — PROGRESS NOTES
Urine, POC Negative NEG mg/dL    Bilirubin, Urine, POC Negative NEG      Blood, UA POC MODERATE (A) NEG      URINE UROBILINOGEN POC 0.2 0.2 - 1.0 EU/dL    Nitrite, Urine, POC Negative NEG      Leukocyte Est, UA POC Negative NEG      Performed by: Amaya Anderson    Basic Metabolic Panel w/ Reflex to MG    Collection Time: 04/11/24  4:10 AM   Result Value Ref Range    Sodium 134 (L) 136 - 146 mmol/L    Potassium 3.7 3.5 - 5.1 mmol/L    Chloride 98 (L) 103 - 113 mmol/L    CO2 29 21 - 32 mmol/L    Anion Gap 7 2 - 11 mmol/L    Glucose 118 (H) 65 - 100 mg/dL    BUN 40 (H) 8 - 23 MG/DL    Creatinine 4.80 (H) 0.6 - 1.0 MG/DL    Est, Glom Filt Rate 9 (L) >60 ml/min/1.73m2    Calcium 10.5 (H) 8.3 - 10.4 MG/DL   CBC with Auto Differential    Collection Time: 04/11/24  4:10 AM   Result Value Ref Range    WBC 7.1 4.3 - 11.1 K/uL    RBC 2.72 (L) 4.05 - 5.2 M/uL    Hemoglobin 8.8 (L) 11.7 - 15.4 g/dL    Hematocrit 26.9 (L) 35.8 - 46.3 %    MCV 98.9 82 - 102 FL    MCH 32.4 26.1 - 32.9 PG    MCHC 32.7 31.4 - 35.0 g/dL    RDW 12.5 11.9 - 14.6 %    Platelets 116 (L) 150 - 450 K/uL    MPV 11.5 9.4 - 12.3 FL    nRBC 0.00 0.0 - 0.2 K/uL    Differential Type AUTOMATED      Neutrophils % 89 (H) 43 - 78 %    Lymphocytes % 8 (L) 13 - 44 %    Monocytes % 2 (L) 4.0 - 12.0 %    Eosinophils % 0 (L) 0.5 - 7.8 %    Basophils % 0 0.0 - 2.0 %    Immature Granulocytes % 1 0.0 - 5.0 %    Neutrophils Absolute 6.3 1.7 - 8.2 K/UL    Lymphocytes Absolute 0.5 0.5 - 4.6 K/UL    Monocytes Absolute 0.1 0.1 - 1.3 K/UL    Eosinophils Absolute 0.0 0.0 - 0.8 K/UL    Basophils Absolute 0.0 0.0 - 0.2 K/UL    Immature Granulocytes Absolute 0.1 0.0 - 0.5 K/UL   Hepatitis Panel, Acute    Collection Time: 04/11/24 11:09 AM   Result Value Ref Range    Hep A IgM NONREACTIVE NR      Hep B Core Ab, IgM NONREACTIVE NR      Hepatitis B Surface Ag NONREACTIVE NR      Hepatitis C Ab NONREACTIVE NR     Transferrin Saturation    Collection Time: 04/11/24 11:09 AM   Result Value

## 2024-04-11 NOTE — PROGRESS NOTES
MD JUSTICE Raman contacted via secure message by this RN due to patient failing to void. Upon reassessment, this RN noticed patient external catheter canister to be empty. This RN asks patient wether or not she feels the urge to void. Patient stating, \"Yes. But, I cannot make myself pee.\" Bladder scan completed by this RN.  388 mL seen upon scan. Straight cath ordered by MD. Care ongoing.

## 2024-04-11 NOTE — PROGRESS NOTES
GOALS:  LTG: Patient will maintain adequate hydration/nutrition with optimum safety and efficiency of swallowing function with PO intake without overt signs or symptoms of aspiration for the highest appropriate diet level.  STG:  Patient will consume easy to chew textures and thin liquids without overt signs or symptoms of airway compromise.  Patient will safely ingest diet trials during therapeutic feedings with SLP without overt signs or symptoms of respiratory compromise in efforts to advance diet.  Patient will complete a Modified Barium Swallow study to fully assess physiology and anatomy of the swallow and determine safest appropriate diet and/or rehabilitation strategies, as medically indicated.    SPEECH LANGUAGE PATHOLOGY: DYSPHAGIA Initial Assessment    Acknowledge Order  I  Therapy Time  I   Charges     I  Rehab Caseload Tracker      NAME: Tayla Escobar  : 1940  MRN: [id number]    ADMISSION DATE: 4/10/2024  PRIMARY DIAGNOSIS: ANTWON (acute kidney injury) (HCC)    ICD-10: Treatment Diagnosis: R13.14 Dysphagia, Pharyngoesophageal Phase    RECOMMENDATIONS   Diet:    Easy to Chew  Thin Liquids    Medication: whole floated in puree or applesauce   Compensatory Swallowing Strategies:   Alternate solids and liquids  Slow rate of intake  Remain upright for 30-45 minutes after meals  Small bites/sips  Upright as possible for all oral intake   Therapeutic Intervention:   Patient/family education  Dysphagia treatment  Consider referral to GI   Patient continues to require skilled intervention:  Yes. Recommend ongoing speech therapy services during this hospitalization.     Anticipated Discharge Needs: Do not anticipate ongoing speech therapy needs upon discharge.      ASSESSMENT    Patient presents with unremarkable swallow mechanism on both solid and liquids consistencies at bedside, however son and daughter report minimal PO intake (1 strawberry, 2 bites pf applesauce) before the patient complains of

## 2024-04-11 NOTE — CONSULTS
Consult Note  Tayla Escobar  MRN: [id number]  :1940  Age:84 y.o.    HPI: Tayla Escobar is a 84 y.o. female who we general surgery is asked in consultation by Dr. Maldonado (hospitalist) to see for s/p recent hiatal hernia repair now with decreased oral intake and feeling of \"feeling up\" in the esophagus in the setting of admission for ANTWON.     The patient has a PMHx of large hiatal hernia (s/p robotic hiatal hernia repair 3/4/2024),acute respiratory failure with hypoxemia (on supplemental O2 since 3/4/24) bilateral leg edema, skin cancer, HLD, HTN, DRISS, impaired fasting glucose, morbid obesity, insomnia, OA of both knees, PE (10/15/2023-on Eliquis), and CKD stage III.   3/4-2024 patient admitted [facility] for hiatal hernia (entire stomach within chest) with moderate protein-calorie malnutrition.    3/4/24 Status post robotic hiatal hernia repair with gastropexy between gastric fundus and diaphragm and gastropexy between stomach and anterior abdominal wall.  3/7/24-BUN/Cr 26/1.2    She presented 4/10/24  with 1 month onset (since hiatal hernia repair 3/4/2024) of reduced oral intake and general malaise with associated nausea and constipation.  She was seen by PCP 24 and encouraged to increase oral intake but she was having difficulty with p.o. intake. She reports feeling of fullness with minimal oral intake (with both liquid or soft foods). She reports 1 week history of constipation for which she took laxatives and had a bowel movement 4/10/24.  She endorses some nausea but no vomiting. She endorses nausea intermittent-sometimes before she eats and sometimes after she eats. She presented a second time to her PCP 4/10/24 and PCP instructed her to present to the ED for lab work and CT scan.    In the ED 4/10/2024, , K+ 3.2, CO2 on BMP 36, BUN/CR 42/4.7.  WBC 7.8    CT ABD/PLV 4/10/23 revealed:  IMPRESSION:  1.  Bilateral lateral fat and bowel-containing ventral hernias without evidence  of

## 2024-04-11 NOTE — PLAN OF CARE
Problem: Discharge Planning  Goal: Discharge to home or other facility with appropriate resources  Outcome: Progressing  Flowsheets (Taken 4/10/2024 2121)  Discharge to home or other facility with appropriate resources: Identify barriers to discharge with patient and caregiver     Problem: Pain  Goal: Verbalizes/displays adequate comfort level or baseline comfort level  Outcome: Progressing     Problem: Skin/Tissue Integrity  Goal: Absence of new skin breakdown  Description: 1.  Monitor for areas of redness and/or skin breakdown  2.  Assess vascular access sites hourly  3.  Every 4-6 hours minimum:  Change oxygen saturation probe site  4.  Every 4-6 hours:  If on nasal continuous positive airway pressure, respiratory therapy assess nares and determine need for appliance change or resting period.  Outcome: Progressing     Problem: Safety - Adult  Goal: Free from fall injury  Outcome: Progressing      0 = understands/communicates without difficulty

## 2024-04-11 NOTE — CONSULTS
Nephrology consult    Admission Date:  4/10/2024    Admission Diagnosis  ANTWON (acute kidney injury) (HCC) [N17.9]    We are asked by Laina Ro DO     History of Present Illness: Ms. Escobar is a 84 y.o female with PMH significant for OA, PE on eliquis, HTN, HLD and CKD 3a reportedly admitted with poor appetite and nausea with weakness for past week. Started on IVFs and solu medrol per primary.   We are consulted for ANTWON/CKD 3a  From a renal standpoint her Cr/BUN on admission was 4.70/42- >4.80/41, baseline Cr 1.10-1.2, but she had Cr of 2.0 (4/4/24) also hx of proteinuria and hematuria.     Pt seen and examined in room daughter at the bedside, pt and daughter reports dry mouth, poor appetite and PO intake, nausea with decrease in uop, + weakness, hematuria. Denies recent falls but did have some dizziness prior to admission.       Past Medical History:   Diagnosis Date    Acute respiratory failure with hypoxemia (HCC)     Benign essential hypertension 2/11/2015    medication    Bilateral leg edema 4/26/2017    Cancer (HCC)     skin    Hiatal hernia     \"large\"    Hypercholesteremia 2/11/2015    IFG (impaired fasting glucose) 4/26/2017    Iron deficiency anemia 5/12/2016    Low oxygen saturation     per office notes- patient's daughter reported O2 sat drops to 85% when supine, she uses O2    Mixed hyperlipidemia 2/11/2015    Morbid obesity (HCC) 10/26/2017    Nipple discharge     not current as of 7/17/13    Primary insomnia 4/26/2018    Primary osteoarthritis of both knees 4/26/2018    Pulmonary embolism (HCC) 10/15/2023    on eliquis- Dr. Arnold recommended eliquis full dose x 6 months    Stage 3 chronic kidney disease (HCC) 7/17/2019      Past Surgical History:   Procedure Laterality Date    HIATAL HERNIA REPAIR N/A 3/4/2024    ROBOTIC HIATAL HERNIA REPAIR performed by Thomas Lozano MD at [facility] MAIN OR    MALIGNANT SKIN LESION EXCISION      OTHER SURGICAL HISTORY      skin cancer      Current  embolism without acute cor pulmonale, unspecified chronicity, unspecified pulmonary embolism type (HCC) 10/15/2023    Primary osteoarthritis of both knees 04/26/2018    Morbid obesity (HCC) 10/26/2017    Bilateral leg edema 04/26/2017    IFG (impaired fasting glucose) 04/26/2017    Vitamin D deficiency 05/27/2015    Mixed hyperlipidemia 02/11/2015    Benign essential hypertension 02/11/2015       Impression:    Plan:   ANTWON/CKD 3a- pre renal component with poor PO intake but also with hx of proteinuria and hematuria- will obtain serologic workup   -CT abd no evidence of obstructive nephropathy  -renal US and UA pending, check GREGORY, ANCA, SPEP, light chains, hepatitis, complements and anti gbm  -baseline Cr 1.1-1.2, noted Cr 2.0 (4/4/24)  Continue IVFs    2. Anemia- check Fe studies    3. Nausea, poor intake and weakness- on solu medrol per primary     4. HTN- stable BP with ARB on hold     5. Hx of PE on Eliquis

## 2024-04-11 NOTE — WOUND CARE
Pt seen for upper back wound. Family at bedside states it got worse at the rehab facility but it has improved since. Noted over thoracic spine small stage 2 pressure injury and below with skin tears. Recommend foam dressing changed daily along with keeping pressure off area as much as possible. Will order chair cushion to be used at the back after removing squares around the back to keep pressure off. Blanchable erythema noted over sacrum. Recommend foam dressing change daily. Wound team will continue to follow while in acute care setting.

## 2024-04-11 NOTE — PROGRESS NOTES
Patient complaining of difficulty swallowing. MD Castro made aware via [employer]. Order for swallow evaluation placed by MD. Care ongoing.

## 2024-04-11 NOTE — PROGRESS NOTES
Order placed by admitting MD for 24hr remote tele monitoring. Upon calling tele monitor tech, this RN told \"We are out of tele boxes at this time. The patient will have to be added to a wait list.\" Charge RN made aware. Care ongoing.

## 2024-04-11 NOTE — PROGRESS NOTES
Received consult for esophageal dysphagia/stricture. Will evaluate in the AM; will place NPO at MN for possible procedures, but to be determined tomorrow. Eliquis has been held during admission.     BOB Simpson

## 2024-04-11 NOTE — CONSULTS
Comprehensive Nutrition Assessment    Type and Reason for Visit: Initial, Consult  Malnutrition Screening Tool: Malnutrition Screen  Have you recently lost weight without trying?: 14 to 23 pounds (2 points)  Have you been eating poorly because of a decreased appetite?: Yes (1 point)  Malnutrition Screening Tool Score: 3  Consult for poor PO intake     Nutrition Recommendations/Plan:   Meals and Snacks:  Diet: Continue current order  Nutrition Supplement Therapy:  Medical food supplement therapy:  Initiate Magic Cup three times per day (this provides 290 kcal and 9 grams protein per serving)     Malnutrition Assessment:  Malnutrition Status: Moderate malnutrition  Context: Acute Illness  Findings of clinical characteristics of malnutrition:   Energy Intake:  Mild decrease in energy intake (Comment) (family reports very poor intake over the past month)  Weight Loss:  Greater than 7.5% over 3 months (10.5% body weight loss in ~ 3 months)     Body Fat Loss:  Mild body fat loss Orbital, Triceps, Buccal region   Muscle Mass Loss:  Mild muscle mass loss Temples (temporalis), Clavicles (pectoralis & deltoids), Hand (interosseous)  Fluid Accumulation:  Unable to assess     Strength:  Not Performed       Nutrition Assessment:  Nutrition History: 4/11: Daughter assists patient in providing nutrition hx. For 2-3 weeks prior to hiatal hernia reports patient appetite was limited, but ever since she was discharged 3/11 patient has taken little to no PO on a daily basis. Patient endorses that she has had consistent nausea (varying in intensity) for the past month. Reports that when she eats solid foods they get \"stuck\" and points to her sternum. Daughter reports she is able to take some fluids, but has declined any oral nutrition supplements as of late. Drinking small amounts of fluids and pureed foods in the month prior to admission. Additionally reports constipation x 1 week pta which resolved 4/10 with medications.      Do

## 2024-04-11 NOTE — PROGRESS NOTES
ACUTE PHYSICAL THERAPY GOALS:   (Developed with and agreed upon by patient and/or caregiver.)    LTG:  (1.)Ms. Escobar will move from supine to sit and sit to supine , scoot up and down, and roll side to side in bed with INDEPENDENT within 7 treatment day(s).    (2.)Ms. Escobar will transfer from bed to chair and chair to bed with MODIFIED INDEPENDENCE using the least restrictive device within 7 treatment day(s).    (3.)Ms. Escobar will ambulate with MODIFIED INDEPENDENCE for 75 feet with the least restrictive device within 7 treatment day(s).  (4.)Ms. Escobar will tolerate at least 23 min of dynamic standing activity to assist standing ADLs with the least restrictive device within 7 treatment days.      ________________________________________________________________________________________________      PHYSICAL THERAPY Initial Assessment, Daily Note, and AM  (Link to Caseload Tracking:    Acknowledge Orders  Time In/Out  PT Charge Capture  Rehab Caseload Tracker    Tayla Escobar is a 84 y.o. female   PRIMARY DIAGNOSIS: ANTWON (acute kidney injury) ([facility])  ANTWON (acute kidney injury) ([facility]) [N17.9]       Reason for Referral: Generalized Muscle Weakness (M62.81)  Other lack of cordination (R27.8)  Difficulty in walking, Not elsewhere classified (R26.2)  Other abnormalities of gait and mobility (R26.89)  Inpatient: Payor: [employer] MEDICARE / Plan: AET MEDICARE-ADVANTAGE PPO / Product Type: Medicare /     ASSESSMENT:     REHAB RECOMMENDATIONS:   Recommendation to date pending progress:  Setting:  Home Health Therapy    Equipment:    To Be Determined     ASSESSMENT:  Ms. Escobar presents in supine, agreeable to session, A&Ox3, adult children present.      Upon entering, Pnt is agreeable to PT treatment.  she reports no pain at rest, but endorses nausea. Pnt performed supine > sit with CGA and significant extra time, sitting EOB with fair+ sitting balance control. Sit > stand with cga-min Ax2 using RW. Gait 2 x 6, 15  ft  or more falls in the past year or any fall with injury in the past year?: No  Receives Help From: Family  ADL Assistance: Needs assistance  Homemaking Assistance: Needs assistance  Ambulation Assistance: Needs assistance  Transfer Assistance: Needs assistance  Active : No  Mode of Transportation: Family    OBJECTIVE:     PAIN: VITALS / O2: PRECAUTION / LINES / DRAINS:   Pre Treatment: 0         Post Treatment: 0 Vitals        Oxygen      IV    RESTRICTIONS/PRECAUTIONS:  Restrictions/Precautions: Fall Risk                 GROSS EVALUATION:  Intact Impaired (Comments):   AROM []  Decreased global extension   PROM []    Strength []  Generalized weakness of posterior chain   Balance []  Fair+ in ambulation   Posture [] Forward Head  Rounded Shoulders  Thoracic Kyphosis   Sensation [x]     Coordination []   Decreased weightshifting   Tone []     Edema []    Activity Tolerance []  Mild fatigue with in room ambulation    []      COGNITION/  PERCEPTION: Intact Impaired (Comments):   Orientation []  x3   Vision [x]     Hearing [x]     Cognition  []  Somewhat poor historian     MOBILITY: I Mod I S SBA CGA Min Mod Max Total  NT x2 Comments:   Bed Mobility    Rolling [] [] [] [] [x] [] [] [] [] [] []    Supine to Sit [] [] [] [] [x] [] [] [] [] [] []    Scooting [] [] [] [] [x] [] [] [] [] [] []    Sit to Supine [] [] [] [] [] [] [] [] [] [] []    Transfers    Sit to Stand [] [] [] [] [x] [x] [] [] [] [] [x]    Bed to Chair [] [] [] [] [x] [x] [] [] [] [] [x]    Stand to Sit [] [] [] [] [x] [x] [] [] [] [] [x]     [] [] [] [] [] [] [] [] [] [] []    I=Independent, Mod I=Modified Independent, S=Supervision, SBA=Standby Assistance, CGA=Contact Guard Assistance,   Min=Minimal Assistance, Mod=Moderate Assistance, Max=Maximal Assistance, Total=Total Assistance, NT=Not Tested    GAIT: I Mod I S SBA CGA Min Mod Max Total  NT x2 Comments:   Level of Assistance [] [] [] [] [x] [x] [] [] [] [] [x]    Distance   2 x 6, 15 feet

## 2024-04-11 NOTE — CARE COORDINATION
MSN, CM:  spoke with patient this afternoon about discharge planning.  Patient lives with her son in own home with a ramp for entrance.  Patient is independent with most ADL's and uses a walker for ambulation.  Patient is [facility] with [facility] for home oxygen.  Patient has also been to [facility] for rehab services.  Patient is retired and is able to drive herself to all appointments.  Patient was admitted from PCP office for decrease in kidney function BUN 40 and Creatinine 4.8 this AM.  Nephrology consulted and following.  Patient currently on room air.  PT/OT consulted for evaluation and recommendations.  Case Management will continue to follow for any discharge needs.       04/11/24 1352   Service Assessment   Patient Orientation Alert and Oriented   Cognition Alert   History Provided By Patient   Primary Caregiver Self   Accompanied By/Relationship Son   Support Systems Children   Patient's Healthcare Decision Maker is: Named in Scanned ACP Document   PCP Verified by CM Yes   Last Visit to PCP Within last 3 months   Prior Functional Level Independent in ADLs/IADLs   Current Functional Level Other (see comment)  (PT/OT consulted)   Can patient return to prior living arrangement Yes   Ability to make needs known: Good   Would you like for me to discuss the discharge plan with any other family members/significant others, and if so, who? Yes  (children)   Financial Resources Medicare   Community Resources ECF/Home Care  ([facility])   Social/Functional History   Lives With Son   Type of Home House   Home Layout One level   Home Access Ramped entrance   Bathroom Shower/Tub Walk-in shower   Bathroom Toilet Standard   Bathroom Equipment 3-in-1 commode;Grab bars in shower;Hand-held shower   Bathroom Accessibility Accessible   Home Equipment Wheelchair-manual;Walker, rolling;Rollator;Hospital bed;Lift chair;Grab bars   Receives Help From Family   ADL Assistance Needs assistance

## 2024-04-12 ENCOUNTER — ANESTHESIA EVENT (OUTPATIENT)
Dept: ENDOSCOPY | Age: 84
End: 2024-04-12
Payer: MEDICARE

## 2024-04-12 ENCOUNTER — APPOINTMENT (OUTPATIENT)
Dept: GENERAL RADIOLOGY | Age: 84
DRG: 682 | End: 2024-04-12
Payer: MEDICARE

## 2024-04-12 PROBLEM — R10.13 EPIGASTRIC PAIN: Status: ACTIVE | Noted: 2024-04-10

## 2024-04-12 PROBLEM — R62.7 FAILURE TO THRIVE IN ADULT: Status: ACTIVE | Noted: 2024-04-12

## 2024-04-12 PROBLEM — D64.9 ANEMIA: Status: ACTIVE | Noted: 2024-04-12

## 2024-04-12 PROBLEM — K20.90 ESOPHAGITIS: Status: ACTIVE | Noted: 2024-04-10

## 2024-04-12 PROBLEM — Z87.19 HX OF HIATAL HERNIA: Status: ACTIVE | Noted: 2024-04-12

## 2024-04-12 LAB
ANION GAP SERPL CALC-SCNC: 4 MMOL/L (ref 2–11)
BASOPHILS # BLD: 0 K/UL (ref 0–0.2)
BASOPHILS NFR BLD: 0 % (ref 0–2)
BUN SERPL-MCNC: 46 MG/DL (ref 8–23)
C-ANCA TITR SER IF: NORMAL TITER
CALCIUM SERPL-MCNC: 11.5 MG/DL (ref 8.7–10.3)
CALCIUM SERPL-MCNC: 9.8 MG/DL (ref 8.3–10.4)
CENTROMERE B AB SER-ACNC: <0.2 AI (ref 0–0.9)
CHLORIDE SERPL-SCNC: 102 MMOL/L (ref 103–113)
CHROMATIN AB SERPL-ACNC: <0.2 AI (ref 0–0.9)
CO2 SERPL-SCNC: 29 MMOL/L (ref 21–32)
CREAT SERPL-MCNC: 4.8 MG/DL (ref 0.6–1)
DIFFERENTIAL METHOD BLD: ABNORMAL
DSDNA AB SER-ACNC: <1 IU/ML (ref 0–9)
ENA JO1 AB SER-ACNC: <0.2 AI (ref 0–0.9)
ENA RNP AB SER-ACNC: <0.2 AI (ref 0–0.9)
ENA SCL70 AB SER-ACNC: <0.2 AI (ref 0–0.9)
ENA SM AB SER-ACNC: <0.2 AI (ref 0–0.9)
ENA SS-A AB SER-ACNC: <0.2 AI (ref 0–0.9)
ENA SS-B AB SER-ACNC: <0.2 AI (ref 0–0.9)
EOSINOPHIL # BLD: 0 K/UL (ref 0–0.8)
EOSINOPHIL NFR BLD: 0 % (ref 0.5–7.8)
ERYTHROCYTE [DISTWIDTH] IN BLOOD BY AUTOMATED COUNT: 12.7 % (ref 11.9–14.6)
GBM IGG SER-ACNC: <0.2 UNITS (ref 0–0.9)
GLUCOSE SERPL-MCNC: 126 MG/DL (ref 65–100)
HCT VFR BLD AUTO: 24.6 % (ref 35.8–46.3)
HGB BLD-MCNC: 8.2 G/DL (ref 11.7–15.4)
IMM GRANULOCYTES # BLD AUTO: 0.1 K/UL (ref 0–0.5)
IMM GRANULOCYTES NFR BLD AUTO: 1 % (ref 0–5)
KAPPA LC FREE SER-MCNC: 7381.9 MG/L (ref 3.3–19.4)
KAPPA LC FREE/LAMBDA FREE SER: 665.04 (ref 0.26–1.65)
LAMBDA LC FREE SERPL-MCNC: 11.1 MG/L (ref 5.7–26.3)
LYMPHOCYTES # BLD: 0.5 K/UL (ref 0.5–4.6)
LYMPHOCYTES NFR BLD: 9 % (ref 13–44)
Lab: NORMAL
MCH RBC QN AUTO: 33.5 PG (ref 26.1–32.9)
MCHC RBC AUTO-ENTMCNC: 33.3 G/DL (ref 31.4–35)
MCV RBC AUTO: 100.4 FL (ref 82–102)
MM INDURATION, POC: 0 MM (ref 0–5)
MONOCYTES # BLD: 0.4 K/UL (ref 0.1–1.3)
MONOCYTES NFR BLD: 6 % (ref 4–12)
MYELOPEROXIDASE AB SER IA-ACNC: <0.2 UNITS (ref 0–0.9)
NEUTS SEG # BLD: 5.3 K/UL (ref 1.7–8.2)
NEUTS SEG NFR BLD: 84 % (ref 43–78)
NRBC # BLD: 0 K/UL (ref 0–0.2)
P-ANCA ATYPICAL TITR SER IF: NORMAL TITER
P-ANCA TITR SER IF: NORMAL TITER
PLATELET # BLD AUTO: 142 K/UL (ref 150–450)
PMV BLD AUTO: 11.5 FL (ref 9.4–12.3)
POTASSIUM SERPL-SCNC: 4.2 MMOL/L (ref 3.5–5.1)
PPD, POC: NEGATIVE
PROTEINASE3 AB SER IA-ACNC: <0.2 UNITS (ref 0–0.9)
PTH-INTACT SERPL-MCNC: ABNORMAL PG/ML (ref 15–65)
RBC # BLD AUTO: 2.45 M/UL (ref 4.05–5.2)
SODIUM SERPL-SCNC: 135 MMOL/L (ref 136–146)
WBC # BLD AUTO: 6.2 K/UL (ref 4.3–11.1)

## 2024-04-12 PROCEDURE — 2500000003 HC RX 250 WO HCPCS: Performed by: SURGERY

## 2024-04-12 PROCEDURE — 80048 BASIC METABOLIC PNL TOTAL CA: CPT

## 2024-04-12 PROCEDURE — 74240 X-RAY XM UPR GI TRC 1CNTRST: CPT

## 2024-04-12 PROCEDURE — 51798 US URINE CAPACITY MEASURE: CPT

## 2024-04-12 PROCEDURE — 2580000003 HC RX 258: Performed by: FAMILY MEDICINE

## 2024-04-12 PROCEDURE — 36415 COLL VENOUS BLD VENIPUNCTURE: CPT

## 2024-04-12 PROCEDURE — 99222 1ST HOSP IP/OBS MODERATE 55: CPT | Performed by: PHYSICIAN ASSISTANT

## 2024-04-12 PROCEDURE — 6370000000 HC RX 637 (ALT 250 FOR IP): Performed by: HOSPITALIST

## 2024-04-12 PROCEDURE — 1100000003 HC PRIVATE W/ TELEMETRY

## 2024-04-12 PROCEDURE — 6370000000 HC RX 637 (ALT 250 FOR IP): Performed by: FAMILY MEDICINE

## 2024-04-12 PROCEDURE — 2580000003 HC RX 258: Performed by: INTERNAL MEDICINE

## 2024-04-12 PROCEDURE — 85025 COMPLETE CBC W/AUTO DIFF WBC: CPT

## 2024-04-12 RX ORDER — LANOLIN ALCOHOL/MO/W.PET/CERES
6 CREAM (GRAM) TOPICAL NIGHTLY PRN
Status: DISCONTINUED | OUTPATIENT
Start: 2024-04-12 | End: 2024-05-04 | Stop reason: HOSPADM

## 2024-04-12 RX ORDER — SODIUM CHLORIDE 9 MG/ML
INJECTION, SOLUTION INTRAVENOUS CONTINUOUS
Status: DISCONTINUED | OUTPATIENT
Start: 2024-04-12 | End: 2024-04-15

## 2024-04-12 RX ADMIN — SODIUM CHLORIDE, PRESERVATIVE FREE 10 ML: 5 INJECTION INTRAVENOUS at 21:34

## 2024-04-12 RX ADMIN — ROSUVASTATIN CALCIUM 10 MG: 10 TABLET, FILM COATED ORAL at 21:33

## 2024-04-12 RX ADMIN — SODIUM CHLORIDE, PRESERVATIVE FREE 10 ML: 5 INJECTION INTRAVENOUS at 10:24

## 2024-04-12 RX ADMIN — BARIUM SULFATE 150 ML: 0.6 SUSPENSION ORAL at 12:52

## 2024-04-12 RX ADMIN — SODIUM CHLORIDE: 9 INJECTION, SOLUTION INTRAVENOUS at 10:28

## 2024-04-12 RX ADMIN — Medication 6 MG: at 01:32

## 2024-04-12 RX ADMIN — SENNOSIDES 8.6 MG: 8.6 TABLET, FILM COATED ORAL at 21:34

## 2024-04-12 ASSESSMENT — PAIN SCALES - GENERAL
PAINLEVEL_OUTOF10: 0
PAINLEVEL_OUTOF10: 0

## 2024-04-12 NOTE — PROGRESS NOTES
General Surgery Progress Note    4/12/2024    Admit Date: 4/10/2024    Subjective:       Denies pain.  Feels about the same.    Objective:     /62   Pulse 69   Temp 97.9 °F (36.6 °C) (Oral)   Resp 16   Ht 1.6 m (5' 2.99\")   Wt 79.8 kg (175 lb 14.4 oz)   SpO2 96%   BMI 31.17 kg/m²       Intake/Output Summary (Last 24 hours) at 4/12/2024 1020  Last data filed at 4/12/2024 0550  Gross per 24 hour   Intake 710.14 ml   Output 520 ml   Net 190.14 ml        Physical Exam  Constitutional:       General: No acute distress.     Appearance: Normal appearance.   HENT:      Head: Normocephalic and atraumatic.   Eyes:      Pupils: Pupils are equal, round, and reactive to light.   Cardiovascular:      Rate and Rhythm: Normal rate and regular rhythm.      Pulses: Normal pulses.      Heart sounds: Normal heart sounds.   Pulmonary:      Effort: Pulmonary effort is normal.      Breath sounds: Normal breath sounds.   Abdominal:      Palpations: Abdomen is soft, nontender, nondistended   Musculoskeletal:         General: Normal range of motion.   Skin:     Findings: No rash.   Neurological:      Mental Status: Alert and oriented to person, place, and time.          Data Review   Recent Results (from the past 24 hour(s))   GREGORY Comprehensive Panel    Collection Time: 04/11/24 11:09 AM   Result Value Ref Range    dsDNA Ab <1 0 - 9 IU/mL    Anti-RNP <0.2 0.0 - 0.9 AI    GAGAN Farmer (SM) Ab <0.2 0.0 - 0.9 AI    GAGAN SCL-70 Ab <0.2 0.0 - 0.9 AI    GAGAN SSA (RO) Ab <0.2 0.0 - 0.9 AI    GAGAN SSB (LA) Ab <0.2 0.0 - 0.9 AI    Chromatin Antibody <0.2 0.0 - 0.9 AI    GAGAN IRAIS-1 Ab <0.2 0.0 - 0.9 AI    CENTROMERE PROTEIN B ANTIBODY <0.2 0.0 - 0.9 AI    See below: Comment     Hepatitis Panel, Acute    Collection Time: 04/11/24 11:09 AM   Result Value Ref Range    Hep A IgM NONREACTIVE NR      Hep B Core Ab, IgM NONREACTIVE NR      Hepatitis B Surface Ag NONREACTIVE NR      Hepatitis C Ab NONREACTIVE NR     Transferrin Saturation    Collection

## 2024-04-12 NOTE — PROGRESS NOTES
Hospitalist Progress Note   Admit Date:  4/10/2024 12:06 PM   Name:  Tayla Escobar   Age:  84 y.o.  Sex:  female  :  1940   MRN:  [id number]   Room:  [facility]/    Presenting/Chief Complaint: Post-op Problem     Reason(s) for Admission: ANTWON (acute kidney injury) (HCC) [N17.9]     Hospital Course:   84 y.o. female who presented to the ED after PCP discovered patient was in ANTWON. Since her hiatal hernia surgery , she has been eating poorly, drinking little, and feeling poorly.     Subjective & 24hr Events:   Patient was seen and examined at the bedside.  Daughter at bedside this morning.  No new complaints.      Assessment & Plan:   ANTWON  - ARB on hold  - Patient was just recently on amlodipine.  Secondary to hypotension which may have contributed to her current ANTWON  - Nephrology consulted  - Renal ultrasound unremarkable  - CT without evidence of hydronephrosis  - restarted IV fluid  -  creatinine of 4.8    Nausea  - Patient with constipation  - As needed Compazine  - Poor p.o. intake    Poor oral intake  - Status post Solu-Medrol to stimulate appetite  - Nutrition consulted  - Concern as patient has recently had surgery for hiatal hernia  - General surgery consulted, appreciate recommendations  -Upper GI per general surgery tertiary contraction of the esophagus with mucosal irregularity suggestive of esophagitis  - Delayed transit of contrast into the small bowel loops may represent gastric outlet obstruction  - GI consulted  - GI to perform EGD tomorrow     Superficial thoracic skin tear  - Local wound care per nursing    Hyperlipidemia  - Statin    Constipation  - Senna  - Colace    PT/OT evals and PPD ordered?  Therapy   Diet:  Diet NPO  Diet NPO Exceptions are: Sips of Water with Meds  VTE prophylaxis: SCD's   Code status: Full Code      Non-peripheral Lines and Tubes (if present):          Telemetry (if present):  Cardiac/Telemetry Monitor On: [facility]  650 mg Rectal Q6H PRN       Signed:  Fawad Maldonado MD    Part of this note may have been written by using a voice dictation software.  The note has been proof read but may still contain some grammatical/other typographical errors.

## 2024-04-12 NOTE — PROGRESS NOTES
SPEECH LANGUAGE PATHOLOGY: ATTEMPT     NAME: Tayla Escobar  : 1940  MRN: [id number]    ADMISSION DATE: 4/10/2024  PRIMARY DIAGNOSIS: ANTWON (acute kidney injury) (HCC)    Speech Therapy attempted. Patient is NPO with possible esophageal stricture. GI following for possible EGD and General surgery following for UGI today to assess esophageal and gastric emptying. Will reassess when medically appropriate.     Melva Delgado M.S., CCC-SLP   2024 1:45 PM

## 2024-04-12 NOTE — PROGRESS NOTES
Physical Therapy Note:    Attempted to see patient this PM for physical therapy treatment  session. Patient off floor for procedure. Will follow and re-attempt as schedule permits/patient available. Thank you,    Carmen Mcclure, PT     Rehab Caseload Tracker

## 2024-04-12 NOTE — CONSULTS
Consult Note            Date:4/12/2024        Patient Name:Tayla Escobar     YOB: 1940     Age:84 y.o.    Inpatient consult to GI  Consult performed by: Grinnell, Melissa R, PA-C  Consult ordered by: Fawad Maldonado MD  Reason for consult: esophageal dysmotility versus stricture          Chief Complaint     Chief Complaint   Patient presents with    Post-op Problem        History Obtained From   patient, electronic medical record, family members at bedside    History of Present Illness   Ms. Tayla Escobar is an 84 year old female with PMH significant for HTN, HPL, CKD III, DRISS, PE (on Eliquis), obesity with BMI 31.17, osteoarthritis.  Surgical history is pertinent for hiatal hernia repair 3/4/2024 by Dr. Lozano.    Patient was admitted to the hospital service 4/10/2024 after presenting to the ED at PCPs recommendations for ANTWON.  Patient been feeling poorly postoperatively with poor p.o. intake, nausea, constipation.  Labs on presentation were notable for stable anemia with Hgb 10.8, Cr 4.70 (2.00), and multiple electrolyte derangements consistent with dehydration. WBC, iron studies, and LFTs were WNL.  CT abdomen/pelvis 4/10/2024 showed hiatal hernia repair, small left pleural effusion, bilateral fat and bowel-containing ventral hernias without obstruction. Surgery was consulted and recommended UGI study which is pending today. GI was consulted for evaluation. Eliquis has been held since admission. Most recent EGD records noted from 2016 which showed a large hiatal hernia. Also had colonoscopy at that time.    Patient evaluated this morning with two family members at bedside who assist with some history. They report she has had worsening abdominal pain, back pain, decreased PO intake, and significant weight loss from 210 > 175 lbs since October. She had imaging in February which showed a massive hiatal hernia and was referred to surgery and underwent repair 3/4/24. Since surgery family has

## 2024-04-12 NOTE — PROGRESS NOTES
UGI reviewed. Case d/w Dr. Vieyra. Will plan for EGD tomorrow. NPO after midnight for procedure.     Thanks,  Melissa Grinnell, PA-C [facility]

## 2024-04-12 NOTE — PLAN OF CARE
Problem: Discharge Planning  Goal: Discharge to home or other facility with appropriate resources  4/12/2024 1428 by Soco Irby, RN  Outcome: Progressing  Flowsheets (Taken 4/12/2024 0755)  Discharge to home or other facility with appropriate resources:   Identify barriers to discharge with patient and caregiver   Refer to discharge planning if patient needs post-hospital services based on physician order or complex needs related to functional status, cognitive ability or social support system  4/12/2024 0444 by Rhonda Lopez RN  Outcome: Progressing     Problem: Pain  Goal: Verbalizes/displays adequate comfort level or baseline comfort level  4/12/2024 1428 by Soco Irby, RN  Outcome: Progressing  4/12/2024 0444 by Rhonda Lopez RN  Outcome: Progressing     Problem: Skin/Tissue Integrity  Goal: Absence of new skin breakdown  Description: 1.  Monitor for areas of redness and/or skin breakdown  2.  Assess vascular access sites hourly  3.  Every 4-6 hours minimum:  Change oxygen saturation probe site  4.  Every 4-6 hours:  If on nasal continuous positive airway pressure, respiratory therapy assess nares and determine need for appliance change or resting period.  4/12/2024 1428 by Soco Irby, RN  Outcome: Progressing  4/12/2024 0444 by Rhonda Lopez RN  Outcome: Progressing     Problem: Safety - Adult  Goal: Free from fall injury  4/12/2024 1428 by Soco Irby, RN  Outcome: Progressing  4/12/2024 0444 by Rhonda Lopez RN  Outcome: Progressing

## 2024-04-12 NOTE — PROGRESS NOTES
[facility] Progress Note    Follow-Up on: ANTWON/CKD    ROS:  Gen - no fever, no chills, appetite low  CV - no chest pain, no palpitation  Lung - no shortness of breath, no cough  Abd - no tenderness, + nausea, no diarrhea  Ext - no edema    Exam:  Vitals:    04/11/24 2317 04/12/24 0358 04/12/24 0405 04/12/24 0727   BP: 126/63 106/60  109/62   Pulse: 74 72  69   Resp: 19 20  16   Temp: 97.7 °F (36.5 °C) 97.9 °F (36.6 °C)  97.9 °F (36.6 °C)   TempSrc: Oral Axillary  Oral   SpO2: 95% 96%  96%   Weight:   79.8 kg (175 lb 14.4 oz)    Height:             Intake/Output Summary (Last 24 hours) at 4/12/2024 0951  Last data filed at 4/12/2024 0550  Gross per 24 hour   Intake 2203.11 ml   Output 520 ml   Net 1683.11 ml       Wt Readings from Last 3 Encounters:   04/12/24 79.8 kg (175 lb 14.4 oz)   04/10/24 73 kg (161 lb)   04/04/24 71.7 kg (158 lb)       GEN - in no distress  CV - regular, no murmur, no rub  Lung - clear bilaterally  Abd - soft, nontender  Ext - no edema    Recent Labs     04/10/24  1227 04/11/24  0410 04/12/24  0409   WBC 8.2 7.1 6.2   HGB 11.4* 8.8* 8.2*   HCT 35.1* 26.9* 24.6*   * 116* 142*        Recent Labs     04/10/24  1417 04/11/24  0410 04/12/24  0409   * 134* 135*   K 3.2* 3.7 4.2   CL 89* 98* 102*   CO2 33* 29 29   BUN 41* 40* 46*   CREATININE 4.90* 4.80* 4.80*   CALCIUM 12.0* 10.5* 9.8   MG 1.8  --   --        Assessment / Plan:  Principal Problem:    ANTWON (acute kidney injury) (HCC)  Active Problems:    Mixed hyperlipidemia    Benign essential hypertension    Moderate protein-calorie malnutrition (HCC)  Resolved Problems:    * No resolved hospital problems. *    1) ANTWON/ CKD stage 3A - baseline Cr about 1.2, already had ANTWON as outpt with Cr 2.0 last week  - In setting of hypotension, poor PO intake - unclear if progression to ATN  - BP meds on hold.  She denies chronic NSAIDS use  - CT without evidence of hydronephrosis  - She reports known microscopic hematuria - evaluated  outpt by Urology with eventual cysto planned  - Also with 1+ protein on UA, serologies pending  - Peak Cr 4.9, Cr 4.8 last two days  - She has been NPO with GI procedure so will start back on IVF     2) Hypotension - BP meds on hold      3) Hyponatremia - in setting of ANTWON, mild, will monitor     4) Nausea - poor PO intake     High Medical Decision Making  -Patient with at least one acute illness that poses a threat to bodily function  -Reviewed 3 labs  -Reviewed external charts

## 2024-04-12 NOTE — CARE COORDINATION
MSN, CM:  patient with possible esophageal stricture.  GI following for possible EGD and General surgery following for UGI today to assess esophageal and gastric emptying.  PT/OT recommend HH which has been ordered.  Case Management will continue to follow.

## 2024-04-13 ENCOUNTER — ANESTHESIA (OUTPATIENT)
Dept: ENDOSCOPY | Age: 84
End: 2024-04-13
Payer: MEDICARE

## 2024-04-13 LAB
ANION GAP SERPL CALC-SCNC: 4 MMOL/L (ref 2–11)
BASOPHILS # BLD: 0 K/UL (ref 0–0.2)
BASOPHILS NFR BLD: 0 % (ref 0–2)
BUN SERPL-MCNC: 49 MG/DL (ref 8–23)
C3 SERPL-MCNC: 128 MG/DL (ref 82–167)
C4 SERPL-MCNC: 30 MG/DL (ref 12–38)
CALCIUM SERPL-MCNC: 9.4 MG/DL (ref 8.3–10.4)
CHLORIDE SERPL-SCNC: 106 MMOL/L (ref 103–113)
CO2 SERPL-SCNC: 28 MMOL/L (ref 21–32)
CREAT SERPL-MCNC: 4.8 MG/DL (ref 0.6–1)
DIFFERENTIAL METHOD BLD: ABNORMAL
EOSINOPHIL # BLD: 0.1 K/UL (ref 0–0.8)
EOSINOPHIL NFR BLD: 1 % (ref 0.5–7.8)
ERYTHROCYTE [DISTWIDTH] IN BLOOD BY AUTOMATED COUNT: 13 % (ref 11.9–14.6)
GLUCOSE SERPL-MCNC: 82 MG/DL (ref 65–100)
HCT VFR BLD AUTO: 23.5 % (ref 35.8–46.3)
HGB BLD-MCNC: 7.7 G/DL (ref 11.7–15.4)
IMM GRANULOCYTES # BLD AUTO: 0 K/UL (ref 0–0.5)
IMM GRANULOCYTES NFR BLD AUTO: 0 % (ref 0–5)
LYMPHOCYTES # BLD: 0.6 K/UL (ref 0.5–4.6)
LYMPHOCYTES NFR BLD: 11 % (ref 13–44)
MCH RBC QN AUTO: 33.5 PG (ref 26.1–32.9)
MCHC RBC AUTO-ENTMCNC: 32.8 G/DL (ref 31.4–35)
MCV RBC AUTO: 102.2 FL (ref 82–102)
MONOCYTES # BLD: 0.4 K/UL (ref 0.1–1.3)
MONOCYTES NFR BLD: 8 % (ref 4–12)
NEUTS SEG # BLD: 4.3 K/UL (ref 1.7–8.2)
NEUTS SEG NFR BLD: 79 % (ref 43–78)
NRBC # BLD: 0 K/UL (ref 0–0.2)
PLATELET # BLD AUTO: 113 K/UL (ref 150–450)
PMV BLD AUTO: 11.3 FL (ref 9.4–12.3)
POTASSIUM SERPL-SCNC: 3.7 MMOL/L (ref 3.5–5.1)
RBC # BLD AUTO: 2.3 M/UL (ref 4.05–5.2)
SODIUM SERPL-SCNC: 138 MMOL/L (ref 136–146)
WBC # BLD AUTO: 5.4 K/UL (ref 4.3–11.1)

## 2024-04-13 PROCEDURE — 6370000000 HC RX 637 (ALT 250 FOR IP): Performed by: SURGERY

## 2024-04-13 PROCEDURE — 3700000000 HC ANESTHESIA ATTENDED CARE: Performed by: INTERNAL MEDICINE

## 2024-04-13 PROCEDURE — 43235 EGD DIAGNOSTIC BRUSH WASH: CPT | Performed by: INTERNAL MEDICINE

## 2024-04-13 PROCEDURE — 3609017100 HC EGD: Performed by: INTERNAL MEDICINE

## 2024-04-13 PROCEDURE — 0DJ08ZZ INSPECTION OF UPPER INTESTINAL TRACT, VIA NATURAL OR ARTIFICIAL OPENING ENDOSCOPIC: ICD-10-PCS | Performed by: INTERNAL MEDICINE

## 2024-04-13 PROCEDURE — 2580000003 HC RX 258: Performed by: FAMILY MEDICINE

## 2024-04-13 PROCEDURE — 2580000003 HC RX 258: Performed by: NURSE ANESTHETIST, CERTIFIED REGISTERED

## 2024-04-13 PROCEDURE — 85025 COMPLETE CBC W/AUTO DIFF WBC: CPT

## 2024-04-13 PROCEDURE — 36415 COLL VENOUS BLD VENIPUNCTURE: CPT

## 2024-04-13 PROCEDURE — 2500000003 HC RX 250 WO HCPCS: Performed by: NURSE ANESTHETIST, CERTIFIED REGISTERED

## 2024-04-13 PROCEDURE — 1100000003 HC PRIVATE W/ TELEMETRY

## 2024-04-13 PROCEDURE — 6370000000 HC RX 637 (ALT 250 FOR IP): Performed by: HOSPITALIST

## 2024-04-13 PROCEDURE — 2580000003 HC RX 258: Performed by: INTERNAL MEDICINE

## 2024-04-13 PROCEDURE — 7100000001 HC PACU RECOVERY - ADDTL 15 MIN: Performed by: INTERNAL MEDICINE

## 2024-04-13 PROCEDURE — 51702 INSERT TEMP BLADDER CATH: CPT

## 2024-04-13 PROCEDURE — 2709999900 HC NON-CHARGEABLE SUPPLY: Performed by: INTERNAL MEDICINE

## 2024-04-13 PROCEDURE — 6360000002 HC RX W HCPCS: Performed by: NURSE ANESTHETIST, CERTIFIED REGISTERED

## 2024-04-13 PROCEDURE — 6370000000 HC RX 637 (ALT 250 FOR IP): Performed by: FAMILY MEDICINE

## 2024-04-13 PROCEDURE — 80048 BASIC METABOLIC PNL TOTAL CA: CPT

## 2024-04-13 PROCEDURE — 51798 US URINE CAPACITY MEASURE: CPT

## 2024-04-13 PROCEDURE — 7100000000 HC PACU RECOVERY - FIRST 15 MIN: Performed by: INTERNAL MEDICINE

## 2024-04-13 RX ORDER — SODIUM CHLORIDE, SODIUM LACTATE, POTASSIUM CHLORIDE, CALCIUM CHLORIDE 600; 310; 30; 20 MG/100ML; MG/100ML; MG/100ML; MG/100ML
INJECTION, SOLUTION INTRAVENOUS CONTINUOUS PRN
Status: DISCONTINUED | OUTPATIENT
Start: 2024-04-13 | End: 2024-04-13 | Stop reason: SDUPTHER

## 2024-04-13 RX ORDER — METOCLOPRAMIDE 10 MG/1
5 TABLET ORAL DAILY
Status: DISCONTINUED | OUTPATIENT
Start: 2024-04-13 | End: 2024-05-04 | Stop reason: HOSPADM

## 2024-04-13 RX ORDER — ERYTHROMYCIN ETHYLSUCCINATE 400 MG/1
400 TABLET ORAL
Status: DISCONTINUED | OUTPATIENT
Start: 2024-04-13 | End: 2024-05-04 | Stop reason: HOSPADM

## 2024-04-13 RX ORDER — LIDOCAINE HYDROCHLORIDE 20 MG/ML
INJECTION, SOLUTION EPIDURAL; INFILTRATION; INTRACAUDAL; PERINEURAL PRN
Status: DISCONTINUED | OUTPATIENT
Start: 2024-04-13 | End: 2024-04-13 | Stop reason: SDUPTHER

## 2024-04-13 RX ORDER — PROPOFOL 10 MG/ML
INJECTION, EMULSION INTRAVENOUS PRN
Status: DISCONTINUED | OUTPATIENT
Start: 2024-04-13 | End: 2024-04-13 | Stop reason: SDUPTHER

## 2024-04-13 RX ADMIN — ERYTHROMYCIN ETHYLSUCCINATE 400 MG: 400 TABLET ORAL at 13:18

## 2024-04-13 RX ADMIN — LIDOCAINE HYDROCHLORIDE 100 MG: 20 INJECTION, SOLUTION EPIDURAL; INFILTRATION; INTRACAUDAL; PERINEURAL at 10:01

## 2024-04-13 RX ADMIN — ROSUVASTATIN CALCIUM 10 MG: 10 TABLET, FILM COATED ORAL at 21:35

## 2024-04-13 RX ADMIN — Medication 6 MG: at 21:40

## 2024-04-13 RX ADMIN — SODIUM CHLORIDE: 9 INJECTION, SOLUTION INTRAVENOUS at 01:14

## 2024-04-13 RX ADMIN — ERYTHROMYCIN ETHYLSUCCINATE 400 MG: 400 TABLET ORAL at 18:35

## 2024-04-13 RX ADMIN — Medication 6 MG: at 01:13

## 2024-04-13 RX ADMIN — SODIUM CHLORIDE, PRESERVATIVE FREE 10 ML: 5 INJECTION INTRAVENOUS at 21:35

## 2024-04-13 RX ADMIN — METOCLOPRAMIDE 5 MG: 10 TABLET ORAL at 13:19

## 2024-04-13 RX ADMIN — SODIUM CHLORIDE: 9 INJECTION, SOLUTION INTRAVENOUS at 16:38

## 2024-04-13 RX ADMIN — PROPOFOL 40 MG: 10 INJECTION, EMULSION INTRAVENOUS at 10:02

## 2024-04-13 RX ADMIN — SODIUM CHLORIDE, PRESERVATIVE FREE 10 ML: 5 INJECTION INTRAVENOUS at 08:39

## 2024-04-13 RX ADMIN — SODIUM CHLORIDE, SODIUM LACTATE, POTASSIUM CHLORIDE, AND CALCIUM CHLORIDE: 600; 310; 30; 20 INJECTION, SOLUTION INTRAVENOUS at 09:58

## 2024-04-13 ASSESSMENT — PAIN - FUNCTIONAL ASSESSMENT
PAIN_FUNCTIONAL_ASSESSMENT: 0-10
PAIN_FUNCTIONAL_ASSESSMENT: PREVENTS OR INTERFERES SOME ACTIVE ACTIVITIES AND ADLS

## 2024-04-13 ASSESSMENT — PAIN SCALES - GENERAL
PAINLEVEL_OUTOF10: 0
PAINLEVEL_OUTOF10: 0
PAINLEVEL_OUTOF10: 5
PAINLEVEL_OUTOF10: 2
PAINLEVEL_OUTOF10: 0

## 2024-04-13 ASSESSMENT — PAIN DESCRIPTION - FREQUENCY: FREQUENCY: INTERMITTENT

## 2024-04-13 ASSESSMENT — PAIN DESCRIPTION - DESCRIPTORS: DESCRIPTORS: ACHING

## 2024-04-13 ASSESSMENT — PAIN DESCRIPTION - PAIN TYPE: TYPE: ACUTE PAIN

## 2024-04-13 ASSESSMENT — PAIN DESCRIPTION - ORIENTATION: ORIENTATION: LEFT

## 2024-04-13 ASSESSMENT — PAIN DESCRIPTION - LOCATION: LOCATION: BACK

## 2024-04-13 NOTE — PROGRESS NOTES
General Surgery Progress Note    4/13/2024    Admit Date: 4/10/2024    Subjective:     S/p EGD, retained food in stomach    Objective:     /66   Pulse 68   Temp 97.5 °F (36.4 °C) (Oral)   Resp 18   Ht 1.6 m (5' 3\")   Wt 80.2 kg (176 lb 14.4 oz)   SpO2 97%   BMI 31.34 kg/m²       Intake/Output Summary (Last 24 hours) at 4/13/2024 1205  Last data filed at 4/13/2024 1005  Gross per 24 hour   Intake 2319.72 ml   Output 800 ml   Net 1519.72 ml        Physical Exam  Constitutional:       General: No acute distress.     Appearance: Normal appearance.   HENT:      Head: Normocephalic and atraumatic.   Eyes:      Pupils: Pupils are equal, round, and reactive to light.   Cardiovascular:      Rate and Rhythm: Normal rate and regular rhythm.      Pulses: Normal pulses.      Heart sounds: Normal heart sounds.   Pulmonary:      Effort: Pulmonary effort is normal.      Breath sounds: Normal breath sounds.   Abdominal:      Palpations: Abdomen is soft, nontender, nondistended   Musculoskeletal:         General: Normal range of motion.   Skin:     Findings: No rash.   Neurological:      Mental Status: Alert and oriented to person, place, and time.          Data Review   Recent Results (from the past 24 hour(s))   Basic Metabolic Panel w/ Reflex to MG    Collection Time: 04/13/24  4:22 AM   Result Value Ref Range    Sodium 138 136 - 146 mmol/L    Potassium 3.7 3.5 - 5.1 mmol/L    Chloride 106 103 - 113 mmol/L    CO2 28 21 - 32 mmol/L    Anion Gap 4 2 - 11 mmol/L    Glucose 82 65 - 100 mg/dL    BUN 49 (H) 8 - 23 MG/DL    Creatinine 4.80 (H) 0.6 - 1.0 MG/DL    Est, Glom Filt Rate 9 (L) >60 ml/min/1.73m2    Calcium 9.4 8.3 - 10.4 MG/DL   CBC with Auto Differential    Collection Time: 04/13/24  4:22 AM   Result Value Ref Range    WBC 5.4 4.3 - 11.1 K/uL    RBC 2.30 (L) 4.05 - 5.2 M/uL    Hemoglobin 7.7 (L) 11.7 - 15.4 g/dL    Hematocrit 23.5 (L) 35.8 - 46.3 %    .2 (H) 82 - 102 FL    MCH 33.5 (H) 26.1 - 32.9 PG

## 2024-04-13 NOTE — PERIOP NOTE
TRANSFER - OUT REPORT:    Verbal report given to Soco GOLDEN on Tayla Escobar  being transferred to [facility] for routine post-op       Report consisted of patient’s Situation, Background, Assessment and   Recommendations(SBAR).     Information from the following report(s) Nurse Handoff Report, Adult Overview, Surgery Report, and Event Log was reviewed with the receiving nurse.    Lines:   Peripheral IV 04/10/24 Left Hand (Active)   Site Assessment Clean, dry & intact 04/13/24 1008   Line Status Infusing 04/13/24 1008   Line Care Connections checked and tightened 04/13/24 0845   Phlebitis Assessment No symptoms 04/13/24 1008   Infiltration Assessment 0 04/13/24 1008   Alcohol Cap Used No 04/13/24 1008   Dressing Status Clean, dry & intact 04/13/24 1008   Dressing Type Transparent 04/13/24 1008        Opportunity for questions and clarification was provided.      Patient transported with:   Monitor  O2 @ 4 liters    VTE prophylaxis orders have been written for Tayla Escobar.    Patient and family given floor number and nurses name.  Family updated re: pt status after security code verified.

## 2024-04-13 NOTE — ANESTHESIA POSTPROCEDURE EVALUATION
Department of Anesthesiology  Postprocedure Note    Patient: Tayla Escobar  MRN: [id number]  YOB: 1940  Date of evaluation: 4/13/2024    Procedure Summary       Date: 04/13/24 Room / Location: [facility] ENDO [facility] 1 / [facility] ENDOSCOPY    Anesthesia Start: 0958 Anesthesia Stop: 1013    Procedure: ESOPHAGOGASTRODUODENOSCOPY (Upper GI Region) Diagnosis:       Epigastric pain      Anemia      Esophagitis      (Epigastric pain [R10.13])      (Anemia [D64.9])      (Esophagitis [K20.90])    Surgeons: Alejandro Vieyra MD Responsible Provider: Ed Gardner MD    Anesthesia Type: MAC, TIVA ASA Status: 4 - Emergent            Anesthesia Type: MAC, TIVA    Aliyah Phase I: Aliyah Score: 9    Aliyah Phase II: Aliyah Score: 9    Anesthesia Post Evaluation    Patient location during evaluation: bedside  Patient participation: complete - patient participated  Level of consciousness: awake and alert  Airway patency: patent  Nausea & Vomiting: no nausea  Cardiovascular status: hemodynamically stable  Respiratory status: acceptable, nonlabored ventilation and spontaneous ventilation  Hydration status: euvolemic    No notable events documented.

## 2024-04-13 NOTE — PROGRESS NOTES
TRANSFER - IN REPORT:    Verbal report received from CHANTEL Wan on Tayla P Luz  being received from 8th floor for ordered procedure      Report consisted of patient's Situation, Background, Assessment and   Recommendations(SBAR).     Information from the following report(s) Nurse Handoff Report was reviewed with the receiving nurse.    Opportunity for questions and clarification was provided.      Assessment will be completed upon patient's arrival to unit and care will be assumed.

## 2024-04-13 NOTE — ANESTHESIA PRE PROCEDURE
Department of Anesthesiology  Preprocedure Note       Name:  aTyla Escobar   Age:  84 y.o.  :  1940                                          MRN:  [id number]         Date:  2024      Surgeon: Surgeon(s):  Alejandro Vieyra MD    Procedure: Procedure(s):  ESOPHAGOGASTRODUODENOSCOPY    Medications prior to admission:   Prior to Admission medications    Medication Sig Start Date End Date Taking? Authorizing Provider   ondansetron (ZOFRAN-ODT) 4 MG disintegrating tablet Take 1 tablet by mouth 3 times daily as needed for Nausea or Vomiting 4/10/24   Yudy Alvarez APRN - CNP   cyclobenzaprine (FLEXERIL) 5 MG tablet Take 1 tablet by mouth 3 times daily as needed for Muscle spasms  Patient not taking: Reported on 2024    Antonio Mora MD   fexofenadine (ALLEGRA ALLERGY) 180 MG tablet Take 1 tablet by mouth daily  Patient not taking: Reported on 23   Antonio Mora MD   apixaban (ELIQUIS) 5 MG TABS tablet Take 1 tablet by mouth 2 times daily 24   Yudy Alvarez APRN - CNP   diclofenac sodium (VOLTAREN) 1 % GEL Apply 4 g topically 4 times daily as needed for Pain  Patient not taking: Reported on 2024   Yudy Alvarez APRN - CNP   amLODIPine (NORVASC) 5 MG tablet Take 1 tablet by mouth daily TAKE 1 TABLET DAILY  Patient not taking: Reported on 4/10/2024 11/27/23   Ricky García DO   furosemide (LASIX) 20 MG tablet Take 1 tablet by mouth daily  Patient not taking: Reported on 3/4/2024 11/27/23   Ricky García DO   potassium chloride (KLOR-CON M) 10 MEQ extended release tablet Take 1 tablet by mouth daily  Patient not taking: Reported on 23   Ricky García DO   rosuvastatin (CRESTOR) 10 MG tablet TAKE 1 TABLET DAILY 23   Ricky García DO   acetaminophen (TYLENOL) 500 MG tablet Take 1 tablet by mouth every 6 hours as needed for Pain    Antonio Mora MD   vitamin D (CHOLECALCIFEROL) 25 MCG (1000

## 2024-04-13 NOTE — PROGRESS NOTES
[facility] Progress Note    Follow-Up on: ANTWON/CKD    ROS:  Gen - no fever, no chills, appetite low  CV - no chest pain, no palpitation  Lung - no shortness of breath, no cough  Abd - no tenderness, + nausea, no diarrhea  Ext - no edema    Exam:  Vitals:    04/13/24 1025 04/13/24 1030 04/13/24 1035 04/13/24 1040   BP: (!) 114/56 (!) 117/57 (!) 121/59    Pulse: 67 64 64 63   Resp:  14     Temp:       TempSrc:       SpO2: 94% 96% 96%    Weight:       Height:             Intake/Output Summary (Last 24 hours) at 4/13/2024 1052  Last data filed at 4/13/2024 1005  Gross per 24 hour   Intake 2319.72 ml   Output 800 ml   Net 1519.72 ml         Wt Readings from Last 3 Encounters:   04/13/24 80.2 kg (176 lb 14.4 oz)   04/10/24 73 kg (161 lb)   04/04/24 71.7 kg (158 lb)       GEN - in no distress  CV - regular, no murmur, no rub  Lung - clear bilaterally  Abd - soft, nontender  Ext - no edema    Recent Labs     04/11/24  0410 04/12/24  0409 04/13/24  0422   WBC 7.1 6.2 5.4   HGB 8.8* 8.2* 7.7*   HCT 26.9* 24.6* 23.5*   * 142* 113*          Recent Labs     04/10/24  1417 04/11/24  0410 04/12/24  0409 04/13/24  0422   * 134* 135* 138   K 3.2* 3.7 4.2 3.7   CL 89* 98* 102* 106   CO2 33* 29 29 28   BUN 41* 40* 46* 49*   CREATININE 4.90* 4.80* 4.80* 4.80*   CALCIUM 12.0* 10.5* 9.8 9.4   MG 1.8  --   --   --          Assessment / Plan:  Principal Problem:    ANTWON (acute kidney injury) (HCC)  Active Problems:    Mixed hyperlipidemia    Benign essential hypertension    Moderate protein-calorie malnutrition (HCC)    Hx of hiatal hernia    Failure to thrive in adult    Anemia    Epigastric pain    Esophagitis  Resolved Problems:    * No resolved hospital problems. *    1) ANTWNO/ CKD stage 3A - baseline Cr about 1.2, already had ANTWON as outpt with Cr 2.0 last week  - In setting of hypotension, poor PO intake - unclear if progression to ATN  - BP meds on hold.  She denies chronic NSAIDS use  - CT without evidence of

## 2024-04-13 NOTE — OP NOTE
Procedure: EGD    Indication: delayed gastric emptying; nausea/vomiting    Date of Procedure: 4/13/2024    Patient profile: Refer to patient note in chart for documentation of history and physical    Providers: Alejandro Vieyra MD    Referring MD: No ref. provider found    PCP: Ricky García DO    Medicines: Monitored anesthesia care    Complication: No immediate complications.     Estimated blood loss: Minimal    Procedure: After the risks including, but not limited to medication reaction, infection, bleeding, perforation, missed lesions, benefits and alternatives of the procedure were discussed with the patient and all questions were answered, informed consent was obtained. The gastroscope was passed under direct vision throughout the procedure, the patient's blood pressure pulse and oxygen saturation were monitored continuously. The scope was introduced through the mouth and advanced only to the gastric body due to the presence of food. The endoscopy was performed without difficulty. The patient tolerated the procedure well.       Findings:   The adult gastroscope was introduced from the mouth and advanced through the esophagus to the GE junction. The esophagus was normal,      The scope was advanced to the stomach, where a large amount of food was noted in the gastric body. Hence, the procedure was aborted.    The scope was not advanced into the duodenum.     The scope was pulled back, and the procedure was subsequently ended.    Impression:  --Food noted in stomach, and hence, procedure aborted.     Recommendations:  --Plan to reschedule EGD, likely on monday    Alejandro Vieyra MD  Gastroenterology and Interventional Endoscopy

## 2024-04-13 NOTE — PLAN OF CARE
Problem: Discharge Planning  Goal: Discharge to home or other facility with appropriate resources  Outcome: Progressing  Flowsheets (Taken 4/13/2024 0808)  Discharge to home or other facility with appropriate resources:   Identify barriers to discharge with patient and caregiver   Refer to discharge planning if patient needs post-hospital services based on physician order or complex needs related to functional status, cognitive ability or social support system     Problem: Pain  Goal: Verbalizes/displays adequate comfort level or baseline comfort level  Outcome: Progressing     Problem: Skin/Tissue Integrity  Goal: Absence of new skin breakdown  Description: 1.  Monitor for areas of redness and/or skin breakdown  2.  Assess vascular access sites hourly  3.  Every 4-6 hours minimum:  Change oxygen saturation probe site  4.  Every 4-6 hours:  If on nasal continuous positive airway pressure, respiratory therapy assess nares and determine need for appliance change or resting period.  Outcome: Progressing     Problem: Safety - Adult  Goal: Free from fall injury  Outcome: Progressing     Problem: ABCDS Injury Assessment  Goal: Absence of physical injury  Outcome: Progressing  Flowsheets (Taken 4/13/2024 0808)  Absence of Physical Injury: Implement safety measures based on patient assessment       End of Shift Summary:  Assessment completed as noted.  Pt to GI lab and this am and returned at 1100.  NPO except for ice chips and sips with meds.  Up in chair this afternoon; assist x 1.  Family at bedside today; supportive and assistive with care.  Denies complaints of nausea/vomitting.  No s/s of distress.  Will continue to monitor and assess.

## 2024-04-13 NOTE — PROGRESS NOTES
Hospitalist Progress Note   Admit Date:  4/10/2024 12:06 PM   Name:  Tayla Escobar   Age:  84 y.o.  Sex:  female  :  1940   MRN:  [id number]   Room:  2/    Presenting/Chief Complaint: Post-op Problem     Reason(s) for Admission: ANTWON (acute kidney injury) (HCC) [N17.9]     Hospital Course:   84 y.o. female who presented to the ED after PCP discovered patient was in ANTWON. Since her hiatal hernia surgery , she has been eating poorly, drinking little, and feeling poorly.     Subjective & 24hr Events:   Patient was seen and examined at the bedside.  No overnight events.  Son at bedside.  All questions answered.  Patient to go for EGD today.      Assessment & Plan:   ANTWON  - ARB on hold  - Patient was just recently on amlodipine.  Secondary to hypotension which may have contributed to her current ANTWON  - Nephrology consulted  - Renal ultrasound unremarkable  - CT without evidence of hydronephrosis  - restarted IV fluid  -  creatinine of 4.8  - creatinine remains at 4.8.  Per nephrology renal function behaving like ATN    Nausea  - Patient with constipation  - As needed Compazine  - Poor p.o. intake    Poor oral intake  - Status post Solu-Medrol to stimulate appetite  - Nutrition consulted  - Concern as patient has recently had surgery for hiatal hernia  - General surgery consulted, appreciate recommendations  -Upper GI per general surgery tertiary contraction of the esophagus with mucosal irregularity suggestive of esophagitis  - Delayed transit of contrast into the small bowel loops may represent gastric outlet obstruction  - GI consulted  -  patient underwent EGD.  However food noted in stomach procedure was aborted  - GI plans to reschedule EGD likely on Monday.  -General surgery started on erythromycin ethyl succinate and Reglan for gastroparesis    Superficial thoracic skin tear  - Local wound care per nursing    Hyperlipidemia  - Statin    Constipation  - Senna  -  tablet 5 mg  5 mg Oral Daily    melatonin tablet 6 mg  6 mg Oral Nightly PRN    0.9 % sodium chloride infusion   IntraVENous Continuous    polyethylene glycol (GLYCOLAX) packet 17 g  17 g Oral Daily    rosuvastatin (CRESTOR) tablet 10 mg  10 mg Oral Nightly    senna (SENOKOT) tablet 8.6 mg  1 tablet Oral Nightly    traMADol (ULTRAM) tablet 50 mg  50 mg Oral Q12H PRN    hydrALAZINE (APRESOLINE) injection 10 mg  10 mg IntraVENous Q6H PRN    sodium chloride flush 0.9 % injection 5-40 mL  5-40 mL IntraVENous 2 times per day    sodium chloride flush 0.9 % injection 5-40 mL  5-40 mL IntraVENous PRN    0.9 % sodium chloride infusion   IntraVENous PRN    polyethylene glycol (GLYCOLAX) packet 17 g  17 g Oral Daily PRN    bisacodyl (DULCOLAX) suppository 10 mg  10 mg Rectal Daily PRN    famotidine (PEPCID) tablet 10 mg  10 mg Oral Daily PRN    aluminum & magnesium hydroxide-simethicone (MAALOX) 200-200-20 MG/5ML suspension 30 mL  30 mL Oral Q6H PRN    acetaminophen (TYLENOL) tablet 650 mg  650 mg Oral Q6H PRN    Or    acetaminophen (TYLENOL) suppository 650 mg  650 mg Rectal Q6H PRN       Signed:  Fawad Maldonado MD    Part of this note may have been written by using a voice dictation software.  The note has been proof read but may still contain some grammatical/other typographical errors.

## 2024-04-13 NOTE — PROGRESS NOTES
Md Joel Celestin ordered urinary catheter indwelling. Morning nurse aware this nurse offered to help to perform the catheterization but morning refuses the help.

## 2024-04-14 LAB
ANION GAP SERPL CALC-SCNC: 8 MMOL/L (ref 2–11)
BASOPHILS # BLD: 0 K/UL (ref 0–0.2)
BASOPHILS NFR BLD: 0 % (ref 0–2)
BUN SERPL-MCNC: 45 MG/DL (ref 8–23)
CALCIUM SERPL-MCNC: 9.3 MG/DL (ref 8.3–10.4)
CHLORIDE SERPL-SCNC: 108 MMOL/L (ref 103–113)
CO2 SERPL-SCNC: 24 MMOL/L (ref 21–32)
CREAT SERPL-MCNC: 4.7 MG/DL (ref 0.6–1)
DIFFERENTIAL METHOD BLD: ABNORMAL
EOSINOPHIL # BLD: 0.1 K/UL (ref 0–0.8)
EOSINOPHIL NFR BLD: 3 % (ref 0.5–7.8)
ERYTHROCYTE [DISTWIDTH] IN BLOOD BY AUTOMATED COUNT: 13 % (ref 11.9–14.6)
GLUCOSE BLD STRIP.AUTO-MCNC: 108 MG/DL (ref 65–100)
GLUCOSE BLD STRIP.AUTO-MCNC: 131 MG/DL (ref 65–100)
GLUCOSE BLD STRIP.AUTO-MCNC: 70 MG/DL (ref 65–100)
GLUCOSE SERPL-MCNC: 58 MG/DL (ref 65–100)
HCT VFR BLD AUTO: 25.5 % (ref 35.8–46.3)
HGB BLD-MCNC: 8.1 G/DL (ref 11.7–15.4)
IMM GRANULOCYTES # BLD AUTO: 0 K/UL (ref 0–0.5)
IMM GRANULOCYTES NFR BLD AUTO: 0 % (ref 0–5)
LYMPHOCYTES # BLD: 0.7 K/UL (ref 0.5–4.6)
LYMPHOCYTES NFR BLD: 15 % (ref 13–44)
MCH RBC QN AUTO: 32.5 PG (ref 26.1–32.9)
MCHC RBC AUTO-ENTMCNC: 31.8 G/DL (ref 31.4–35)
MCV RBC AUTO: 102.4 FL (ref 82–102)
MONOCYTES # BLD: 0.4 K/UL (ref 0.1–1.3)
MONOCYTES NFR BLD: 8 % (ref 4–12)
NEUTS SEG # BLD: 3.3 K/UL (ref 1.7–8.2)
NEUTS SEG NFR BLD: 74 % (ref 43–78)
NRBC # BLD: 0 K/UL (ref 0–0.2)
PLATELET # BLD AUTO: 112 K/UL (ref 150–450)
PMV BLD AUTO: 11 FL (ref 9.4–12.3)
POTASSIUM SERPL-SCNC: 3.9 MMOL/L (ref 3.5–5.1)
RBC # BLD AUTO: 2.49 M/UL (ref 4.05–5.2)
SERVICE CMNT-IMP: ABNORMAL
SERVICE CMNT-IMP: ABNORMAL
SERVICE CMNT-IMP: NORMAL
SODIUM SERPL-SCNC: 140 MMOL/L (ref 136–146)
WBC # BLD AUTO: 4.5 K/UL (ref 4.3–11.1)

## 2024-04-14 PROCEDURE — 2580000003 HC RX 258: Performed by: INTERNAL MEDICINE

## 2024-04-14 PROCEDURE — 2580000003 HC RX 258: Performed by: FAMILY MEDICINE

## 2024-04-14 PROCEDURE — 2700000000 HC OXYGEN THERAPY PER DAY

## 2024-04-14 PROCEDURE — 82962 GLUCOSE BLOOD TEST: CPT

## 2024-04-14 PROCEDURE — 6370000000 HC RX 637 (ALT 250 FOR IP): Performed by: SURGERY

## 2024-04-14 PROCEDURE — 80048 BASIC METABOLIC PNL TOTAL CA: CPT

## 2024-04-14 PROCEDURE — 36415 COLL VENOUS BLD VENIPUNCTURE: CPT

## 2024-04-14 PROCEDURE — 6370000000 HC RX 637 (ALT 250 FOR IP): Performed by: FAMILY MEDICINE

## 2024-04-14 PROCEDURE — 1100000000 HC RM PRIVATE

## 2024-04-14 PROCEDURE — 6370000000 HC RX 637 (ALT 250 FOR IP): Performed by: HOSPITALIST

## 2024-04-14 PROCEDURE — 85025 COMPLETE CBC W/AUTO DIFF WBC: CPT

## 2024-04-14 PROCEDURE — 2580000003 HC RX 258: Performed by: STUDENT IN AN ORGANIZED HEALTH CARE EDUCATION/TRAINING PROGRAM

## 2024-04-14 PROCEDURE — 94760 N-INVAS EAR/PLS OXIMETRY 1: CPT

## 2024-04-14 RX ORDER — DEXTROSE MONOHYDRATE 100 MG/ML
INJECTION, SOLUTION INTRAVENOUS CONTINUOUS PRN
Status: DISCONTINUED | OUTPATIENT
Start: 2024-04-14 | End: 2024-05-04 | Stop reason: HOSPADM

## 2024-04-14 RX ORDER — IBUPROFEN 600 MG/1
1 TABLET ORAL PRN
Status: DISCONTINUED | OUTPATIENT
Start: 2024-04-14 | End: 2024-05-04 | Stop reason: HOSPADM

## 2024-04-14 RX ADMIN — SODIUM CHLORIDE, PRESERVATIVE FREE 10 ML: 5 INJECTION INTRAVENOUS at 21:12

## 2024-04-14 RX ADMIN — SENNOSIDES 8.6 MG: 8.6 TABLET, FILM COATED ORAL at 21:12

## 2024-04-14 RX ADMIN — ROSUVASTATIN CALCIUM 10 MG: 10 TABLET, FILM COATED ORAL at 21:12

## 2024-04-14 RX ADMIN — ERYTHROMYCIN ETHYLSUCCINATE 400 MG: 400 TABLET ORAL at 09:34

## 2024-04-14 RX ADMIN — ERYTHROMYCIN ETHYLSUCCINATE 400 MG: 400 TABLET ORAL at 16:41

## 2024-04-14 RX ADMIN — Medication 6 MG: at 21:17

## 2024-04-14 RX ADMIN — DEXTROSE MONOHYDRATE: 10 INJECTION, SOLUTION INTRAVENOUS at 09:27

## 2024-04-14 RX ADMIN — DEXTROSE MONOHYDRATE: 10 INJECTION, SOLUTION INTRAVENOUS at 18:02

## 2024-04-14 RX ADMIN — SODIUM CHLORIDE: 9 INJECTION, SOLUTION INTRAVENOUS at 11:32

## 2024-04-14 RX ADMIN — ERYTHROMYCIN ETHYLSUCCINATE 400 MG: 400 TABLET ORAL at 11:52

## 2024-04-14 RX ADMIN — METOCLOPRAMIDE 5 MG: 10 TABLET ORAL at 09:30

## 2024-04-14 RX ADMIN — SODIUM CHLORIDE: 9 INJECTION, SOLUTION INTRAVENOUS at 05:02

## 2024-04-14 ASSESSMENT — PAIN SCALES - GENERAL
PAINLEVEL_OUTOF10: 0

## 2024-04-14 ASSESSMENT — PAIN SCALES - WONG BAKER: WONGBAKER_NUMERICALRESPONSE: NO HURT

## 2024-04-14 NOTE — PROGRESS NOTES
completed initial visit with patient, per consult.  Patient was up in chair and two sisters were at bedside and supportive.  Patient expressed some concerns for her illness, but syed well, utilizing her farhan and her supportive family.   provided pastoral presence, prayer and empathetic listening.  Peace be with you,  Signed by  SHAYY BailonDiv.   [phone]

## 2024-04-14 NOTE — PROGRESS NOTES
[facility] Progress Note    Follow-Up on: ANTWON/CKD    ROS:  Gen - no fever, no chills, appetite low  CV - no chest pain, no palpitation  Lung - no shortness of breath, no cough  Abd - no tenderness, + nausea, no diarrhea  Ext - no edema    Exam:  Vitals:    04/13/24 2330 04/14/24 0337 04/14/24 0813 04/14/24 1102   BP: (!) 112/59 (!) 118/58 121/61 (!) 119/58   Pulse: 63 64 68 69   Resp: 16 21     Temp: 98.3 °F (36.8 °C) 98 °F (36.7 °C) 97.7 °F (36.5 °C) 97.7 °F (36.5 °C)   TempSrc: Oral Oral Axillary Axillary   SpO2: 94% 96% 96% 97%   Weight:       Height:             Intake/Output Summary (Last 24 hours) at 4/14/2024 1129  Last data filed at 4/14/2024 0637  Gross per 24 hour   Intake 1472.27 ml   Output 820 ml   Net 652.27 ml         Wt Readings from Last 3 Encounters:   04/13/24 80.2 kg (176 lb 14.4 oz)   04/10/24 73 kg (161 lb)   04/04/24 71.7 kg (158 lb)       GEN - in no distress  CV - regular, no murmur, no rub  Lung - clear bilaterally  Abd - soft, nontender  Ext - no edema    Recent Labs     04/12/24  0409 04/13/24  0422 04/14/24  0516   WBC 6.2 5.4 4.5   HGB 8.2* 7.7* 8.1*   HCT 24.6* 23.5* 25.5*   * 113* 112*          Recent Labs     04/12/24  0409 04/13/24  0422 04/14/24  0516   * 138 140   K 4.2 3.7 3.9   * 106 108   CO2 29 28 24   BUN 46* 49* 45*   CREATININE 4.80* 4.80* 4.70*   CALCIUM 9.8 9.4 9.3         Assessment / Plan:  Principal Problem:    ANTWON (acute kidney injury) (HCC)  Active Problems:    Mixed hyperlipidemia    Benign essential hypertension    Moderate protein-calorie malnutrition (HCC)    Hx of hiatal hernia    Failure to thrive in adult    Anemia    Epigastric pain    Esophagitis  Resolved Problems:    * No resolved hospital problems. *    1) ANTWON/ CKD stage 3A - baseline Cr about 1.2, already had ANTWON as outpt with Cr 2.0 last week  - In setting of hypotension, poor PO intake - unclear if progression to ATN  - BP meds on hold.  She denies chronic NSAIDS use  -  CT without evidence of hydronephrosis  - She reports known microscopic hematuria - evaluated outpt by Urology with eventual cysto planned  - Also with 1+ protein on UA, serologies ordered.  ANCA, anti-GBM okay but light chains abnormal  - Peak Cr 4.9, Cr 4.7-4.8 last 4 days, renal function behaving like ATN but now abnormal light chains discovered  - Since pt is NPO, would continue same IVF     2) Hypotension - BP meds on hold      3) Hyponatremia - in setting of ANTWON, mild, improving     4) Nausea - poor PO intake    5) Elevated kappa light chains - needs Heme/Onc consult     High Medical Decision Making  -Patient with at least one acute illness that poses a threat to bodily function  -Reviewed 3 labs  -Reviewed external charts

## 2024-04-14 NOTE — PROGRESS NOTES
This nurse offered to check this patient dressing over her back, but patient and family declined the help.

## 2024-04-14 NOTE — PROGRESS NOTES
Hospitalist Progress Note   Admit Date:  4/10/2024 12:06 PM   Name:  Tayla Escobar   Age:  84 y.o.  Sex:  female  :  1940   MRN:  [id number]   Room:  2/    Presenting/Chief Complaint: Post-op Problem     Reason(s) for Admission: ANTWON (acute kidney injury) (HCC) [N17.9]     Hospital Course:   84 y.o. female who presented to the ED after PCP discovered patient was in ANTWON. Since her hiatal hernia surgery , she has been eating poorly, drinking little, and feeling poorly.     Subjective & 24hr Events:   Patient was seen and examined at the bedside.  No overnight events.  Patient resting in bed comfortably.  Only complains of feeling tired this morning.  Denies cardiac chest pain, shortness of breath, abdominal pain, fever/chills.      Assessment & Plan:   ANTWON  - ARB on hold  - Patient was just recently on amlodipine.  Secondary to hypotension which may have contributed to her current ANTWON  - Nephrology consulted  - Renal ultrasound unremarkable  - CT without evidence of hydronephrosis  - restarted IV fluid  - creatinine remains at 4.8.  Per nephrology renal function behaving like ATN  - creatinine of 4.7 down from 4.8  - elevated kappa light chains  -Nephrology consulting hematology    Nausea  - Patient with constipation  - As needed Compazine  - Poor p.o. intake    Poor oral intake  - Status post Solu-Medrol to stimulate appetite  - Nutrition consulted  - Concern as patient has recently had surgery for hiatal hernia  - General surgery consulted, appreciate recommendations  -Upper GI per general surgery tertiary contraction of the esophagus with mucosal irregularity suggestive of esophagitis  - Delayed transit of contrast into the small bowel loops may represent gastric outlet obstruction  - GI consulted  -  patient underwent EGD.  However food noted in stomach procedure was aborted  - GI plans to reschedule EGD likely on Monday 4/15  -General surgery started on    Head:  Normocephalic, atraumatic  Eyes:  Sclerae appear normal.  Pupils equally round.  ENT:  Nares appear normal.  Moist oral mucosa  Neck:  No restricted ROM.  Trachea midline   CV:   RRR.  No m/r/g.  No jugular venous distension.  Lungs:   CTAB.  No wheezing, rhonchi, or rales.  Symmetric expansion.  Abdomen:   Soft, nontender, nondistended.  Extremities: No cyanosis or clubbing.  No edema  Skin:     No rashes.  Skin tear to thoracic area.   Warm and dry.    Neuro:  CN II-XII grossly intact.    Psych:  Normal mood and affect.      I have personally reviewed labs and tests:  Recent Labs:  Recent Results (from the past 48 hour(s))   Basic Metabolic Panel w/ Reflex to MG    Collection Time: 04/13/24  4:22 AM   Result Value Ref Range    Sodium 138 136 - 146 mmol/L    Potassium 3.7 3.5 - 5.1 mmol/L    Chloride 106 103 - 113 mmol/L    CO2 28 21 - 32 mmol/L    Anion Gap 4 2 - 11 mmol/L    Glucose 82 65 - 100 mg/dL    BUN 49 (H) 8 - 23 MG/DL    Creatinine 4.80 (H) 0.6 - 1.0 MG/DL    Est, Glom Filt Rate 9 (L) >60 ml/min/1.73m2    Calcium 9.4 8.3 - 10.4 MG/DL   CBC with Auto Differential    Collection Time: 04/13/24  4:22 AM   Result Value Ref Range    WBC 5.4 4.3 - 11.1 K/uL    RBC 2.30 (L) 4.05 - 5.2 M/uL    Hemoglobin 7.7 (L) 11.7 - 15.4 g/dL    Hematocrit 23.5 (L) 35.8 - 46.3 %    .2 (H) 82 - 102 FL    MCH 33.5 (H) 26.1 - 32.9 PG    MCHC 32.8 31.4 - 35.0 g/dL    RDW 13.0 11.9 - 14.6 %    Platelets 113 (L) 150 - 450 K/uL    MPV 11.3 9.4 - 12.3 FL    nRBC 0.00 0.0 - 0.2 K/uL    Differential Type AUTOMATED      Neutrophils % 79 (H) 43 - 78 %    Lymphocytes % 11 (L) 13 - 44 %    Monocytes % 8 4.0 - 12.0 %    Eosinophils % 1 0.5 - 7.8 %    Basophils % 0 0.0 - 2.0 %    Immature Granulocytes % 0 0.0 - 5.0 %    Neutrophils Absolute 4.3 1.7 - 8.2 K/UL    Lymphocytes Absolute 0.6 0.5 - 4.6 K/UL    Monocytes Absolute 0.4 0.1 - 1.3 K/UL    Eosinophils Absolute 0.1 0.0 - 0.8 K/UL    Basophils Absolute 0.0 0.0 - 0.2 K/UL

## 2024-04-14 NOTE — PROGRESS NOTES
Brief GI Note    Plan for repeat EGD tomorrow with general anesthesia    Alejandro Vieyra MD  Gastroenterology and Interventional Endoscopy

## 2024-04-15 ENCOUNTER — ANESTHESIA (OUTPATIENT)
Dept: ENDOSCOPY | Age: 84
End: 2024-04-15
Payer: MEDICARE

## 2024-04-15 ENCOUNTER — ANESTHESIA EVENT (OUTPATIENT)
Dept: ENDOSCOPY | Age: 84
End: 2024-04-15
Payer: MEDICARE

## 2024-04-15 PROBLEM — C90.00 MULTIPLE MYELOMA NOT HAVING ACHIEVED REMISSION (HCC): Status: ACTIVE | Noted: 2024-04-15

## 2024-04-15 PROBLEM — K31.1 GASTRIC OUTLET OBSTRUCTION: Status: ACTIVE | Noted: 2024-04-10

## 2024-04-15 LAB
ALBUMIN SERPL ELPH-MCNC: 3.4 G/DL (ref 2.9–4.4)
ALBUMIN/GLOB SERPL: 1.4 (ref 0.7–1.7)
ALPHA1 GLOB SERPL ELPH-MCNC: 0.3 G/DL (ref 0–0.4)
ALPHA2 GLOB SERPL ELPH-MCNC: 0.8 G/DL (ref 0.4–1)
ANION GAP SERPL CALC-SCNC: 6 MMOL/L (ref 2–11)
B-GLOBULIN SERPL ELPH-MCNC: 0.8 G/DL (ref 0.7–1.3)
BASOPHILS # BLD: 0 K/UL (ref 0–0.2)
BASOPHILS NFR BLD: 0 % (ref 0–2)
BUN SERPL-MCNC: 46 MG/DL (ref 8–23)
CALCIUM SERPL-MCNC: 9.1 MG/DL (ref 8.3–10.4)
CHLORIDE SERPL-SCNC: 109 MMOL/L (ref 103–113)
CO2 SERPL-SCNC: 23 MMOL/L (ref 21–32)
CREAT SERPL-MCNC: 4.6 MG/DL (ref 0.6–1)
CREAT UR-MCNC: 62 MG/DL
DIFFERENTIAL METHOD BLD: ABNORMAL
EOSINOPHIL # BLD: 0.1 K/UL (ref 0–0.8)
EOSINOPHIL NFR BLD: 3 % (ref 0.5–7.8)
ERYTHROCYTE [DISTWIDTH] IN BLOOD BY AUTOMATED COUNT: 12.9 % (ref 11.9–14.6)
GAMMA GLOB SERPL ELPH-MCNC: 0.7 G/DL (ref 0.4–1.8)
GLOBULIN SER-MCNC: 2.6 G/DL (ref 2.2–3.9)
GLUCOSE BLD STRIP.AUTO-MCNC: 123 MG/DL (ref 65–100)
GLUCOSE BLD STRIP.AUTO-MCNC: 81 MG/DL (ref 65–100)
GLUCOSE BLD STRIP.AUTO-MCNC: 97 MG/DL (ref 65–100)
GLUCOSE SERPL-MCNC: 96 MG/DL (ref 65–100)
HCT VFR BLD AUTO: 28.1 % (ref 35.8–46.3)
HGB BLD-MCNC: 8.9 G/DL (ref 11.7–15.4)
IGA SERPL-MCNC: 593 MG/DL (ref 64–422)
IGG SERPL-MCNC: 281 MG/DL (ref 586–1602)
IGM SERPL-MCNC: 38 MG/DL (ref 26–217)
IMM GRANULOCYTES # BLD AUTO: 0 K/UL (ref 0–0.5)
IMM GRANULOCYTES NFR BLD AUTO: 0 % (ref 0–5)
INTERPRETATION SERPL IEP-IMP: ABNORMAL
LYMPHOCYTES # BLD: 0.6 K/UL (ref 0.5–4.6)
LYMPHOCYTES NFR BLD: 14 % (ref 13–44)
M PROTEIN SERPL ELPH-MCNC: 0.3 G/DL
MAGNESIUM SERPL-MCNC: 1.5 MG/DL (ref 1.8–2.4)
MCH RBC QN AUTO: 32.8 PG (ref 26.1–32.9)
MCHC RBC AUTO-ENTMCNC: 31.7 G/DL (ref 31.4–35)
MCV RBC AUTO: 103.7 FL (ref 82–102)
MONOCYTES # BLD: 0.4 K/UL (ref 0.1–1.3)
MONOCYTES NFR BLD: 10 % (ref 4–12)
NEUTS SEG # BLD: 3.2 K/UL (ref 1.7–8.2)
NEUTS SEG NFR BLD: 73 % (ref 43–78)
NRBC # BLD: 0 K/UL (ref 0–0.2)
PLATELET # BLD AUTO: 113 K/UL (ref 150–450)
PMV BLD AUTO: 10.7 FL (ref 9.4–12.3)
POTASSIUM SERPL-SCNC: 3.5 MMOL/L (ref 3.5–5.1)
PROT SERPL-MCNC: 6 G/DL (ref 6–8.5)
PROT UR-MCNC: 50 MG/DL
PROT/CREAT UR-RTO: 0.8
RBC # BLD AUTO: 2.71 M/UL (ref 4.05–5.2)
SERVICE CMNT-IMP: ABNORMAL
SERVICE CMNT-IMP: NORMAL
SERVICE CMNT-IMP: NORMAL
SODIUM SERPL-SCNC: 138 MMOL/L (ref 136–146)
URATE SERPL-MCNC: 9.5 MG/DL (ref 2.6–6)
WBC # BLD AUTO: 4.4 K/UL (ref 4.3–11.1)

## 2024-04-15 PROCEDURE — 6370000000 HC RX 637 (ALT 250 FOR IP): Performed by: HOSPITALIST

## 2024-04-15 PROCEDURE — 7100000000 HC PACU RECOVERY - FIRST 15 MIN: Performed by: INTERNAL MEDICINE

## 2024-04-15 PROCEDURE — 84156 ASSAY OF PROTEIN URINE: CPT

## 2024-04-15 PROCEDURE — 3700000000 HC ANESTHESIA ATTENDED CARE: Performed by: INTERNAL MEDICINE

## 2024-04-15 PROCEDURE — 6370000000 HC RX 637 (ALT 250 FOR IP): Performed by: NURSE PRACTITIONER

## 2024-04-15 PROCEDURE — 2580000003 HC RX 258: Performed by: FAMILY MEDICINE

## 2024-04-15 PROCEDURE — 82232 ASSAY OF BETA-2 PROTEIN: CPT

## 2024-04-15 PROCEDURE — 1100000000 HC RM PRIVATE

## 2024-04-15 PROCEDURE — 6360000002 HC RX W HCPCS

## 2024-04-15 PROCEDURE — 82962 GLUCOSE BLOOD TEST: CPT

## 2024-04-15 PROCEDURE — 2580000003 HC RX 258

## 2024-04-15 PROCEDURE — 7100000001 HC PACU RECOVERY - ADDTL 15 MIN: Performed by: INTERNAL MEDICINE

## 2024-04-15 PROCEDURE — 82570 ASSAY OF URINE CREATININE: CPT

## 2024-04-15 PROCEDURE — 84550 ASSAY OF BLOOD/URIC ACID: CPT

## 2024-04-15 PROCEDURE — 2500000003 HC RX 250 WO HCPCS

## 2024-04-15 PROCEDURE — 80048 BASIC METABOLIC PNL TOTAL CA: CPT

## 2024-04-15 PROCEDURE — 2709999900 HC NON-CHARGEABLE SUPPLY: Performed by: INTERNAL MEDICINE

## 2024-04-15 PROCEDURE — 3700000001 HC ADD 15 MINUTES (ANESTHESIA): Performed by: INTERNAL MEDICINE

## 2024-04-15 PROCEDURE — 6370000000 HC RX 637 (ALT 250 FOR IP): Performed by: SURGERY

## 2024-04-15 PROCEDURE — 6360000002 HC RX W HCPCS: Performed by: NURSE PRACTITIONER

## 2024-04-15 PROCEDURE — 85025 COMPLETE CBC W/AUTO DIFF WBC: CPT

## 2024-04-15 PROCEDURE — APPSS60 APP SPLIT SHARED TIME 46-60 MINUTES: Performed by: NURSE PRACTITIONER

## 2024-04-15 PROCEDURE — 36415 COLL VENOUS BLD VENIPUNCTURE: CPT

## 2024-04-15 PROCEDURE — 6370000000 HC RX 637 (ALT 250 FOR IP): Performed by: FAMILY MEDICINE

## 2024-04-15 PROCEDURE — 0DJ08ZZ INSPECTION OF UPPER INTESTINAL TRACT, VIA NATURAL OR ARTIFICIAL OPENING ENDOSCOPIC: ICD-10-PCS | Performed by: INTERNAL MEDICINE

## 2024-04-15 PROCEDURE — 3609017100 HC EGD: Performed by: INTERNAL MEDICINE

## 2024-04-15 PROCEDURE — 43235 EGD DIAGNOSTIC BRUSH WASH: CPT | Performed by: INTERNAL MEDICINE

## 2024-04-15 PROCEDURE — 6360000002 HC RX W HCPCS: Performed by: ANESTHESIOLOGY

## 2024-04-15 PROCEDURE — 83735 ASSAY OF MAGNESIUM: CPT

## 2024-04-15 PROCEDURE — 99222 1ST HOSP IP/OBS MODERATE 55: CPT | Performed by: INTERNAL MEDICINE

## 2024-04-15 PROCEDURE — 2580000003 HC RX 258: Performed by: NURSE PRACTITIONER

## 2024-04-15 RX ORDER — LIDOCAINE HYDROCHLORIDE 10 MG/ML
1 INJECTION, SOLUTION INFILTRATION; PERINEURAL
Status: DISCONTINUED | OUTPATIENT
Start: 2024-04-15 | End: 2024-04-15 | Stop reason: HOSPADM

## 2024-04-15 RX ORDER — SODIUM CHLORIDE 0.9 % (FLUSH) 0.9 %
5-40 SYRINGE (ML) INJECTION PRN
Status: DISCONTINUED | OUTPATIENT
Start: 2024-04-15 | End: 2024-04-15 | Stop reason: HOSPADM

## 2024-04-15 RX ORDER — PROCHLORPERAZINE EDISYLATE 5 MG/ML
5 INJECTION INTRAMUSCULAR; INTRAVENOUS
Status: DISCONTINUED | OUTPATIENT
Start: 2024-04-15 | End: 2024-04-15 | Stop reason: HOSPADM

## 2024-04-15 RX ORDER — SUCCINYLCHOLINE CHLORIDE 20 MG/ML
INJECTION INTRAMUSCULAR; INTRAVENOUS PRN
Status: DISCONTINUED | OUTPATIENT
Start: 2024-04-15 | End: 2024-04-15 | Stop reason: SDUPTHER

## 2024-04-15 RX ORDER — SODIUM CHLORIDE 0.9 % (FLUSH) 0.9 %
5-40 SYRINGE (ML) INJECTION EVERY 12 HOURS SCHEDULED
Status: DISCONTINUED | OUTPATIENT
Start: 2024-04-15 | End: 2024-04-15 | Stop reason: HOSPADM

## 2024-04-15 RX ORDER — PROPOFOL 10 MG/ML
INJECTION, EMULSION INTRAVENOUS PRN
Status: DISCONTINUED | OUTPATIENT
Start: 2024-04-15 | End: 2024-04-15 | Stop reason: SDUPTHER

## 2024-04-15 RX ORDER — ALLOPURINOL 100 MG/1
50 TABLET ORAL
Status: DISCONTINUED | OUTPATIENT
Start: 2024-04-15 | End: 2024-05-04 | Stop reason: HOSPADM

## 2024-04-15 RX ORDER — SODIUM CHLORIDE, SODIUM LACTATE, POTASSIUM CHLORIDE, CALCIUM CHLORIDE 600; 310; 30; 20 MG/100ML; MG/100ML; MG/100ML; MG/100ML
INJECTION, SOLUTION INTRAVENOUS CONTINUOUS
Status: DISCONTINUED | OUTPATIENT
Start: 2024-04-15 | End: 2024-04-15

## 2024-04-15 RX ORDER — SODIUM CHLORIDE, SODIUM LACTATE, POTASSIUM CHLORIDE, CALCIUM CHLORIDE 600; 310; 30; 20 MG/100ML; MG/100ML; MG/100ML; MG/100ML
INJECTION, SOLUTION INTRAVENOUS CONTINUOUS
Status: DISCONTINUED | OUTPATIENT
Start: 2024-04-15 | End: 2024-04-15 | Stop reason: HOSPADM

## 2024-04-15 RX ORDER — DEXTROSE AND SODIUM CHLORIDE 5; .45 G/100ML; G/100ML
INJECTION, SOLUTION INTRAVENOUS CONTINUOUS
Status: DISCONTINUED | OUTPATIENT
Start: 2024-04-15 | End: 2024-04-19

## 2024-04-15 RX ORDER — ONDANSETRON 2 MG/ML
INJECTION INTRAMUSCULAR; INTRAVENOUS PRN
Status: DISCONTINUED | OUTPATIENT
Start: 2024-04-15 | End: 2024-04-15 | Stop reason: SDUPTHER

## 2024-04-15 RX ORDER — ONDANSETRON 2 MG/ML
4 INJECTION INTRAMUSCULAR; INTRAVENOUS
Status: DISCONTINUED | OUTPATIENT
Start: 2024-04-15 | End: 2024-04-15 | Stop reason: HOSPADM

## 2024-04-15 RX ORDER — SODIUM CHLORIDE 9 MG/ML
INJECTION, SOLUTION INTRAVENOUS PRN
Status: DISCONTINUED | OUTPATIENT
Start: 2024-04-15 | End: 2024-04-15 | Stop reason: HOSPADM

## 2024-04-15 RX ORDER — DEXAMETHASONE SODIUM PHOSPHATE 4 MG/ML
INJECTION, SOLUTION INTRA-ARTICULAR; INTRALESIONAL; INTRAMUSCULAR; INTRAVENOUS; SOFT TISSUE PRN
Status: DISCONTINUED | OUTPATIENT
Start: 2024-04-15 | End: 2024-04-15 | Stop reason: SDUPTHER

## 2024-04-15 RX ORDER — LIDOCAINE HYDROCHLORIDE 20 MG/ML
INJECTION, SOLUTION EPIDURAL; INFILTRATION; INTRACAUDAL; PERINEURAL PRN
Status: DISCONTINUED | OUTPATIENT
Start: 2024-04-15 | End: 2024-04-15 | Stop reason: SDUPTHER

## 2024-04-15 RX ORDER — ONDANSETRON 2 MG/ML
4 INJECTION INTRAMUSCULAR; INTRAVENOUS
Status: COMPLETED | OUTPATIENT
Start: 2024-04-15 | End: 2024-04-15

## 2024-04-15 RX ORDER — SODIUM CHLORIDE, SODIUM LACTATE, POTASSIUM CHLORIDE, CALCIUM CHLORIDE 600; 310; 30; 20 MG/100ML; MG/100ML; MG/100ML; MG/100ML
INJECTION, SOLUTION INTRAVENOUS CONTINUOUS PRN
Status: DISCONTINUED | OUTPATIENT
Start: 2024-04-15 | End: 2024-04-15 | Stop reason: SDUPTHER

## 2024-04-15 RX ORDER — NALOXONE HYDROCHLORIDE 0.4 MG/ML
INJECTION, SOLUTION INTRAMUSCULAR; INTRAVENOUS; SUBCUTANEOUS PRN
Status: DISCONTINUED | OUTPATIENT
Start: 2024-04-15 | End: 2024-04-15 | Stop reason: HOSPADM

## 2024-04-15 RX ADMIN — ONDANSETRON 4 MG: 2 INJECTION INTRAMUSCULAR; INTRAVENOUS at 14:15

## 2024-04-15 RX ADMIN — ERYTHROMYCIN ETHYLSUCCINATE 400 MG: 400 TABLET ORAL at 17:39

## 2024-04-15 RX ADMIN — DEXAMETHASONE SODIUM PHOSPHATE 4 MG: 4 INJECTION, SOLUTION INTRAMUSCULAR; INTRAVENOUS at 14:15

## 2024-04-15 RX ADMIN — LIDOCAINE HYDROCHLORIDE 80 MG: 20 INJECTION, SOLUTION EPIDURAL; INFILTRATION; INTRACAUDAL; PERINEURAL at 14:10

## 2024-04-15 RX ADMIN — Medication 6 MG: at 21:37

## 2024-04-15 RX ADMIN — DEXTROSE AND SODIUM CHLORIDE: 5; 450 INJECTION, SOLUTION INTRAVENOUS at 01:13

## 2024-04-15 RX ADMIN — ALLOPURINOL 50 MG: 100 TABLET ORAL at 17:39

## 2024-04-15 RX ADMIN — SODIUM CHLORIDE, SODIUM LACTATE, POTASSIUM CHLORIDE, AND CALCIUM CHLORIDE: 600; 310; 30; 20 INJECTION, SOLUTION INTRAVENOUS at 14:02

## 2024-04-15 RX ADMIN — ONDANSETRON 4 MG: 2 INJECTION INTRAMUSCULAR; INTRAVENOUS at 14:46

## 2024-04-15 RX ADMIN — PHENYLEPHRINE HYDROCHLORIDE 100 MCG: 10 INJECTION INTRAVENOUS at 14:14

## 2024-04-15 RX ADMIN — Medication 200 MG: at 14:10

## 2024-04-15 RX ADMIN — ERYTHROMYCIN ETHYLSUCCINATE 400 MG: 400 TABLET ORAL at 08:52

## 2024-04-15 RX ADMIN — SODIUM CHLORIDE 3 MG: 9 INJECTION, SOLUTION INTRAVENOUS at 17:46

## 2024-04-15 RX ADMIN — SENNOSIDES 8.6 MG: 8.6 TABLET, FILM COATED ORAL at 21:37

## 2024-04-15 RX ADMIN — ROSUVASTATIN CALCIUM 10 MG: 10 TABLET, FILM COATED ORAL at 21:37

## 2024-04-15 RX ADMIN — METOCLOPRAMIDE 5 MG: 10 TABLET ORAL at 08:53

## 2024-04-15 RX ADMIN — PROPOFOL 150 MG: 10 INJECTION, EMULSION INTRAVENOUS at 14:10

## 2024-04-15 ASSESSMENT — PAIN - FUNCTIONAL ASSESSMENT: PAIN_FUNCTIONAL_ASSESSMENT: 0-10

## 2024-04-15 ASSESSMENT — PAIN SCALES - GENERAL
PAINLEVEL_OUTOF10: 0
PAINLEVEL_OUTOF10: 0

## 2024-04-15 NOTE — PROGRESS NOTES
Attempted to see patient this PM for occupational therapy treatment  session. Patient off floor for procedure. Will follow and re-attempt as schedule permits/patient available. Thank you,    Callie Tafoya, OT    Rehab Caseload Tracker        Simple: Patient demonstrates quick and easy understanding/Verbalized Understanding

## 2024-04-15 NOTE — PROGRESS NOTES
Hospitalist Progress Note   Admit Date:  4/10/2024 12:06 PM   Name:  Tayla Escobar   Age:  84 y.o.  Sex:  female  :  1940   MRN:  [id number]   Room:  ENDO/PL    Presenting/Chief Complaint: Post-op Problem     Reason(s) for Admission: ANTWON (acute kidney injury) (HCC) [N17.9]     Hospital Course:   84 y.o. female who presented to the ED after PCP discovered patient was in ANTWON. Since her hiatal hernia surgery , she has been eating poorly, drinking little, and feeling poorly.     Subjective & 24hr Events:   Patient was seen and examined at the bedside.  Patient expected undergo EGD today with GI.  Daughter at bedside.  No new complaints this morning.      Assessment & Plan:   ANTWON  - ARB on hold  - Patient was just recently on amlodipine.  Secondary to hypotension which may have contributed to her current ANTWON  - Nephrology consulted  - Renal ultrasound unremarkable  - CT without evidence of hydronephrosis  - restarted IV fluid  - creatinine remains at 4.8.  Per nephrology renal function behaving like ATN  - creatinine of 4.7 down from 4.8  - elevated kappa light chains  -Nephrology consulting hematology  -Uric acid of 9.5.  Rasburicase and allopurinol ordered    Elevated K LC/multiple myeloma  - SPEP pending  - Oncology consulted, appreciate recommendations  - Oncology discussed with family.  Family deciding on goals of care  - Per oncology she will need bone marrow biopsy    Nausea  - Patient with constipation  - As needed Compazine  - Poor p.o. intake    Poor oral intake  - Status post Solu-Medrol to stimulate appetite  - Nutrition consulted  - Concern as patient has recently had surgery for hiatal hernia  - General surgery consulted, appreciate recommendations  -Upper GI per general surgery tertiary contraction of the esophagus with mucosal irregularity suggestive of esophagitis  - Delayed transit of contrast into the small bowel loops may represent gastric outlet

## 2024-04-15 NOTE — PROGRESS NOTES
Comprehensive Nutrition Assessment    Type and Reason for Visit: Reassess  Malnutrition Screening Tool: Malnutrition Screen  Have you recently lost weight without trying?: 14 to 23 pounds (2 points)  Have you been eating poorly because of a decreased appetite?: Yes (1 point)  Malnutrition Screening Tool Score: 3  Consult for poor PO intake     Nutrition Recommendations/Plan:   Meals and Snacks:  Diet: Continue NPO and advance as medically appropriate  Nutrition Supplement Therapy:  Medical food supplement therapy:  None  not indicated 2/2 NPO status  Pt with minimal PO intake throughout 5 day admission. NPO/clear liquid diet day 4.  If anticipated patient will remain NPO/Clear liquid for greater than 1-2 more days, consider nutrition support for primary needs (with tube feeding route preferred if medically appropriate). If nutrition support pursued, order appropriate route and an Inpatient Consult to Dietitian for RD to manage.  Updated weight      Malnutrition Assessment:  Malnutrition Status: Moderate malnutrition  Context: Acute Illness  Findings of clinical characteristics of malnutrition:   Energy Intake:  Mild decrease in energy intake (Comment) (family reports very poor intake over the past month)  Weight Loss:  Greater than 7.5% over 3 months (10.5% body weight loss in ~ 3 months)     Body Fat Loss:  Mild body fat loss Orbital, Triceps, Buccal region   Muscle Mass Loss:  Mild muscle mass loss Temples (temporalis), Clavicles (pectoralis & deltoids), Hand (interosseous)  Fluid Accumulation:  Unable to assess     Strength:  Not Performed       Nutrition Assessment:  Nutrition History: 4/11: Daughter assists patient in providing nutrition hx. For 2-3 weeks prior to hiatal hernia reports patient appetite was limited, but ever since she was discharged 3/11 patient has taken little to no PO on a daily basis. Patient endorses that she has had consistent nausea (varying in intensity) for the past month. Reports

## 2024-04-15 NOTE — PROGRESS NOTES
Physical Therapy Note:    Attempted to see patient this PM for physical therapy treatment  session. Patient off the floor at endo. Will follow and re-attempt as schedule permits/patient available. Thank you,    Carmen Mcclure, PT     Rehab Caseload Tracker

## 2024-04-15 NOTE — PROGRESS NOTES
Patient resting in bed, alert and oriented, cooperative with care. Patient denies pain or distress, safety measures in place, call light within reach.

## 2024-04-15 NOTE — ANESTHESIA POSTPROCEDURE EVALUATION
Department of Anesthesiology  Postprocedure Note    Patient: Tayla Escobar  MRN: [id number]  YOB: 1940  Date of evaluation: 4/15/2024    Procedure Summary       Date: 04/15/24 Room / Location: [facility] ENDO FLOURO 1 / [facility] ENDOSCOPY    Anesthesia Start: 1402 Anesthesia Stop:     Procedure: ESOPHAGOGASTRODUODENOSCOPY (Upper GI Region) Diagnosis:       Gastric outlet obstruction      (Gastric outlet obstruction [K31.1])    Surgeons: Alejandro Vieyra MD Responsible Provider: MISTY Hernandez MD    Anesthesia Type: general ASA Status: 4            Anesthesia Type: No value filed.    Aliyah Phase I: Aliyah Score: 10    Aliyah Phase II: Aliyah Score: 9    Anesthesia Post Evaluation    Patient location during evaluation: PACU  Patient participation: complete - patient participated  Level of consciousness: awake and alert  Airway patency: patent  Nausea & Vomiting: no nausea and no vomiting  Cardiovascular status: hemodynamically stable  Respiratory status: acceptable  Hydration status: euvolemic  Comments: Blood pressure 132/69, pulse 66, temperature 97.8 °F (36.6 °C), temperature source Skin, resp. rate 16, height 1.6 m (5' 3\"), weight 83.9 kg (185 lb), SpO2 93 %.    No apparent anesthetic complications.  Pt stable for discharge from PACU  Multimodal analgesia pain management approach  Pain management: adequate    No notable events documented.

## 2024-04-15 NOTE — CONSULTS
of the spine and sacroiliac joints.  - VASCULATURE: Mild atherosclerosis.  - OTHER: No ascites. Bilateral lateral fat and bowel-containing ventral hernias.  Mild diastases recti.    Impression  1.  Bilateral lateral fat and bowel-containing ventral hernias without evidence  of obstruction.  2.  Mild diastases recti.  3.  Status post hiatal hernia repair.  4.  Small left pleural effusion.        ASSESSMENT:  Principal Problem:    ANTWON (acute kidney injury) (HCC)  Active Problems:    Mixed hyperlipidemia    Benign essential hypertension    Moderate protein-calorie malnutrition (HCC)    Hx of hiatal hernia    Failure to thrive in adult    Anemia    Epigastric pain    Esophagitis    Gastric outlet obstruction  Resolved Problems:    * No resolved hospital problems. *    Ms. Escobar is a 84 y.o. female admitted on 4/10/2024. The primary encounter diagnosis was ANTWON (acute kidney injury) (HCC). Diagnoses of Epigastric pain, Esophagitis, and Gastric outlet obstruction were also pertinent to this visit..      Her PMH includes HTN, HLD, PE on Eliquis, renal stones, and CKD3A.  She is a former patient of Dr. Wilkinson, followed for PE.  She presented to ED from PCP with ANTWON/CKD.  She is s/p hiatal hernia surgery on 3/4, reports eating and drinking poorly and malaise since surgery.  Cr 4.9 (b/l ~1.2).  Hgb 10.8, Plt 136k.  CCa++ 12.1.  Renal US neg.  CT AP w/o contrast with small L pleural effusion.  Neph following.  FLC with kappa >7300 and ratio 665.   SPEP pending.  We were consulted for elevated kappa light chains.      RECOMMENDATIONS:  Elevated KLC / Multiple myeloma  - FLC with kappa >7300 and ratio 665  - SPEP pending  - Beta 2 ordered  - Dr. Serrato discussed diagnosis of MM.  Pt/family to discuss GOC.  She will need BMBx followed by [facility] if she chooses to pursue work-up/treatment.    ANTWON/CKD  - Renal US neg  - CT AP with small L pleur eff  - Neph follow  - KLC >7300  - Uric acid 9.5.  Rasburicase and renally dosed  allopurinol ordered.    Goals and plan of care reviewed with the patient.  All questions answered to the best of our ability.  Lab studies and imaging studies were personally reviewed.  Thank you for allowing us to participate in the care of Ms. Escobar. We will continue to follow along pending [facility].         Nadia Vidal, RAQUEL - CNP   [facility]  [address] [place] [id number]  Office : [phone]  Fax : [phone]     Attending Addendum:  I have personally performed a face to face diagnostic evaluation on this patient. I have reviewed and agree with the care plan as documented above by [email or web address].  My findings are as follows:   Vitals were reviewed.  /66   Pulse 70   Temp 97.5 °F (36.4 °C) (Oral)   Resp 21   Ht 1.6 m (5' 3\")   Wt 83.9 kg (185 lb)   SpO2 90%   BMI 32.77 kg/m²   Appears tired. Lungs dim in bases, cor regular, abdomen soft and non tender.   I have personally reviewed pertinent labs and imaging.  84 years old female patient with medical problems including hypertension, CKD stage III, history of PE (on Eliquis) and recent hiatal hernia repair on 3/4/2024 who was referred to ED by her primary care physician for management of ANTWON on CKD.  Patient reports feeling unwell with decreased p.o. intake since surgery.  Labs were significant for creatinine of 4.9, hemoglobin of 10.8, platelet count of 136, corrected calcium of 12.1.  Renal ultrasound was unremarkable.  CTAP showed small left pleural effusion and degenerative changes of the spine and sacroiliac joints.  Kappa/lambda was 665 with free kappa light chains of 7382 consistent with diagnosis of plasma cell myeloma.  SPEP is pending.  Discussed pathogenesis, natural history, prognosis and management approach to plasma cell myeloma with the patient and her shoulder.  Discussed the role of bone marrow biopsy in further evaluation and initiation of myeloma directed therapy with Cy[facility].  Patient and family would

## 2024-04-15 NOTE — PROGRESS NOTES
Brief GI Note    Plan for repeat EGD with general anesthesia    Alejandro Vieyra MD  Gastroenterology and Interventional Endoscopy

## 2024-04-15 NOTE — PROGRESS NOTES
Admit Date: 4/10/2024    General surgery following for poor appetite with early satiety and weakness.    3/4/24 Status post robotic hiatal hernia repair with gastropexy between gastric fundus and diaphragm and gastropexy between stomach and anterior abdominal wall.  3/7/24-BUN/Cr 26/1.2    4/11/24-nephrology consultation for ANTWON/CKD 3A  4/12/24 -UGI fluoroscopy   IMPRESSION:     Tertiary contraction of the esophagus with mucosal irregularity suggestive of  esophagitis.     There is delayed transit of contrast into the small bowel loops may represent a  gastric outlet obstruction. Consider follow-up nuclear med gastric emptying  study.  4/13/24 EGD-  Impression:  --Food noted in stomach, and hence, procedure aborted.  4/15/24 Oncology consulted for FLC with kappa >7300 and ratio 665.   4/15/24-EGD   Findings:   --The single channel therapeutic scope was introduced from the mouth and advanced through the esophagus to the GE junction.   --The esophagus was normal.  --The scope was advanced to the stomach which was normal in forward and retroflexed views.   --Duodenum was normal to 2nd portion.     The scope was pulled back, and the procedure was subsequently ended.     Impression:  --Normal EGD. No food noted in the stomach.     Recommendations:  --Suspect most likely underlying gastroparesis. Recommend gastroparesis diet (6 small meals throughout the day and low fiber)    Subjective:     A/O x 4 with some mild distress noted R/T new diagnosis multiple myeloma -4/14/24.     Plan for EGD today 4/14/2024 (aborted EGD 4/30/2024 = food in stomach)  Patient has been n.p.o. over the past 3 days.  Denies pain.  Positive flatus.  Last BM 4/12/2024.  Denies nausea or vomiting.  Patient is hungry.  Afebrile.  VSS.    Objective:       Vitals:    04/15/24 1335 04/15/24 1434 04/15/24 1435 04/15/24 1440   BP: 135/62 (!) 151/69  (!) 141/66   Pulse: 68 74 75 73   Resp: 16 16  17   Temp: 98.1 °F (36.7 °C) 97.8 °F (36.6 °C)     TempSrc:  mmol/L    Anion Gap 6 2 - 11 mmol/L    Glucose 96 65 - 100 mg/dL    BUN 46 (H) 8 - 23 MG/DL    Creatinine 4.60 (H) 0.6 - 1.0 MG/DL    Est, Glom Filt Rate 9 (L) >60 ml/min/1.73m2    Calcium 9.1 8.3 - 10.4 MG/DL   CBC with Auto Differential    Collection Time: 04/15/24  4:20 AM   Result Value Ref Range    WBC 4.4 4.3 - 11.1 K/uL    RBC 2.71 (L) 4.05 - 5.2 M/uL    Hemoglobin 8.9 (L) 11.7 - 15.4 g/dL    Hematocrit 28.1 (L) 35.8 - 46.3 %    .7 (H) 82 - 102 FL    MCH 32.8 26.1 - 32.9 PG    MCHC 31.7 31.4 - 35.0 g/dL    RDW 12.9 11.9 - 14.6 %    Platelets 113 (L) 150 - 450 K/uL    MPV 10.7 9.4 - 12.3 FL    nRBC 0.00 0.0 - 0.2 K/uL    Differential Type AUTOMATED      Neutrophils % 73 43 - 78 %    Lymphocytes % 14 13 - 44 %    Monocytes % 10 4.0 - 12.0 %    Eosinophils % 3 0.5 - 7.8 %    Basophils % 0 0.0 - 2.0 %    Immature Granulocytes % 0 0.0 - 5.0 %    Neutrophils Absolute 3.2 1.7 - 8.2 K/UL    Lymphocytes Absolute 0.6 0.5 - 4.6 K/UL    Monocytes Absolute 0.4 0.1 - 1.3 K/UL    Eosinophils Absolute 0.1 0.0 - 0.8 K/UL    Basophils Absolute 0.0 0.0 - 0.2 K/UL    Immature Granulocytes Absolute 0.0 0.0 - 0.5 K/UL   Magnesium    Collection Time: 04/15/24  4:20 AM   Result Value Ref Range    Magnesium 1.5 (L) 1.8 - 2.4 mg/dL   Uric Acid    Collection Time: 04/15/24  4:20 AM   Result Value Ref Range    Uric Acid 9.5 (H) 2.6 - 6.0 MG/DL   POCT Glucose    Collection Time: 04/15/24  5:10 AM   Result Value Ref Range    POC Glucose 97 65 - 100 mg/dL    Performed by: Aster          Assessment:     Principal Problem:    ANTWON (acute kidney injury) (HCC)  Active Problems:    Mixed hyperlipidemia    Benign essential hypertension    Moderate protein-calorie malnutrition (HCC)    Hx of hiatal hernia    Failure to thrive in adult    Anemia    Epigastric pain    Esophagitis    Gastric outlet obstruction  Resolved Problems:    * No resolved hospital problems. *        Plan/Recommendations/Medical Decision Making:     Attending

## 2024-04-15 NOTE — OP NOTE
Procedure: EGD    Indication: Concern for Gastric outlet obstruction    Date of Procedure: 4/15/2024    Patient profile: Refer to patient note in chart for documentation of history and physical    Providers: Alejandro Vieyra MD    Referring MD: Fawad Maldonado MD     PCP: Ricky García DO    Medicines: Monitored anesthesia care    Complication: No immediate complications.     Estimated blood loss: Minimal    Procedure: After the risks including, but not limited to medication reaction, infection, bleeding, perforation, missed lesions, benefits and alternatives of the procedure were discussed with the patient and all questions were answered, informed consent was obtained. The therapeutic scope was passed under direct vision throughout the procedure, the patient's blood pressure pulse and oxygen saturation were monitored continuously. The scope was introduced through the mouth and advanced to the 2nd portion of the duodenum. The endoscopy was performed without difficulty. The patient tolerated the procedure well.       Findings:   --The single channel therapeutic scope was introduced from the mouth and advanced through the esophagus to the GE junction.   --The esophagus was normal.  --The scope was advanced to the stomach which was normal in forward and retroflexed views.   --Duodenum was normal to 2nd portion.    The scope was pulled back, and the procedure was subsequently ended.    Impression:  --Normal EGD. No food noted in the stomach.    Recommendations:  --Suspect most likely underlying gastroparesis. Recommend gastroparesis diet (6 small meals throughout the day and low fiber)    Alejandro Vieyra MD  Gastroenterology and Interventional Endoscopy

## 2024-04-15 NOTE — ANESTHESIA PRE PROCEDURE
blood dyscrasia: anemia and thrombocytopenia, arthritis:..                 Abdominal:             Vascular:   + DVT, PE (10/23).       Other Findings:           Anesthesia Plan      general     ASA 4     (GETA, RSI)  Induction: intravenous.      Anesthetic plan and risks discussed with patient and child/children.                      Hubert Blandon MD   4/15/2024

## 2024-04-15 NOTE — PERIOP NOTE
TRANSFER - OUT REPORT:    Verbal report given to CHANTEL Moran on Tayla Escobar  being transferred to Room 812 for routine progression of patient care       Report consisted of patient’s Situation, Background, Assessment and   Recommendations(SBAR).     Information from the following report(s) Nurse Handoff Report, Index, Adult Overview, Surgery Report, MAR, Cardiac Rhythm SR, and Neuro Assessment was reviewed with the receiving nurse.    Lines:   Peripheral IV 04/15/24 Left;Proximal;Anterior Antecubital (Active)        Opportunity for questions and clarification was provided.      Patient transported with:   Tech    VTE prophylaxis orders have been written for Tayla Escobar.    Patient and family given floor number and nurses name.  Family updated re: pt status after security code verified.

## 2024-04-15 NOTE — PROGRESS NOTES
[facility] Progress Note    Follow-Up on: ANTWON/CKD  Patient seen and examined. Denies complaints. Family at bedside  ROS:  Gen - no fever, no chills, appetite low  CV - no chest pain, no palpitation  Lung - no shortness of breath, no cough  Abd - no tenderness, + nausea, no diarrhea  Ext - no edema    Exam:  Vitals:    04/14/24 1959 04/14/24 2351 04/15/24 0410 04/15/24 0722   BP: 127/63 125/70 126/63 132/64   Pulse: 69 67 64 71   Resp: 19 19 18 18   Temp: 98.2 °F (36.8 °C) 98.6 °F (37 °C) 97.7 °F (36.5 °C) 98.1 °F (36.7 °C)   TempSrc: Oral Oral Oral Oral   SpO2: 99% 97% 98% 97%   Weight:       Height:             Intake/Output Summary (Last 24 hours) at 4/15/2024 0858  Last data filed at 4/15/2024 0648  Gross per 24 hour   Intake 1900.08 ml   Output 550 ml   Net 1350.08 ml         Wt Readings from Last 3 Encounters:   04/13/24 80.2 kg (176 lb 14.4 oz)   04/10/24 73 kg (161 lb)   04/04/24 71.7 kg (158 lb)       GEN - in no distress  CV - regular, no murmur, no rub  Lung - clear bilaterally  Abd - soft, nontender  Ext - no edema    Recent Labs     04/13/24  0422 04/14/24  0516 04/15/24  0420   WBC 5.4 4.5 4.4   HGB 7.7* 8.1* 8.9*   HCT 23.5* 25.5* 28.1*   * 112* 113*          Recent Labs     04/13/24  0422 04/14/24  0516 04/15/24  0420    140 138   K 3.7 3.9 3.5    108 109   CO2 28 24 23   BUN 49* 45* 46*   CREATININE 4.80* 4.70* 4.60*   CALCIUM 9.4 9.3 9.1   MG  --   --  1.5*         Assessment / Plan:  Principal Problem:    ANTWON (acute kidney injury) (HCC)  Active Problems:    Mixed hyperlipidemia    Benign essential hypertension    Moderate protein-calorie malnutrition (HCC)    Hx of hiatal hernia    Failure to thrive in adult    Anemia    Epigastric pain    Esophagitis    Gastric outlet obstruction  Resolved Problems:    * No resolved hospital problems. *    1) ANTWON/ CKD stage 3A - baseline Cr about 1.2, already had ANTWON as outpt with Cr 2.0 last week  - In setting of hypotension, poor  PO intake   - CT without evidence of hydronephrosis  - She reports known microscopic hematuria - evaluated outpt by Urology with eventual cysto planned  - Also with 1+ protein on UA, serologies ordered.  ANCA, anti-GBM okay. Light chains abnormal  - Peak Cr 4.9, Cr 4.7-4.8 last 4 days, renal function stable today  - She is NPO for EGD so continue IVF today     2) Hypotension - BP meds on hold      3) Hyponatremia - in setting of ANTWON, resolved     4) Nausea - poor PO intake    5) Elevated kappa light chains - Heme/onc has been consulted     High Medical Decision Making  -Patient with at least one acute illness that poses a threat to bodily function  -Reviewed 3 labs  -Reviewed external charts

## 2024-04-16 ENCOUNTER — APPOINTMENT (OUTPATIENT)
Dept: GENERAL RADIOLOGY | Age: 84
DRG: 682 | End: 2024-04-16
Payer: MEDICARE

## 2024-04-16 LAB
ALBUMIN SERPL-MCNC: 2.7 G/DL (ref 3.2–4.6)
ANION GAP SERPL CALC-SCNC: 6 MMOL/L (ref 2–11)
B2 MICROGLOB SERPL-MCNC: 6.5 MG/L (ref 0.6–2.4)
BUN SERPL-MCNC: 42 MG/DL (ref 8–23)
CALCIUM SERPL-MCNC: 8.7 MG/DL (ref 8.3–10.4)
CHLORIDE SERPL-SCNC: 109 MMOL/L (ref 103–113)
CO2 SERPL-SCNC: 23 MMOL/L (ref 21–32)
CREAT SERPL-MCNC: 4.4 MG/DL (ref 0.6–1)
GLUCOSE BLD STRIP.AUTO-MCNC: 112 MG/DL (ref 65–100)
GLUCOSE BLD STRIP.AUTO-MCNC: 119 MG/DL (ref 65–100)
GLUCOSE BLD STRIP.AUTO-MCNC: 123 MG/DL (ref 65–100)
GLUCOSE BLD STRIP.AUTO-MCNC: 143 MG/DL (ref 65–100)
GLUCOSE SERPL-MCNC: 109 MG/DL (ref 65–100)
PHOSPHATE SERPL-MCNC: 3.4 MG/DL (ref 2.3–3.7)
POTASSIUM SERPL-SCNC: 3.4 MMOL/L (ref 3.5–5.1)
SERVICE CMNT-IMP: ABNORMAL
SODIUM SERPL-SCNC: 138 MMOL/L (ref 136–146)
URATE SERPL-MCNC: 0.3 MG/DL (ref 2.6–6)

## 2024-04-16 PROCEDURE — APPSS30 APP SPLIT SHARED TIME 16-30 MINUTES: Performed by: NURSE PRACTITIONER

## 2024-04-16 PROCEDURE — 36415 COLL VENOUS BLD VENIPUNCTURE: CPT

## 2024-04-16 PROCEDURE — 2700000000 HC OXYGEN THERAPY PER DAY

## 2024-04-16 PROCEDURE — 80069 RENAL FUNCTION PANEL: CPT

## 2024-04-16 PROCEDURE — 97535 SELF CARE MNGMENT TRAINING: CPT

## 2024-04-16 PROCEDURE — 2580000003 HC RX 258: Performed by: FAMILY MEDICINE

## 2024-04-16 PROCEDURE — 6370000000 HC RX 637 (ALT 250 FOR IP): Performed by: SURGERY

## 2024-04-16 PROCEDURE — 97112 NEUROMUSCULAR REEDUCATION: CPT

## 2024-04-16 PROCEDURE — 77075 RADEX OSSEOUS SURVEY COMPL: CPT

## 2024-04-16 PROCEDURE — 99232 SBSQ HOSP IP/OBS MODERATE 35: CPT | Performed by: INTERNAL MEDICINE

## 2024-04-16 PROCEDURE — 94760 N-INVAS EAR/PLS OXIMETRY 1: CPT

## 2024-04-16 PROCEDURE — 2400000000

## 2024-04-16 PROCEDURE — 82962 GLUCOSE BLOOD TEST: CPT

## 2024-04-16 PROCEDURE — 92526 ORAL FUNCTION THERAPY: CPT

## 2024-04-16 PROCEDURE — 1100000000 HC RM PRIVATE

## 2024-04-16 PROCEDURE — 6370000000 HC RX 637 (ALT 250 FOR IP): Performed by: FAMILY MEDICINE

## 2024-04-16 PROCEDURE — 84550 ASSAY OF BLOOD/URIC ACID: CPT

## 2024-04-16 PROCEDURE — 2580000003 HC RX 258: Performed by: INTERNAL MEDICINE

## 2024-04-16 PROCEDURE — 6370000000 HC RX 637 (ALT 250 FOR IP): Performed by: HOSPITALIST

## 2024-04-16 PROCEDURE — 99232 SBSQ HOSP IP/OBS MODERATE 35: CPT | Performed by: STUDENT IN AN ORGANIZED HEALTH CARE EDUCATION/TRAINING PROGRAM

## 2024-04-16 PROCEDURE — 97530 THERAPEUTIC ACTIVITIES: CPT

## 2024-04-16 RX ORDER — SENNOSIDES A AND B 8.6 MG/1
1 TABLET, FILM COATED ORAL NIGHTLY
Status: DISCONTINUED | OUTPATIENT
Start: 2024-04-17 | End: 2024-04-24

## 2024-04-16 RX ADMIN — ROSUVASTATIN CALCIUM 10 MG: 10 TABLET, FILM COATED ORAL at 20:54

## 2024-04-16 RX ADMIN — ERYTHROMYCIN ETHYLSUCCINATE 400 MG: 400 TABLET ORAL at 18:31

## 2024-04-16 RX ADMIN — ERYTHROMYCIN ETHYLSUCCINATE 400 MG: 400 TABLET ORAL at 13:00

## 2024-04-16 RX ADMIN — SODIUM CHLORIDE, PRESERVATIVE FREE 10 ML: 5 INJECTION INTRAVENOUS at 10:00

## 2024-04-16 RX ADMIN — METOCLOPRAMIDE 5 MG: 10 TABLET ORAL at 10:35

## 2024-04-16 RX ADMIN — DEXTROSE AND SODIUM CHLORIDE: 5; 450 INJECTION, SOLUTION INTRAVENOUS at 12:41

## 2024-04-16 RX ADMIN — ERYTHROMYCIN ETHYLSUCCINATE 400 MG: 400 TABLET ORAL at 10:34

## 2024-04-16 RX ADMIN — Medication 6 MG: at 22:03

## 2024-04-16 RX ADMIN — SODIUM CHLORIDE, PRESERVATIVE FREE 10 ML: 5 INJECTION INTRAVENOUS at 20:53

## 2024-04-16 ASSESSMENT — PAIN SCALES - GENERAL: PAINLEVEL_OUTOF10: 0

## 2024-04-16 NOTE — PROGRESS NOTES
[facility] Progress Note    Follow-Up on: ANTWON/CKD  Patient seen and examined. Denies complaints. Family at bedside and unsure of next steps with new myeloma diagnosis. Nausea is some better  ROS:  Gen - no fever, no chills, appetite low  CV - no chest pain, no palpitation  Lung - no shortness of breath, no cough  Abd - no tenderness, + nausea, no diarrhea  Ext - no edema    Exam:  Vitals:    04/15/24 1947 04/15/24 2342 04/16/24 0307 04/16/24 0727   BP: 133/69 116/65 135/66 122/68   Pulse: 67 71 67 72   Resp: 18 19 19 18   Temp: 97.9 °F (36.6 °C) 97.9 °F (36.6 °C) 97.9 °F (36.6 °C) 98.2 °F (36.8 °C)   TempSrc: Oral Axillary Axillary Axillary   SpO2: 97% 97% 96% 97%   Weight:       Height:             Intake/Output Summary (Last 24 hours) at 4/16/2024 0900  Last data filed at 4/16/2024 0623  Gross per 24 hour   Intake 600 ml   Output 900 ml   Net -300 ml         Wt Readings from Last 3 Encounters:   04/15/24 83.9 kg (185 lb)   04/10/24 73 kg (161 lb)   04/04/24 71.7 kg (158 lb)       GEN - in no distress  CV - regular, no murmur, no rub  Lung - clear bilaterally  Abd - soft, nontender  Ext - no edema    Recent Labs     04/14/24  0516 04/15/24  0420   WBC 4.5 4.4   HGB 8.1* 8.9*   HCT 25.5* 28.1*   * 113*          Recent Labs     04/14/24  0516 04/15/24  0420 04/16/24  0440    138 138   K 3.9 3.5 3.4*    109 109   CO2 24 23 23   BUN 45* 46* 42*   CREATININE 4.70* 4.60* 4.40*   CALCIUM 9.3 9.1 8.7   MG  --  1.5*  --    PHOS  --   --  3.4         Assessment / Plan:  Principal Problem:    ANTWON (acute kidney injury) (HCC)  Active Problems:    Mixed hyperlipidemia    Benign essential hypertension    Moderate protein-calorie malnutrition (HCC)    Hx of hiatal hernia    Failure to thrive in adult    Anemia    Epigastric pain    Esophagitis    Gastric outlet obstruction    Multiple myeloma not having achieved remission (HCC)  Resolved Problems:    * No resolved hospital problems. *    1) ANTWON/ CKD

## 2024-04-16 NOTE — PROGRESS NOTES
ACUTE OCCUPATIONAL THERAPY GOALS:   (Developed with and agreed upon by patient and/or caregiver.)  1. Patient will complete full body bathing and dressing with min A, additional time, verbal cues and adaptive equipment as needed.   2. Patient will complete toileting with SBA.   3. Patient will complete functional transfers with SBA and adaptive equipment as needed.   4. Patient will tolerate at least 15 minutes of OT activity with less than 2 rest breaks while maintaining O2 sats >90%.   5. Patient will verbalize at least 3 energy conservation technique to utilize during ADL/IADL.   6. Patient will complete grooming in standing at sink level with SBA.  7. Patient will complete household distances with SBA and adaptive equipment as needed.     Timeframe: 7 visits        OCCUPATIONAL THERAPY: Daily Note AM   OT Visit Days: 2   Time In/Out  OT Charge Capture  Rehab Caseload Tracker  OT Orders    Tayla Escobar is a 84 y.o. female   PRIMARY DIAGNOSIS: ANTWON (acute kidney injury) (HCC)  ANTWON (acute kidney injury) (HCC) [N17.9]  Procedure(s) (LRB):  ESOPHAGOGASTRODUODENOSCOPY (N/A)  1 Day Post-Op  Inpatient: Payor: [employer] MEDICARE / Plan: AET MEDICARE-ADVANTAGE PPO / Product Type: Medicare /     ASSESSMENT:     REHAB RECOMMENDATIONS:   Recommendation to date pending progress:  Setting:  Short-term Rehab    Equipment:    To Be Determined--pt has rollator, rolling walker, bedside commode, hospital bed, and lift chair at home     ASSESSMENT:  Ms. Escobar is received supine in bed with daughter and son at bedside, agreeable to participate in the session. OT/PT seen pt together this date due to decreased activity tolerance. Pt performed bed mobility transfers/supine to sit edge of bed with minimal-moderate assist x2 and additional time/cues for proper technique and sequencing. Pt presented with fair + to fair sitting balance edge of bed requiring standby assist for safety. Pt required maximal assist to don B socks seated

## 2024-04-16 NOTE — PROGRESS NOTES
Comprehensive Nutrition Assessment    Type and Reason for Visit: Reassess  Malnutrition Screening Tool: Malnutrition Screen  Have you recently lost weight without trying?: 14 to 23 pounds (2 points)  Have you been eating poorly because of a decreased appetite?: Yes (1 point)  Malnutrition Screening Tool Score: 3  Consult for poor PO intake     Nutrition Recommendations/Plan:   Meals and Snacks:  Diet: Add low fat/low fiber  dietary restrictions  Nutrition Supplement Therapy:  Medical food supplement therapy:  Change Ensure High Protein three times per day (this provides 160 kcal and 16 grams protein per bottle)  Consider initiation of appetite stimulant   Nutrition Education  Educated on Gastroparesis diet   Learners: Patient and Family  Readiness: Eager  Method: Explanation and Handout  Response: Verbalizes Understanding     Malnutrition Assessment:  Malnutrition Status: Moderate malnutrition  Context: Acute Illness  Findings of clinical characteristics of malnutrition:   Energy Intake:  Mild decrease in energy intake (Comment) (family reports very poor intake over the past month)  Weight Loss:  Greater than 7.5% over 3 months (10.5% body weight loss in ~ 3 months)     Body Fat Loss:  Mild body fat loss Orbital, Triceps, Buccal region   Muscle Mass Loss:  Mild muscle mass loss Temples (temporalis), Clavicles (pectoralis & deltoids), Hand (interosseous)  Fluid Accumulation:  Unable to assess     Strength:  Not Performed       Nutrition Assessment:  Nutrition History: 4/11: Daughter assists patient in providing nutrition hx. For 2-3 weeks prior to hiatal hernia reports patient appetite was limited, but ever since she was discharged 3/11 patient has taken little to no PO on a daily basis. Patient endorses that she has had consistent nausea (varying in intensity) for the past month. Reports that when she eats solid foods they get \"stuck\" and points to her sternum. Daughter reports she is able to take some fluids,

## 2024-04-16 NOTE — PROGRESS NOTES
Hospitalist Progress Note   Admit Date:  4/10/2024 12:06 PM   Name:  Tayla Escobar   Age:  84 y.o.  Sex:  female  :  1940   MRN:  [id number]   Room:  [facility]/    Presenting/Chief Complaint: Post-op Problem     Reason(s) for Admission: ANTWON (acute kidney injury) (HCC) [N17.9]     Hospital Course:   84 y.o. female who presented to the ED after PCP discovered patient was in ANTWON. Since her hiatal hernia surgery , she has been eating poorly, drinking little, and feeling poorly.     Subjective & 24hr Events:   Patient was seen and examined at the bedside.  No overnight events.  Family at bedside.  Discussed in detail about hospitalization.  Patient wishes to have bone marrow biopsy.      Assessment & Plan:   ANTWON  - ARB on hold  - Patient was just recently on amlodipine.  Secondary to hypotension which may have contributed to her current ANTWON  - Nephrology consulted  - Renal ultrasound unremarkable  - CT without evidence of hydronephrosis  - restarted IV fluid  - creatinine remains at 4.8.  Per nephrology renal function behaving like ATN  - creatinine of 4.7 down from 4.8  - elevated kappa light chains  -Nephrology consulting hematology  -Uric acid of 9.5.  Rasburicase and allopurinol ordered  - creatinine of 4.4 down from 4.6    Elevated K LC/multiple myeloma  - SPEP with M spike at 0.3 and 0.1  - Oncology consulted, appreciate recommendations  - Oncology discussed with family.  Family deciding on goals of care  - Patient wishes to have bone marrow biopsy for further workup  -IR consulted  - Patient still undecided on whether or not she wants treatment at this time  - Palliative care consulted for goals of care discussion  - Skeletal survey ordered by oncology    Nausea  - Patient with constipation  - As needed Compazine  - Poor p.o. intake    Poor oral intake  - Status post Solu-Medrol to stimulate appetite  - Nutrition consulted  - Concern as patient has recently had surgery  for hiatal hernia  - General surgery consulted, appreciate recommendations  -Upper GI per general surgery tertiary contraction of the esophagus with mucosal irregularity suggestive of esophagitis  - Delayed transit of contrast into the small bowel loops may represent gastric outlet obstruction  - GI consulted  - 4/13 patient underwent EGD.  However food noted in stomach procedure was aborted  - GI plans to reschedule EGD likely on Monday 4/15  -General surgery started on erythromycin ethyl succinate and Reglan for gastroparesis    Superficial thoracic skin tear  - Local wound care per nursing    Hyperlipidemia  - Statin    Constipation  - Senna  - Colace    PT/OT evals and PPD ordered?  Therapy   Diet:  ADULT ORAL NUTRITION SUPPLEMENT; Breakfast, Lunch, Dinner; Low Calorie/High Protein Oral Supplement  ADULT DIET; Dysphagia - Soft and Bite Sized; Low Fat (less than or equal to 50 gm/day); Low Fiber; No Caffeine  VTE prophylaxis: SCD's   Code status: Full Code      Non-peripheral Lines and Tubes (if present):      Urinary Catheter 04/13/24 2 Way (Active)        Telemetry (if present):  Cardiac/Telemetry Monitor On: No        Hospital Problems:  Principal Problem:    ANTWON (acute kidney injury) (HCC)  Active Problems:    Mixed hyperlipidemia    Benign essential hypertension    Moderate protein-calorie malnutrition (HCC)    Hx of hiatal hernia    Failure to thrive in adult    Anemia    Epigastric pain    Esophagitis    Gastric outlet obstruction    Multiple myeloma not having achieved remission (HCC)  Resolved Problems:    * No resolved hospital problems. *      Objective:   Patient Vitals for the past 24 hrs:   Temp Pulse Resp BP SpO2   04/16/24 1048 97.3 °F (36.3 °C) 75 18 126/68 97 %   04/16/24 0727 98.2 °F (36.8 °C) 72 18 122/68 97 %   04/16/24 0307 97.9 °F (36.6 °C) 67 19 135/66 96 %   04/15/24 2342 97.9 °F (36.6 °C) 71 19 116/65 97 %   04/15/24 1947 97.9 °F (36.6 °C) 67 18 133/69 97 %   04/15/24 1549 97.5 °F (36.4

## 2024-04-16 NOTE — PROGRESS NOTES
Tayla Escobar is 84 y.o. y/o female     Initial consult: See consult note from 4/12: Patient with poor appetite, possible dysphagia, ANTWON, and weight loss after hernia repair in March. EGD attempted on 4/13, but had food in her stomach. Repeat EGD on 4/15 was normal; likely gastroparesis and recommend gastroparesis diet.    Today: Patient started on Reglan and erythromycin since admission. Able to tolerate cups of jello and small sips of liquid. No pain, but seems that poor appetite is mostly due to lack of wanting anything. Dietitian to see her and work on diet.     PE:   Vitals:    04/16/24 1502   BP: 127/72   Pulse: 71   Resp: 18   Temp: 98.1 °F (36.7 °C)   SpO2: 99%      General:  The patient appears well-nourished, and is in no acute distress.    HEENT:  Normocephalic, atraumatic. No sclerae icterus.   Respiratory: Respiratory effort is normal. Expansion maintained bilaterally and symmetrically. Normal breath sounds and clear to auscultation bilaterally without wheezes, rales, or rhonchi.    Cardiovascular:  Regular rate and rhythm.     Abdomen:  Soft, non tender to palpation. No distention. Normoactive bowel sounds present.    Neurologic:  Alert and oriented x3.  Psychiatric: Appropriate mood and affect.    Assessment and Plan:   #Gastroparesis: No abdominal discomfort or vomiting currently. Tolerating some sips of clear liquids. Seems mostly due to lack of wanting any food. Dietitan will work with her for gastroparesis diet. MD to follow with further recommendations.     Electronically signed by Jame Rosas PA-C.

## 2024-04-16 NOTE — PROGRESS NOTES
[facility] Hematology & Oncology        Inpatient Hematology / Oncology Progress Note    Reason for Consult:  ANTWON (acute kidney injury) (HCC) [N17.9]  Referring Physician:  Fawad Maldonado MD    24 Hour Events:  Afebrile, VSS  Cr stable 4.4  SPEP with m-spike @ 0.3 and 0.1  Pt wishes to have BMBx for further w/u  Family at bedside        ROS:  Constitutional: +weakness, fatigue, malaise. Negative for fever, chills.  CV: Negative for chest pain, palpitations, edema.  Respiratory: Negative for dyspnea, cough, wheezing.  GI: Negative for nausea, abdominal pain, diarrhea.    10 point review of systems is otherwise negative with the exception of the elements mentioned above in the HPI.         No Known Allergies  Past Medical History:   Diagnosis Date    Acute respiratory failure with hypoxemia (HCC)     Benign essential hypertension 2/11/2015    medication    Bilateral leg edema 4/26/2017    Cancer (HCC)     skin    Hiatal hernia     \"large\"    Hypercholesteremia 2/11/2015    IFG (impaired fasting glucose) 4/26/2017    Iron deficiency anemia 5/12/2016    Low oxygen saturation     per office notes- patient's daughter reported O2 sat drops to 85% when supine, she uses O2    Mixed hyperlipidemia 2/11/2015    Morbid obesity (HCC) 10/26/2017    Nipple discharge     not current as of 7/17/13    Primary insomnia 4/26/2018    Primary osteoarthritis of both knees 4/26/2018    Pulmonary embolism (HCC) 10/15/2023    on eliquis- Dr. Arnold recommended eliquis full dose x 6 months    Stage 3 chronic kidney disease (HCC) 7/17/2019     Past Surgical History:   Procedure Laterality Date    HIATAL HERNIA REPAIR N/A 3/4/2024    ROBOTIC HIATAL HERNIA REPAIR performed by Thomas Lozano MD at [facility] MAIN OR    MALIGNANT SKIN LESION EXCISION      OTHER SURGICAL HISTORY      skin cancer    UPPER GASTROINTESTINAL ENDOSCOPY N/A 4/13/2024    ESOPHAGOGASTRODUODENOSCOPY performed by Alejandro Vieyra MD at [facility] ENDOSCOPY     Family History

## 2024-04-16 NOTE — PROGRESS NOTES
GOALS:  LTG: Patient will maintain adequate hydration/nutrition with optimum safety and efficiency of swallowing function with PO intake without overt signs or symptoms of aspiration for the highest appropriate diet level.  STG:  Patient will consume soft and bite-sized textures and thin liquids without overt signs or symptoms of airway compromise. MET 24  Patient will safely ingest diet trials during therapeutic feedings with SLP without overt signs or symptoms of respiratory compromise in efforts to advance diet. MET 24      SPEECH LANGUAGE PATHOLOGY: DYSPHAGIA Daily Note #1    Acknowledge Order  I  Therapy Time  I   Charges     I  Rehab Caseload Tracker      NAME: Tayla Escobar  : 1940  MRN: [id number]    ADMISSION DATE: 4/10/2024  PRIMARY DIAGNOSIS: ANTWON (acute kidney injury) (HCC)    ICD-10: Treatment Diagnosis: R13.14 Dysphagia, Pharyngoesophageal Phase    RECOMMENDATIONS   Diet:    Soft and Bite-Sized  Thin Liquids  Family is cleared to bring in preferred food items  Upgrade diet as tolerated, although patient will likely not eat hospital provided foods    Medication: as tolerated and whole floated in puree or applesauce   Compensatory Swallowing Strategies:   Slow rate of intake  Remain upright for 30-45 minutes after meals  Small bites/sips  PER GI: 6 small meals/day due to gastroparesis    Therapeutic Intervention:   Patient/family education  Dysphagia treatment   Patient continues to require skilled intervention:  No. Please re-consult if new concerns arise.      Anticipated Discharge Needs: No additional speech therapy is indicated.      ASSESSMENT    Patient continues to present with unremarkable swallow mechanism on both solid and liquids consistencies at bedside. Patient presenting with disinterest and distaste for food vs dysfunction. No further ST indicated.     EGD findings:   -The esophagus was normal.  -The stomach was normal in forward and retroflexed views.   - Duodenum was

## 2024-04-16 NOTE — PROGRESS NOTES
ACUTE PHYSICAL THERAPY GOALS:   (Developed with and agreed upon by patient and/or caregiver.)  LTG:  (1.)Ms. Escobar will move from supine to sit and sit to supine , scoot up and down, and roll side to side in bed with INDEPENDENT within 7 treatment day(s).    (2.)Ms. Escobar will transfer from bed to chair and chair to bed with MODIFIED INDEPENDENCE using the least restrictive device within 7 treatment day(s).    (3.)Ms. Escobar will ambulate with MODIFIED INDEPENDENCE for 75 feet with the least restrictive device within 7 treatment day(s).  (4.)Ms. Escobar will tolerate at least 23 min of dynamic standing activity to assist standing ADLs with the least restrictive device within 7 treatment days.     PHYSICAL THERAPY: Daily Note AM   (Link to Caseload Tracking: PT Visit Days : 2  Time In/Out PT Charge Capture  Rehab Caseload Tracker  Orders    Tayla Escobar is a 84 y.o. female   PRIMARY DIAGNOSIS: ANTWON (acute kidney injury) ([facility])  ANTWON (acute kidney injury) ([facility]) [N17.9]  Procedure(s) (LRB):  ESOPHAGOGASTRODUODENOSCOPY (N/A)  1 Day Post-Op  Inpatient: Payor: [employer] MEDICARE / Plan: [employer] MEDICARE-ADVANTAGE PPO / Product Type: Medicare /     ASSESSMENT:     REHAB RECOMMENDATIONS:   Recommendation to date pending progress:  Setting:  Short-term Rehab    Equipment:    To Be Determined     ASSESSMENT:  Ms. Escobar presents in supine, lethargic, adult daughter present. She requires frequent encouragement from both therapists, and discharge recommendations have been updated at this time due to significant decline in activity tolerance.       Upon entering, Pnt is agreeable to PT treatment.  she reports 3/10 pain in her low back at rest. Pnt performed supine > sit with min-mod Ax2, sitting EOB with fair sitting balance control. Sit > stand with min-mod Ax2 using RW. Gait 4 x6  ft from bed to chair to commode to chair with min-mod Ax2, cues for step length.  Gait is noted to be slowed and shuffled. Stand > sit with

## 2024-04-17 ENCOUNTER — APPOINTMENT (OUTPATIENT)
Dept: GENERAL RADIOLOGY | Age: 84
DRG: 682 | End: 2024-04-17
Payer: MEDICARE

## 2024-04-17 ENCOUNTER — APPOINTMENT (OUTPATIENT)
Dept: CT IMAGING | Age: 84
DRG: 682 | End: 2024-04-17
Payer: MEDICARE

## 2024-04-17 PROBLEM — Z51.11 ENCOUNTER FOR ANTINEOPLASTIC CHEMOTHERAPY: Status: ACTIVE | Noted: 2024-04-17

## 2024-04-17 LAB
ALBUMIN SERPL-MCNC: 2.5 G/DL (ref 3.2–4.6)
ALBUMIN/GLOB SERPL: 1.1 (ref 0.4–1.6)
ALP SERPL-CCNC: 60 U/L (ref 50–136)
ALT SERPL-CCNC: 9 U/L (ref 12–65)
ANION GAP SERPL CALC-SCNC: 4 MMOL/L (ref 2–11)
APTT PPP: 26.5 SEC (ref 23.3–37.4)
AST SERPL-CCNC: 16 U/L (ref 15–37)
BASOPHILS # BLD: 0 K/UL (ref 0–0.2)
BASOPHILS NFR BLD: 1 % (ref 0–2)
BILIRUB DIRECT SERPL-MCNC: 0.2 MG/DL
BILIRUB INDIRECT SERPL-MCNC: 0.4 MG/DL (ref 0–1.1)
BILIRUB SERPL-MCNC: 0.5 MG/DL (ref 0.2–1.1)
BILIRUB SERPL-MCNC: 0.6 MG/DL (ref 0.2–1.1)
BONE MARROW PREP & WRIGHT STAIN: NORMAL
BUN SERPL-MCNC: 37 MG/DL (ref 8–23)
CALCIUM SERPL-MCNC: 8.7 MG/DL (ref 8.3–10.4)
CHLORIDE SERPL-SCNC: 110 MMOL/L (ref 103–113)
CO2 SERPL-SCNC: 25 MMOL/L (ref 21–32)
CREAT SERPL-MCNC: 4.4 MG/DL (ref 0.6–1)
D DIMER PPP FEU-MCNC: 2.1 UG/ML(FEU)
DIFFERENTIAL METHOD BLD: ABNORMAL
EOSINOPHIL # BLD: 0.1 K/UL (ref 0–0.8)
EOSINOPHIL NFR BLD: 3 % (ref 0.5–7.8)
ERYTHROCYTE [DISTWIDTH] IN BLOOD BY AUTOMATED COUNT: 13.1 % (ref 11.9–14.6)
FIBRINOGEN PPP-MCNC: 377 MG/DL (ref 190–501)
FOLATE SERPL-MCNC: 5.4 NG/ML (ref 3.1–17.5)
GLOBULIN SER CALC-MCNC: 2.3 G/DL (ref 2.8–4.5)
GLUCOSE BLD STRIP.AUTO-MCNC: 111 MG/DL (ref 65–100)
GLUCOSE BLD STRIP.AUTO-MCNC: 131 MG/DL (ref 65–100)
GLUCOSE BLD STRIP.AUTO-MCNC: 99 MG/DL (ref 65–100)
GLUCOSE SERPL-MCNC: 106 MG/DL (ref 65–100)
HCT VFR BLD AUTO: 23.2 % (ref 35.8–46.3)
HGB BLD-MCNC: 7.6 G/DL (ref 11.7–15.4)
HGB RETIC QN AUTO: 39 PG (ref 29–35)
IMM GRANULOCYTES # BLD AUTO: 0 K/UL (ref 0–0.5)
IMM GRANULOCYTES NFR BLD AUTO: 0 % (ref 0–5)
IMM RETICS NFR: 5.6 % (ref 3–15.9)
INR PPP: 1.1
LDH SERPL L TO P-CCNC: 274 U/L (ref 110–210)
LYMPHOCYTES # BLD: 0.7 K/UL (ref 0.5–4.6)
LYMPHOCYTES NFR BLD: 16 % (ref 13–44)
MAGNESIUM SERPL-MCNC: 1.6 MG/DL (ref 1.8–2.4)
MCH RBC QN AUTO: 32.6 PG (ref 26.1–32.9)
MCHC RBC AUTO-ENTMCNC: 32.8 G/DL (ref 31.4–35)
MCV RBC AUTO: 99.6 FL (ref 82–102)
MONOCYTES # BLD: 0.4 K/UL (ref 0.1–1.3)
MONOCYTES NFR BLD: 10 % (ref 4–12)
NEUTS SEG # BLD: 3.1 K/UL (ref 1.7–8.2)
NEUTS SEG NFR BLD: 71 % (ref 43–78)
NRBC # BLD: 0 K/UL (ref 0–0.2)
PLATELET # BLD AUTO: 111 K/UL (ref 150–450)
PMV BLD AUTO: 10.5 FL (ref 9.4–12.3)
POTASSIUM SERPL-SCNC: 3.2 MMOL/L (ref 3.5–5.1)
PROT SERPL-MCNC: 4.8 G/DL (ref 6.3–8.2)
PROTHROMBIN TIME: 14.7 SEC (ref 11.3–14.9)
RBC # BLD AUTO: 2.33 M/UL (ref 4.05–5.2)
RETICS # AUTO: 0.02 M/UL (ref 0.03–0.1)
RETICS/RBC NFR AUTO: 1 % (ref 0.3–2)
SERVICE CMNT-IMP: ABNORMAL
SERVICE CMNT-IMP: ABNORMAL
SERVICE CMNT-IMP: NORMAL
SODIUM SERPL-SCNC: 139 MMOL/L (ref 136–146)
VIT B12 SERPL-MCNC: 584 PG/ML (ref 193–986)
WBC # BLD AUTO: 4.4 K/UL (ref 4.3–11.1)

## 2024-04-17 PROCEDURE — 80053 COMPREHEN METABOLIC PANEL: CPT

## 2024-04-17 PROCEDURE — 88313 SPECIAL STAINS GROUP 2: CPT

## 2024-04-17 PROCEDURE — 99152 MOD SED SAME PHYS/QHP 5/>YRS: CPT | Performed by: RADIOLOGY

## 2024-04-17 PROCEDURE — 6360000002 HC RX W HCPCS: Performed by: STUDENT IN AN ORGANIZED HEALTH CARE EDUCATION/TRAINING PROGRAM

## 2024-04-17 PROCEDURE — 51701 INSERT BLADDER CATHETER: CPT

## 2024-04-17 PROCEDURE — 36415 COLL VENOUS BLD VENIPUNCTURE: CPT

## 2024-04-17 PROCEDURE — 82607 VITAMIN B-12: CPT

## 2024-04-17 PROCEDURE — 82247 BILIRUBIN TOTAL: CPT

## 2024-04-17 PROCEDURE — 2580000003 HC RX 258: Performed by: FAMILY MEDICINE

## 2024-04-17 PROCEDURE — 71045 X-RAY EXAM CHEST 1 VIEW: CPT

## 2024-04-17 PROCEDURE — 2580000003 HC RX 258: Performed by: INTERNAL MEDICINE

## 2024-04-17 PROCEDURE — 83735 ASSAY OF MAGNESIUM: CPT

## 2024-04-17 PROCEDURE — 99152 MOD SED SAME PHYS/QHP 5/>YRS: CPT

## 2024-04-17 PROCEDURE — 85046 RETICYTE/HGB CONCENTRATE: CPT

## 2024-04-17 PROCEDURE — 2700000000 HC OXYGEN THERAPY PER DAY

## 2024-04-17 PROCEDURE — 88184 FLOWCYTOMETRY/ TC 1 MARKER: CPT

## 2024-04-17 PROCEDURE — 88311 DECALCIFY TISSUE: CPT

## 2024-04-17 PROCEDURE — 6370000000 HC RX 637 (ALT 250 FOR IP): Performed by: SURGERY

## 2024-04-17 PROCEDURE — 88237 TISSUE CULTURE BONE MARROW: CPT

## 2024-04-17 PROCEDURE — 99233 SBSQ HOSP IP/OBS HIGH 50: CPT | Performed by: INTERNAL MEDICINE

## 2024-04-17 PROCEDURE — 38222 DX BONE MARROW BX & ASPIR: CPT | Performed by: PHYSICIAN ASSISTANT

## 2024-04-17 PROCEDURE — 2500000003 HC RX 250 WO HCPCS: Performed by: PHYSICIAN ASSISTANT

## 2024-04-17 PROCEDURE — 88365 INSITU HYBRIDIZATION (FISH): CPT

## 2024-04-17 PROCEDURE — 88374 M/PHMTRC ALYS ISHQUANT/SEMIQ: CPT

## 2024-04-17 PROCEDURE — 99231 SBSQ HOSP IP/OBS SF/LOW 25: CPT | Performed by: STUDENT IN AN ORGANIZED HEALTH CARE EDUCATION/TRAINING PROGRAM

## 2024-04-17 PROCEDURE — 85025 COMPLETE CBC W/AUTO DIFF WBC: CPT

## 2024-04-17 PROCEDURE — 079T3ZX DRAINAGE OF BONE MARROW, PERCUTANEOUS APPROACH, DIAGNOSTIC: ICD-10-PCS | Performed by: RADIOLOGY

## 2024-04-17 PROCEDURE — 88342 IMHCHEM/IMCYTCHM 1ST ANTB: CPT

## 2024-04-17 PROCEDURE — 88264 CHROMOSOME ANALYSIS 20-25: CPT

## 2024-04-17 PROCEDURE — 6360000002 HC RX W HCPCS: Performed by: RADIOLOGY

## 2024-04-17 PROCEDURE — 6370000000 HC RX 637 (ALT 250 FOR IP): Performed by: HOSPITALIST

## 2024-04-17 PROCEDURE — 99223 1ST HOSP IP/OBS HIGH 75: CPT | Performed by: NURSE PRACTITIONER

## 2024-04-17 PROCEDURE — 51798 US URINE CAPACITY MEASURE: CPT

## 2024-04-17 PROCEDURE — 07DR3ZX EXTRACTION OF ILIAC BONE MARROW, PERCUTANEOUS APPROACH, DIAGNOSTIC: ICD-10-PCS | Performed by: RADIOLOGY

## 2024-04-17 PROCEDURE — 6370000000 HC RX 637 (ALT 250 FOR IP): Performed by: STUDENT IN AN ORGANIZED HEALTH CARE EDUCATION/TRAINING PROGRAM

## 2024-04-17 PROCEDURE — 77012 CT SCAN FOR NEEDLE BIOPSY: CPT | Performed by: PHYSICIAN ASSISTANT

## 2024-04-17 PROCEDURE — 82962 GLUCOSE BLOOD TEST: CPT

## 2024-04-17 PROCEDURE — 6370000000 HC RX 637 (ALT 250 FOR IP)

## 2024-04-17 PROCEDURE — 85379 FIBRIN DEGRADATION QUANT: CPT

## 2024-04-17 PROCEDURE — 85610 PROTHROMBIN TIME: CPT

## 2024-04-17 PROCEDURE — 84238 ASSAY NONENDOCRINE RECEPTOR: CPT

## 2024-04-17 PROCEDURE — 82248 BILIRUBIN DIRECT: CPT

## 2024-04-17 PROCEDURE — 1100000000 HC RM PRIVATE

## 2024-04-17 PROCEDURE — 83615 LACTATE (LD) (LDH) ENZYME: CPT

## 2024-04-17 PROCEDURE — 77012 CT SCAN FOR NEEDLE BIOPSY: CPT

## 2024-04-17 PROCEDURE — 85384 FIBRINOGEN ACTIVITY: CPT

## 2024-04-17 PROCEDURE — 83010 ASSAY OF HAPTOGLOBIN QUANT: CPT

## 2024-04-17 PROCEDURE — 88305 TISSUE EXAM BY PATHOLOGIST: CPT

## 2024-04-17 PROCEDURE — 88364 INSITU HYBRIDIZATION (FISH): CPT

## 2024-04-17 PROCEDURE — 88185 FLOWCYTOMETRY/TC ADD-ON: CPT

## 2024-04-17 PROCEDURE — APPSS30 APP SPLIT SHARED TIME 16-30 MINUTES: Performed by: NURSE PRACTITIONER

## 2024-04-17 PROCEDURE — 85730 THROMBOPLASTIN TIME PARTIAL: CPT

## 2024-04-17 PROCEDURE — 82746 ASSAY OF FOLIC ACID SERUM: CPT

## 2024-04-17 PROCEDURE — 6370000000 HC RX 637 (ALT 250 FOR IP): Performed by: FAMILY MEDICINE

## 2024-04-17 RX ORDER — FENTANYL CITRATE 50 UG/ML
INJECTION, SOLUTION INTRAMUSCULAR; INTRAVENOUS PRN
Status: COMPLETED | OUTPATIENT
Start: 2024-04-17 | End: 2024-04-17

## 2024-04-17 RX ORDER — POTASSIUM CHLORIDE 7.45 MG/ML
10 INJECTION INTRAVENOUS PRN
Status: DISCONTINUED | OUTPATIENT
Start: 2024-04-17 | End: 2024-04-24

## 2024-04-17 RX ORDER — LIDOCAINE HYDROCHLORIDE 20 MG/ML
INJECTION, SOLUTION INFILTRATION; PERINEURAL PRN
Status: COMPLETED | OUTPATIENT
Start: 2024-04-17 | End: 2024-04-17

## 2024-04-17 RX ORDER — MAGNESIUM SULFATE IN WATER 40 MG/ML
2000 INJECTION, SOLUTION INTRAVENOUS PRN
Status: DISCONTINUED | OUTPATIENT
Start: 2024-04-17 | End: 2024-04-24

## 2024-04-17 RX ORDER — DIPHENHYDRAMINE HYDROCHLORIDE 50 MG/ML
INJECTION INTRAMUSCULAR; INTRAVENOUS PRN
Status: COMPLETED | OUTPATIENT
Start: 2024-04-17 | End: 2024-04-17

## 2024-04-17 RX ORDER — ONDANSETRON 2 MG/ML
4 INJECTION INTRAMUSCULAR; INTRAVENOUS EVERY 6 HOURS PRN
Status: DISCONTINUED | OUTPATIENT
Start: 2024-04-17 | End: 2024-05-04 | Stop reason: HOSPADM

## 2024-04-17 RX ORDER — POTASSIUM CHLORIDE 20 MEQ/1
40 TABLET, EXTENDED RELEASE ORAL PRN
Status: DISCONTINUED | OUTPATIENT
Start: 2024-04-17 | End: 2024-04-24

## 2024-04-17 RX ORDER — ACYCLOVIR 400 MG/1
200 TABLET ORAL 2 TIMES DAILY
Status: DISCONTINUED | OUTPATIENT
Start: 2024-04-18 | End: 2024-05-04 | Stop reason: HOSPADM

## 2024-04-17 RX ORDER — MIDAZOLAM HYDROCHLORIDE 2 MG/2ML
INJECTION, SOLUTION INTRAMUSCULAR; INTRAVENOUS PRN
Status: COMPLETED | OUTPATIENT
Start: 2024-04-17 | End: 2024-04-17

## 2024-04-17 RX ADMIN — FENTANYL CITRATE 50 MCG: 50 INJECTION, SOLUTION INTRAMUSCULAR; INTRAVENOUS at 12:37

## 2024-04-17 RX ADMIN — POTASSIUM BICARBONATE 40 MEQ: 782 TABLET, EFFERVESCENT ORAL at 09:58

## 2024-04-17 RX ADMIN — SODIUM CHLORIDE, PRESERVATIVE FREE 10 ML: 5 INJECTION INTRAVENOUS at 21:51

## 2024-04-17 RX ADMIN — LIDOCAINE HYDROCHLORIDE 5 ML: 20 INJECTION, SOLUTION INFILTRATION; PERINEURAL at 12:58

## 2024-04-17 RX ADMIN — DIPHENHYDRAMINE HYDROCHLORIDE 12.5 MG: 50 INJECTION, SOLUTION INTRAMUSCULAR; INTRAVENOUS at 12:37

## 2024-04-17 RX ADMIN — SODIUM CHLORIDE, PRESERVATIVE FREE 10 ML: 5 INJECTION INTRAVENOUS at 09:45

## 2024-04-17 RX ADMIN — MIDAZOLAM HYDROCHLORIDE 0.5 MG: 1 INJECTION, SOLUTION INTRAMUSCULAR; INTRAVENOUS at 12:49

## 2024-04-17 RX ADMIN — MAGNESIUM SULFATE HEPTAHYDRATE 2000 MG: 40 INJECTION, SOLUTION INTRAVENOUS at 10:00

## 2024-04-17 RX ADMIN — SENNOSIDES 8.6 MG: 8.6 TABLET, FILM COATED ORAL at 21:46

## 2024-04-17 RX ADMIN — METOCLOPRAMIDE 5 MG: 10 TABLET ORAL at 09:58

## 2024-04-17 RX ADMIN — DEXTROSE AND SODIUM CHLORIDE: 5; 450 INJECTION, SOLUTION INTRAVENOUS at 16:57

## 2024-04-17 RX ADMIN — Medication 6 MG: at 21:47

## 2024-04-17 RX ADMIN — ONDANSETRON 4 MG: 2 INJECTION INTRAMUSCULAR; INTRAVENOUS at 09:56

## 2024-04-17 RX ADMIN — ROSUVASTATIN CALCIUM 10 MG: 10 TABLET, FILM COATED ORAL at 21:46

## 2024-04-17 RX ADMIN — ERYTHROMYCIN ETHYLSUCCINATE 400 MG: 400 TABLET ORAL at 16:57

## 2024-04-17 RX ADMIN — MIDAZOLAM HYDROCHLORIDE 1 MG: 1 INJECTION, SOLUTION INTRAMUSCULAR; INTRAVENOUS at 12:37

## 2024-04-17 RX ADMIN — FENTANYL CITRATE 25 MCG: 50 INJECTION, SOLUTION INTRAMUSCULAR; INTRAVENOUS at 12:49

## 2024-04-17 NOTE — CONSULTS
Procedure Laterality Date    HIATAL HERNIA REPAIR N/A 3/4/2024    ROBOTIC HIATAL HERNIA REPAIR performed by Thomas Lozano MD at [facility] MAIN OR    MALIGNANT SKIN LESION EXCISION      OTHER SURGICAL HISTORY      skin cancer    UPPER GASTROINTESTINAL ENDOSCOPY N/A 4/13/2024    ESOPHAGOGASTRODUODENOSCOPY performed by Alejandro Vieyra MD at [facility] ENDOSCOPY    UPPER GASTROINTESTINAL ENDOSCOPY N/A 4/15/2024    ESOPHAGOGASTRODUODENOSCOPY performed by Alejandro Vieyra MD at [facility] ENDOSCOPY     Family History   Problem Relation Age of Onset    No Known Problems Sister     Ovarian Cancer Neg Hx     Cancer Sister         lung    Breast Cancer Neg Hx     Hypertension Mother     Arrhythmia Mother     Cancer Sister     Cancer Brother         lung    Cancer Father       Social History     Tobacco Use    Smoking status: Never    Smokeless tobacco: Former     Types: Chew     Quit date: 2018    Tobacco comments:     Quit smoking: dip snuff   Substance Use Topics    Alcohol use: No     Alcohol/week: 0.0 standard drinks of alcohol     Prior to Admission medications    Medication Sig Start Date End Date Taking? Authorizing Provider   ondansetron (ZOFRAN-ODT) 4 MG disintegrating tablet Take 1 tablet by mouth 3 times daily as needed for Nausea or Vomiting 4/10/24   Yudy Alvarez APRN - CNP   cyclobenzaprine (FLEXERIL) 5 MG tablet Take 1 tablet by mouth 3 times daily as needed for Muscle spasms  Patient not taking: Reported on 4/4/2024    Antonio Mora MD   fexofenadine (ALLEGRA ALLERGY) 180 MG tablet Take 1 tablet by mouth daily  Patient not taking: Reported on 4/4/2024 11/28/23   Antonio Mora MD   apixaban (ELIQUIS) 5 MG TABS tablet Take 1 tablet by mouth 2 times daily 1/16/24   Yudy Alvarez APRN - CNP   diclofenac sodium (VOLTAREN) 1 % GEL Apply 4 g topically 4 times daily as needed for Pain  Patient not taking: Reported on 4/4/2024 1/16/24   Yudy Alvarez APRN - CNP   amLODIPine (NORVASC) 5 MG  tablet Take 1 tablet by mouth daily TAKE 1 TABLET DAILY  Patient not taking: Reported on 4/10/2024 11/27/23   Nelia Garcíann LUCI    furosemide (LASIX) 20 MG tablet Take 1 tablet by mouth daily  Patient not taking: Reported on 3/4/2024 11/27/23   Ricky Ricky VERMA DO   potassium chloride (KLOR-CON M) 10 MEQ extended release tablet Take 1 tablet by mouth daily  Patient not taking: Reported on 2/28/2024 11/27/23   RickyRicky DO   rosuvastatin (CRESTOR) 10 MG tablet TAKE 1 TABLET DAILY 11/27/23   Nelia Garcíann LUCIDO   acetaminophen (TYLENOL) 500 MG tablet Take 1 tablet by mouth every 6 hours as needed for Pain    Provider, MD Antonio   vitamin D (CHOLECALCIFEROL) 25 MCG (1000 UT) TABS tablet Take by mouth  Patient not taking: Reported on 4/4/2024    Automatic Reconciliation, Ar       No Known Allergies     Review of Systems:  A comprehensive review of systems was negative except for:   Constitutional: Positive for fatigue.  Gastrointestinal: Positive for nausea      Objective:     Visit Vitals  /69   Pulse 69   Temp 98.1 °F (36.7 °C) (Oral)   Resp 22   Ht 1.6 m (5' 3\")   Wt 83.9 kg (185 lb)   SpO2 96%   BMI 32.77 kg/m²        Physical Exam:    General:  Cooperative. Debilitated and frail. No acute distress.   Eyes:  Conjunctivae/corneas clear    Nose: Nares normal. Septum midline.   Neck: Supple, symmetrical, trachea midline   Lungs:   unlabored   Heart:  Regular rate and rhythm   Abdomen:   Soft, non-tender, non-distended   Extremities: Normal, atraumatic, no cyanosis. Generalized edema   Skin: Skin color, texture, turgor normal.   Neurologic: Nonfocal   Psych: Alert and oriented      Assessment:         Signed By: Margarita Arndt, APRN - CNP     April 17, 2024

## 2024-04-17 NOTE — PROGRESS NOTES
[facility] Progress Note    Follow-Up on: ANTWON/CKD  Patient seen and examined. Complains of nausea. BMBx pending. Family at bedside  ROS:  Gen - no fever, no chills, appetite low  CV - no chest pain, no palpitation  Lung - no shortness of breath, no cough  Abd - no tenderness, + nausea, no diarrhea  Ext - no edema    Exam:  Vitals:    04/16/24 2014 04/16/24 2302 04/17/24 0307 04/17/24 0818   BP: (!) 154/80 123/66 (!) 149/75 124/69   Pulse: 68 66 70 69   Resp: 18 18 22 22   Temp: 98.6 °F (37 °C) 98.8 °F (37.1 °C) 98.4 °F (36.9 °C) 98.1 °F (36.7 °C)   TempSrc: Oral Oral Oral Oral   SpO2: 97% 98% 96% 96%   Weight:       Height:             Intake/Output Summary (Last 24 hours) at 4/17/2024 0923  Last data filed at 4/17/2024 0520  Gross per 24 hour   Intake 540 ml   Output 1100 ml   Net -560 ml         Wt Readings from Last 3 Encounters:   04/15/24 83.9 kg (185 lb)   04/10/24 73 kg (161 lb)   04/04/24 71.7 kg (158 lb)       GEN - in no distress  CV - regular, no murmur, no rub  Lung - clear bilaterally  Abd - soft, nontender  Ext - no edema    Recent Labs     04/15/24  0420 04/17/24  0411   WBC 4.4 4.4   HGB 8.9* 7.6*   HCT 28.1* 23.2*   * 111*          Recent Labs     04/15/24  0420 04/16/24  0440 04/17/24  0411    138 139   K 3.5 3.4* 3.2*    109 110   CO2 23 23 25   BUN 46* 42* 37*   CREATININE 4.60* 4.40* 4.40*   CALCIUM 9.1 8.7 8.7   MG 1.5*  --  1.6*   PHOS  --  3.4  --    ALBUMIN  --  2.7* 2.5*         Assessment / Plan:  Principal Problem:    ANTWON (acute kidney injury) ([facility])  Active Problems:    Mixed hyperlipidemia    Benign essential hypertension    Moderate protein-calorie malnutrition (HCC)    Hx of hiatal hernia    Failure to thrive in adult    Anemia    Epigastric pain    Esophagitis    Gastric outlet obstruction    Multiple myeloma not having achieved remission (HCC)  Resolved Problems:    * No resolved hospital problems. *    1) ANTWON/ CKD stage 3A - baseline Cr about 1.2,  already had ANTWON as outpt with Cr 2.0 last week  - In setting of hypotension, poor PO intake   - CT without evidence of hydronephrosis  - Peak Cr 4.9, stable today  - new diagnosis myeloma with elevated K/L rato ~600. M spike on SPEP. Proteinuria 800mg  -net negative  last few days with nausea, npo for procedure. Increase IVF    2) Hypotension - BP meds on hold      3) Hyponatremia - in setting of ANTWON, resolved     4) Nausea - poor PO intake    5) Elevated kappa light chains/MM - Heme/onc following, if patient wishes to pursue treatment, they will start with CyBorD, plan for BMBx per patient request for staging. Undecided on pursuing treatment. PC consulted to assist with [facility]     High Medical Decision Making  -Patient with at least one acute illness that poses a threat to bodily function  -Reviewed 3 labs  -Reviewed external charts

## 2024-04-17 NOTE — PROGRESS NOTES
hyperlipidemia    Benign essential hypertension    Moderate protein-calorie malnutrition (HCC)    Hx of hiatal hernia    Failure to thrive in adult    Anemia    Epigastric pain    Esophagitis    Gastric outlet obstruction    Multiple myeloma not having achieved remission (HCC)  Resolved Problems:    * No resolved hospital problems. *    Ms. Escobar is a 84 y.o. female admitted on 4/10/2024. The primary encounter diagnosis was ANTWON (acute kidney injury) (HCC). Diagnoses of Epigastric pain, Esophagitis, and Gastric outlet obstruction were also pertinent to this visit..      Her PMH includes HTN, HLD, PE on Eliquis, renal stones, and CKD3A.  She is a former patient of Dr. Wilkinson, followed for PE.  She presented to ED from PCP with ANTWON/CKD.  She is s/p hiatal hernia surgery on 3/4, reports eating and drinking poorly and malaise since surgery.  Cr 4.9 (b/l ~1.2).  Hgb 10.8, Plt 136k.  CCa++ 12.1.  Renal US neg.  CT AP w/o contrast with small L pleural effusion.  Neph following.  FLC with kappa >7300 and ratio 665.   SPEP pending.  We were consulted for elevated kappa light chains.      RECOMMENDATIONS:  Elevated KLC / Multiple myeloma  - FLC with kappa >7300 and ratio 665  - SPEP pending  - Beta 2 ordered  - Dr. Serrato discussed diagnosis of MM.  Pt/family to discuss GOC.  She will need BMBx followed by CyBorD if she chooses to pursue work-up/treatment as well as OP PET/CT.  4/16 SPEP with m-spike @ 0.3 and 0.1, biclonal IgA protein with kappa specificity.  Pt wishes to proceed with BMBx.  IR consulted.  She is undecided whether she wants to treatment right now.  Consult PC to assist with GOC.  If pt wishes to pursue treatment, would recommend first treatment with CyBorD prior to discharging to [facility].  Further treatments will need to be held while in rehab.  Skeletal survey ordered.  Beta 2 pending.  4/17 BMBx today.  Beta 2 elevated at 6.5.  Skeletal survey with destructive changes of lower lumbar and upper sacral spine.   free kappa light chains of 7382 consistent with diagnosis of plasma cell myeloma.  Reviewed lab results with the patient and daughter.  Patient is agreeable to having bone marrow biopsy done  this was completed earlier today.  Discussed rationale/logistics/risks/benefits/potential toxicities of CyBorD.  Patient and daughter verbalized understanding and wished to proceed.  Treatment plan orders have been entered.  Hopefully can start cycle 1 on 4/18/2024. PET/CT as outpatient.     Jovan Serrato MD  [facility] Hematology/Oncology

## 2024-04-17 NOTE — PROGRESS NOTES
Tayla Escobar is 84 y.o. y/o female     Initial consult: See consult note from 4/12: Patient with poor appetite, possible dysphagia, ANTWON, and weight loss after hernia repair in March. EGD attempted on 4/13, but had food in her stomach. Repeat EGD on 4/15 was normal; likely gastroparesis and recommend gastroparesis diet. She was stared on Reglan and Erythromycin. Recommend EGD with botox to pylorus if she continued to have symptoms.     Today: Patient in radiology. Per nurse, tolerating soft diet today.       PE:   Vitals:    04/17/24 1625   BP: 138/70   Pulse: 69   Resp: 18   Temp: 97.3 °F (36.3 °C)   SpO2: 97%        Assessment and Plan:   #Gastroparesis: Per nurse, patient PO intake is improving. Would recommend advancing diet as tolerated. GI will sign off at this time; please reconsult as needed.     Electronically signed by Jame Rosas PA-C.

## 2024-04-17 NOTE — PROGRESS NOTES
Admit Date: 4/10/2024    General surgery following for poor appetite with early satiety and weakness.    3/4/24 Status post robotic hiatal hernia repair with gastropexy between gastric fundus and diaphragm and gastropexy between stomach and anterior abdominal wall.  3/7/24-BUN/Cr 26/1.2    4/11/24-nephrology consultation for ANTWON/CKD 3A  4/12/24 -UGI fluoroscopy   IMPRESSION:     Tertiary contraction of the esophagus with mucosal irregularity suggestive of  esophagitis.     There is delayed transit of contrast into the small bowel loops may represent a  gastric outlet obstruction. Consider follow-up nuclear med gastric emptying  study.  4/13/24 EGD-  Impression:  --Food noted in stomach, and hence, procedure aborted.  4/15/24 Oncology consulted for FLC with kappa >7300 and ratio 665.   4/15/24-EGD   Findings:   --The single channel therapeutic scope was introduced from the mouth and advanced through the esophagus to the GE junction.   --The esophagus was normal.  --The scope was advanced to the stomach which was normal in forward and retroflexed views.   --Duodenum was normal to 2nd portion.     The scope was pulled back, and the procedure was subsequently ended.     Impression:  --Normal EGD. No food noted in the stomach.     Recommendations:  --Suspect most likely underlying gastroparesis. Recommend gastroparesis diet (6 small meals throughout the day and low fiber)    4/16/24-patient has left to pursue oncology treatment for multiple myeloma  Subjective:     A/O x 4 with NAD noted.  Respirations even and unlabored on 2 L/min.    Patient is n.p.o. this a.m. for bone marrow biopsy with IR.    Patient reports tolerating soft and bite-size diet-(low fiber for gastroparesis) prior to NPO  Patient reports some nausea this AM but no vomiting.  Patient is hungry and agrees that nausea may be related to NPO.  Positive flatus.  BM x 3 past 24 H = D (no melena or hematochezia)  Afebrile.  BP 120s-150s/60s-80s; otherwise,  VSS.    Objective:       Vitals:    24 1252 24 1256 24 1300 24 1304   BP: (!) 147/66 127/61 125/61 127/65   Pulse: 79 68 75 70   Resp:    Temp:       TempSrc:       SpO2: 100% 100% 100% 100%   Weight:       Height:           Temp (24hrs), Av.3 °F (36.8 °C), Min:98 °F (36.7 °C), Max:98.8 °F (37.1 °C)  .  I&O reviewed as documented.  1100 mL UOP past 24 H-Forrest  Physical Exam  Constitutional:       General: She is not in acute distress.  HENT:      Mouth/Throat:      Mouth: Mucous membranes are moist.   Cardiovascular:      Rate and Rhythm: Normal rate and regular rhythm.      Pulses: Normal pulses.      Heart sounds: Normal heart sounds. No murmur heard.     No friction rub. No gallop.   Pulmonary:      Effort: Pulmonary effort is normal. No respiratory distress.      Breath sounds: Normal breath sounds.   Abdominal:      General: Bowel sounds are normal. There is no distension.      Palpations: Abdomen is soft.   Genitourinary:     Comments: forrest  Musculoskeletal:         General: No swelling. Normal range of motion.      Cervical back: No rigidity.   Skin:     General: Skin is warm and dry.      Capillary Refill: Capillary refill takes less than 2 seconds.      Comments: Abdominal laparoscopic sites are C/D/I with no signs of infection   Neurological:      Mental Status: She is alert.   Psychiatric:         Behavior: Behavior normal.          Labs:   Recent Results (from the past 24 hour(s))   POCT Glucose    Collection Time: 24  4:23 PM   Result Value Ref Range    POC Glucose 119 (H) 65 - 100 mg/dL    Performed by: Brett    POCT Glucose    Collection Time: 24  8:15 PM   Result Value Ref Range    POC Glucose 123 (H) 65 - 100 mg/dL    Performed by: Christopher    POCT Glucose    Collection Time: 24  1:40 AM   Result Value Ref Range    POC Glucose 131 (H) 65 - 100 mg/dL    Performed by: Sal    Comprehensive Metabolic Panel

## 2024-04-17 NOTE — PROGRESS NOTES
Physical Therapy Note:    Attempted to see patient this AM for physical therapy treatment  session. Patient politely refused due to nausea. Will follow and re-attempt as schedule permits/patient available. Thank you,    Carmen Mcclure, PT     Rehab Caseload Tracker

## 2024-04-17 NOTE — PROGRESS NOTES
TRANSFER - OUT REPORT:    Verbal report given to Regina RN(name) on Tayla Escobar  being transferred to 5th floor Oncology(unit) for routine progression of patient care       Report consisted of patient’s Situation, Background, Assessment and   Recommendations(SBAR).     Information from the following report(s) Nurse Handoff Report was reviewed with the receiving nurse.    Opportunity for questions and clarification was provided.      Patient transported with:   O2 @ 2 liters    Lines: 20g LAC 20gRW Help Text [id number]     This SmartLink retrieves the last documented value for LDA assessment data, retrieved by either LDA type or by LDA group ID.     This SmartLink should be used in a SmartText or SmartPhrase. If one is not available, please contact your .

## 2024-04-17 NOTE — OR NURSING
TRANSFER - OUT REPORT:     Verbal report given to Yary Gil RN on Tayla Escobar  being transferred to IR Recovery for routine progression of patient care      Report consisted of patient’s Situation, Background, Assessment and Recommendations(SBAR).      Information from the following report(s) SBAR, Procedure Summary, and MAR was reviewed with the receiving nurse.     Opportunity for questions and clarification was provided.        Conscious Sedation:    75 Mcg of Fentanyl administered   1.5 Mg of Versed administered   12.5 Mg of Benadryl administered        Pt tolerated procedure Well.      Peripheral Intravenous Line:   Peripheral IV 04/15/24 Left;Proximal;Anterior Antecubital (Active)   Site Assessment Clean, dry & intact 04/17/24 0356   Line Status Flushed;Infusing 04/17/24 0356   Line Care Cap changed 04/17/24 0356   Phlebitis Assessment No symptoms 04/17/24 0356   Infiltration Assessment 0 04/17/24 0356   Alcohol Cap Used Yes 04/17/24 0356   Dressing Status Clean, dry & intact 04/17/24 0356   Dressing Type Transparent 04/17/24 0356       Peripheral IV 04/17/24 Right;Ventral Wrist (Active)     Right Buttock sterile and bandaid-type dressing clean, dry, intact, and nontender    VITALS:  /61   Pulse 69   Temp 98 °F (36.7 °C) (Infrared)   Resp 16   Ht 1.6 m (5' 3\")   Wt 83.9 kg (185 lb)   SpO2 96%   BMI 32.77 kg/m² .

## 2024-04-17 NOTE — CARE COORDINATION
MSN, CM:  spoke with patient and daughter this AM about discharge planning.  Patient and family declining SNF.  Patient wants to pursue treatment.  Patient to have bone marrow biopsy today.  Case Management will continue to follow.

## 2024-04-17 NOTE — BRIEF OP NOTE
[facility]  Department of Interventional Radiology  [phone]        Interventional Radiology Brief Procedure Note    Patient: Tayla Escobar MRN: [id number]  SSN: [id number]    YOB: 1940  Age: 84 y.o.  Sex: female      Date of Procedure: 4/17/2024    Pre-Procedure Diagnosis: multiple myeloma    Post-Procedure Diagnosis: SAME    Procedure(s): Image Guided Biopsy    Brief Description of Procedure: CT guided BMBX    Performed By: Billie Gaitan PA-C     Assistants: None    Anesthesia:Moderate Sedation SHAYY Mejía MD    Estimated Blood Loss: Less than 10ml    Specimens:   to lab    Implants:  None    Findings: no post biopsy bleeding    Complications: None    Recommendations: routine wound care     Follow Up: prn    Signed By: Billie Gaitan PA-C     April 17, 2024

## 2024-04-17 NOTE — PROGRESS NOTES
Hospitalist Progress Note   Admit Date:  4/10/2024 12:06 PM   Name:  Tayla Escobar   Age:  84 y.o.  Sex:  female  :  1940   MRN:  [id number]   Room:  [facility]/    Presenting/Chief Complaint: Post-op Problem     Reason(s) for Admission: ANTWON (acute kidney injury) (HCC) [N17.9]     Hospital Course:   84 y.o. female who presented to the ED after PCP discovered patient was in ANTWON. Since her hiatal hernia surgery , she has been eating poorly, drinking little, and feeling poorly.     Subjective & 24hr Events:   Patient was seen and examined at the bedside.  Daughter at bedside.  Patient feeling slightly nauseous this morning.  No other complaints.  Patient to go for bone marrow biopsy today.      Assessment & Plan:   ANTWON  - ARB on hold  - Patient was just recently on amlodipine.  Secondary to hypotension which may have contributed to her current ANTWON  - Nephrology consulted  - Renal ultrasound unremarkable  - CT without evidence of hydronephrosis  - restarted IV fluid  - elevated kappa light chains  -Nephrology consulting hematology  -Uric acid of 9.5.  Rasburicase and allopurinol ordered  - creatinine of 4.4 down from 4.6  - creatinine is remain stable 4.4    Elevated K LC/multiple myeloma  - SPEP with M spike at 0.3 and 0.1  - Oncology consulted, appreciate recommendations  - Oncology discussed with family.  Family deciding on goals of care  - Patient wishes to have bone marrow biopsy for further workup  -IR consulted  - Patient still undecided on whether or not she wants treatment at this time  - Palliative care consulted for goals of care discussion  - Skeletal survey ordered by oncology  - patient undergo bone marrow biopsy today  -Per oncology patient will receive C1 D1 CyBorD tomorrow 418    Nausea  - Patient with constipation  - As needed Compazine  - Poor p.o. intake    Poor oral intake  - Status post Solu-Medrol to stimulate appetite  - Nutrition consulted  - Concern as  Time: 04/16/24  3:05 AM   Result Value Ref Range    POC Glucose 112 (H) 65 - 100 mg/dL    Performed by: Carly    Renal Function Panel    Collection Time: 04/16/24  4:40 AM   Result Value Ref Range    Sodium 138 136 - 146 mmol/L    Potassium 3.4 (L) 3.5 - 5.1 mmol/L    Chloride 109 103 - 113 mmol/L    CO2 23 21 - 32 mmol/L    Anion Gap 6 2 - 11 mmol/L    Glucose 109 (H) 65 - 100 mg/dL    BUN 42 (H) 8 - 23 MG/DL    Creatinine 4.40 (H) 0.6 - 1.0 MG/DL    Est, Glom Filt Rate 10 (L) >60 ml/min/1.73m2    Calcium 8.7 8.3 - 10.4 MG/DL    Phosphorus 3.4 2.3 - 3.7 MG/DL    Albumin 2.7 (L) 3.2 - 4.6 g/dL   Uric Acid    Collection Time: 04/16/24  4:40 AM   Result Value Ref Range    Uric Acid 0.3 (L) 2.6 - 6.0 MG/DL   POCT Glucose    Collection Time: 04/16/24 12:39 PM   Result Value Ref Range    POC Glucose 143 (H) 65 - 100 mg/dL    Performed by: Jesusita    POCT Glucose    Collection Time: 04/16/24  4:23 PM   Result Value Ref Range    POC Glucose 119 (H) 65 - 100 mg/dL    Performed by: Brett    POCT Glucose    Collection Time: 04/16/24  8:15 PM   Result Value Ref Range    POC Glucose 123 (H) 65 - 100 mg/dL    Performed by: Christopher    POCT Glucose    Collection Time: 04/17/24  1:40 AM   Result Value Ref Range    POC Glucose 131 (H) 65 - 100 mg/dL    Performed by: Sal    Comprehensive Metabolic Panel    Collection Time: 04/17/24  4:11 AM   Result Value Ref Range    Sodium 139 136 - 146 mmol/L    Potassium 3.2 (L) 3.5 - 5.1 mmol/L    Chloride 110 103 - 113 mmol/L    CO2 25 21 - 32 mmol/L    Anion Gap 4 2 - 11 mmol/L    Glucose 106 (H) 65 - 100 mg/dL    BUN 37 (H) 8 - 23 MG/DL    Creatinine 4.40 (H) 0.6 - 1.0 MG/DL    Est, Glom Filt Rate 10 (L) >60 ml/min/1.73m2    Calcium 8.7 8.3 - 10.4 MG/DL    Total Bilirubin 0.5 0.2 - 1.1 MG/DL    ALT 9 (L) 12 - 65 U/L    AST 16 15 - 37 U/L    Alk Phosphatase 60 50 - 136 U/L    Total Protein 4.8 (L) 6.3 - 8.2 g/dL    Albumin 2.5 (L) 3.2 -

## 2024-04-18 ENCOUNTER — APPOINTMENT (OUTPATIENT)
Dept: ULTRASOUND IMAGING | Age: 84
DRG: 682 | End: 2024-04-18
Payer: MEDICARE

## 2024-04-18 PROBLEM — D69.6 THROMBOCYTOPENIA (HCC): Status: ACTIVE | Noted: 2024-04-18

## 2024-04-18 PROBLEM — E87.6 HYPOKALEMIA: Status: ACTIVE | Noted: 2024-04-18

## 2024-04-18 LAB
ALBUMIN SERPL-MCNC: 2.6 G/DL (ref 3.2–4.6)
ALBUMIN/GLOB SERPL: 1.1 (ref 0.4–1.6)
ALP SERPL-CCNC: 68 U/L (ref 50–136)
ALT SERPL-CCNC: 11 U/L (ref 12–65)
ANION GAP SERPL CALC-SCNC: 4 MMOL/L (ref 2–11)
AST SERPL-CCNC: 13 U/L (ref 15–37)
BASOPHILS # BLD: 0 K/UL (ref 0–0.2)
BASOPHILS NFR BLD: 0 % (ref 0–2)
BILIRUB SERPL-MCNC: 0.6 MG/DL (ref 0.2–1.1)
BUN SERPL-MCNC: 31 MG/DL (ref 8–23)
CALCIUM SERPL-MCNC: 9 MG/DL (ref 8.3–10.4)
CHLORIDE SERPL-SCNC: 110 MMOL/L (ref 103–113)
CO2 SERPL-SCNC: 27 MMOL/L (ref 21–32)
CREAT SERPL-MCNC: 4.4 MG/DL (ref 0.6–1)
DIFFERENTIAL METHOD BLD: ABNORMAL
EOSINOPHIL # BLD: 0.1 K/UL (ref 0–0.8)
EOSINOPHIL NFR BLD: 3 % (ref 0.5–7.8)
ERYTHROCYTE [DISTWIDTH] IN BLOOD BY AUTOMATED COUNT: 13.1 % (ref 11.9–14.6)
GLOBULIN SER CALC-MCNC: 2.4 G/DL (ref 2.8–4.5)
GLUCOSE SERPL-MCNC: 119 MG/DL (ref 65–100)
HAPTOGLOB SERPL-MCNC: 135 MG/DL (ref 30–200)
HCT VFR BLD AUTO: 24.9 % (ref 35.8–46.3)
HGB BLD-MCNC: 8 G/DL (ref 11.7–15.4)
IMM GRANULOCYTES # BLD AUTO: 0 K/UL (ref 0–0.5)
IMM GRANULOCYTES NFR BLD AUTO: 1 % (ref 0–5)
LYMPHOCYTES # BLD: 0.8 K/UL (ref 0.5–4.6)
LYMPHOCYTES NFR BLD: 17 % (ref 13–44)
MAGNESIUM SERPL-MCNC: 2 MG/DL (ref 1.8–2.4)
MCH RBC QN AUTO: 32.3 PG (ref 26.1–32.9)
MCHC RBC AUTO-ENTMCNC: 32.1 G/DL (ref 31.4–35)
MCV RBC AUTO: 100.4 FL (ref 82–102)
MONOCYTES # BLD: 0.5 K/UL (ref 0.1–1.3)
MONOCYTES NFR BLD: 10 % (ref 4–12)
NEUTS SEG # BLD: 3.2 K/UL (ref 1.7–8.2)
NEUTS SEG NFR BLD: 69 % (ref 43–78)
NRBC # BLD: 0 K/UL (ref 0–0.2)
PHOSPHATE SERPL-MCNC: 2.9 MG/DL (ref 2.3–3.7)
PLATELET # BLD AUTO: 119 K/UL (ref 150–450)
PMV BLD AUTO: 10.1 FL (ref 9.4–12.3)
POTASSIUM SERPL-SCNC: 3.3 MMOL/L (ref 3.5–5.1)
PROT SERPL-MCNC: 5 G/DL (ref 6.3–8.2)
RBC # BLD AUTO: 2.48 M/UL (ref 4.05–5.2)
SODIUM SERPL-SCNC: 141 MMOL/L (ref 136–146)
URATE SERPL-MCNC: 0.4 MG/DL (ref 2.6–6)
WBC # BLD AUTO: 4.6 K/UL (ref 4.3–11.1)

## 2024-04-18 PROCEDURE — 83735 ASSAY OF MAGNESIUM: CPT

## 2024-04-18 PROCEDURE — 6370000000 HC RX 637 (ALT 250 FOR IP): Performed by: SURGERY

## 2024-04-18 PROCEDURE — 6370000000 HC RX 637 (ALT 250 FOR IP): Performed by: NURSE PRACTITIONER

## 2024-04-18 PROCEDURE — 2580000003 HC RX 258: Performed by: INTERNAL MEDICINE

## 2024-04-18 PROCEDURE — 6370000000 HC RX 637 (ALT 250 FOR IP)

## 2024-04-18 PROCEDURE — 99233 SBSQ HOSP IP/OBS HIGH 50: CPT | Performed by: INTERNAL MEDICINE

## 2024-04-18 PROCEDURE — 6370000000 HC RX 637 (ALT 250 FOR IP): Performed by: HOSPITALIST

## 2024-04-18 PROCEDURE — 36415 COLL VENOUS BLD VENIPUNCTURE: CPT

## 2024-04-18 PROCEDURE — 80053 COMPREHEN METABOLIC PANEL: CPT

## 2024-04-18 PROCEDURE — 6370000000 HC RX 637 (ALT 250 FOR IP): Performed by: STUDENT IN AN ORGANIZED HEALTH CARE EDUCATION/TRAINING PROGRAM

## 2024-04-18 PROCEDURE — 51798 US URINE CAPACITY MEASURE: CPT

## 2024-04-18 PROCEDURE — 2580000003 HC RX 258: Performed by: FAMILY MEDICINE

## 2024-04-18 PROCEDURE — 2700000000 HC OXYGEN THERAPY PER DAY

## 2024-04-18 PROCEDURE — APPSS60 APP SPLIT SHARED TIME 46-60 MINUTES

## 2024-04-18 PROCEDURE — 85025 COMPLETE CBC W/AUTO DIFF WBC: CPT

## 2024-04-18 PROCEDURE — 84100 ASSAY OF PHOSPHORUS: CPT

## 2024-04-18 PROCEDURE — 1100000000 HC RM PRIVATE

## 2024-04-18 PROCEDURE — 84550 ASSAY OF BLOOD/URIC ACID: CPT

## 2024-04-18 PROCEDURE — 6370000000 HC RX 637 (ALT 250 FOR IP): Performed by: FAMILY MEDICINE

## 2024-04-18 PROCEDURE — 6360000002 HC RX W HCPCS: Performed by: INTERNAL MEDICINE

## 2024-04-18 RX ORDER — LEVOFLOXACIN 500 MG/1
750 TABLET, FILM COATED ORAL ONCE
Status: COMPLETED | OUTPATIENT
Start: 2024-04-18 | End: 2024-04-18

## 2024-04-18 RX ORDER — HEPARIN SODIUM 5000 [USP'U]/ML
5000 INJECTION, SOLUTION INTRAVENOUS; SUBCUTANEOUS EVERY 8 HOURS SCHEDULED
Status: DISCONTINUED | OUTPATIENT
Start: 2024-04-18 | End: 2024-05-04 | Stop reason: HOSPADM

## 2024-04-18 RX ORDER — LEVOFLOXACIN 500 MG/1
500 TABLET, FILM COATED ORAL EVERY OTHER DAY
Status: COMPLETED | OUTPATIENT
Start: 2024-04-20 | End: 2024-04-24

## 2024-04-18 RX ADMIN — ROSUVASTATIN CALCIUM 10 MG: 10 TABLET, FILM COATED ORAL at 20:20

## 2024-04-18 RX ADMIN — ALLOPURINOL 50 MG: 100 TABLET ORAL at 08:00

## 2024-04-18 RX ADMIN — DEXTROSE AND SODIUM CHLORIDE: 5; 450 INJECTION, SOLUTION INTRAVENOUS at 06:28

## 2024-04-18 RX ADMIN — ERYTHROMYCIN ETHYLSUCCINATE 400 MG: 400 TABLET ORAL at 08:00

## 2024-04-18 RX ADMIN — ACYCLOVIR 200 MG: 400 TABLET ORAL at 20:20

## 2024-04-18 RX ADMIN — ACYCLOVIR 200 MG: 400 TABLET ORAL at 07:59

## 2024-04-18 RX ADMIN — ERYTHROMYCIN ETHYLSUCCINATE 400 MG: 400 TABLET ORAL at 18:28

## 2024-04-18 RX ADMIN — SODIUM CHLORIDE, PRESERVATIVE FREE 10 ML: 5 INJECTION INTRAVENOUS at 20:20

## 2024-04-18 RX ADMIN — METOCLOPRAMIDE 5 MG: 10 TABLET ORAL at 07:59

## 2024-04-18 RX ADMIN — ERYTHROMYCIN ETHYLSUCCINATE 400 MG: 400 TABLET ORAL at 12:26

## 2024-04-18 RX ADMIN — SENNOSIDES 8.6 MG: 8.6 TABLET, FILM COATED ORAL at 20:20

## 2024-04-18 RX ADMIN — POTASSIUM CHLORIDE 40 MEQ: 1500 TABLET, EXTENDED RELEASE ORAL at 06:29

## 2024-04-18 RX ADMIN — SODIUM CHLORIDE, PRESERVATIVE FREE 10 ML: 5 INJECTION INTRAVENOUS at 08:00

## 2024-04-18 RX ADMIN — LEVOFLOXACIN 750 MG: 500 TABLET, FILM COATED ORAL at 13:54

## 2024-04-18 RX ADMIN — Medication 6 MG: at 22:53

## 2024-04-18 RX ADMIN — DEXTROSE AND SODIUM CHLORIDE: 5; 450 INJECTION, SOLUTION INTRAVENOUS at 20:19

## 2024-04-18 RX ADMIN — HEPARIN SODIUM 5000 UNITS: 5000 INJECTION INTRAVENOUS; SUBCUTANEOUS at 22:17

## 2024-04-18 NOTE — PROGRESS NOTES
Patient unable to void. Bladder scan showed 230 ml. MD notified and would like for AM MD to reassess in 3 hours. Oncoming RN made aware. No distress noted.

## 2024-04-18 NOTE — PROGRESS NOTES
Pt stated that she felt like she had to urinate but was unable to . Bladder scan showed 711ml , pt able to void 650ml on bedside commode . Notified Dr Hernadez via [employer] serve

## 2024-04-18 NOTE — PROGRESS NOTES
Comprehensive Nutrition Assessment    Type and Reason for Visit: Reassess  Malnutrition Screening Tool: Malnutrition Screen  Have you recently lost weight without trying?: 14 to 23 pounds (2 points)  Have you been eating poorly because of a decreased appetite?: Yes (1 point)  Malnutrition Screening Tool Score: 3  Consult for poor PO intake     Nutrition Recommendations/Plan:   Meals and Snacks:  Diet: Liberalize to regular diet to allow patient choice. Discussed with SLP who agrees. Do not recommend any diet restrictions at this point given severely compromised intake. Continue small/frequent meals/snacks    Nutrition Supplement Therapy:  Medical food supplement therapy:  Change Ensure Enlive three times per day (this provides 350 kcal and 20 grams protein per bottle)     Malnutrition Assessment:  Malnutrition Status: Moderate malnutrition  Context: Acute Illness  Findings of clinical characteristics of malnutrition:   Energy Intake:  Mild decrease in energy intake (Comment) (family reports very poor intake over the past month)  Weight Loss:  Greater than 7.5% over 3 months (10.5% body weight loss in ~ 3 months)     Body Fat Loss:  Mild body fat loss Orbital, Triceps, Buccal region   Muscle Mass Loss:  Mild muscle mass loss Temples (temporalis), Clavicles (pectoralis & deltoids), Hand (interosseous)  Fluid Accumulation:  Unable to assess     Strength:  Not Performed       Nutrition Assessment:  Nutrition History: 4/11: Daughter assists patient in providing nutrition hx. For 2-3 weeks prior to hiatal hernia reports patient appetite was limited, but ever since she was discharged 3/11 patient has taken little to no PO on a daily basis. Patient endorses that she has had consistent nausea (varying in intensity) for the past month. Reports that when she eats solid foods they get \"stuck\" and points to her sternum. Daughter reports she is able to take some fluids, but has declined any oral nutrition supplements as of  late. Drinking small amounts of fluids and pureed foods in the month prior to admission. Additionally reports constipation x 1 week pta which resolved 4/10 with medications.      Do You Have Any Cultural, Mosque, or Ethnic Food Preferences?: No   Weight History:  IM OV: 4/10/24: 161#, 1/16/24: 180#, 11/27/23: 186#, 11/1/23: 187#, 9/18/23: 190#, 7/12/23: 196#, 1/6/23: 197#  CBW of 161 (stated- 4/10); pt in chair at time of assessment, unable to obtain updated measured weight.   Nutrition Background:     Stage 2 PI to upper back  PMH: HTN, HLD, morbid obesity, pulmonary embolism. Admitted with ANTWON after taking little to no PO intake after hiatal hernia surgery March 4th. Also noted constipation on admission.  4/12 [place] UGI impression: Tertiary contraction of the esophagus with mucosal irregularity suggestive of  esophagitis. There is delayed transit of contrast into the small bowel loops may represent a  gastric outlet obstruction.   4/13: EGD aborted 2/2 food in stomach, erythromycin and reglan initiated.   Repeat EGD 4/15 normal.   4/15: Oncology consulted 2/2 elevated kappa light chains. Dx of multiple myeloma.   Nutrition Interval:  4/10:  Easy to Chew per SLP.  4/12: NPO for procedure. 4/15: Diet advanced to full liquids per surgery.  4/16: Diet advanced to soft and bite size per SLP, family okay to bring foods, advance as tolerated.    Patient sitting up in bed with family present. Daughter at bedside c/o not knowing what patient can have due to previous gastroparesis education and soft and bite sized diet (what is allowed). She states patient actually was craving loyd this am but was not allowed due to ordered diet. They did make her a half Ensure Enlive with ice cream shake which she accepted about 50% of. She ate a few bites of breakfast. They also report alternating diarrhea/constipation. States she does better when not receiving bowel regimen daily. States she does best with bowel regimen ever other day.

## 2024-04-18 NOTE — PROGRESS NOTES
Attempted to call US for vascular duplex scans , sent to voicemail. Left a message , waiting for callback

## 2024-04-18 NOTE — ONCOLOGY
START ON PATHWAY REGIMEN - Multiple Myeloma and Other Plasma Cell Dyscrasias    [id number]        Dexamethasone       Bortezomib (Velcade)       Cyclophosphamide     **Always confirm dose/schedule in your pharmacy ordering system**    Patient Characteristics:  Multiple Myeloma, Newly Diagnosed, Transplant Ineligible or Refused, Unknown or Awaiting Test Results  Disease Classification: Multiple Myeloma  R-ISS Staging: III  Therapeutic Status: Newly Diagnosed  Is Patient Eligible for Transplant<= Transplant Ineligible or Refused  Risk Status: Unknown

## 2024-04-18 NOTE — PROGRESS NOTES
Attempted to see pt for PT treatment session--pt currently pending dopplers of BLEs and LUE to rule out DVT and VQ scan to rule out PE. Will hold mobility at this time but continue to follow and attempt to see as schedule allows.     Mary Kate Najera, PT, DPT

## 2024-04-18 NOTE — PLAN OF CARE
Problem: Discharge Planning  Goal: Discharge to home or other facility with appropriate resources  4/18/2024 0813 by Dianne Julian RN  Outcome: Progressing  4/18/2024 0040 by Yary Medina RN  Outcome: Progressing  4/18/2024 0040 by Yary Medina RN  Outcome: Progressing  Flowsheets (Taken 4/17/2024 1647 by Rody Moreno, RN)  Discharge to home or other facility with appropriate resources: Identify barriers to discharge with patient and caregiver     Problem: Pain  Goal: Verbalizes/displays adequate comfort level or baseline comfort level  4/18/2024 0813 by Dianne Julian RN  Outcome: Progressing  4/18/2024 0040 by Yary Medina RN  Outcome: Progressing  4/18/2024 0040 by Yary Medina RN  Outcome: Progressing     Problem: Skin/Tissue Integrity  Goal: Absence of new skin breakdown  Description: 1.  Monitor for areas of redness and/or skin breakdown  2.  Assess vascular access sites hourly  3.  Every 4-6 hours minimum:  Change oxygen saturation probe site  4.  Every 4-6 hours:  If on nasal continuous positive airway pressure, respiratory therapy assess nares and determine need for appliance change or resting period.  4/18/2024 0813 by Dianne Julian RN  Outcome: Progressing  4/18/2024 0040 by Yary Medina RN  Outcome: Progressing  4/18/2024 0040 by Yary Medina RN  Outcome: Progressing     Problem: Safety - Adult  Goal: Free from fall injury  4/18/2024 0813 by Dianne Julian RN  Outcome: Progressing  4/18/2024 0040 by Yary Medina RN  Outcome: Progressing  4/18/2024 0040 by Yary Medina RN  Outcome: Progressing     Problem: ABCDS Injury Assessment  Goal: Absence of physical injury  4/18/2024 0813 by Dianne Julian RN  Outcome: Progressing  4/18/2024 0040 by Yary Medina RN  Outcome: Progressing  4/18/2024 0040 by Yary Medina RN  Outcome: Progressing

## 2024-04-18 NOTE — PROGRESS NOTES
[facility] Progress Note    Subjective:    Seen in the room.  Family present comfortable    ROS:  Denies CP, SOB, nausea/ vomitting     Objective:    Current Facility-Administered Medications   Medication Dose Route Frequency    potassium chloride (KLOR-CON M) extended release tablet 40 mEq  40 mEq Oral PRN    Or    potassium bicarb-citric acid (EFFER-K) effervescent tablet 40 mEq  40 mEq Oral PRN    Or    potassium chloride 10 mEq/100 mL IVPB (Peripheral Line)  10 mEq IntraVENous PRN    sodium phosphate 15 mmol in sodium chloride 0.9 % 250 mL IVPB  15 mmol IntraVENous PRN    magnesium sulfate 2000 mg in 50 mL IVPB premix  2,000 mg IntraVENous PRN    ondansetron (ZOFRAN) injection 4 mg  4 mg IntraVENous Q6H PRN    acyclovir (ZOVIRAX) tablet 200 mg  200 mg Oral BID    senna (SENOKOT) tablet 8.6 mg  1 tablet Oral Nightly    dextrose 5 % and 0.45 % sodium chloride infusion   IntraVENous Continuous    allopurinol (ZYLOPRIM) tablet 50 mg  50 mg Oral Once per day on Mon Thu    glucose chewable tablet 16 g  4 tablet Oral PRN    dextrose bolus 10% 125 mL  125 mL IntraVENous PRN    Or    dextrose bolus 10% 250 mL  250 mL IntraVENous PRN    Glucagon Emergency KIT 1 mg  1 mg SubCUTAneous PRN    dextrose 10 % infusion   IntraVENous Continuous PRN    erythromycin ethylsuccinate (EES) tablet 400 mg  400 mg Oral TID WC    metoclopramide (REGLAN) tablet 5 mg  5 mg Oral Daily    melatonin tablet 6 mg  6 mg Oral Nightly PRN    rosuvastatin (CRESTOR) tablet 10 mg  10 mg Oral Nightly    traMADol (ULTRAM) tablet 50 mg  50 mg Oral Q12H PRN    hydrALAZINE (APRESOLINE) injection 10 mg  10 mg IntraVENous Q6H PRN    sodium chloride flush 0.9 % injection 5-40 mL  5-40 mL IntraVENous 2 times per day    sodium chloride flush 0.9 % injection 5-40 mL  5-40 mL IntraVENous PRN    0.9 % sodium chloride infusion   IntraVENous PRN    polyethylene glycol (GLYCOLAX) packet 17 g  17 g Oral Daily PRN    bisacodyl (DULCOLAX) suppository 10 mg  10  unspecified chronicity, unspecified pulmonary embolism type (HCC) 10/15/2023    Primary osteoarthritis of both knees 04/26/2018    Morbid obesity (HCC) 10/26/2017    Bilateral leg edema 04/26/2017    IFG (impaired fasting glucose) 04/26/2017    Vitamin D deficiency 05/27/2015    Mixed hyperlipidemia 02/11/2015    Benign essential hypertension 02/11/2015       Assessment/Plan:  1) ANTWON/ CKD stage 3A - baseline Cr about 1.2, already had ANTWON as outpt with Cr 2.0 last week  - In setting of hypotension, poor PO intake   - CT without evidence of hydronephrosis  - Peak Cr 4.9, improved and stabilized at 4.4.  - new diagnosis myeloma with elevated K/L rato ~600. M spike on SPEP. Proteinuria 800mg  -Was on the fence about initiating therapy but now plans to initiate today.  Bone marrow biopsy for staging.  She had lytic lesions on skeletal survey     2) Hypotension - BP meds on hold      3) Hyponatremia - in setting of ANTWON, resolved     4) Nausea - poor PO intake     5) Elevated kappa light chains/MM -therapy today initiation

## 2024-04-18 NOTE — PLAN OF CARE
Problem: Discharge Planning  Goal: Discharge to home or other facility with appropriate resources  4/18/2024 0040 by Yary Medina RN  Outcome: Progressing  4/18/2024 0040 by Yary Medina RN  Outcome: Progressing  Flowsheets (Taken 4/17/2024 1647 by Rody Moreno, RN)  Discharge to home or other facility with appropriate resources: Identify barriers to discharge with patient and caregiver     Problem: Pain  Goal: Verbalizes/displays adequate comfort level or baseline comfort level  4/18/2024 0040 by Yary Medina RN  Outcome: Progressing  4/18/2024 0040 by Yary Medina RN  Outcome: Progressing     Problem: Skin/Tissue Integrity  Goal: Absence of new skin breakdown  Description: 1.  Monitor for areas of redness and/or skin breakdown  2.  Assess vascular access sites hourly  3.  Every 4-6 hours minimum:  Change oxygen saturation probe site  4.  Every 4-6 hours:  If on nasal continuous positive airway pressure, respiratory therapy assess nares and determine need for appliance change or resting period.  4/18/2024 0040 by Yary Medina RN  Outcome: Progressing  4/18/2024 0040 by Yary Medina RN  Outcome: Progressing     Problem: Safety - Adult  Goal: Free from fall injury  4/18/2024 0040 by Yary Medina RN  Outcome: Progressing  4/18/2024 0040 by Yary Medina RN  Outcome: Progressing     Problem: ABCDS Injury Assessment  Goal: Absence of physical injury  4/18/2024 0040 by Yary Medina RN  Outcome: Progressing  4/18/2024 0040 by Yary Medina RN  Outcome: Progressing

## 2024-04-18 NOTE — PROGRESS NOTES
Occupational Therapy Note:    Attempted to see pt for OT treatment session--pt currently pending dopplers of BLEs and LUE to rule out DVT and VQ scan to rule out PE. Will hold mobility at this time but continue to follow and attempt to see as schedule allows.    Thank you,  Elodia Serrano, OTR/L

## 2024-04-18 NOTE — PROGRESS NOTES
on file   Housing Stability: Low Risk  (4/10/2024)    Housing Stability Vital Sign     Unable to Pay for Housing in the Last Year: No     Number of Places Lived in the Last Year: 1     Unstable Housing in the Last Year: No     Current Facility-Administered Medications   Medication Dose Route Frequency Provider Last Rate Last Admin    [START ON 4/20/2024] levoFLOXacin (LEVAQUIN) tablet 500 mg  500 mg Oral Every Other Day Balaji Hernadez MD        potassium chloride (KLOR-CON M) extended release tablet 40 mEq  40 mEq Oral PRN Fawad Maldonado MD   40 mEq at 04/18/24 0629    Or    potassium bicarb-citric acid (EFFER-K) effervescent tablet 40 mEq  40 mEq Oral PRN Fawad Maldonado MD   40 mEq at 04/17/24 0958    Or    potassium chloride 10 mEq/100 mL IVPB (Peripheral Line)  10 mEq IntraVENous PRN Fawad Maldonado MD        sodium phosphate 15 mmol in sodium chloride 0.9 % 250 mL IVPB  15 mmol IntraVENous PRN Fawad Maldonado MD        magnesium sulfate 2000 mg in 50 mL IVPB premix  2,000 mg IntraVENous PRN Fawad Maldonado MD   Stopped at 04/17/24 1232    ondansetron (ZOFRAN) injection 4 mg  4 mg IntraVENous Q6H PRN Fawad Maldonado MD   4 mg at 04/17/24 0956    acyclovir (ZOVIRAX) tablet 200 mg  200 mg Oral BID Nadia Vidal APRN - CNP   200 mg at 04/18/24 0759    senna (SENOKOT) tablet 8.6 mg  1 tablet Oral Nightly Jason Bourne APRN - NP   8.6 mg at 04/17/24 2146    dextrose 5 % and 0.45 % sodium chloride infusion   IntraVENous Continuous Jeanie Cherry APRN - CNP 75 mL/hr at 04/18/24 0628 New Bag at 04/18/24 0628    allopurinol (ZYLOPRIM) tablet 50 mg  50 mg Oral Once per day on Mon Thu Nadia Vidal APRN - CNP   50 mg at 04/18/24 0800    glucose chewable tablet 16 g  4 tablet Oral PRN Fawad Maldonado MD        dextrose bolus 10% 125 mL  125 mL IntraVENous PRN Fawad Maldonado MD        Or    dextrose bolus 10% 250 mL  250 mL IntraVENous PRN Fawad Maldonado MD        Glucagon Emergency KIT 1 mg  1 mg  @ 0.3 and 0.1, biclonal IgA protein with kappa specificity.  Pt wishes to proceed with BMBx.  IR consulted.  She is undecided whether she wants to treatment right now.  Consult PC to assist with GOC.  If pt wishes to pursue treatment, would recommend first treatment with CyBorD prior to discharging to STR.  Further treatments will need to be held while in rehab.  Skeletal survey ordered.  Beta 2 pending.  4/17 BMBx today.  Beta 2 elevated at 6.5.  Skeletal survey with destructive changes of lower lumbar and upper sacral spine.  States she would like to proceed with treatment, plan to give C1D1 CyBorD tomorrow.  Further treatment will need to be held while at rehab.  4/18 C1D1 to be given tomorrow if scans are negative today.    Risk for TLS  - monitor labs  - on IVF and allopurinol (renally dosed)  4/18 Uric acid 0.4. Creat stable at 4.4. Phos 2.9.    ANTWON/CKD  - Renal US neg  - CT AP with small L pleur eff  - Neph follow  - KLC >7300  - Uric acid 9.5.  Rasburicase and renally dosed allopurinol ordered.  4/16 Cr stable 4.4.  Repeat uric acid down to 0.3  4/18 Cr 4.4.  Neph following.    Acute hypoxic resp failure  4/17 O2 up to 5L.  Check CXR  4/18 Down to 2L NC. CXR with BL PNA and small L pleural effusion. Start LVQ. D-dimer 2.1. Doppler legs, doppler LUE for increased swelling, VQ scan.    Disposition: TBD; PT/OT recs STR.    Goals and plan of care reviewed with the patient.  All questions answered to the best of our ability.  Lab studies and imaging studies were personally reviewed.           Ariana Lua, RAQUEL - CNP   [facility]  [address] [place] [address]  Office : [phone]  Fax : [phone]    Attending Addendum:  I have personally performed a face to face diagnostic evaluation on this patient. I have reviewed and agree with the care plan as documented above by N.P.  My findings are as follows:   Vitals were reviewed.  /66   Pulse 70   Temp 98.4 °F

## 2024-04-18 NOTE — PROGRESS NOTES
Hospitalist Progress Note   Admit Date:  4/10/2024 12:06 PM   Name:  Tayla Escobar   Age:  84 y.o.  Sex:  female  :  1940   MRN:  [id number]   Room:  [facility]/    Presenting/Chief Complaint: Post-op Problem     Reason(s) for Admission: ANTWON (acute kidney injury) (HCC) [N17.9]     Hospital Course:   Mrs. Escobar is an 85 y/o WF with a h/o HH s/p repair 3/2024, HTN, HLD and PE on Eliquis (dx 10/2023) who was admitted to our service on 4/10 due to poor PO intake and ANTWON. PO intake had been decreased since HH surgery 3/4/24. Cr was 4.7 on admission (baseline ~1.2). She was started on IVFs and offending meds held. She was seen by Nephrology and felt pre-renal, ATN. CT a/p showed known b/l inguinal hernias and s/p HH repair. She was evaluated by Surgery. UGI series showed changes suggestive of esophagitis with delayed transit, possibly representing GOO. Not felt to have a stricture. Her Cr peaked at 4.9 but has remained stable in the mid 4 range since admission. Total serum calcium elevated since 2024. GI consulted  for UGI series findings. EGD done on  and showed food in the stomach so the procedure was aborted and rescheduled for 4/15 which was ultimately normal. She was recommended to have a gastroparesis diet. Oncology was consulted on 4/15 due to elevated kappa/lambda light chains and concerns for plasma cell disease. Calcium is elevated, Cr is stable but remains >4, her Hgb is worse than baseline (7-8s vs 10-11 in the Fall ). Skeletal survey xrays  showed osteopenia, kyphosis, destructive changes of lumbar and sacrum, shoulder arthritis. Seen by PC on  and made DNR. IR consulted and she underwent CT guided BM bx on  with intentions to start chemotherapy .    Subjective & 24hr Events:   Awake, in bed, comfortable, oriented. Family at bedside. C/o some mild edema of the L hand, no redness. Had some urinary retention overnight, I/O cath x1. Bladder scan this AM 700ml, she  Granulocytes Absolute 0.0 0.0 - 0.5 K/UL   Uric Acid    Collection Time: 04/18/24  3:55 AM   Result Value Ref Range    Uric Acid 0.4 (L) 2.6 - 6.0 MG/DL   Phosphorus    Collection Time: 04/18/24  3:55 AM   Result Value Ref Range    Phosphorus 2.9 2.3 - 3.7 MG/DL       No results for input(s): \"COVID19\" in the last 72 hours.    Current Meds:  Current Facility-Administered Medications   Medication Dose Route Frequency    potassium chloride (KLOR-CON M) extended release tablet 40 mEq  40 mEq Oral PRN    Or    potassium bicarb-citric acid (EFFER-K) effervescent tablet 40 mEq  40 mEq Oral PRN    Or    potassium chloride 10 mEq/100 mL IVPB (Peripheral Line)  10 mEq IntraVENous PRN    sodium phosphate 15 mmol in sodium chloride 0.9 % 250 mL IVPB  15 mmol IntraVENous PRN    magnesium sulfate 2000 mg in 50 mL IVPB premix  2,000 mg IntraVENous PRN    ondansetron (ZOFRAN) injection 4 mg  4 mg IntraVENous Q6H PRN    acyclovir (ZOVIRAX) tablet 200 mg  200 mg Oral BID    senna (SENOKOT) tablet 8.6 mg  1 tablet Oral Nightly    dextrose 5 % and 0.45 % sodium chloride infusion   IntraVENous Continuous    allopurinol (ZYLOPRIM) tablet 50 mg  50 mg Oral Once per day on Mon Thu    glucose chewable tablet 16 g  4 tablet Oral PRN    dextrose bolus 10% 125 mL  125 mL IntraVENous PRN    Or    dextrose bolus 10% 250 mL  250 mL IntraVENous PRN    Glucagon Emergency KIT 1 mg  1 mg SubCUTAneous PRN    dextrose 10 % infusion   IntraVENous Continuous PRN    erythromycin ethylsuccinate (EES) tablet 400 mg  400 mg Oral TID WC    metoclopramide (REGLAN) tablet 5 mg  5 mg Oral Daily    melatonin tablet 6 mg  6 mg Oral Nightly PRN    rosuvastatin (CRESTOR) tablet 10 mg  10 mg Oral Nightly    traMADol (ULTRAM) tablet 50 mg  50 mg Oral Q12H PRN    hydrALAZINE (APRESOLINE) injection 10 mg  10 mg IntraVENous Q6H PRN    sodium chloride flush 0.9 % injection 5-40 mL  5-40 mL IntraVENous 2 times per day    sodium chloride flush 0.9 % injection 5-40 mL  5-40

## 2024-04-19 ENCOUNTER — APPOINTMENT (OUTPATIENT)
Dept: NUCLEAR MEDICINE | Age: 84
DRG: 682 | End: 2024-04-19
Payer: MEDICARE

## 2024-04-19 ENCOUNTER — APPOINTMENT (OUTPATIENT)
Dept: ULTRASOUND IMAGING | Age: 84
DRG: 682 | End: 2024-04-19
Payer: MEDICARE

## 2024-04-19 PROBLEM — E83.42 HYPOMAGNESEMIA: Status: ACTIVE | Noted: 2024-04-19

## 2024-04-19 LAB
ALBUMIN SERPL-MCNC: 2.5 G/DL (ref 3.2–4.6)
ALBUMIN/GLOB SERPL: 1 (ref 0.4–1.6)
ALP SERPL-CCNC: 68 U/L (ref 50–136)
ALT SERPL-CCNC: 12 U/L (ref 12–65)
ANION GAP SERPL CALC-SCNC: 4 MMOL/L (ref 2–11)
AST SERPL-CCNC: 13 U/L (ref 15–37)
B PERT DNA SPEC QL NAA+PROBE: NOT DETECTED
BASOPHILS # BLD: 0 K/UL (ref 0–0.2)
BASOPHILS NFR BLD: 0 % (ref 0–2)
BILIRUB SERPL-MCNC: 0.5 MG/DL (ref 0.2–1.1)
BORDETELLA PARAPERTUSSIS BY PCR: NOT DETECTED
BUN SERPL-MCNC: 27 MG/DL (ref 8–23)
C PNEUM DNA SPEC QL NAA+PROBE: NOT DETECTED
CALCIUM SERPL-MCNC: 8.6 MG/DL (ref 8.3–10.4)
CHLORIDE SERPL-SCNC: 110 MMOL/L (ref 103–113)
CO2 SERPL-SCNC: 25 MMOL/L (ref 21–32)
CREAT SERPL-MCNC: 4 MG/DL (ref 0.6–1)
DIFFERENTIAL METHOD BLD: ABNORMAL
EOSINOPHIL # BLD: 0.1 K/UL (ref 0–0.8)
EOSINOPHIL NFR BLD: 2 % (ref 0.5–7.8)
ERYTHROCYTE [DISTWIDTH] IN BLOOD BY AUTOMATED COUNT: 13.2 % (ref 11.9–14.6)
FLUAV SUBTYP SPEC NAA+PROBE: NOT DETECTED
FLUBV RNA SPEC QL NAA+PROBE: NOT DETECTED
GLOBULIN SER CALC-MCNC: 2.6 G/DL (ref 2.8–4.5)
GLUCOSE SERPL-MCNC: 113 MG/DL (ref 65–100)
HADV DNA SPEC QL NAA+PROBE: NOT DETECTED
HCOV 229E RNA SPEC QL NAA+PROBE: NOT DETECTED
HCOV HKU1 RNA SPEC QL NAA+PROBE: NOT DETECTED
HCOV NL63 RNA SPEC QL NAA+PROBE: NOT DETECTED
HCOV OC43 RNA SPEC QL NAA+PROBE: NOT DETECTED
HCT VFR BLD AUTO: 24.9 % (ref 35.8–46.3)
HGB BLD-MCNC: 7.9 G/DL (ref 11.7–15.4)
HMPV RNA SPEC QL NAA+PROBE: NOT DETECTED
HPIV1 RNA SPEC QL NAA+PROBE: NOT DETECTED
HPIV2 RNA SPEC QL NAA+PROBE: NOT DETECTED
HPIV3 RNA SPEC QL NAA+PROBE: NOT DETECTED
HPIV4 RNA SPEC QL NAA+PROBE: NOT DETECTED
IMM GRANULOCYTES # BLD AUTO: 0 K/UL (ref 0–0.5)
IMM GRANULOCYTES NFR BLD AUTO: 1 % (ref 0–5)
LYMPHOCYTES # BLD: 0.9 K/UL (ref 0.5–4.6)
LYMPHOCYTES NFR BLD: 14 % (ref 13–44)
M PNEUMO DNA SPEC QL NAA+PROBE: NOT DETECTED
MAGNESIUM SERPL-MCNC: 1.6 MG/DL (ref 1.8–2.4)
MCH RBC QN AUTO: 32.2 PG (ref 26.1–32.9)
MCHC RBC AUTO-ENTMCNC: 31.7 G/DL (ref 31.4–35)
MCV RBC AUTO: 101.6 FL (ref 82–102)
MONOCYTES # BLD: 0.6 K/UL (ref 0.1–1.3)
MONOCYTES NFR BLD: 9 % (ref 4–12)
NEUTS SEG # BLD: 4.6 K/UL (ref 1.7–8.2)
NEUTS SEG NFR BLD: 74 % (ref 43–78)
NRBC # BLD: 0 K/UL (ref 0–0.2)
PHOSPHATE SERPL-MCNC: 1.9 MG/DL (ref 2.3–3.7)
PLATELET # BLD AUTO: 119 K/UL (ref 150–450)
PMV BLD AUTO: 10.8 FL (ref 9.4–12.3)
POTASSIUM SERPL-SCNC: 3.6 MMOL/L (ref 3.5–5.1)
PROT SERPL-MCNC: 5.1 G/DL (ref 6.3–8.2)
RBC # BLD AUTO: 2.45 M/UL (ref 4.05–5.2)
RSV RNA SPEC QL NAA+PROBE: NOT DETECTED
RV+EV RNA SPEC QL NAA+PROBE: NOT DETECTED
SARS-COV-2 RNA RESP QL NAA+PROBE: NOT DETECTED
SODIUM SERPL-SCNC: 139 MMOL/L (ref 136–146)
TRANSFERRIN SERPL-SCNC: 9.6 NMOL/L (ref 12.2–27.3)
URATE SERPL-MCNC: 0.4 MG/DL (ref 2.6–6)
WBC # BLD AUTO: 6.2 K/UL (ref 4.3–11.1)

## 2024-04-19 PROCEDURE — 6370000000 HC RX 637 (ALT 250 FOR IP): Performed by: HOSPITALIST

## 2024-04-19 PROCEDURE — 93970 EXTREMITY STUDY: CPT | Performed by: RADIOLOGY

## 2024-04-19 PROCEDURE — 83735 ASSAY OF MAGNESIUM: CPT

## 2024-04-19 PROCEDURE — 84100 ASSAY OF PHOSPHORUS: CPT

## 2024-04-19 PROCEDURE — 99232 SBSQ HOSP IP/OBS MODERATE 35: CPT | Performed by: INTERNAL MEDICINE

## 2024-04-19 PROCEDURE — 6360000002 HC RX W HCPCS: Performed by: INTERNAL MEDICINE

## 2024-04-19 PROCEDURE — 1100000000 HC RM PRIVATE

## 2024-04-19 PROCEDURE — A9539 TC99M PENTETATE: HCPCS

## 2024-04-19 PROCEDURE — 6360000002 HC RX W HCPCS: Performed by: STUDENT IN AN ORGANIZED HEALTH CARE EDUCATION/TRAINING PROGRAM

## 2024-04-19 PROCEDURE — 85025 COMPLETE CBC W/AUTO DIFF WBC: CPT

## 2024-04-19 PROCEDURE — 93971 EXTREMITY STUDY: CPT

## 2024-04-19 PROCEDURE — 0202U NFCT DS 22 TRGT SARS-COV-2: CPT

## 2024-04-19 PROCEDURE — 6370000000 HC RX 637 (ALT 250 FOR IP): Performed by: NURSE PRACTITIONER

## 2024-04-19 PROCEDURE — 2700000000 HC OXYGEN THERAPY PER DAY

## 2024-04-19 PROCEDURE — 93970 EXTREMITY STUDY: CPT

## 2024-04-19 PROCEDURE — 94760 N-INVAS EAR/PLS OXIMETRY 1: CPT

## 2024-04-19 PROCEDURE — 2580000003 HC RX 258: Performed by: NURSE PRACTITIONER

## 2024-04-19 PROCEDURE — APPSS30 APP SPLIT SHARED TIME 16-30 MINUTES: Performed by: NURSE PRACTITIONER

## 2024-04-19 PROCEDURE — 2500000003 HC RX 250 WO HCPCS: Performed by: NURSE PRACTITIONER

## 2024-04-19 PROCEDURE — 93971 EXTREMITY STUDY: CPT | Performed by: RADIOLOGY

## 2024-04-19 PROCEDURE — 76937 US GUIDE VASCULAR ACCESS: CPT

## 2024-04-19 PROCEDURE — 80053 COMPREHEN METABOLIC PANEL: CPT

## 2024-04-19 PROCEDURE — 6370000000 HC RX 637 (ALT 250 FOR IP): Performed by: FAMILY MEDICINE

## 2024-04-19 PROCEDURE — 84550 ASSAY OF BLOOD/URIC ACID: CPT

## 2024-04-19 PROCEDURE — 2580000003 HC RX 258: Performed by: FAMILY MEDICINE

## 2024-04-19 PROCEDURE — 6370000000 HC RX 637 (ALT 250 FOR IP)

## 2024-04-19 PROCEDURE — 3430000000 HC RX DIAGNOSTIC RADIOPHARMACEUTICAL

## 2024-04-19 PROCEDURE — A9540 TC99M MAA: HCPCS

## 2024-04-19 PROCEDURE — 36415 COLL VENOUS BLD VENIPUNCTURE: CPT

## 2024-04-19 PROCEDURE — 78582 LUNG VENTILAT&PERFUS IMAGING: CPT

## 2024-04-19 PROCEDURE — 6370000000 HC RX 637 (ALT 250 FOR IP): Performed by: SURGERY

## 2024-04-19 RX ORDER — SODIUM CHLORIDE 0.9 % (FLUSH) 0.9 %
5-40 SYRINGE (ML) INJECTION PRN
Status: CANCELLED | OUTPATIENT
Start: 2024-04-29

## 2024-04-19 RX ORDER — MEPERIDINE HYDROCHLORIDE 25 MG/ML
12.5 INJECTION INTRAMUSCULAR; INTRAVENOUS; SUBCUTANEOUS PRN
Status: CANCELLED | OUTPATIENT
Start: 2024-05-06

## 2024-04-19 RX ORDER — SODIUM CHLORIDE 0.9 % (FLUSH) 0.9 %
5-40 SYRINGE (ML) INJECTION PRN
Status: CANCELLED | OUTPATIENT
Start: 2024-04-19

## 2024-04-19 RX ORDER — ONDANSETRON 2 MG/ML
8 INJECTION INTRAMUSCULAR; INTRAVENOUS ONCE
Status: CANCELLED | OUTPATIENT
Start: 2024-05-06 | End: 2024-05-02

## 2024-04-19 RX ORDER — EPINEPHRINE 1 MG/ML
0.3 INJECTION, SOLUTION, CONCENTRATE INTRAVENOUS PRN
Status: CANCELLED | OUTPATIENT
Start: 2024-05-06

## 2024-04-19 RX ORDER — EPINEPHRINE 1 MG/ML
0.3 INJECTION, SOLUTION, CONCENTRATE INTRAVENOUS PRN
OUTPATIENT
Start: 2024-05-13

## 2024-04-19 RX ORDER — ALBUTEROL SULFATE 90 UG/1
4 AEROSOL, METERED RESPIRATORY (INHALATION) PRN
OUTPATIENT
Start: 2024-05-13

## 2024-04-19 RX ORDER — ACETAMINOPHEN 325 MG/1
650 TABLET ORAL
Status: CANCELLED | OUTPATIENT
Start: 2024-04-29

## 2024-04-19 RX ORDER — ACETAMINOPHEN 325 MG/1
650 TABLET ORAL
OUTPATIENT
Start: 2024-05-13

## 2024-04-19 RX ORDER — EPINEPHRINE 1 MG/ML
0.3 INJECTION, SOLUTION, CONCENTRATE INTRAVENOUS PRN
Status: CANCELLED | OUTPATIENT
Start: 2024-04-29

## 2024-04-19 RX ORDER — SODIUM CHLORIDE 9 MG/ML
5-250 INJECTION, SOLUTION INTRAVENOUS PRN
Status: CANCELLED | OUTPATIENT
Start: 2024-04-19

## 2024-04-19 RX ORDER — SODIUM CHLORIDE 9 MG/ML
5-250 INJECTION, SOLUTION INTRAVENOUS PRN
Status: CANCELLED | OUTPATIENT
Start: 2024-05-06

## 2024-04-19 RX ORDER — DIPHENHYDRAMINE HYDROCHLORIDE 50 MG/ML
50 INJECTION INTRAMUSCULAR; INTRAVENOUS
Status: CANCELLED | OUTPATIENT
Start: 2024-04-29

## 2024-04-19 RX ORDER — ALBUTEROL SULFATE 90 UG/1
4 AEROSOL, METERED RESPIRATORY (INHALATION) PRN
Status: CANCELLED | OUTPATIENT
Start: 2024-05-06

## 2024-04-19 RX ORDER — HEPARIN 100 UNIT/ML
500 SYRINGE INTRAVENOUS PRN
OUTPATIENT
Start: 2024-05-13

## 2024-04-19 RX ORDER — SODIUM CHLORIDE 9 MG/ML
5-250 INJECTION, SOLUTION INTRAVENOUS PRN
Status: CANCELLED | OUTPATIENT
Start: 2024-04-29

## 2024-04-19 RX ORDER — SODIUM CHLORIDE 9 MG/ML
INJECTION, SOLUTION INTRAVENOUS CONTINUOUS
OUTPATIENT
Start: 2024-05-13

## 2024-04-19 RX ORDER — MEPERIDINE HYDROCHLORIDE 25 MG/ML
12.5 INJECTION INTRAMUSCULAR; INTRAVENOUS; SUBCUTANEOUS PRN
OUTPATIENT
Start: 2024-05-13

## 2024-04-19 RX ORDER — HEPARIN 100 UNIT/ML
500 SYRINGE INTRAVENOUS PRN
Status: CANCELLED | OUTPATIENT
Start: 2024-05-06

## 2024-04-19 RX ORDER — DIPHENHYDRAMINE HYDROCHLORIDE 50 MG/ML
50 INJECTION INTRAMUSCULAR; INTRAVENOUS
Status: CANCELLED | OUTPATIENT
Start: 2024-05-06

## 2024-04-19 RX ORDER — SODIUM CHLORIDE 0.9 % (FLUSH) 0.9 %
5-40 SYRINGE (ML) INJECTION PRN
OUTPATIENT
Start: 2024-05-13

## 2024-04-19 RX ORDER — SODIUM CHLORIDE 9 MG/ML
5-250 INJECTION, SOLUTION INTRAVENOUS PRN
OUTPATIENT
Start: 2024-05-13

## 2024-04-19 RX ORDER — SODIUM CHLORIDE 9 MG/ML
INJECTION, SOLUTION INTRAVENOUS CONTINUOUS
Status: CANCELLED | OUTPATIENT
Start: 2024-04-29

## 2024-04-19 RX ORDER — ONDANSETRON 2 MG/ML
8 INJECTION INTRAMUSCULAR; INTRAVENOUS ONCE
OUTPATIENT
Start: 2024-05-13 | End: 2024-05-09

## 2024-04-19 RX ORDER — HEPARIN 100 UNIT/ML
500 SYRINGE INTRAVENOUS PRN
Status: CANCELLED | OUTPATIENT
Start: 2024-04-19

## 2024-04-19 RX ORDER — FUROSEMIDE 10 MG/ML
80 INJECTION INTRAMUSCULAR; INTRAVENOUS ONCE
Status: COMPLETED | OUTPATIENT
Start: 2024-04-19 | End: 2024-04-19

## 2024-04-19 RX ORDER — ONDANSETRON 2 MG/ML
8 INJECTION INTRAMUSCULAR; INTRAVENOUS
Status: CANCELLED | OUTPATIENT
Start: 2024-04-29

## 2024-04-19 RX ORDER — ONDANSETRON 2 MG/ML
8 INJECTION INTRAMUSCULAR; INTRAVENOUS
Status: CANCELLED | OUTPATIENT
Start: 2024-05-06

## 2024-04-19 RX ORDER — SODIUM CHLORIDE 9 MG/ML
INJECTION, SOLUTION INTRAVENOUS CONTINUOUS
Status: CANCELLED | OUTPATIENT
Start: 2024-05-06

## 2024-04-19 RX ORDER — HEPARIN 100 UNIT/ML
500 SYRINGE INTRAVENOUS PRN
Status: CANCELLED | OUTPATIENT
Start: 2024-04-29

## 2024-04-19 RX ORDER — ONDANSETRON 2 MG/ML
8 INJECTION INTRAMUSCULAR; INTRAVENOUS
OUTPATIENT
Start: 2024-05-13

## 2024-04-19 RX ORDER — DIPHENHYDRAMINE HYDROCHLORIDE 50 MG/ML
50 INJECTION INTRAMUSCULAR; INTRAVENOUS
OUTPATIENT
Start: 2024-05-13

## 2024-04-19 RX ORDER — SODIUM CHLORIDE 0.9 % (FLUSH) 0.9 %
5-40 SYRINGE (ML) INJECTION PRN
Status: CANCELLED | OUTPATIENT
Start: 2024-05-06

## 2024-04-19 RX ORDER — KIT FOR THE PREPARATION OF TECHNETIUM TC 99M PENTETATE 20 MG/1
45 INJECTION, POWDER, LYOPHILIZED, FOR SOLUTION INTRAVENOUS; RESPIRATORY (INHALATION)
Status: COMPLETED | OUTPATIENT
Start: 2024-04-19 | End: 2024-04-19

## 2024-04-19 RX ORDER — ACETAMINOPHEN 325 MG/1
650 TABLET ORAL
Status: CANCELLED | OUTPATIENT
Start: 2024-05-06

## 2024-04-19 RX ORDER — ALBUTEROL SULFATE 90 UG/1
4 AEROSOL, METERED RESPIRATORY (INHALATION) PRN
Status: CANCELLED | OUTPATIENT
Start: 2024-04-29

## 2024-04-19 RX ORDER — MEPERIDINE HYDROCHLORIDE 25 MG/ML
12.5 INJECTION INTRAMUSCULAR; INTRAVENOUS; SUBCUTANEOUS PRN
Status: CANCELLED | OUTPATIENT
Start: 2024-04-29

## 2024-04-19 RX ADMIN — HEPARIN SODIUM 5000 UNITS: 5000 INJECTION INTRAVENOUS; SUBCUTANEOUS at 06:07

## 2024-04-19 RX ADMIN — ERYTHROMYCIN ETHYLSUCCINATE 400 MG: 400 TABLET ORAL at 09:09

## 2024-04-19 RX ADMIN — ACYCLOVIR 200 MG: 400 TABLET ORAL at 20:31

## 2024-04-19 RX ADMIN — SENNOSIDES 8.6 MG: 8.6 TABLET, FILM COATED ORAL at 20:32

## 2024-04-19 RX ADMIN — KIT FOR THE PREPARATION OF TECHNETIUM TC 99M PENTETATE 45 MILLICURIE: 20 INJECTION, POWDER, LYOPHILIZED, FOR SOLUTION INTRAVENOUS; RESPIRATORY (INHALATION) at 07:40

## 2024-04-19 RX ADMIN — SODIUM PHOSPHATE, MONOBASIC, MONOHYDRATE AND SODIUM PHOSPHATE, DIBASIC, ANHYDROUS 30 MMOL: 276; 142 INJECTION, SOLUTION INTRAVENOUS at 09:20

## 2024-04-19 RX ADMIN — ONDANSETRON 4 MG: 2 INJECTION INTRAMUSCULAR; INTRAVENOUS at 06:12

## 2024-04-19 RX ADMIN — KIT FOR THE PREPARATION OF TECHNETIUM TC 99M ALBUMIN AGGREGATED 6.3 MILLICURIE: 2.5 INJECTION, POWDER, FOR SOLUTION INTRAVENOUS at 07:53

## 2024-04-19 RX ADMIN — SODIUM CHLORIDE, PRESERVATIVE FREE 10 ML: 5 INJECTION INTRAVENOUS at 20:32

## 2024-04-19 RX ADMIN — HEPARIN SODIUM 5000 UNITS: 5000 INJECTION INTRAVENOUS; SUBCUTANEOUS at 22:29

## 2024-04-19 RX ADMIN — Medication 6 MG: at 22:28

## 2024-04-19 RX ADMIN — HEPARIN SODIUM 5000 UNITS: 5000 INJECTION INTRAVENOUS; SUBCUTANEOUS at 15:37

## 2024-04-19 RX ADMIN — FUROSEMIDE 80 MG: 10 INJECTION, SOLUTION INTRAMUSCULAR; INTRAVENOUS at 14:00

## 2024-04-19 RX ADMIN — ACETAMINOPHEN 650 MG: 325 TABLET ORAL at 15:37

## 2024-04-19 RX ADMIN — ROSUVASTATIN CALCIUM 10 MG: 10 TABLET, FILM COATED ORAL at 20:31

## 2024-04-19 RX ADMIN — ONDANSETRON 4 MG: 2 INJECTION INTRAMUSCULAR; INTRAVENOUS at 20:32

## 2024-04-19 RX ADMIN — ACYCLOVIR 200 MG: 400 TABLET ORAL at 09:10

## 2024-04-19 RX ADMIN — ERYTHROMYCIN ETHYLSUCCINATE 400 MG: 400 TABLET ORAL at 17:06

## 2024-04-19 RX ADMIN — METOCLOPRAMIDE 5 MG: 10 TABLET ORAL at 09:10

## 2024-04-19 RX ADMIN — SODIUM CHLORIDE, PRESERVATIVE FREE 10 ML: 5 INJECTION INTRAVENOUS at 09:10

## 2024-04-19 NOTE — PROGRESS NOTES
Occupational Therapy Note:    Attempted to see pt for OT treatment session--pt GIGI in nuclear med this AM and then GIGI for US this PM. Will continue to follow and attempt to see as schedule allows.    Thank you,  Elodia Serrano, OTR/L

## 2024-04-19 NOTE — CARE COORDINATION
LOS 9d  IDR and chart reviewed for discharge planning.   24 Hour Events:  Afebrile, VSS, on O2 @ 2L.  Cr stable 4.4. Nephro following.  BMBx yesterday.  CXR with BL PNA. Start levaquin.  Duplex LE and LUE. VQ scan.  Plan to give C1D1 CyBorD on 4/19 if scans negative.    PT/OT unable to work with patient until vq scan is cleared.  PT/OT pending  PPD ordered.    Transition of care is pending PT/OT recommendation at this time.    Transitions of Care plan is ongoing, no further concerns as of present.   Please consult  if any new issues arise.  Will continue to follow.

## 2024-04-19 NOTE — PROGRESS NOTES
[facility] Hematology [facility] On[facility]        Inpatient Hematology / Oncology Progress Note    Reason for Consult:  ANTWON (acute kidney injury) (HCC) [N17.9]  Referring Physician:  Jovan Serrato MD    24 Hour Events:  Afebrile, VSS, on O2 @ 2L  Cr down to 4.0, Neph following  S/p BMBx 4/17, path pending  Starting C1D1 CyBorD today  On LVQ (D2) for pneumonia  VQ scan neg  LUE/BLE dopp pending  Daughter at bedside    Transfusions: None  Replacements: Phos      ROS:  Constitutional: +weakness, fatigue. Negative for fever, chills.  CV: Negative for chest pain, palpitations, edema.  Respiratory: Negative for dyspnea, cough, wheezing.  GI: +nausea.  Negative for abdominal pain, diarrhea.    10 point review of systems is otherwise negative with the exception of the elements mentioned above in the HPI.         No Known Allergies  Past Medical History:   Diagnosis Date    Acute respiratory failure with hypoxemia (HCC)     Benign essential hypertension 2/11/2015    medication    Bilateral leg edema 4/26/2017    Cancer (HCC)     skin    Hiatal hernia     \"large\"    Hypercholesteremia 2/11/2015    IFG (impaired fasting glucose) 4/26/2017    Iron deficiency anemia 5/12/2016    Low oxygen saturation     per office notes- patient's daughter reported O2 sat drops to 85% when supine, she uses O2    Mixed hyperlipidemia 2/11/2015    Morbid obesity (HCC) 10/26/2017    Nipple discharge     not current as of 7/17/13    Primary insomnia 4/26/2018    Primary osteoarthritis of both knees 4/26/2018    Pulmonary embolism (HCC) 10/15/2023    on eliquis- Dr. Arnold recommended eliquis full dose x 6 months    Stage 3 chronic kidney disease (HCC) 7/17/2019     Past Surgical History:   Procedure Laterality Date    HIATAL HERNIA REPAIR N/A 3/4/2024    ROBOTIC HIATAL HERNIA REPAIR performed by Thomas Lozano MD at [facility] MAIN OR    MALIGNANT SKIN LESION EXCISION      OTHER SURGICAL HISTORY      skin cancer    UPPER GASTROINTESTINAL ENDOSCOPY  3/4/2024 who was referred to ED by her primary care physician for management of ANTWON on CKD.  Patient reports feeling unwell with decreased p.o. intake since surgery.  Labs were significant for creatinine of 4.9, hemoglobin of 10.8, platelet count of 136, corrected calcium of 12.1.  Renal ultrasound was unremarkable.  CTAP showed small left pleural effusion and degenerative changes of the spine and sacroiliac joints.  Kappa/lambda was 665 with free kappa light chains of 7382 consistent with diagnosis of plasma cell myeloma.  Reviewed lab results with the patient and daughter.  Patient is agreeable to having bone marrow biopsy done  this was completed earlier today.  Discussed rationale/logistics/risks/benefits/potential toxicities of CyBorD.  Patient and daughter verbalized understanding and wished to proceed.  Treatment plan orders have been entered.  VQ scan and venous duplex studies negative.  Labs and physical exam are adequate to proceed with D1 C1 CyBorD.  Jovan Serrato MD  [facility] Hematology/Oncology

## 2024-04-19 NOTE — PROGRESS NOTES
[facility] Progress Note    Subjective:    Seen in the room.  Family present.     ROS:  Denies CP, SOB, nausea/ vomitting     Objective:    Current Facility-Administered Medications   Medication Dose Route Frequency    sodium phosphate 30 mmol in sodium chloride 0.9 % 500 mL IVPB  30 mmol IntraVENous Once    furosemide (LASIX) injection 80 mg  80 mg IntraVENous Once    [START ON 4/20/2024] levoFLOXacin (LEVAQUIN) tablet 500 mg  500 mg Oral Every Other Day    heparin (porcine) injection 5,000 Units  5,000 Units SubCUTAneous 3 times per day    potassium chloride (KLOR-CON M) extended release tablet 40 mEq  40 mEq Oral PRN    Or    potassium bicarb-citric acid (EFFER-K) effervescent tablet 40 mEq  40 mEq Oral PRN    Or    potassium chloride 10 mEq/100 mL IVPB (Peripheral Line)  10 mEq IntraVENous PRN    sodium phosphate 15 mmol in sodium chloride 0.9 % 250 mL IVPB  15 mmol IntraVENous PRN    magnesium sulfate 2000 mg in 50 mL IVPB premix  2,000 mg IntraVENous PRN    ondansetron (ZOFRAN) injection 4 mg  4 mg IntraVENous Q6H PRN    acyclovir (ZOVIRAX) tablet 200 mg  200 mg Oral BID    senna (SENOKOT) tablet 8.6 mg  1 tablet Oral Nightly    allopurinol (ZYLOPRIM) tablet 50 mg  50 mg Oral Once per day on Mon Thu    glucose chewable tablet 16 g  4 tablet Oral PRN    dextrose bolus 10% 125 mL  125 mL IntraVENous PRN    Or    dextrose bolus 10% 250 mL  250 mL IntraVENous PRN    Glucagon Emergency KIT 1 mg  1 mg SubCUTAneous PRN    dextrose 10 % infusion   IntraVENous Continuous PRN    erythromycin ethylsuccinate (EES) tablet 400 mg  400 mg Oral TID WC    metoclopramide (REGLAN) tablet 5 mg  5 mg Oral Daily    melatonin tablet 6 mg  6 mg Oral Nightly PRN    rosuvastatin (CRESTOR) tablet 10 mg  10 mg Oral Nightly    traMADol (ULTRAM) tablet 50 mg  50 mg Oral Q12H PRN    hydrALAZINE (APRESOLINE) injection 10 mg  10 mg IntraVENous Q6H PRN    sodium chloride flush 0.9 % injection 5-40 mL  5-40 mL IntraVENous 2 times per  day    sodium chloride flush 0.9 % injection 5-40 mL  5-40 mL IntraVENous PRN    0.9 % sodium chloride infusion   IntraVENous PRN    polyethylene glycol (GLYCOLAX) packet 17 g  17 g Oral Daily PRN    bisacodyl (DULCOLAX) suppository 10 mg  10 mg Rectal Daily PRN    famotidine (PEPCID) tablet 10 mg  10 mg Oral Daily PRN    aluminum & magnesium hydroxide-simethicone (MAALOX) 200-200-20 MG/5ML suspension 30 mL  30 mL Oral Q6H PRN    acetaminophen (TYLENOL) tablet 650 mg  650 mg Oral Q6H PRN    Or    acetaminophen (TYLENOL) suppository 650 mg  650 mg Rectal Q6H PRN       Exam:  Vitals:    04/18/24 2059 04/18/24 2246 04/19/24 0225 04/19/24 0842   BP: 133/69 133/76 129/74 131/72   Pulse: 81 78 81 85   Resp: 17 18 17 26   Temp: 97.6 °F (36.4 °C) 97.7 °F (36.5 °C) 98.4 °F (36.9 °C) 98.1 °F (36.7 °C)   TempSrc: Oral Oral Oral Oral   SpO2: 97% 96% 100% 96%   Weight:       Height:             Intake/Output Summary (Last 24 hours) at 4/19/2024 0933  Last data filed at 4/19/2024 0225  Gross per 24 hour   Intake 480 ml   Output 200 ml   Net 280 ml         Gen: comfortable , NAD  HEENT: moist membranes  CV: S1, S2  Lungs: Clear bilaterally  Extem: no edema        Labs  Recent Labs     04/17/24 0411 04/18/24  0355 04/19/24  0619   WBC 4.4 4.6 6.2   HGB 7.6* 8.0* 7.9*   HCT 23.2* 24.9* 24.9*   * 119* 119*       Recent Labs     04/17/24 0411 04/18/24  0355 04/19/24  0619    141 139   K 3.2* 3.3* 3.6    110 110   CO2 25 27 25   BUN 37* 31* 27*   MG 1.6* 2.0 1.6*   PHOS  --  2.9 1.9*           Problem List:  Patient Active Problem List    Diagnosis Date Noted    Seasonal allergic rhinitis due to pollen 01/06/2023    Hypomagnesemia 04/19/2024    Thrombocytopenia (HCC) 04/18/2024    Hypokalemia 04/18/2024    Encounter for antineoplastic chemotherapy 04/17/2024    Multiple myeloma not having achieved remission (HCC) 04/15/2024    Hx of hiatal hernia 04/12/2024    Failure to thrive in adult 04/12/2024    Anemia

## 2024-04-19 NOTE — PROGRESS NOTES
Physical Therapy Note:    Attempted to see patient for physical therapy treatment  session. Patient GIGI to nuclear med this AM and in US this PM. Will follow and re-attempt as schedule permits/patient available. Thank you,    CECILY YOUNGER, PT     Rehab Caseload Tracker

## 2024-04-19 NOTE — PROGRESS NOTES
CH attempted to visit PT several times earlier in the day. PT was in bed awake. Daughter was at bedside. CH introduced self. PT and Daughter shared about health and hospitalization including new diagnosis and starting chemotherapy. CH offered empathetic spiritual presence, active listening, and therapeutic communication. PT and Daughter expressed importance of and affection for Family. PT and Daughter expressed comfort in Adrienne and Prayer. PT is Taoist. Son arrived. CH offered prayer.  thanked PT for visit and offered support.     Rev. Kathy Abdul M.Div.

## 2024-04-19 NOTE — PROGRESS NOTES
Hospitalist Progress Note   Admit Date:  4/10/2024 12:06 PM   Name:  Tayla Escobar   Age:  84 y.o.  Sex:  female  :  1940   MRN:  [id number]   Room:  [facility]/    Presenting/Chief Complaint: Post-op Problem     Reason(s) for Admission: ANTWON (acute kidney injury) (HCC) [N17.9]     Hospital Course:   Mrs. Escobar is an 85 y/o WF with a h/o HH s/p repair 3/2024, HTN, HLD and PE on Eliquis (dx 10/2023) who was admitted to our service on 4/10 due to poor PO intake and ANTWON. PO intake had been decreased since HH surgery 3/4/24. Cr was 4.7 on admission (baseline ~1.2). She was started on IVFs and offending meds held. She was seen by Nephrology and felt pre-renal, ATN. CT a/p showed known b/l inguinal hernias and s/p HH repair. She was evaluated by Surgery. UGI series showed changes suggestive of esophagitis with delayed transit, possibly representing GOO. Not felt to have a stricture. Her Cr peaked at 4.9 but has remained stable in the mid 4 range since admission. Total serum calcium elevated since 2024. GI consulted  for UGI series findings. EGD done on  and showed food in the stomach so the procedure was aborted and rescheduled for 4/15 which was ultimately normal. She was recommended to have a gastroparesis diet. Oncology was consulted on 4/15 due to elevated kappa/lambda light chains and concerns for plasma cell disease. Calcium is elevated, Cr is stable but remains >4, her Hgb is worse than baseline (7-8s vs 10-11 in the Fall ). Skeletal survey xrays  showed osteopenia, kyphosis, destructive changes of lumbar and sacrum, shoulder arthritis. Seen by PC on  and made DNR. IR consulted and she underwent CT guided BM bx on  with intentions to start chemotherapy .       Subjective & 24hr Events:   Slight change in her breathing from yesterday. CXR was repeated in the afternoon, vascular congestion, basilar infiltrates. Remains on 2L, just came back from VQ scan. Same amt of DANYELL

## 2024-04-20 LAB
ALBUMIN SERPL-MCNC: 2.7 G/DL (ref 3.2–4.6)
ALBUMIN/GLOB SERPL: 1.1 (ref 0.4–1.6)
ALP SERPL-CCNC: 71 U/L (ref 50–136)
ALT SERPL-CCNC: 12 U/L (ref 12–65)
ANION GAP SERPL CALC-SCNC: 3 MMOL/L (ref 2–11)
AST SERPL-CCNC: 16 U/L (ref 15–37)
BASOPHILS # BLD: 0 K/UL (ref 0–0.2)
BASOPHILS NFR BLD: 0 % (ref 0–2)
BILIRUB SERPL-MCNC: 0.5 MG/DL (ref 0.2–1.1)
BUN SERPL-MCNC: 24 MG/DL (ref 8–23)
CALCIUM SERPL-MCNC: 8.9 MG/DL (ref 8.3–10.4)
CHLORIDE SERPL-SCNC: 109 MMOL/L (ref 103–113)
CO2 SERPL-SCNC: 28 MMOL/L (ref 21–32)
CREAT SERPL-MCNC: 4.1 MG/DL (ref 0.6–1)
DIFFERENTIAL METHOD BLD: ABNORMAL
EOSINOPHIL # BLD: 0.1 K/UL (ref 0–0.8)
EOSINOPHIL NFR BLD: 2 % (ref 0.5–7.8)
ERYTHROCYTE [DISTWIDTH] IN BLOOD BY AUTOMATED COUNT: 13.2 % (ref 11.9–14.6)
GLOBULIN SER CALC-MCNC: 2.4 G/DL (ref 2.8–4.5)
GLUCOSE SERPL-MCNC: 98 MG/DL (ref 65–100)
HCT VFR BLD AUTO: 25.7 % (ref 35.8–46.3)
HGB BLD-MCNC: 8.4 G/DL (ref 11.7–15.4)
IMM GRANULOCYTES # BLD AUTO: 0 K/UL (ref 0–0.5)
IMM GRANULOCYTES NFR BLD AUTO: 1 % (ref 0–5)
LYMPHOCYTES # BLD: 0.7 K/UL (ref 0.5–4.6)
LYMPHOCYTES NFR BLD: 14 % (ref 13–44)
MAGNESIUM SERPL-MCNC: 1.5 MG/DL (ref 1.8–2.4)
MCH RBC QN AUTO: 33.3 PG (ref 26.1–32.9)
MCHC RBC AUTO-ENTMCNC: 32.7 G/DL (ref 31.4–35)
MCV RBC AUTO: 102 FL (ref 82–102)
MONOCYTES # BLD: 0.6 K/UL (ref 0.1–1.3)
MONOCYTES NFR BLD: 11 % (ref 4–12)
NEUTS SEG # BLD: 3.7 K/UL (ref 1.7–8.2)
NEUTS SEG NFR BLD: 72 % (ref 43–78)
NRBC # BLD: 0 K/UL (ref 0–0.2)
PHOSPHATE SERPL-MCNC: 4.4 MG/DL (ref 2.3–3.7)
PLATELET # BLD AUTO: 119 K/UL (ref 150–450)
PMV BLD AUTO: 10.4 FL (ref 9.4–12.3)
POTASSIUM SERPL-SCNC: 3.4 MMOL/L (ref 3.5–5.1)
PROT SERPL-MCNC: 5.1 G/DL (ref 6.3–8.2)
RBC # BLD AUTO: 2.52 M/UL (ref 4.05–5.2)
SODIUM SERPL-SCNC: 140 MMOL/L (ref 136–146)
URATE SERPL-MCNC: 0.6 MG/DL (ref 2.6–6)
WBC # BLD AUTO: 5.1 K/UL (ref 4.3–11.1)

## 2024-04-20 PROCEDURE — 6370000000 HC RX 637 (ALT 250 FOR IP): Performed by: STUDENT IN AN ORGANIZED HEALTH CARE EDUCATION/TRAINING PROGRAM

## 2024-04-20 PROCEDURE — 6360000002 HC RX W HCPCS: Performed by: STUDENT IN AN ORGANIZED HEALTH CARE EDUCATION/TRAINING PROGRAM

## 2024-04-20 PROCEDURE — 6370000000 HC RX 637 (ALT 250 FOR IP): Performed by: HOSPITALIST

## 2024-04-20 PROCEDURE — 6370000000 HC RX 637 (ALT 250 FOR IP)

## 2024-04-20 PROCEDURE — 36415 COLL VENOUS BLD VENIPUNCTURE: CPT

## 2024-04-20 PROCEDURE — 6360000002 HC RX W HCPCS: Performed by: INTERNAL MEDICINE

## 2024-04-20 PROCEDURE — A4216 STERILE WATER/SALINE, 10 ML: HCPCS | Performed by: INTERNAL MEDICINE

## 2024-04-20 PROCEDURE — 84100 ASSAY OF PHOSPHORUS: CPT

## 2024-04-20 PROCEDURE — 1100000000 HC RM PRIVATE

## 2024-04-20 PROCEDURE — 6370000000 HC RX 637 (ALT 250 FOR IP): Performed by: INTERNAL MEDICINE

## 2024-04-20 PROCEDURE — 6370000000 HC RX 637 (ALT 250 FOR IP): Performed by: SURGERY

## 2024-04-20 PROCEDURE — 6370000000 HC RX 637 (ALT 250 FOR IP): Performed by: NURSE PRACTITIONER

## 2024-04-20 PROCEDURE — 2580000003 HC RX 258: Performed by: FAMILY MEDICINE

## 2024-04-20 PROCEDURE — 85025 COMPLETE CBC W/AUTO DIFF WBC: CPT

## 2024-04-20 PROCEDURE — 84550 ASSAY OF BLOOD/URIC ACID: CPT

## 2024-04-20 PROCEDURE — 80053 COMPREHEN METABOLIC PANEL: CPT

## 2024-04-20 PROCEDURE — 99233 SBSQ HOSP IP/OBS HIGH 50: CPT | Performed by: INTERNAL MEDICINE

## 2024-04-20 PROCEDURE — 6370000000 HC RX 637 (ALT 250 FOR IP): Performed by: FAMILY MEDICINE

## 2024-04-20 PROCEDURE — 83735 ASSAY OF MAGNESIUM: CPT

## 2024-04-20 PROCEDURE — 2580000003 HC RX 258: Performed by: INTERNAL MEDICINE

## 2024-04-20 RX ORDER — MEPERIDINE HYDROCHLORIDE 25 MG/ML
12.5 INJECTION INTRAMUSCULAR; INTRAVENOUS; SUBCUTANEOUS PRN
Status: ACTIVE | OUTPATIENT
Start: 2024-04-20 | End: 2024-04-21

## 2024-04-20 RX ORDER — DIPHENHYDRAMINE HYDROCHLORIDE 50 MG/ML
50 INJECTION INTRAMUSCULAR; INTRAVENOUS
Status: ACTIVE | OUTPATIENT
Start: 2024-04-20 | End: 2024-04-21

## 2024-04-20 RX ORDER — ACETAMINOPHEN 325 MG/1
650 TABLET ORAL
Status: ACTIVE | OUTPATIENT
Start: 2024-04-20 | End: 2024-04-21

## 2024-04-20 RX ORDER — EPINEPHRINE 1 MG/ML
0.3 INJECTION, SOLUTION, CONCENTRATE INTRAVENOUS PRN
Status: ACTIVE | OUTPATIENT
Start: 2024-04-20 | End: 2024-04-21

## 2024-04-20 RX ORDER — ONDANSETRON 2 MG/ML
8 INJECTION INTRAMUSCULAR; INTRAVENOUS
Status: ACTIVE | OUTPATIENT
Start: 2024-04-20 | End: 2024-04-21

## 2024-04-20 RX ORDER — ONDANSETRON 2 MG/ML
8 INJECTION INTRAMUSCULAR; INTRAVENOUS ONCE
Status: COMPLETED | OUTPATIENT
Start: 2024-04-20 | End: 2024-04-20

## 2024-04-20 RX ORDER — ALBUTEROL SULFATE 90 UG/1
4 AEROSOL, METERED RESPIRATORY (INHALATION) PRN
Status: ACTIVE | OUTPATIENT
Start: 2024-04-20 | End: 2024-04-21

## 2024-04-20 RX ORDER — SODIUM CHLORIDE 9 MG/ML
5-250 INJECTION, SOLUTION INTRAVENOUS PRN
Status: ACTIVE | OUTPATIENT
Start: 2024-04-20 | End: 2024-04-21

## 2024-04-20 RX ORDER — SODIUM CHLORIDE 9 MG/ML
INJECTION, SOLUTION INTRAVENOUS PRN
Status: ACTIVE | OUTPATIENT
Start: 2024-04-20 | End: 2024-04-21

## 2024-04-20 RX ADMIN — ONDANSETRON 8 MG: 2 INJECTION INTRAMUSCULAR; INTRAVENOUS at 11:20

## 2024-04-20 RX ADMIN — SENNOSIDES 8.6 MG: 8.6 TABLET, FILM COATED ORAL at 20:48

## 2024-04-20 RX ADMIN — ERYTHROMYCIN ETHYLSUCCINATE 400 MG: 400 TABLET ORAL at 07:59

## 2024-04-20 RX ADMIN — ACYCLOVIR 200 MG: 400 TABLET ORAL at 20:48

## 2024-04-20 RX ADMIN — HEPARIN SODIUM 5000 UNITS: 5000 INJECTION INTRAVENOUS; SUBCUTANEOUS at 05:59

## 2024-04-20 RX ADMIN — POTASSIUM CHLORIDE 40 MEQ: 1500 TABLET, EXTENDED RELEASE ORAL at 06:33

## 2024-04-20 RX ADMIN — DEXAMETHASONE SODIUM PHOSPHATE 40 MG: 10 INJECTION INTRAMUSCULAR; INTRAVENOUS at 11:24

## 2024-04-20 RX ADMIN — ACETAMINOPHEN 650 MG: 325 TABLET ORAL at 16:41

## 2024-04-20 RX ADMIN — SODIUM CHLORIDE, PRESERVATIVE FREE 10 ML: 5 INJECTION INTRAVENOUS at 20:49

## 2024-04-20 RX ADMIN — ERYTHROMYCIN ETHYLSUCCINATE 400 MG: 400 TABLET ORAL at 16:42

## 2024-04-20 RX ADMIN — SODIUM CHLORIDE 3 MG: 9 INJECTION INTRAMUSCULAR; INTRAVENOUS; SUBCUTANEOUS at 12:28

## 2024-04-20 RX ADMIN — METOCLOPRAMIDE 5 MG: 10 TABLET ORAL at 07:59

## 2024-04-20 RX ADMIN — ERYTHROMYCIN ETHYLSUCCINATE 400 MG: 400 TABLET ORAL at 12:28

## 2024-04-20 RX ADMIN — HEPARIN SODIUM 5000 UNITS: 5000 INJECTION INTRAVENOUS; SUBCUTANEOUS at 22:00

## 2024-04-20 RX ADMIN — LEVOFLOXACIN 500 MG: 500 TABLET, FILM COATED ORAL at 08:00

## 2024-04-20 RX ADMIN — SODIUM CHLORIDE, PRESERVATIVE FREE 10 ML: 5 INJECTION INTRAVENOUS at 08:00

## 2024-04-20 RX ADMIN — ONDANSETRON 4 MG: 2 INJECTION INTRAMUSCULAR; INTRAVENOUS at 02:59

## 2024-04-20 RX ADMIN — ACYCLOVIR 200 MG: 400 TABLET ORAL at 08:00

## 2024-04-20 RX ADMIN — TRAMADOL HYDROCHLORIDE 50 MG: 50 TABLET ORAL at 21:42

## 2024-04-20 RX ADMIN — HEPARIN SODIUM 5000 UNITS: 5000 INJECTION INTRAVENOUS; SUBCUTANEOUS at 13:33

## 2024-04-20 RX ADMIN — CYCLOPHOSPHAMIDE 580 MG: 1 INJECTION, POWDER, FOR SOLUTION INTRAVENOUS; ORAL at 11:49

## 2024-04-20 RX ADMIN — ROSUVASTATIN CALCIUM 10 MG: 10 TABLET, FILM COATED ORAL at 20:48

## 2024-04-20 RX ADMIN — SODIUM CHLORIDE: 9 INJECTION, SOLUTION INTRAVENOUS at 06:37

## 2024-04-20 RX ADMIN — Medication 6 MG: at 20:48

## 2024-04-20 RX ADMIN — MAGNESIUM SULFATE HEPTAHYDRATE 2000 MG: 40 INJECTION, SOLUTION INTRAVENOUS at 06:38

## 2024-04-20 ASSESSMENT — PAIN DESCRIPTION - PAIN TYPE: TYPE: ACUTE PAIN

## 2024-04-20 ASSESSMENT — PAIN DESCRIPTION - LOCATION
LOCATION: BUTTOCKS;BACK
LOCATION: HEAD

## 2024-04-20 ASSESSMENT — PAIN DESCRIPTION - DESCRIPTORS
DESCRIPTORS: ACHING
DESCRIPTORS: ACHING;DISCOMFORT

## 2024-04-20 ASSESSMENT — PAIN SCALES - GENERAL
PAINLEVEL_OUTOF10: 3
PAINLEVEL_OUTOF10: 5

## 2024-04-20 ASSESSMENT — PAIN DESCRIPTION - ORIENTATION: ORIENTATION: ANTERIOR;POSTERIOR

## 2024-04-20 ASSESSMENT — PAIN DESCRIPTION - FREQUENCY: FREQUENCY: INTERMITTENT

## 2024-04-20 ASSESSMENT — PAIN - FUNCTIONAL ASSESSMENT: PAIN_FUNCTIONAL_ASSESSMENT: PREVENTS OR INTERFERES SOME ACTIVE ACTIVITIES AND ADLS

## 2024-04-20 ASSESSMENT — PAIN DESCRIPTION - ONSET: ONSET: GRADUAL

## 2024-04-20 NOTE — PROGRESS NOTES
Hospitalist Progress Note   Admit Date:  4/10/2024 12:06 PM   Name:  Tayla Escobar   Age:  84 y.o.  Sex:  female  :  1940   MRN:  [id number]   Room:  [facility]/    Presenting/Chief Complaint: Post-op Problem     Reason(s) for Admission: ANTWON (acute kidney injury) (HCC) [N17.9]     Hospital Course:   Mrs. Escobar is an 83 y/o WF with a h/o HH s/p repair 3/2024, HTN, HLD and PE on Eliquis (dx 10/2023) who was admitted to our service on 4/10 due to poor PO intake and ANTWON. PO intake had been decreased since HH surgery 3/4/24. Cr was 4.7 on admission (baseline ~1.2). She was started on IVFs and offending meds held. She was seen by Nephrology and felt pre-renal, ATN. CT a/p showed known b/l inguinal hernias and s/p HH repair. She was evaluated by Surgery. UGI series showed changes suggestive of esophagitis with delayed transit, possibly representing GOO. Not felt to have a stricture. Her Cr peaked at 4.9 but has remained stable in the mid 4 range since admission. Total serum calcium elevated since 2024. GI consulted  for UGI series findings. EGD done on  and showed food in the stomach so the procedure was aborted and rescheduled for 4/15 which was ultimately normal. She was recommended to have a gastroparesis diet. Oncology was consulted on 4/15 due to elevated kappa/lambda light chains and concerns for plasma cell disease. Calcium is elevated, Cr is stable but remains >4, her Hgb is worse than baseline (7-8s vs 10-11 in the Fall ). Skeletal survey xrays  showed osteopenia, kyphosis, destructive changes of lumbar and sacrum, shoulder arthritis. Seen by PC on  and made DNR. IR consulted and she underwent CT guided BM bx on . CyBorD started .    Subjective & 24hr Events:   Looks tired today. About 2.2L urine output yesterday. SOB and edema are better. Starting chemo today. Cr stable at 4.1. Mg and K low, both replaced. Family at bedside. PO intake is poor.     Assessment & Plan:

## 2024-04-20 NOTE — PROGRESS NOTES
flush 0.9 % injection 5-40 mL  5-40 mL IntraVENous PRN Laina Ro, DO        0.9 % sodium chloride infusion   IntraVENous PRN Laina Ro,  10 mL/hr at 24 0908 Rate Verify at 24 0908    polyethylene glycol (GLYCOLAX) packet 17 g  17 g Oral Daily PRN Laina Ro,         bisacodyl (DULCOLAX) suppository 10 mg  10 mg Rectal Daily PRN Laina Ro,         famotidine (PEPCID) tablet 10 mg  10 mg Oral Daily PRN Laina Ro DO        aluminum & magnesium hydroxide-simethicone (MAALOX) 200-200-20 MG/5ML suspension 30 mL  30 mL Oral Q6H PRN Laina Ro,         acetaminophen (TYLENOL) tablet 650 mg  650 mg Oral Q6H PRN Laina Ro,    650 mg at 24 1537    Or    acetaminophen (TYLENOL) suppository 650 mg  650 mg Rectal Q6H PRN Laina Ro,            OBJECTIVE:  Patient Vitals for the past 8 hrs:   BP Temp Pulse SpO2   24 1529 129/61 98.8 °F (37.1 °C) 83 95 %   24 1215 129/60 98.2 °F (36.8 °C) 77 96 %   24 0812 129/69 98.4 °F (36.9 °C) 79 94 %     Temp (24hrs), Av.4 °F (36.9 °C), Min:98.2 °F (36.8 °C), Max:98.8 °F (37.1 °C)     07 -  190  In: 517.7 [I.V.:5]  Out: 700 [Urine:700]    Physical Exam:  Constitutional: Weak appearing elderly female in no acute distress, sitting comfortably in the hospital bed.   HEENT: Normocephalic and atraumatic. Oropharynx is clear, mucous membranes are moist.  Extraocular muscles are intact.  Sclerae anicteric. Neck supple without JVD. No thyromegaly present.    Skin Warm and dry.  No bruising and no rash noted.  No erythema.  No pallor.    Neuro Grossly nonfocal with no obvious sensory or motor deficits.   MSK Normal range of motion in general.     Psych Appropriate mood and affect.    Full exam per attending MD    Labs:    Recent Results (from the past 24 hour(s))   Comprehensive Metabolic Panel    Collection Time: 24  5:14 AM   Result Value Ref Range    Sodium 140 136 -  Renal ultrasound was unremarkable.  CTAP showed small left pleural effusion and degenerative changes of the spine and sacroiliac joints.  Kappa/lambda was 665 with free kappa light chains of 7382 consistent with diagnosis of plasma cell myeloma.  Reviewed lab results with the patient and daughter.  Patient is agreeable to having bone marrow biopsy done  this was completed earlier today.  Discussed rationale/logistics/risks/benefits/potential toxicities of CyBorD.  Patient and daughter verbalized understanding and wished to proceed.  Treatment plan orders have been entered.  VQ scan and venous duplex studies negative. Labs and physical exam is adequate to proceed with C1D1 CyBorD today. C/w supportive care as outlined above.     Jovan Serrato MD  [facility] Hematology/Oncology

## 2024-04-20 NOTE — PROGRESS NOTES
[facility] Progress Note    Subjective:    Seen in the room.  Family present.  Reports not eating at all    ROS:  Denies CP, SOB, nausea/ vomitting     Objective:    Current Facility-Administered Medications   Medication Dose Route Frequency    0.9 % sodium chloride infusion  5-250 mL/hr IntraVENous PRN    ondansetron (ZOFRAN) injection 8 mg  8 mg IntraVENous Once    dexAMETHasone (DECADRON) 40 mg in sodium chloride 0.9 % 65 mL IVPB  40 mg IntraVENous Once    bortezomib (VELCADE) 3 mg in sodium chloride (PF) 0.9 % 1.2 mL chemo subcutaneous syringe  1.5 mg/m2 (Treatment Plan Recorded) SubCUTAneous Once    cycloPHOSphamide (CYTOXAN) 580 mg in sodium chloride 0.9 % 250 mL chemo IVPB  300 mg/m2 (Treatment Plan Recorded) IntraVENous Once    0.9 % sodium chloride infusion   IntraVENous PRN    diphenhydrAMINE (BENADRYL) injection 50 mg  50 mg IntraVENous Once PRN    famotidine (PEPCID) 20 mg in sodium chloride (PF) 0.9 % 10 mL injection  20 mg IntraVENous Once PRN    hydrocortisone sodium succinate PF (SOLU-CORTEF) injection 100 mg  100 mg IntraVENous Once PRN    acetaminophen (TYLENOL) tablet 650 mg  650 mg Oral Once PRN    meperidine (DEMEROL) injection 12.5 mg  12.5 mg IntraVENous PRN    ondansetron (ZOFRAN) injection 8 mg  8 mg IntraVENous Once PRN    EPINEPHrine PF 1 MG/ML injection (Anaphylaxis) 0.3 mg  0.3 mg IntraMUSCular PRN    albuterol sulfate HFA (PROVENTIL;VENTOLIN;PROAIR) 108 (90 Base) MCG/ACT inhaler 4 puff  4 puff Inhalation PRN    levoFLOXacin (LEVAQUIN) tablet 500 mg  500 mg Oral Every Other Day    heparin (porcine) injection 5,000 Units  5,000 Units SubCUTAneous 3 times per day    potassium chloride (KLOR-CON M) extended release tablet 40 mEq  40 mEq Oral PRN    Or    potassium bicarb-citric acid (EFFER-K) effervescent tablet 40 mEq  40 mEq Oral PRN    Or    potassium chloride 10 mEq/100 mL IVPB (Peripheral Line)  10 mEq IntraVENous PRN    sodium phosphate 15 mmol in sodium chloride 0.9 % 250 mL  Summary (Last 24 hours) at 4/20/2024 1051  Last data filed at 4/20/2024 0908  Gross per 24 hour   Intake 206.94 ml   Output 1800 ml   Net -1593.06 ml       Gen: comfortable , NAD  HEENT: moist membranes  CV: S1, S2  Lungs: Clear bilaterally  Extem: 1+edema        Labs  Recent Labs     04/18/24  0355 04/19/24  0619 04/20/24  0514   WBC 4.6 6.2 5.1   HGB 8.0* 7.9* 8.4*   HCT 24.9* 24.9* 25.7*   * 119* 119*     Recent Labs     04/18/24  0355 04/19/24  0619 04/20/24  0514    139 140   K 3.3* 3.6 3.4*    110 109   CO2 27 25 28   BUN 31* 27* 24*   CREATININE 4.40* 4.00* 4.10*   MG 2.0 1.6* 1.5*   PHOS 2.9 1.9* 4.4*         Problem List:  Patient Active Problem List    Diagnosis Date Noted    Seasonal allergic rhinitis due to pollen 01/06/2023    Hypomagnesemia 04/19/2024    Thrombocytopenia (HCC) 04/18/2024    Hypokalemia 04/18/2024    Encounter for antineoplastic chemotherapy 04/17/2024    Multiple myeloma not having achieved remission (HCC) 04/15/2024    Hx of hiatal hernia 04/12/2024    Failure to thrive in adult 04/12/2024    Anemia 04/12/2024    ANTWON (acute kidney injury) (HCC) 04/10/2024    Epigastric pain 04/10/2024    Esophagitis 04/10/2024    Gastric outlet obstruction 04/10/2024    Surgical follow-up care 03/28/2024    Moderate protein-calorie malnutrition (HCC) 03/05/2024    Pulmonary embolism without acute cor pulmonale, unspecified chronicity, unspecified pulmonary embolism type ([facility]) 10/15/2023    Primary osteoarthritis of both knees 04/26/2018    Morbid obesity (HCC) 10/26/2017    Bilateral leg edema 04/26/2017    IFG (impaired fasting glucose) 04/26/2017    Vitamin D deficiency 05/27/2015    Mixed hyperlipidemia 02/11/2015    Benign essential hypertension 02/11/2015       Assessment/Plan:  1) ANTWON/ CKD stage 3A - baseline Cr about 1.2, already had ANTWON as outpt with Cr 2.0 last week  - In setting of hypotension, poor PO intake   - CT without evidence of hydronephrosis  - Peak Cr 4.9,

## 2024-04-20 NOTE — PROGRESS NOTES
Patient tolerated CyBorD today without adverse reaction at this time.   Tylenol given x 1.  Mg and K+ replacements.

## 2024-04-21 LAB
ALBUMIN SERPL-MCNC: 2.8 G/DL (ref 3.2–4.6)
ALBUMIN/GLOB SERPL: 1.1 (ref 0.4–1.6)
ALP SERPL-CCNC: 73 U/L (ref 50–136)
ALT SERPL-CCNC: 12 U/L (ref 12–65)
ANION GAP SERPL CALC-SCNC: 6 MMOL/L (ref 2–11)
AST SERPL-CCNC: 12 U/L (ref 15–37)
BASOPHILS # BLD: 0 K/UL (ref 0–0.2)
BASOPHILS NFR BLD: 0 % (ref 0–2)
BILIRUB SERPL-MCNC: 0.6 MG/DL (ref 0.2–1.1)
BUN SERPL-MCNC: 23 MG/DL (ref 8–23)
CALCIUM SERPL-MCNC: 8.8 MG/DL (ref 8.3–10.4)
CHLORIDE SERPL-SCNC: 107 MMOL/L (ref 103–113)
CO2 SERPL-SCNC: 25 MMOL/L (ref 21–32)
CREAT SERPL-MCNC: 4 MG/DL (ref 0.6–1)
DIFFERENTIAL METHOD BLD: ABNORMAL
EOSINOPHIL # BLD: 0 K/UL (ref 0–0.8)
EOSINOPHIL NFR BLD: 0 % (ref 0.5–7.8)
ERYTHROCYTE [DISTWIDTH] IN BLOOD BY AUTOMATED COUNT: 13.2 % (ref 11.9–14.6)
GLOBULIN SER CALC-MCNC: 2.6 G/DL (ref 2.8–4.5)
GLUCOSE SERPL-MCNC: 112 MG/DL (ref 65–100)
HCT VFR BLD AUTO: 24.3 % (ref 35.8–46.3)
HGB BLD-MCNC: 8 G/DL (ref 11.7–15.4)
IMM GRANULOCYTES # BLD AUTO: 0.4 K/UL (ref 0–0.5)
IMM GRANULOCYTES NFR BLD AUTO: 6 % (ref 0–5)
LYMPHOCYTES # BLD: 1.1 K/UL (ref 0.5–4.6)
LYMPHOCYTES NFR BLD: 15 % (ref 13–44)
MAGNESIUM SERPL-MCNC: 2 MG/DL (ref 1.8–2.4)
MCH RBC QN AUTO: 33.1 PG (ref 26.1–32.9)
MCHC RBC AUTO-ENTMCNC: 32.9 G/DL (ref 31.4–35)
MCV RBC AUTO: 100.4 FL (ref 82–102)
MONOCYTES # BLD: 0.1 K/UL (ref 0.1–1.3)
MONOCYTES NFR BLD: 2 % (ref 4–12)
NEUTS SEG # BLD: 5.5 K/UL (ref 1.7–8.2)
NEUTS SEG NFR BLD: 77 % (ref 43–78)
NRBC # BLD: 0 K/UL (ref 0–0.2)
PHOSPHATE SERPL-MCNC: 3.7 MG/DL (ref 2.3–3.7)
PLATELET # BLD AUTO: 112 K/UL (ref 150–450)
PLATELET COMMENT: ABNORMAL
PMV BLD AUTO: 10.1 FL (ref 9.4–12.3)
POTASSIUM SERPL-SCNC: 3.9 MMOL/L (ref 3.5–5.1)
PROT SERPL-MCNC: 5.4 G/DL (ref 6.3–8.2)
RBC # BLD AUTO: 2.42 M/UL (ref 4.05–5.2)
RBC MORPH BLD: ABNORMAL
RBC MORPH BLD: ABNORMAL
SODIUM SERPL-SCNC: 138 MMOL/L (ref 136–146)
URATE SERPL-MCNC: 1 MG/DL (ref 2.6–6)
WBC # BLD AUTO: 7.1 K/UL (ref 4.3–11.1)
WBC MORPH BLD: ABNORMAL

## 2024-04-21 PROCEDURE — 2700000000 HC OXYGEN THERAPY PER DAY

## 2024-04-21 PROCEDURE — 80053 COMPREHEN METABOLIC PANEL: CPT

## 2024-04-21 PROCEDURE — 94760 N-INVAS EAR/PLS OXIMETRY 1: CPT

## 2024-04-21 PROCEDURE — 99232 SBSQ HOSP IP/OBS MODERATE 35: CPT | Performed by: INTERNAL MEDICINE

## 2024-04-21 PROCEDURE — 6360000002 HC RX W HCPCS: Performed by: INTERNAL MEDICINE

## 2024-04-21 PROCEDURE — 84100 ASSAY OF PHOSPHORUS: CPT

## 2024-04-21 PROCEDURE — 6370000000 HC RX 637 (ALT 250 FOR IP): Performed by: NURSE PRACTITIONER

## 2024-04-21 PROCEDURE — 84550 ASSAY OF BLOOD/URIC ACID: CPT

## 2024-04-21 PROCEDURE — 85025 COMPLETE CBC W/AUTO DIFF WBC: CPT

## 2024-04-21 PROCEDURE — 6370000000 HC RX 637 (ALT 250 FOR IP): Performed by: SURGERY

## 2024-04-21 PROCEDURE — 2580000003 HC RX 258: Performed by: FAMILY MEDICINE

## 2024-04-21 PROCEDURE — 6370000000 HC RX 637 (ALT 250 FOR IP): Performed by: FAMILY MEDICINE

## 2024-04-21 PROCEDURE — 1100000000 HC RM PRIVATE

## 2024-04-21 PROCEDURE — 36415 COLL VENOUS BLD VENIPUNCTURE: CPT

## 2024-04-21 PROCEDURE — 6370000000 HC RX 637 (ALT 250 FOR IP)

## 2024-04-21 PROCEDURE — 83735 ASSAY OF MAGNESIUM: CPT

## 2024-04-21 PROCEDURE — 51798 US URINE CAPACITY MEASURE: CPT

## 2024-04-21 RX ORDER — FUROSEMIDE 10 MG/ML
80 INJECTION INTRAMUSCULAR; INTRAVENOUS ONCE
Status: COMPLETED | OUTPATIENT
Start: 2024-04-21 | End: 2024-04-21

## 2024-04-21 RX ORDER — FUROSEMIDE 10 MG/ML
80 INJECTION INTRAMUSCULAR; INTRAVENOUS 2 TIMES DAILY
Status: DISCONTINUED | OUTPATIENT
Start: 2024-04-21 | End: 2024-04-22

## 2024-04-21 RX ADMIN — FUROSEMIDE 80 MG: 10 INJECTION, SOLUTION INTRAMUSCULAR; INTRAVENOUS at 17:46

## 2024-04-21 RX ADMIN — ACYCLOVIR 200 MG: 400 TABLET ORAL at 07:46

## 2024-04-21 RX ADMIN — ERYTHROMYCIN ETHYLSUCCINATE 400 MG: 400 TABLET ORAL at 07:46

## 2024-04-21 RX ADMIN — ROSUVASTATIN CALCIUM 10 MG: 10 TABLET, FILM COATED ORAL at 21:04

## 2024-04-21 RX ADMIN — SODIUM CHLORIDE, PRESERVATIVE FREE 10 ML: 5 INJECTION INTRAVENOUS at 21:07

## 2024-04-21 RX ADMIN — HEPARIN SODIUM 5000 UNITS: 5000 INJECTION INTRAVENOUS; SUBCUTANEOUS at 05:57

## 2024-04-21 RX ADMIN — FUROSEMIDE 80 MG: 10 INJECTION, SOLUTION INTRAMUSCULAR; INTRAVENOUS at 09:29

## 2024-04-21 RX ADMIN — SODIUM CHLORIDE, PRESERVATIVE FREE 10 ML: 5 INJECTION INTRAVENOUS at 07:46

## 2024-04-21 RX ADMIN — POLYETHYLENE GLYCOL 3350 17 G: 17 POWDER, FOR SOLUTION ORAL at 17:46

## 2024-04-21 RX ADMIN — SENNOSIDES 8.6 MG: 8.6 TABLET, FILM COATED ORAL at 21:04

## 2024-04-21 RX ADMIN — ERYTHROMYCIN ETHYLSUCCINATE 400 MG: 400 TABLET ORAL at 17:46

## 2024-04-21 RX ADMIN — METOCLOPRAMIDE 5 MG: 10 TABLET ORAL at 07:46

## 2024-04-21 RX ADMIN — ACYCLOVIR 200 MG: 400 TABLET ORAL at 21:04

## 2024-04-21 RX ADMIN — ERYTHROMYCIN ETHYLSUCCINATE 400 MG: 400 TABLET ORAL at 11:13

## 2024-04-21 RX ADMIN — HEPARIN SODIUM 5000 UNITS: 5000 INJECTION INTRAVENOUS; SUBCUTANEOUS at 21:06

## 2024-04-21 NOTE — PROGRESS NOTES
Hospitalist Progress Note   Admit Date:  4/10/2024 12:06 PM   Name:  Tayla Escobar   Age:  84 y.o.  Sex:  female  :  1940   MRN:  [id number]   Room:  [facility]/    Presenting/Chief Complaint: Post-op Problem     Reason(s) for Admission: ANTWON (acute kidney injury) (HCC) [N17.9]     Hospital Course:   Mrs. Escobar is an 85 y/o WF with a h/o HH s/p repair 3/2024, HTN, HLD and PE on Eliquis (dx 10/2023) who was admitted to our service on 4/10 due to poor PO intake and ANTWON. PO intake had been decreased since HH surgery 3/4/24. Cr was 4.7 on admission (baseline ~1.2). She was started on IVFs and offending meds held. She was seen by Nephrology and felt pre-renal, ATN. CT a/p showed known b/l inguinal hernias and s/p HH repair. She was evaluated by Surgery. UGI series showed changes suggestive of esophagitis with delayed transit, possibly representing GOO. Not felt to have a stricture. Her Cr peaked at 4.9 but has remained stable in the mid 4 range since admission. Total serum calcium elevated since 2024. GI consulted  for UGI series findings. EGD done on  and showed food in the stomach so the procedure was aborted and rescheduled for 4/15 which was ultimately normal. She was recommended to have a gastroparesis diet. Oncology was consulted on 4/15 due to elevated kappa/lambda light chains and concerns for plasma cell disease. Calcium is elevated, Cr is stable but remains >4, her Hgb is worse than baseline (7-8s vs 10-11 in the Fall ). Skeletal survey xrays  showed osteopenia, kyphosis, destructive changes of lumbar and sacrum, shoulder arthritis. Seen by PC on  and made DNR. IR consulted and she underwent CT guided BM bx on . CyBorD started .     Subjective & 24hr Events:   Tired, didn't sleep much. Looks more SOB today, she has orthopnea. Remains on 2L NC. Labs are stable today.    Assessment & Plan:   # Elevated kappa/lambda // plasma cell disease/MM // hypercalcemia                   Signed:  Balaji Hernadez MD    Part of this note may have been written by using a voice dictation software.  The note has been proof read but may still contain some grammatical/other typographical errors.

## 2024-04-21 NOTE — PROGRESS NOTES
[facility] Hematology & Oncology        Inpatient Hematology / Oncology Progress Note    Reason for Consult:  ANTWON (acute kidney injury) (HCC) [N17.9]  Referring Physician:  Jovan Serrato MD    24 Hour Events:  Afebrile, VSS, on O2 @ 2L  Cr stable at 4.0, Neph following  S/p BMBx 4/17, path pending  C1D1 CyBorD given 4/20   On LVQ (D4) for pneumonia  VQ scan neg  LUE/BLE dopp negative   Diuresis per nephrology   Appears to be in better spirits today       Transfusions: None  Replacements: None        ROS:  Constitutional: +weakness, fatigue. Negative for fever, chills.  CV: Negative for chest pain, palpitations, edema.  Respiratory: + dyspnea. Negative for cough, wheezing.  GI: +nausea.  Negative for abdominal pain, diarrhea.    10 point review of systems is otherwise negative with the exception of the elements mentioned above in the HPI.         No Known Allergies  Past Medical History:   Diagnosis Date    Acute respiratory failure with hypoxemia (HCC)     Benign essential hypertension 2/11/2015    medication    Bilateral leg edema 4/26/2017    Cancer (HCC)     skin    Hiatal hernia     \"large\"    Hypercholesteremia 2/11/2015    IFG (impaired fasting glucose) 4/26/2017    Iron deficiency anemia 5/12/2016    Low oxygen saturation     per office notes- patient's daughter reported O2 sat drops to 85% when supine, she uses O2    Mixed hyperlipidemia 2/11/2015    Morbid obesity (HCC) 10/26/2017    Nipple discharge     not current as of 7/17/13    Primary insomnia 4/26/2018    Primary osteoarthritis of both knees 4/26/2018    Pulmonary embolism (HCC) 10/15/2023    on eliquis- Dr. Arnold recommended eliquis full dose x 6 months    Stage 3 chronic kidney disease (HCC) 7/17/2019     Past Surgical History:   Procedure Laterality Date    HIATAL HERNIA REPAIR N/A 3/4/2024    ROBOTIC HIATAL HERNIA REPAIR performed by Thomas Lozano MD at [facility] MAIN OR    MALIGNANT SKIN LESION EXCISION      OTHER SURGICAL HISTORY    entered. VQ scan and venous duplex studies negative. D1C1 CyBorD on 4/20/2024.  Tolerated well.  Will need STR as per PT/OT recommendations. OP office follow up to be arranged.     Jovan Serrato MD  [facility] Hematology/Oncology

## 2024-04-21 NOTE — PROGRESS NOTES
[facility] Progress Note    Subjective:    Seen in the room.  Family present.  Weak and still with edema    ROS:  Denies CP, SOB, nausea/ vomitting     Objective:    Current Facility-Administered Medications   Medication Dose Route Frequency    diphenhydrAMINE (BENADRYL) injection 50 mg  50 mg IntraVENous Once PRN    famotidine (PEPCID) 20 mg in sodium chloride (PF) 0.9 % 10 mL injection  20 mg IntraVENous Once PRN    hydrocortisone sodium succinate PF (SOLU-CORTEF) injection 100 mg  100 mg IntraVENous Once PRN    acetaminophen (TYLENOL) tablet 650 mg  650 mg Oral Once PRN    ondansetron (ZOFRAN) injection 8 mg  8 mg IntraVENous Once PRN    levoFLOXacin (LEVAQUIN) tablet 500 mg  500 mg Oral Every Other Day    heparin (porcine) injection 5,000 Units  5,000 Units SubCUTAneous 3 times per day    potassium chloride (KLOR-CON M) extended release tablet 40 mEq  40 mEq Oral PRN    Or    potassium bicarb-citric acid (EFFER-K) effervescent tablet 40 mEq  40 mEq Oral PRN    Or    potassium chloride 10 mEq/100 mL IVPB (Peripheral Line)  10 mEq IntraVENous PRN    sodium phosphate 15 mmol in sodium chloride 0.9 % 250 mL IVPB  15 mmol IntraVENous PRN    magnesium sulfate 2000 mg in 50 mL IVPB premix  2,000 mg IntraVENous PRN    ondansetron (ZOFRAN) injection 4 mg  4 mg IntraVENous Q6H PRN    acyclovir (ZOVIRAX) tablet 200 mg  200 mg Oral BID    senna (SENOKOT) tablet 8.6 mg  1 tablet Oral Nightly    allopurinol (ZYLOPRIM) tablet 50 mg  50 mg Oral Once per day on Mon Thu    glucose chewable tablet 16 g  4 tablet Oral PRN    dextrose bolus 10% 125 mL  125 mL IntraVENous PRN    Or    dextrose bolus 10% 250 mL  250 mL IntraVENous PRN    Glucagon Emergency KIT 1 mg  1 mg SubCUTAneous PRN    dextrose 10 % infusion   IntraVENous Continuous PRN    erythromycin ethylsuccinate (EES) tablet 400 mg  400 mg Oral TID WC    metoclopramide (REGLAN) tablet 5 mg  5 mg Oral Daily    melatonin tablet 6 mg  6 mg Oral Nightly PRN     rosuvastatin (CRESTOR) tablet 10 mg  10 mg Oral Nightly    traMADol (ULTRAM) tablet 50 mg  50 mg Oral Q12H PRN    hydrALAZINE (APRESOLINE) injection 10 mg  10 mg IntraVENous Q6H PRN    sodium chloride flush 0.9 % injection 5-40 mL  5-40 mL IntraVENous 2 times per day    sodium chloride flush 0.9 % injection 5-40 mL  5-40 mL IntraVENous PRN    0.9 % sodium chloride infusion   IntraVENous PRN    polyethylene glycol (GLYCOLAX) packet 17 g  17 g Oral Daily PRN    bisacodyl (DULCOLAX) suppository 10 mg  10 mg Rectal Daily PRN    famotidine (PEPCID) tablet 10 mg  10 mg Oral Daily PRN    aluminum & magnesium hydroxide-simethicone (MAALOX) 200-200-20 MG/5ML suspension 30 mL  30 mL Oral Q6H PRN    acetaminophen (TYLENOL) tablet 650 mg  650 mg Oral Q6H PRN    Or    acetaminophen (TYLENOL) suppository 650 mg  650 mg Rectal Q6H PRN       Exam:  Vitals:    04/20/24 2126 04/20/24 2142 04/21/24 0230 04/21/24 0758   BP:   119/67 121/71   Pulse:   74 79   Resp:  20 20 18   Temp:   97.7 °F (36.5 °C) 98.1 °F (36.7 °C)   TempSrc:   Oral Oral   SpO2:   93%    Weight: 81.2 kg (179 lb 1.6 oz)      Height:             Intake/Output Summary (Last 24 hours) at 4/21/2024 1059  Last data filed at 4/21/2024 0953  Gross per 24 hour   Intake 564.15 ml   Output 1100 ml   Net -535.85 ml         Gen: comfortable , NAD  HEENT: moist membranes  CV: S1, S2  Lungs: Clear bilaterally  Extem: 1+edema        Labs  Recent Labs     04/19/24  0619 04/20/24  0514 04/21/24  0502   WBC 6.2 5.1 7.1   HGB 7.9* 8.4* 8.0*   HCT 24.9* 25.7* 24.3*   * 119* 112*       Recent Labs     04/19/24  0619 04/20/24  0514 04/21/24  0502    140 138   K 3.6 3.4* 3.9    109 107   CO2 25 28 25   BUN 27* 24* 23   CREATININE 4.00* 4.10* 4.00*   MG 1.6* 1.5* 2.0   PHOS 1.9* 4.4* 3.7           Problem List:  Patient Active Problem List    Diagnosis Date Noted    Seasonal allergic rhinitis due to pollen 01/06/2023    Hypomagnesemia 04/19/2024    Thrombocytopenia (HCC)

## 2024-04-22 ENCOUNTER — APPOINTMENT (OUTPATIENT)
Dept: GENERAL RADIOLOGY | Age: 84
DRG: 682 | End: 2024-04-22
Payer: MEDICARE

## 2024-04-22 PROBLEM — R60.9 EDEMA: Status: ACTIVE | Noted: 2024-04-22

## 2024-04-22 LAB
ALBUMIN SERPL-MCNC: 2.7 G/DL (ref 3.2–4.6)
ALBUMIN/GLOB SERPL: 1 (ref 0.4–1.6)
ALP SERPL-CCNC: 73 U/L (ref 50–136)
ALT SERPL-CCNC: 11 U/L (ref 12–65)
ANION GAP SERPL CALC-SCNC: 6 MMOL/L (ref 2–11)
AST SERPL-CCNC: 10 U/L (ref 15–37)
BASOPHILS # BLD: 0 K/UL (ref 0–0.2)
BASOPHILS NFR BLD: 0 % (ref 0–2)
BILIRUB SERPL-MCNC: 0.5 MG/DL (ref 0.2–1.1)
BUN SERPL-MCNC: 31 MG/DL (ref 8–23)
CALCIUM SERPL-MCNC: 8.5 MG/DL (ref 8.3–10.4)
CHLORIDE SERPL-SCNC: 103 MMOL/L (ref 103–113)
CO2 SERPL-SCNC: 26 MMOL/L (ref 21–32)
CREAT SERPL-MCNC: 4.3 MG/DL (ref 0.6–1)
DIFFERENTIAL METHOD BLD: ABNORMAL
EOSINOPHIL # BLD: 0 K/UL (ref 0–0.8)
EOSINOPHIL NFR BLD: 0 % (ref 0.5–7.8)
ERYTHROCYTE [DISTWIDTH] IN BLOOD BY AUTOMATED COUNT: 13 % (ref 11.9–14.6)
GLOBULIN SER CALC-MCNC: 2.7 G/DL (ref 2.8–4.5)
GLUCOSE SERPL-MCNC: 167 MG/DL (ref 65–100)
HCT VFR BLD AUTO: 24 % (ref 35.8–46.3)
HGB BLD-MCNC: 7.9 G/DL (ref 11.7–15.4)
IMM GRANULOCYTES # BLD AUTO: 0.2 K/UL (ref 0–0.5)
IMM GRANULOCYTES NFR BLD AUTO: 2 % (ref 0–5)
LYMPHOCYTES # BLD: 0.7 K/UL (ref 0.5–4.6)
LYMPHOCYTES NFR BLD: 7 % (ref 13–44)
MAGNESIUM SERPL-MCNC: 2.2 MG/DL (ref 1.8–2.4)
MCH RBC QN AUTO: 32.9 PG (ref 26.1–32.9)
MCHC RBC AUTO-ENTMCNC: 32.9 G/DL (ref 31.4–35)
MCV RBC AUTO: 100 FL (ref 82–102)
MONOCYTES # BLD: 0.3 K/UL (ref 0.1–1.3)
MONOCYTES NFR BLD: 4 % (ref 4–12)
NEUTS SEG # BLD: 8.1 K/UL (ref 1.7–8.2)
NEUTS SEG NFR BLD: 87 % (ref 43–78)
NRBC # BLD: 0 K/UL (ref 0–0.2)
PHOSPHATE SERPL-MCNC: 4 MG/DL (ref 2.3–3.7)
PLATELET # BLD AUTO: 130 K/UL (ref 150–450)
PMV BLD AUTO: 10.8 FL (ref 9.4–12.3)
POTASSIUM SERPL-SCNC: 3.6 MMOL/L (ref 3.5–5.1)
PROT SERPL-MCNC: 5.4 G/DL (ref 6.3–8.2)
RBC # BLD AUTO: 2.4 M/UL (ref 4.05–5.2)
SODIUM SERPL-SCNC: 135 MMOL/L (ref 136–146)
URATE SERPL-MCNC: 1.5 MG/DL (ref 2.6–6)
WBC # BLD AUTO: 9.2 K/UL (ref 4.3–11.1)

## 2024-04-22 PROCEDURE — 6370000000 HC RX 637 (ALT 250 FOR IP): Performed by: INTERNAL MEDICINE

## 2024-04-22 PROCEDURE — 6370000000 HC RX 637 (ALT 250 FOR IP): Performed by: SURGERY

## 2024-04-22 PROCEDURE — 6360000002 HC RX W HCPCS: Performed by: INTERNAL MEDICINE

## 2024-04-22 PROCEDURE — 74018 RADEX ABDOMEN 1 VIEW: CPT

## 2024-04-22 PROCEDURE — 99233 SBSQ HOSP IP/OBS HIGH 50: CPT | Performed by: INTERNAL MEDICINE

## 2024-04-22 PROCEDURE — 97112 NEUROMUSCULAR REEDUCATION: CPT

## 2024-04-22 PROCEDURE — 6370000000 HC RX 637 (ALT 250 FOR IP): Performed by: NURSE PRACTITIONER

## 2024-04-22 PROCEDURE — 1100000000 HC RM PRIVATE

## 2024-04-22 PROCEDURE — 6370000000 HC RX 637 (ALT 250 FOR IP): Performed by: FAMILY MEDICINE

## 2024-04-22 PROCEDURE — 6370000000 HC RX 637 (ALT 250 FOR IP)

## 2024-04-22 PROCEDURE — 80053 COMPREHEN METABOLIC PANEL: CPT

## 2024-04-22 PROCEDURE — 83735 ASSAY OF MAGNESIUM: CPT

## 2024-04-22 PROCEDURE — 84550 ASSAY OF BLOOD/URIC ACID: CPT

## 2024-04-22 PROCEDURE — APPSS45 APP SPLIT SHARED TIME 31-45 MINUTES: Performed by: NURSE PRACTITIONER

## 2024-04-22 PROCEDURE — 2580000003 HC RX 258: Performed by: FAMILY MEDICINE

## 2024-04-22 PROCEDURE — 36415 COLL VENOUS BLD VENIPUNCTURE: CPT

## 2024-04-22 PROCEDURE — 84100 ASSAY OF PHOSPHORUS: CPT

## 2024-04-22 PROCEDURE — 85025 COMPLETE CBC W/AUTO DIFF WBC: CPT

## 2024-04-22 PROCEDURE — 97530 THERAPEUTIC ACTIVITIES: CPT

## 2024-04-22 RX ORDER — FUROSEMIDE 10 MG/ML
40 INJECTION INTRAMUSCULAR; INTRAVENOUS 2 TIMES DAILY
Status: DISCONTINUED | OUTPATIENT
Start: 2024-04-22 | End: 2024-04-23

## 2024-04-22 RX ORDER — MIRTAZAPINE 15 MG/1
7.5 TABLET, FILM COATED ORAL NIGHTLY
Status: DISCONTINUED | OUTPATIENT
Start: 2024-04-22 | End: 2024-05-04 | Stop reason: HOSPADM

## 2024-04-22 RX ADMIN — ROSUVASTATIN CALCIUM 10 MG: 10 TABLET, FILM COATED ORAL at 21:33

## 2024-04-22 RX ADMIN — TRAMADOL HYDROCHLORIDE 50 MG: 50 TABLET ORAL at 03:44

## 2024-04-22 RX ADMIN — MIRTAZAPINE 7.5 MG: 15 TABLET, FILM COATED ORAL at 21:33

## 2024-04-22 RX ADMIN — FUROSEMIDE 80 MG: 10 INJECTION, SOLUTION INTRAMUSCULAR; INTRAVENOUS at 08:26

## 2024-04-22 RX ADMIN — ACYCLOVIR 200 MG: 400 TABLET ORAL at 08:25

## 2024-04-22 RX ADMIN — ERYTHROMYCIN ETHYLSUCCINATE 400 MG: 400 TABLET ORAL at 16:59

## 2024-04-22 RX ADMIN — FUROSEMIDE 40 MG: 10 INJECTION, SOLUTION INTRAMUSCULAR; INTRAVENOUS at 16:59

## 2024-04-22 RX ADMIN — ERYTHROMYCIN ETHYLSUCCINATE 400 MG: 400 TABLET ORAL at 08:25

## 2024-04-22 RX ADMIN — HEPARIN SODIUM 5000 UNITS: 5000 INJECTION INTRAVENOUS; SUBCUTANEOUS at 13:47

## 2024-04-22 RX ADMIN — LEVOFLOXACIN 500 MG: 500 TABLET, FILM COATED ORAL at 08:25

## 2024-04-22 RX ADMIN — ALLOPURINOL 50 MG: 100 TABLET ORAL at 08:25

## 2024-04-22 RX ADMIN — HEPARIN SODIUM 5000 UNITS: 5000 INJECTION INTRAVENOUS; SUBCUTANEOUS at 06:15

## 2024-04-22 RX ADMIN — METOCLOPRAMIDE 5 MG: 10 TABLET ORAL at 08:25

## 2024-04-22 RX ADMIN — SODIUM CHLORIDE, PRESERVATIVE FREE 10 ML: 5 INJECTION INTRAVENOUS at 08:32

## 2024-04-22 RX ADMIN — ERYTHROMYCIN ETHYLSUCCINATE 400 MG: 400 TABLET ORAL at 13:47

## 2024-04-22 RX ADMIN — SODIUM CHLORIDE, PRESERVATIVE FREE 10 ML: 5 INJECTION INTRAVENOUS at 21:33

## 2024-04-22 RX ADMIN — SENNOSIDES 8.6 MG: 8.6 TABLET, FILM COATED ORAL at 21:33

## 2024-04-22 RX ADMIN — ACYCLOVIR 200 MG: 400 TABLET ORAL at 21:33

## 2024-04-22 RX ADMIN — HEPARIN SODIUM 5000 UNITS: 5000 INJECTION INTRAVENOUS; SUBCUTANEOUS at 21:33

## 2024-04-22 ASSESSMENT — PAIN DESCRIPTION - FREQUENCY: FREQUENCY: CONTINUOUS

## 2024-04-22 ASSESSMENT — PAIN DESCRIPTION - PAIN TYPE: TYPE: ACUTE PAIN

## 2024-04-22 ASSESSMENT — PAIN DESCRIPTION - ORIENTATION: ORIENTATION: LEFT

## 2024-04-22 ASSESSMENT — PAIN - FUNCTIONAL ASSESSMENT: PAIN_FUNCTIONAL_ASSESSMENT: PREVENTS OR INTERFERES SOME ACTIVE ACTIVITIES AND ADLS

## 2024-04-22 ASSESSMENT — PAIN SCALES - WONG BAKER: WONGBAKER_NUMERICALRESPONSE: NO HURT

## 2024-04-22 ASSESSMENT — PAIN DESCRIPTION - DESCRIPTORS: DESCRIPTORS: DISCOMFORT

## 2024-04-22 ASSESSMENT — PAIN SCALES - GENERAL
PAINLEVEL_OUTOF10: 0
PAINLEVEL_OUTOF10: 0
PAINLEVEL_OUTOF10: 9

## 2024-04-22 ASSESSMENT — PAIN DESCRIPTION - LOCATION: LOCATION: BACK

## 2024-04-22 ASSESSMENT — PAIN DESCRIPTION - ONSET: ONSET: ON-GOING

## 2024-04-22 NOTE — PROGRESS NOTES
clearance, Decreased step length, and Trunk sway increased    Weightbearing Status      Stairs      I=Independent, Mod I=Modified Independent, S=Supervision, SBA=Standby Assistance, CGA=Contact Guard Assistance,   Min=Minimal Assistance, Mod=Moderate Assistance, Max=Maximal Assistance, Total=Total Assistance, NT=Not Tested    PLAN:   FREQUENCY AND DURATION: 3 times/week for duration of hospital stay or until stated goals are met, whichever comes first.    TREATMENT:   TREATMENT:   Co-Treatment PT/OT necessary due to patient's decreased overall endurance/tolerance levels, as well as need for high level skilled assistance to complete functional transfers/mobility and functional tasks  Therapeutic Activity (25 Minutes): Therapeutic activity included Rolling, Sit to Supine, Scooting, Transfer Training, Ambulation on level ground, Sitting balance , and Standing balance to improve functional Activity tolerance, Balance, Coordination, Mobility, Strength, and ROM.    TREATMENT GRID:  N/A    AFTER TREATMENT PRECAUTIONS: Bed, Bed/Chair Locked, Call light within reach, Needs within reach, RN notified, and Visitors at bedside    INTERDISCIPLINARY COLLABORATION:  RN/ PCT and OT/ PRATT    EDUCATION:      TIME IN/OUT:  Time In: 1320  Time Out: 1345  Minutes: 25    CECILY YOUNGER PT

## 2024-04-22 NOTE — PROGRESS NOTES
[facility] Progress Note    Subjective:    Seen in the room.  Family present. Complains of left sided pain that resolved with tramadol in the night      ROS:  Denies CP, SOB, nausea/ vomitting     Objective:    Current Facility-Administered Medications   Medication Dose Route Frequency    furosemide (LASIX) injection 80 mg  80 mg IntraVENous BID    levoFLOXacin (LEVAQUIN) tablet 500 mg  500 mg Oral Every Other Day    heparin (porcine) injection 5,000 Units  5,000 Units SubCUTAneous 3 times per day    potassium chloride (KLOR-CON M) extended release tablet 40 mEq  40 mEq Oral PRN    Or    potassium bicarb-citric acid (EFFER-K) effervescent tablet 40 mEq  40 mEq Oral PRN    Or    potassium chloride 10 mEq/100 mL IVPB (Peripheral Line)  10 mEq IntraVENous PRN    sodium phosphate 15 mmol in sodium chloride 0.9 % 250 mL IVPB  15 mmol IntraVENous PRN    magnesium sulfate 2000 mg in 50 mL IVPB premix  2,000 mg IntraVENous PRN    ondansetron (ZOFRAN) injection 4 mg  4 mg IntraVENous Q6H PRN    acyclovir (ZOVIRAX) tablet 200 mg  200 mg Oral BID    senna (SENOKOT) tablet 8.6 mg  1 tablet Oral Nightly    allopurinol (ZYLOPRIM) tablet 50 mg  50 mg Oral Once per day on Mon Thu    glucose chewable tablet 16 g  4 tablet Oral PRN    dextrose bolus 10% 125 mL  125 mL IntraVENous PRN    Or    dextrose bolus 10% 250 mL  250 mL IntraVENous PRN    Glucagon Emergency KIT 1 mg  1 mg SubCUTAneous PRN    dextrose 10 % infusion   IntraVENous Continuous PRN    erythromycin ethylsuccinate (EES) tablet 400 mg  400 mg Oral TID WC    metoclopramide (REGLAN) tablet 5 mg  5 mg Oral Daily    melatonin tablet 6 mg  6 mg Oral Nightly PRN    rosuvastatin (CRESTOR) tablet 10 mg  10 mg Oral Nightly    traMADol (ULTRAM) tablet 50 mg  50 mg Oral Q12H PRN    hydrALAZINE (APRESOLINE) injection 10 mg  10 mg IntraVENous Q6H PRN    sodium chloride flush 0.9 % injection 5-40 mL  5-40 mL IntraVENous 2 times per day    sodium chloride flush 0.9 %  Epigastric pain 04/10/2024    Esophagitis 04/10/2024    Gastric outlet obstruction 04/10/2024    Surgical follow-up care 03/28/2024    Moderate protein-calorie malnutrition (HCC) 03/05/2024    Pulmonary embolism without acute cor pulmonale, unspecified chronicity, unspecified pulmonary embolism type (HCC) 10/15/2023    Primary osteoarthritis of both knees 04/26/2018    Morbid obesity (HCC) 10/26/2017    Bilateral leg edema 04/26/2017    IFG (impaired fasting glucose) 04/26/2017    Vitamin D deficiency 05/27/2015    Mixed hyperlipidemia 02/11/2015    Benign essential hypertension 02/11/2015       Assessment/Plan:  1) ANTWON/ CKD stage 3A - baseline Cr about 1.2, already had ANTWON as outpt with Cr 2.0 last week  - In setting of hypotension, poor PO intake   - CT without evidence of hydronephrosis  - Peak Cr 4.9, improved and stabilized at 4.0  - new diagnosis myeloma with elevated K/L rato ~600. M spike on SPEP. Proteinuria 800mg  -Initiation of therapy was delayed secondary to potential for fluid overload and/or possible DVT/PE.  VQ scan unremarkable  -Hopeful that myeloma treatment could improve kidney function by knocking down the light chains.  - chemo per oncology     2) Hypotension - BP meds on hold.  Blood pressures have stabilized     3) fluid overload.  Scheduled IV diuretics

## 2024-04-22 NOTE — PLAN OF CARE
Problem: Discharge Planning  Goal: Discharge to home or other facility with appropriate resources  4/21/2024 2228 by Elodia Jeffers RN  Outcome: Progressing  4/21/2024 1038 by Dianne Julian RN  Outcome: Progressing     Problem: Pain  Goal: Verbalizes/displays adequate comfort level or baseline comfort level  4/21/2024 2228 by Elodia Jeffers RN  Outcome: Progressing  4/21/2024 1038 by Dianne Julian RN  Outcome: Progressing     Problem: Skin/Tissue Integrity  Goal: Absence of new skin breakdown  Description: 1.  Monitor for areas of redness and/or skin breakdown  2.  Assess vascular access sites hourly  3.  Every 4-6 hours minimum:  Change oxygen saturation probe site  4.  Every 4-6 hours:  If on nasal continuous positive airway pressure, respiratory therapy assess nares and determine need for appliance change or resting period.  4/21/2024 2228 by Elodia Jeffers RN  Outcome: Progressing  4/21/2024 1038 by Dianne Julian RN  Outcome: Progressing     Problem: Safety - Adult  Goal: Free from fall injury  4/21/2024 2228 by Elodia Jeffers RN  Outcome: Progressing  4/21/2024 1038 by Dianne Julian RN  Outcome: Progressing     Problem: ABCDS Injury Assessment  Goal: Absence of physical injury  4/21/2024 2228 by Elodia Jeffers RN  Outcome: Progressing  4/21/2024 1038 by Dianne Julian RN  Outcome: Progressing

## 2024-04-22 NOTE — PROGRESS NOTES
Hospitalist Progress Note   Admit Date:  4/10/2024 12:06 PM   Name:  Tayla Escobar   Age:  84 y.o.  Sex:  female  :  1940   MRN:  [id number]   Room:  [facility]/    Presenting/Chief Complaint: Post-op Problem     Reason(s) for Admission: ANTWON (acute kidney injury) (HCC) [N17.9]     Hospital Course:   Mrs. Escobar is an 85 y/o WF with a h/o HH s/p repair 3/2024, HTN, HLD and PE on Eliquis (dx 10/2023) who was admitted to our service on 4/10 due to poor PO intake and ANTWON. PO intake had been decreased since HH surgery 3/4/24. Cr was 4.7 on admission (baseline ~1.2). She was started on IVFs and offending meds held. She was seen by Nephrology and felt pre-renal, ATN. CT a/p showed known b/l inguinal hernias and s/p HH repair. She was evaluated by Surgery. UGI series showed changes suggestive of esophagitis with delayed transit, possibly representing GOO. Not felt to have a stricture. Her Cr peaked at 4.9 but has remained stable in the mid 4 range since admission. Total serum calcium elevated since 2024. GI consulted  for UGI series findings. EGD done on  and showed food in the stomach so the procedure was aborted and rescheduled for 4/15 which was ultimately normal. She was recommended to have a gastroparesis diet. Oncology was consulted on 4/15 due to elevated kappa/lambda light chains and concerns for plasma cell disease. Calcium is elevated, Cr is stable but remains >4, her Hgb is worse than baseline (7-8s vs 10-11 in the Fall ). Skeletal survey xrays  showed osteopenia, kyphosis, destructive changes of lumbar and sacrum, shoulder arthritis. Seen by PC on  and made DNR. IR consulted and she underwent CT guided BM bx on . CyBorD started .     Subjective & 24hr Events:   Up to chair, edema better, ~1.1L uop recorded. Appetite is poor. Cr up a little to 4.3. C/o some L sided/LUQ tenderness, no dysuria. Thinks she's been moving her bowel.     Assessment & Plan:   # Elevated   5:02 AM   Result Value Ref Range    Uric Acid 1.0 (L) 2.6 - 6.0 MG/DL   Phosphorus    Collection Time: 04/21/24  5:02 AM   Result Value Ref Range    Phosphorus 3.7 2.3 - 3.7 MG/DL   Comprehensive Metabolic Panel    Collection Time: 04/22/24  4:10 AM   Result Value Ref Range    Sodium 135 (L) 136 - 146 mmol/L    Potassium 3.6 3.5 - 5.1 mmol/L    Chloride 103 103 - 113 mmol/L    CO2 26 21 - 32 mmol/L    Anion Gap 6 2 - 11 mmol/L    Glucose 167 (H) 65 - 100 mg/dL    BUN 31 (H) 8 - 23 MG/DL    Creatinine 4.30 (H) 0.6 - 1.0 MG/DL    Est, Glom Filt Rate 10 (L) >60 ml/min/1.73m2    Calcium 8.5 8.3 - 10.4 MG/DL    Total Bilirubin 0.5 0.2 - 1.1 MG/DL    ALT 11 (L) 12 - 65 U/L    AST 10 (L) 15 - 37 U/L    Alk Phosphatase 73 50 - 136 U/L    Total Protein 5.4 (L) 6.3 - 8.2 g/dL    Albumin 2.7 (L) 3.2 - 4.6 g/dL    Globulin 2.7 (L) 2.8 - 4.5 g/dL    Albumin/Globulin Ratio 1.0 0.4 - 1.6     Magnesium    Collection Time: 04/22/24  4:10 AM   Result Value Ref Range    Magnesium 2.2 1.8 - 2.4 mg/dL   CBC with Auto Differential    Collection Time: 04/22/24  4:10 AM   Result Value Ref Range    WBC 9.2 4.3 - 11.1 K/uL    RBC 2.40 (L) 4.05 - 5.2 M/uL    Hemoglobin 7.9 (L) 11.7 - 15.4 g/dL    Hematocrit 24.0 (L) 35.8 - 46.3 %    .0 82 - 102 FL    MCH 32.9 26.1 - 32.9 PG    MCHC 32.9 31.4 - 35.0 g/dL    RDW 13.0 11.9 - 14.6 %    Platelets 130 (L) 150 - 450 K/uL    MPV 10.8 9.4 - 12.3 FL    nRBC 0.00 0.0 - 0.2 K/uL    Differential Type AUTOMATED      Neutrophils % 87 (H) 43 - 78 %    Lymphocytes % 7 (L) 13 - 44 %    Monocytes % 4 4.0 - 12.0 %    Eosinophils % 0 (L) 0.5 - 7.8 %    Basophils % 0 0.0 - 2.0 %    Immature Granulocytes % 2 0.0 - 5.0 %    Neutrophils Absolute 8.1 1.7 - 8.2 K/UL    Lymphocytes Absolute 0.7 0.5 - 4.6 K/UL    Monocytes Absolute 0.3 0.1 - 1.3 K/UL    Eosinophils Absolute 0.0 0.0 - 0.8 K/UL    Basophils Absolute 0.0 0.0 - 0.2 K/UL    Immature Granulocytes Absolute 0.2 0.0 - 0.5 K/UL   Uric Acid    Collection

## 2024-04-22 NOTE — PROGRESS NOTES
ACUTE OCCUPATIONAL THERAPY GOALS:   (Developed with and agreed upon by patient and/or caregiver.)  1. Patient will complete full body bathing and dressing with min A, additional time, verbal cues and adaptive equipment as needed.   2. Patient will complete toileting with SBA.   3. Patient will complete functional transfers with SBA and adaptive equipment as needed.   4. Patient will tolerate at least 15 minutes of OT activity with less than 2 rest breaks while maintaining O2 sats >90%.   5. Patient will verbalize at least 3 energy conservation technique to utilize during ADL/IADL.   6. Patient will complete grooming in standing at sink level with SBA.  7. Patient will complete household distances with SBA and adaptive equipment as needed.     Timeframe: 7 visits        OCCUPATIONAL THERAPY: Daily Note PM   OT Visit Days: 3   Time In/Out  OT Charge Capture  Rehab Caseload Tracker  OT Orders    Tayla Escobar is a 84 y.o. female   PRIMARY DIAGNOSIS: ANTWON (acute kidney injury) (HCC)  ANTWON (acute kidney injury) (HCC) [N17.9]  Procedure(s) (LRB):  ESOPHAGOGASTRODUODENOSCOPY (N/A)  7 Days Post-Op  Inpatient: Payor: [employer] MEDICARE / Plan: AET MEDICARE-ADVANTAGE PPO / Product Type: Medicare /     ASSESSMENT:     REHAB RECOMMENDATIONS:   Recommendation to date pending progress:  Setting:  Short-term Rehab    Equipment:    To Be Determined--pt has rollator, rolling walker, bedside commode, hospital bed, and lift chair at home     ASSESSMENT:  Ms. Escobar presents up in the chair upon arrival with her son in the room. Pt stated that she wanted to get back to bed and required encouragement to participate in further mobility. Pt completed sit to stand with min a x 2 and completed functional mobility in the room with  a rolling walker. Pt sat edge of bed while educated on the importance of mobility. Pt stood again and completed further mobility into the room and in the hallway. Pt returned to the bed and declined any self

## 2024-04-22 NOTE — PLAN OF CARE
Problem: Discharge Planning  Goal: Discharge to home or other facility with appropriate resources  Outcome: Progressing     Problem: Pain  Goal: Verbalizes/displays adequate comfort level or baseline comfort level  Outcome: Progressing  Flowsheets (Taken 4/22/2024 4725)  Verbalizes/displays adequate comfort level or baseline comfort level: Encourage patient to monitor pain and request assistance

## 2024-04-22 NOTE — PROGRESS NOTES
Comprehensive Nutrition Assessment    Type and Reason for Visit: Reassess  Malnutrition Screening Tool: Malnutrition Screen  Have you recently lost weight without trying?: 14 to 23 pounds (2 points)  Have you been eating poorly because of a decreased appetite?: Yes (1 point)  Malnutrition Screening Tool Score: 3  Consult for poor PO intake     Nutrition Recommendations/Plan:   Meals and Snacks:  Diet: Continue current order   Nutrition Supplement Therapy:  Medical food supplement therapy:  Continue Ensure Enlive three times per day (this provides 350 kcal and 20 grams protein per bottle)     Malnutrition Assessment:  Malnutrition Status: Moderate malnutrition  Context: Acute Illness  Findings of clinical characteristics of malnutrition:   Energy Intake:  Mild decrease in energy intake (Comment) (family reports very poor intake over the past month)  Weight Loss:  Greater than 7.5% over 3 months (10.5% body weight loss in ~ 3 months)     Body Fat Loss:  Mild body fat loss Orbital, Triceps, Buccal region   Muscle Mass Loss:  Mild muscle mass loss Temples (temporalis), Clavicles (pectoralis & deltoids), Hand (interosseous)  Fluid Accumulation:  Unable to assess     Strength:  Not Performed       Nutrition Assessment:  Nutrition History: 4/11: Daughter assists patient in providing nutrition hx. For 2-3 weeks prior to hiatal hernia reports patient appetite was limited, but ever since she was discharged 3/11 patient has taken little to no PO on a daily basis. Patient endorses that she has had consistent nausea (varying in intensity) for the past month. Reports that when she eats solid foods they get \"stuck\" and points to her sternum. Daughter reports she is able to take some fluids, but has declined any oral nutrition supplements as of late. Drinking small amounts of fluids and pureed foods in the month prior to admission. Additionally reports constipation x 1 week pta which resolved 4/10 with medications.      Do You

## 2024-04-22 NOTE — PROGRESS NOTES
[facility] Hematology & Oncology        Inpatient Hematology / Oncology Progress Note    Reason for Consult:  ANTWON (acute kidney injury) (HCC) [N17.9]  Referring Physician:  Audrey Verma MD    24 Hour Events:  Afebrile, VSS, on O2 @ 1L  Cr stable at 4.3, Neph following  S/p BMBx 4/17, path pending  C1D1 CyBorD given 4/20, next due on 4/27 if still admitted  On LVQ (D5) for pneumonia  C/o poor appetite  Daughter and son at bedside    Transfusions: None  Replacements: None        ROS:  Constitutional: +weakness, fatigue. Negative for fever, chills.  CV: Negative for chest pain, palpitations, edema.  Respiratory: Negative for cough, wheezing.  GI: +nausea.  Negative for abdominal pain, diarrhea.    10 point review of systems is otherwise negative with the exception of the elements mentioned above in the HPI.         No Known Allergies  Past Medical History:   Diagnosis Date    Acute respiratory failure with hypoxemia (HCC)     Benign essential hypertension 2/11/2015    medication    Bilateral leg edema 4/26/2017    Cancer (HCC)     skin    Hiatal hernia     \"large\"    Hypercholesteremia 2/11/2015    IFG (impaired fasting glucose) 4/26/2017    Iron deficiency anemia 5/12/2016    Low oxygen saturation     per office notes- patient's daughter reported O2 sat drops to 85% when supine, she uses O2    Mixed hyperlipidemia 2/11/2015    Morbid obesity (HCC) 10/26/2017    Nipple discharge     not current as of 7/17/13    Primary insomnia 4/26/2018    Primary osteoarthritis of both knees 4/26/2018    Pulmonary embolism (HCC) 10/15/2023    on eliquis- Dr. Arnold recommended eliquis full dose x 6 months    Stage 3 chronic kidney disease (HCC) 7/17/2019     Past Surgical History:   Procedure Laterality Date    HIATAL HERNIA REPAIR N/A 3/4/2024    ROBOTIC HIATAL HERNIA REPAIR performed by Thomas Lozano MD at [facility] MAIN OR    MALIGNANT SKIN LESION EXCISION      OTHER SURGICAL HISTORY      skin cancer    UPPER  Zinc in AM.    Poor appetite  4/22 RD following.  Start low dose Remeron.    Continue home meds  Ppx: Acyclovir  Supportive care  Heparin for VTE ppx    Goals and plan of care reviewed with the patient.  All questions answered to the best of our ability.      Dispo:  TBD pending clinical course.  PT/OT recommend STR at discharge.         RAQUEL Joshi - CNP   [facility] Hematology & Oncology  [address] [place] [address]  Office : [phone]  Fax : [phone]

## 2024-04-23 DIAGNOSIS — C90.00 MULTIPLE MYELOMA NOT HAVING ACHIEVED REMISSION (HCC): Primary | ICD-10-CM

## 2024-04-23 PROBLEM — R53.83 FATIGUE: Status: ACTIVE | Noted: 2024-04-23

## 2024-04-23 PROBLEM — K59.00 CONSTIPATION: Status: ACTIVE | Noted: 2024-04-23

## 2024-04-23 PROBLEM — R54 FRAILTY: Status: ACTIVE | Noted: 2024-04-23

## 2024-04-23 PROBLEM — R53.81 DEBILITY: Status: ACTIVE | Noted: 2024-04-23

## 2024-04-23 PROBLEM — Z51.5 ENCOUNTER FOR PALLIATIVE CARE: Status: ACTIVE | Noted: 2024-04-23

## 2024-04-23 LAB
25(OH)D3 SERPL-MCNC: 47.6 NG/ML (ref 30–100)
ALBUMIN SERPL-MCNC: 2.8 G/DL (ref 3.2–4.6)
ALBUMIN/GLOB SERPL: 1.2 (ref 0.4–1.6)
ALP SERPL-CCNC: 66 U/L (ref 50–136)
ALT SERPL-CCNC: 10 U/L (ref 12–65)
ANION GAP SERPL CALC-SCNC: 5 MMOL/L (ref 2–11)
AST SERPL-CCNC: 13 U/L (ref 15–37)
BASOPHILS # BLD: 0 K/UL (ref 0–0.2)
BASOPHILS NFR BLD: 0 % (ref 0–2)
BILIRUB SERPL-MCNC: 0.4 MG/DL (ref 0.2–1.1)
BUN SERPL-MCNC: 37 MG/DL (ref 8–23)
CALCIUM SERPL-MCNC: 8.4 MG/DL (ref 8.3–10.4)
CHLORIDE SERPL-SCNC: 101 MMOL/L (ref 103–113)
CO2 SERPL-SCNC: 30 MMOL/L (ref 21–32)
CREAT SERPL-MCNC: 4.3 MG/DL (ref 0.6–1)
DIFFERENTIAL METHOD BLD: ABNORMAL
EOSINOPHIL # BLD: 0 K/UL (ref 0–0.8)
EOSINOPHIL NFR BLD: 0 % (ref 0.5–7.8)
ERYTHROCYTE [DISTWIDTH] IN BLOOD BY AUTOMATED COUNT: 13.2 % (ref 11.9–14.6)
GLOBULIN SER CALC-MCNC: 2.4 G/DL (ref 2.8–4.5)
GLUCOSE SERPL-MCNC: 138 MG/DL (ref 65–100)
HCT VFR BLD AUTO: 23.3 % (ref 35.8–46.3)
HGB BLD-MCNC: 7.7 G/DL (ref 11.7–15.4)
IMM GRANULOCYTES # BLD AUTO: 0.1 K/UL (ref 0–0.5)
IMM GRANULOCYTES NFR BLD AUTO: 1 % (ref 0–5)
LYMPHOCYTES # BLD: 0.6 K/UL (ref 0.5–4.6)
LYMPHOCYTES NFR BLD: 8 % (ref 13–44)
MAGNESIUM SERPL-MCNC: 1.8 MG/DL (ref 1.8–2.4)
MCH RBC QN AUTO: 32.5 PG (ref 26.1–32.9)
MCHC RBC AUTO-ENTMCNC: 33 G/DL (ref 31.4–35)
MCV RBC AUTO: 98.3 FL (ref 82–102)
MONOCYTES # BLD: 0.5 K/UL (ref 0.1–1.3)
MONOCYTES NFR BLD: 6 % (ref 4–12)
NEUTS SEG # BLD: 6.8 K/UL (ref 1.7–8.2)
NEUTS SEG NFR BLD: 85 % (ref 43–78)
NRBC # BLD: 0.02 K/UL (ref 0–0.2)
PHOSPHATE SERPL-MCNC: 4.2 MG/DL (ref 2.3–3.7)
PLATELET # BLD AUTO: 114 K/UL (ref 150–450)
PMV BLD AUTO: 10.9 FL (ref 9.4–12.3)
POTASSIUM SERPL-SCNC: 3.7 MMOL/L (ref 3.5–5.1)
PROT SERPL-MCNC: 5.2 G/DL (ref 6.3–8.2)
RBC # BLD AUTO: 2.37 M/UL (ref 4.05–5.2)
SODIUM SERPL-SCNC: 136 MMOL/L (ref 136–146)
URATE SERPL-MCNC: 2.1 MG/DL (ref 2.6–6)
WBC # BLD AUTO: 7.9 K/UL (ref 4.3–11.1)

## 2024-04-23 PROCEDURE — 36415 COLL VENOUS BLD VENIPUNCTURE: CPT

## 2024-04-23 PROCEDURE — 84630 ASSAY OF ZINC: CPT

## 2024-04-23 PROCEDURE — 6370000000 HC RX 637 (ALT 250 FOR IP): Performed by: HOSPITALIST

## 2024-04-23 PROCEDURE — 6360000002 HC RX W HCPCS: Performed by: INTERNAL MEDICINE

## 2024-04-23 PROCEDURE — 6370000000 HC RX 637 (ALT 250 FOR IP): Performed by: INTERNAL MEDICINE

## 2024-04-23 PROCEDURE — 6370000000 HC RX 637 (ALT 250 FOR IP)

## 2024-04-23 PROCEDURE — 82180 ASSAY OF ASCORBIC ACID: CPT

## 2024-04-23 PROCEDURE — 1100000000 HC RM PRIVATE

## 2024-04-23 PROCEDURE — 99232 SBSQ HOSP IP/OBS MODERATE 35: CPT | Performed by: INTERNAL MEDICINE

## 2024-04-23 PROCEDURE — 6370000000 HC RX 637 (ALT 250 FOR IP): Performed by: FAMILY MEDICINE

## 2024-04-23 PROCEDURE — 82525 ASSAY OF COPPER: CPT

## 2024-04-23 PROCEDURE — 6370000000 HC RX 637 (ALT 250 FOR IP): Performed by: SURGERY

## 2024-04-23 PROCEDURE — 99231 SBSQ HOSP IP/OBS SF/LOW 25: CPT | Performed by: NURSE PRACTITIONER

## 2024-04-23 PROCEDURE — 2580000003 HC RX 258: Performed by: FAMILY MEDICINE

## 2024-04-23 PROCEDURE — 80053 COMPREHEN METABOLIC PANEL: CPT

## 2024-04-23 PROCEDURE — 82306 VITAMIN D 25 HYDROXY: CPT

## 2024-04-23 PROCEDURE — 2700000000 HC OXYGEN THERAPY PER DAY

## 2024-04-23 PROCEDURE — 83735 ASSAY OF MAGNESIUM: CPT

## 2024-04-23 PROCEDURE — 85025 COMPLETE CBC W/AUTO DIFF WBC: CPT

## 2024-04-23 PROCEDURE — 84550 ASSAY OF BLOOD/URIC ACID: CPT

## 2024-04-23 PROCEDURE — 94760 N-INVAS EAR/PLS OXIMETRY 1: CPT

## 2024-04-23 PROCEDURE — 97530 THERAPEUTIC ACTIVITIES: CPT

## 2024-04-23 PROCEDURE — 84100 ASSAY OF PHOSPHORUS: CPT

## 2024-04-23 PROCEDURE — 6370000000 HC RX 637 (ALT 250 FOR IP): Performed by: NURSE PRACTITIONER

## 2024-04-23 PROCEDURE — APPSS45 APP SPLIT SHARED TIME 31-45 MINUTES: Performed by: NURSE PRACTITIONER

## 2024-04-23 RX ORDER — FUROSEMIDE 40 MG/1
40 TABLET ORAL 2 TIMES DAILY
Status: DISCONTINUED | OUTPATIENT
Start: 2024-04-23 | End: 2024-05-02

## 2024-04-23 RX ORDER — POLYETHYLENE GLYCOL 3350 17 G/17G
17 POWDER, FOR SOLUTION ORAL DAILY
Status: DISCONTINUED | OUTPATIENT
Start: 2024-04-23 | End: 2024-05-04 | Stop reason: HOSPADM

## 2024-04-23 RX ADMIN — SENNOSIDES 8.6 MG: 8.6 TABLET, FILM COATED ORAL at 21:06

## 2024-04-23 RX ADMIN — Medication 6 MG: at 00:41

## 2024-04-23 RX ADMIN — HEPARIN SODIUM 5000 UNITS: 5000 INJECTION INTRAVENOUS; SUBCUTANEOUS at 06:08

## 2024-04-23 RX ADMIN — HEPARIN SODIUM 5000 UNITS: 5000 INJECTION INTRAVENOUS; SUBCUTANEOUS at 13:57

## 2024-04-23 RX ADMIN — HEPARIN SODIUM 5000 UNITS: 5000 INJECTION INTRAVENOUS; SUBCUTANEOUS at 21:06

## 2024-04-23 RX ADMIN — ACYCLOVIR 200 MG: 400 TABLET ORAL at 21:05

## 2024-04-23 RX ADMIN — POLYETHYLENE GLYCOL 3350 17 G: 17 POWDER, FOR SOLUTION ORAL at 11:15

## 2024-04-23 RX ADMIN — ERYTHROMYCIN ETHYLSUCCINATE 400 MG: 400 TABLET ORAL at 08:30

## 2024-04-23 RX ADMIN — MIRTAZAPINE 7.5 MG: 15 TABLET, FILM COATED ORAL at 21:05

## 2024-04-23 RX ADMIN — SODIUM CHLORIDE, PRESERVATIVE FREE 10 ML: 5 INJECTION INTRAVENOUS at 21:18

## 2024-04-23 RX ADMIN — ERYTHROMYCIN ETHYLSUCCINATE 400 MG: 400 TABLET ORAL at 16:32

## 2024-04-23 RX ADMIN — ACYCLOVIR 200 MG: 400 TABLET ORAL at 08:39

## 2024-04-23 RX ADMIN — ERYTHROMYCIN ETHYLSUCCINATE 400 MG: 400 TABLET ORAL at 11:15

## 2024-04-23 RX ADMIN — FUROSEMIDE 40 MG: 40 TABLET ORAL at 16:32

## 2024-04-23 RX ADMIN — ROSUVASTATIN CALCIUM 10 MG: 10 TABLET, FILM COATED ORAL at 21:05

## 2024-04-23 RX ADMIN — FUROSEMIDE 40 MG: 10 INJECTION, SOLUTION INTRAMUSCULAR; INTRAVENOUS at 08:39

## 2024-04-23 RX ADMIN — METOCLOPRAMIDE 5 MG: 10 TABLET ORAL at 08:39

## 2024-04-23 RX ADMIN — SODIUM CHLORIDE, PRESERVATIVE FREE 10 ML: 5 INJECTION INTRAVENOUS at 08:40

## 2024-04-23 RX ADMIN — TRAMADOL HYDROCHLORIDE 50 MG: 50 TABLET ORAL at 21:09

## 2024-04-23 ASSESSMENT — PAIN DESCRIPTION - ORIENTATION: ORIENTATION: UPPER;ANTERIOR

## 2024-04-23 ASSESSMENT — PAIN DESCRIPTION - LOCATION: LOCATION: ABDOMEN

## 2024-04-23 ASSESSMENT — PAIN DESCRIPTION - FREQUENCY: FREQUENCY: CONTINUOUS

## 2024-04-23 ASSESSMENT — PAIN DESCRIPTION - ONSET: ONSET: ON-GOING

## 2024-04-23 ASSESSMENT — PAIN SCALES - GENERAL
PAINLEVEL_OUTOF10: 6
PAINLEVEL_OUTOF10: 0

## 2024-04-23 ASSESSMENT — PAIN DESCRIPTION - PAIN TYPE: TYPE: ACUTE PAIN

## 2024-04-23 ASSESSMENT — PAIN - FUNCTIONAL ASSESSMENT: PAIN_FUNCTIONAL_ASSESSMENT: PREVENTS OR INTERFERES SOME ACTIVE ACTIVITIES AND ADLS

## 2024-04-23 ASSESSMENT — PAIN DESCRIPTION - DESCRIPTORS: DESCRIPTORS: SORE

## 2024-04-23 NOTE — PROGRESS NOTES
[facility] Progress Note    Subjective:    Seen in the room.  Family present. Denies new complaints    ROS:  Denies CP, SOB, nausea/ vomitting     Objective:    Current Facility-Administered Medications   Medication Dose Route Frequency    polyethylene glycol (GLYCOLAX) packet 17 g  17 g Oral Daily    furosemide (LASIX) injection 40 mg  40 mg IntraVENous BID    mirtazapine (REMERON) tablet 7.5 mg  7.5 mg Oral Nightly    levoFLOXacin (LEVAQUIN) tablet 500 mg  500 mg Oral Every Other Day    heparin (porcine) injection 5,000 Units  5,000 Units SubCUTAneous 3 times per day    potassium chloride (KLOR-CON M) extended release tablet 40 mEq  40 mEq Oral PRN    Or    potassium bicarb-citric acid (EFFER-K) effervescent tablet 40 mEq  40 mEq Oral PRN    Or    potassium chloride 10 mEq/100 mL IVPB (Peripheral Line)  10 mEq IntraVENous PRN    sodium phosphate 15 mmol in sodium chloride 0.9 % 250 mL IVPB  15 mmol IntraVENous PRN    magnesium sulfate 2000 mg in 50 mL IVPB premix  2,000 mg IntraVENous PRN    ondansetron (ZOFRAN) injection 4 mg  4 mg IntraVENous Q6H PRN    acyclovir (ZOVIRAX) tablet 200 mg  200 mg Oral BID    senna (SENOKOT) tablet 8.6 mg  1 tablet Oral Nightly    allopurinol (ZYLOPRIM) tablet 50 mg  50 mg Oral Once per day on Mon Thu    glucose chewable tablet 16 g  4 tablet Oral PRN    dextrose bolus 10% 125 mL  125 mL IntraVENous PRN    Or    dextrose bolus 10% 250 mL  250 mL IntraVENous PRN    Glucagon Emergency KIT 1 mg  1 mg SubCUTAneous PRN    dextrose 10 % infusion   IntraVENous Continuous PRN    erythromycin ethylsuccinate (EES) tablet 400 mg  400 mg Oral TID WC    metoclopramide (REGLAN) tablet 5 mg  5 mg Oral Daily    melatonin tablet 6 mg  6 mg Oral Nightly PRN    rosuvastatin (CRESTOR) tablet 10 mg  10 mg Oral Nightly    traMADol (ULTRAM) tablet 50 mg  50 mg Oral Q12H PRN    hydrALAZINE (APRESOLINE) injection 10 mg  10 mg IntraVENous Q6H PRN    sodium chloride flush 0.9 % injection 5-40 mL

## 2024-04-23 NOTE — PROGRESS NOTES
Palliative Care Progress Note    Patient: Tayla Escobar MRN: [id number]  SSN: [id number]    YOB: 1940  Age: 84 y.o.  Sex: female       Assessment/Plan:     Chief Complaint/Interval History: tolerated first treatment well; likely ready for discharge soon       Principal Diagnosis:    Fatigue, Lethargy  R53.83    Additional Diagnoses:   Constipation, Unspecified  K59.00  Debility, Unspecified  R53.81  Frailty  R54  Counseling, Encounter for Medical Advice  Z71.9  Encounter for Palliative Care  Z51.5    Palliative Performance Scale (PPS)       Medical Decision Making:   Reviewed and summarized notes over last week  Discussed case with appropriate providers- TOM Zuniga  Reviewed laboratory and x-ray data over last week     Pt resting in bed, no distress noted.  Son at bedside. Dr Verma and Nadia Vidal, TOM, at bedside as well. Pt reports she is doing well.  She c/o mild fatigue, and constipation.  She reports no BM in 2 days.  Family is discussing options for discharge, either home with home health or STR. Will send referral for outpatient follow up with [facility] at [facility].  We will sign off.  Thank you for allowing us to participate in Ms Escobar' care.      Will discuss findings with members of the interdisciplinary team.         More than 50% of this 25 minute visit was spent counseling and coordination of care as outlined above.    Subjective:     Review of Systems:  A comprehensive review of systems was negative except for:   Constitutional: Positive for fatigue.  Gastrointestinal: Positive for constipation      Objective:     Visit Vitals  BP (!) 140/80   Pulse 79   Temp 97.5 °F (36.4 °C) (Oral)   Resp 20   Ht 1.6 m (5' 3\")   Wt 81.5 kg (179 lb 11.2 oz)   SpO2 94%   BMI 31.83 kg/m²       Physical Exam:    General:  Cooperative. No acute distress.   Eyes:  Conjunctivae/corneas clear    Nose: Nares normal. Septum midline.   Neck: Supple, symmetrical, trachea midline   Lungs:   unlabored   Heart:  Regular  rate and rhythm   Abdomen:   Soft, non-tender, non-distended   Extremities: Normal, atraumatic, no cyanosis. BLE edema   Skin: Skin color, texture, turgor normal   Neurologic: Nonfocal   Psych: Alert and oriented     Signed By: Margarita Arndt, APRN - CNP     April 23, 2024

## 2024-04-23 NOTE — PROGRESS NOTES
Hospitalist Progress Note   Admit Date:  4/10/2024 12:06 PM   Name:  Tayla Escobar   Age:  84 y.o.  Sex:  female  :  1940   MRN:  [id number]   Room:  [facility]/    Presenting/Chief Complaint: Post-op Problem     Reason(s) for Admission: ANTWON (acute kidney injury) (HCC) [N17.9]     Hospital Course:   Mrs. Escobar is an 85 y/o WF with a h/o HH s/p repair 3/2024, HTN, HLD and PE on Eliquis (dx 10/2023) who was admitted to our service on 4/10 due to poor PO intake and ANTWON. PO intake had been decreased since HH surgery 3/4/24. Cr was 4.7 on admission (baseline ~1.2). She was started on IVFs and offending meds held. She was seen by Nephrology and felt pre-renal, ATN. CT a/p showed known b/l inguinal hernias and s/p HH repair. She was evaluated by Surgery. UGI series showed changes suggestive of esophagitis with delayed transit, possibly representing GOO. Not felt to have a stricture. Her Cr peaked at 4.9 but has remained stable in the mid 4 range since admission. Total serum calcium elevated since 2024. GI consulted  for UGI series findings. EGD done on  and showed food in the stomach so the procedure was aborted and rescheduled for 4/15 which was ultimately normal. She was recommended to have a gastroparesis diet. Oncology was consulted on 4/15 due to elevated kappa/lambda light chains and concerns for plasma cell disease. Calcium is elevated, Cr is stable but remains >4, her Hgb is worse than baseline (7-8s vs 10-11 in the Fall ). Skeletal survey xrays  showed osteopenia, kyphosis, destructive changes of lumbar and sacrum, shoulder arthritis. Seen by PC on  and made DNR. IR consulted and she underwent CT guided BM bx on . CyBorD started .     Subjective & 24hr Events:   No acute overnight meds.  Patient resting in bed with no complaints this morning.    Assessment & Plan:     Multiple myeloma  -Oncology managing as primary  -S/p bone marrow biopsy on   -C1D1 CyBorD given

## 2024-04-23 NOTE — CARE COORDINATION
LOS13d  RNCM met with patient and daughter in room 525 to discuss STRH.in-network list given to daughter. Daughter is hesitate about STRH related to last STRH facility patient developed breakdown on her back and is still healing.  RNCM assured the daughter that if the wished to take the patient home with home health this also could be arranged.  Patient was with [facility] before admission. We did have the discussion that PT/OT recommendation was for STRH but if family did not want this we could set up the home health to go home.    Transition of care is STRH vs HH (pending family decision) daughter to talk with brother today.      Transitions of Care plan is ongoing, no further concerns as of present.   Please consult  if any new issues arise.  Will continue to follow.

## 2024-04-23 NOTE — PROGRESS NOTES
ACUTE PHYSICAL THERAPY GOALS:   (Developed with and agreed upon by patient and/or caregiver.)  LTG:  (1.)Ms. Escobar will move from supine to sit and sit to supine , scoot up and down, and roll side to side in bed with INDEPENDENT within 7 treatment day(s).    (2.)Ms. Escobar will transfer from bed to chair and chair to bed with MODIFIED INDEPENDENCE using the least restrictive device within 7 treatment day(s).    (3.)Ms. Escobar will ambulate with MODIFIED INDEPENDENCE for 75 feet with the least restrictive device within 7 treatment day(s).  (4.)Ms. Escobar will tolerate at least 23 min of dynamic standing activity to assist standing ADLs with the least restrictive device within 7 treatment days.     PHYSICAL THERAPY: Daily Note PM   (Link to Caseload Tracking: PT Visit Days : 4  Time In/Out PT Charge Capture  Rehab Caseload Tracker  Orders    Tayla Escobar is a 84 y.o. female   PRIMARY DIAGNOSIS: ANTWON (acute kidney injury) ([facility])  ANTWON (acute kidney injury) (HCC) [N17.9]  Procedure(s) (LRB):  ESOPHAGOGASTRODUODENOSCOPY (N/A)  8 Days Post-Op  Inpatient: Payor: [employer] MEDICARE / Plan: [employer] MEDICARE-ADVANTAGE PPO / Product Type: Medicare /     ASSESSMENT:     REHAB RECOMMENDATIONS:   Recommendation to date pending progress:  Setting:  Short-term Rehab    Equipment:    To Be Determined     ASSESSMENT:  Ms. Escobar was received supine in bed, agreeable to therapy. She was able to sit at EOB with Aiyana. She was able to progress today to ambulating 20, 40 ft with RW and Aiyana and rest breaks as needed. She fatigues and gets SOB easily but was able to maintain SpO2 >90% on RA. She transferred to chair with Aiyana. Steady progress, will continue to follow.      SUBJECTIVE:   Ms. Escobar states, \"what are we doing next?\"    Social/Functional Lives With: Son  Type of Home: House  Home Layout: One level  Home Access: Ramped entrance  Bathroom Shower/Tub: Walk-in shower  Bathroom Toilet: Standard  Bathroom Equipment: 3-in-1

## 2024-04-23 NOTE — PROGRESS NOTES
[facility] Hematology & Oncology        Inpatient Hematology / Oncology Progress Note    Reason for Consult:  ANTWON (acute kidney injury) (HCC) [N17.9]  Referring Physician:  Audrey Verma MD    24 Hour Events:  Afebrile, VSS, on O2 @ 1L  Cr stable at 4.3, Neph following  S/p BMBx 4/17, path pending  C1D1 CyBorD given 4/20, next due on 4/27 if still admitted  On LVQ (D6) for pneumonia  C/o constipation  Son at bedside    Transfusions: None  Replacements: None        ROS:  Constitutional: +weakness, fatigue. Negative for fever, chills.  CV: Negative for chest pain, palpitations, edema.  Respiratory: Negative for cough, wheezing.  GI: +constipation.  Negative for abdominal pain, diarrhea.    10 point review of systems is otherwise negative with the exception of the elements mentioned above in the HPI.         No Known Allergies  Past Medical History:   Diagnosis Date    Acute respiratory failure with hypoxemia (HCC)     Benign essential hypertension 2/11/2015    medication    Bilateral leg edema 4/26/2017    Cancer (HCC)     skin    Hiatal hernia     \"large\"    Hypercholesteremia 2/11/2015    IFG (impaired fasting glucose) 4/26/2017    Iron deficiency anemia 5/12/2016    Low oxygen saturation     per office notes- patient's daughter reported O2 sat drops to 85% when supine, she uses O2    Mixed hyperlipidemia 2/11/2015    Morbid obesity (HCC) 10/26/2017    Nipple discharge     not current as of 7/17/13    Primary insomnia 4/26/2018    Primary osteoarthritis of both knees 4/26/2018    Pulmonary embolism (HCC) 10/15/2023    on eliquis- Dr. Arnold recommended eliquis full dose x 6 months    Stage 3 chronic kidney disease (HCC) 7/17/2019     Past Surgical History:   Procedure Laterality Date    HIATAL HERNIA REPAIR N/A 3/4/2024    ROBOTIC HIATAL HERNIA REPAIR performed by Thomas Lozano MD at [facility] MAIN OR    MALIGNANT SKIN LESION EXCISION      OTHER SURGICAL HISTORY      skin cancer    UPPER

## 2024-04-23 NOTE — PLAN OF CARE
Problem: Discharge Planning  Goal: Discharge to home or other facility with appropriate resources  4/23/2024 0124 by Elodia Jeffers RN  Outcome: Progressing  4/22/2024 1614 by Arun Mcihel RN  Outcome: Progressing     Problem: Pain  Goal: Verbalizes/displays adequate comfort level or baseline comfort level  4/23/2024 0124 by Elodia Jeffers RN  Outcome: Progressing  4/22/2024 1614 by Arun Michel RN  Outcome: Progressing  Flowsheets (Taken 4/22/2024 0825)  Verbalizes/displays adequate comfort level or baseline comfort level: Encourage patient to monitor pain and request assistance     Problem: Skin/Tissue Integrity  Goal: Absence of new skin breakdown  Description: 1.  Monitor for areas of redness and/or skin breakdown  2.  Assess vascular access sites hourly  3.  Every 4-6 hours minimum:  Change oxygen saturation probe site  4.  Every 4-6 hours:  If on nasal continuous positive airway pressure, respiratory therapy assess nares and determine need for appliance change or resting period.  Outcome: Progressing     Problem: Safety - Adult  Goal: Free from fall injury  Outcome: Progressing     Problem: ABCDS Injury Assessment  Goal: Absence of physical injury  Outcome: Progressing

## 2024-04-24 LAB
ALBUMIN SERPL-MCNC: 2.9 G/DL (ref 3.2–4.6)
ALBUMIN/GLOB SERPL: 1.5 (ref 1–1.9)
ALP SERPL-CCNC: 61 U/L (ref 35–104)
ALT SERPL-CCNC: 8 U/L (ref 12–65)
ANION GAP SERPL CALC-SCNC: 11 MMOL/L (ref 9–18)
AST SERPL-CCNC: 16 U/L (ref 15–37)
BASOPHILS # BLD: 0 K/UL (ref 0–0.2)
BASOPHILS NFR BLD: 0 % (ref 0–2)
BILIRUB SERPL-MCNC: 0.5 MG/DL (ref 0–1.2)
BUN SERPL-MCNC: 40 MG/DL (ref 8–23)
CALCIUM SERPL-MCNC: 8.3 MG/DL (ref 8.8–10.2)
CHLORIDE SERPL-SCNC: 96 MMOL/L (ref 98–107)
CO2 SERPL-SCNC: 30 MMOL/L (ref 20–28)
CREAT SERPL-MCNC: 4.01 MG/DL (ref 0.6–1.1)
DIFFERENTIAL METHOD BLD: ABNORMAL
EOSINOPHIL # BLD: 0 K/UL (ref 0–0.8)
EOSINOPHIL NFR BLD: 0 % (ref 0.5–7.8)
ERYTHROCYTE [DISTWIDTH] IN BLOOD BY AUTOMATED COUNT: 13.3 % (ref 11.9–14.6)
GLOBULIN SER CALC-MCNC: 2 G/DL (ref 2.3–3.5)
GLUCOSE SERPL-MCNC: 107 MG/DL (ref 70–99)
HCT VFR BLD AUTO: 22.8 % (ref 35.8–46.3)
HGB BLD-MCNC: 7.5 G/DL (ref 11.7–15.4)
HISTORY CHECK: NORMAL
IMM GRANULOCYTES # BLD AUTO: 0 K/UL (ref 0–0.5)
IMM GRANULOCYTES NFR BLD AUTO: 1 % (ref 0–5)
LYMPHOCYTES # BLD: 0.4 K/UL (ref 0.5–4.6)
LYMPHOCYTES NFR BLD: 7 % (ref 13–44)
MAGNESIUM SERPL-MCNC: 1.8 MG/DL (ref 1.8–2.4)
MCH RBC QN AUTO: 32.6 PG (ref 26.1–32.9)
MCHC RBC AUTO-ENTMCNC: 32.9 G/DL (ref 31.4–35)
MCV RBC AUTO: 99.1 FL (ref 82–102)
MONOCYTES # BLD: 0.5 K/UL (ref 0.1–1.3)
MONOCYTES NFR BLD: 10 % (ref 4–12)
NEUTS SEG # BLD: 4.2 K/UL (ref 1.7–8.2)
NEUTS SEG NFR BLD: 82 % (ref 43–78)
NRBC # BLD: 0 K/UL (ref 0–0.2)
PHOSPHATE SERPL-MCNC: 4.1 MG/DL (ref 2.5–4.5)
PLATELET # BLD AUTO: 92 K/UL (ref 150–450)
PMV BLD AUTO: 11.6 FL (ref 9.4–12.3)
POTASSIUM SERPL-SCNC: 3.3 MMOL/L (ref 3.5–5.1)
PROT SERPL-MCNC: 4.9 G/DL (ref 6.3–8.2)
RBC # BLD AUTO: 2.3 M/UL (ref 4.05–5.2)
SODIUM SERPL-SCNC: 137 MMOL/L (ref 136–145)
URATE SERPL-MCNC: 3.4 MG/DL (ref 2.5–7.1)
WBC # BLD AUTO: 5.1 K/UL (ref 4.3–11.1)

## 2024-04-24 PROCEDURE — 6370000000 HC RX 637 (ALT 250 FOR IP): Performed by: SURGERY

## 2024-04-24 PROCEDURE — 6370000000 HC RX 637 (ALT 250 FOR IP): Performed by: NURSE PRACTITIONER

## 2024-04-24 PROCEDURE — 6370000000 HC RX 637 (ALT 250 FOR IP): Performed by: INTERNAL MEDICINE

## 2024-04-24 PROCEDURE — 2580000003 HC RX 258: Performed by: FAMILY MEDICINE

## 2024-04-24 PROCEDURE — 80053 COMPREHEN METABOLIC PANEL: CPT

## 2024-04-24 PROCEDURE — 99233 SBSQ HOSP IP/OBS HIGH 50: CPT | Performed by: INTERNAL MEDICINE

## 2024-04-24 PROCEDURE — 36415 COLL VENOUS BLD VENIPUNCTURE: CPT

## 2024-04-24 PROCEDURE — 84100 ASSAY OF PHOSPHORUS: CPT

## 2024-04-24 PROCEDURE — 86850 RBC ANTIBODY SCREEN: CPT

## 2024-04-24 PROCEDURE — 6360000002 HC RX W HCPCS: Performed by: INTERNAL MEDICINE

## 2024-04-24 PROCEDURE — 97530 THERAPEUTIC ACTIVITIES: CPT

## 2024-04-24 PROCEDURE — 86901 BLOOD TYPING SEROLOGIC RH(D): CPT

## 2024-04-24 PROCEDURE — A4216 STERILE WATER/SALINE, 10 ML: HCPCS | Performed by: FAMILY MEDICINE

## 2024-04-24 PROCEDURE — 84550 ASSAY OF BLOOD/URIC ACID: CPT

## 2024-04-24 PROCEDURE — APPSS45 APP SPLIT SHARED TIME 31-45 MINUTES: Performed by: NURSE PRACTITIONER

## 2024-04-24 PROCEDURE — 86923 COMPATIBILITY TEST ELECTRIC: CPT

## 2024-04-24 PROCEDURE — 83735 ASSAY OF MAGNESIUM: CPT

## 2024-04-24 PROCEDURE — 6370000000 HC RX 637 (ALT 250 FOR IP): Performed by: FAMILY MEDICINE

## 2024-04-24 PROCEDURE — 85025 COMPLETE CBC W/AUTO DIFF WBC: CPT

## 2024-04-24 PROCEDURE — 1100000000 HC RM PRIVATE

## 2024-04-24 PROCEDURE — 86900 BLOOD TYPING SEROLOGIC ABO: CPT

## 2024-04-24 PROCEDURE — 97112 NEUROMUSCULAR REEDUCATION: CPT

## 2024-04-24 RX ORDER — POTASSIUM CHLORIDE 20 MEQ/1
20 TABLET, EXTENDED RELEASE ORAL 2 TIMES DAILY
Status: DISCONTINUED | OUTPATIENT
Start: 2024-04-24 | End: 2024-04-25

## 2024-04-24 RX ORDER — SENNA AND DOCUSATE SODIUM 50; 8.6 MG/1; MG/1
1 TABLET, FILM COATED ORAL 2 TIMES DAILY
Status: DISCONTINUED | OUTPATIENT
Start: 2024-04-24 | End: 2024-05-04 | Stop reason: HOSPADM

## 2024-04-24 RX ADMIN — ERYTHROMYCIN ETHYLSUCCINATE 400 MG: 400 TABLET ORAL at 12:30

## 2024-04-24 RX ADMIN — FUROSEMIDE 40 MG: 40 TABLET ORAL at 08:15

## 2024-04-24 RX ADMIN — ERYTHROMYCIN ETHYLSUCCINATE 400 MG: 400 TABLET ORAL at 08:15

## 2024-04-24 RX ADMIN — POTASSIUM CHLORIDE 20 MEQ: 1500 TABLET, EXTENDED RELEASE ORAL at 21:41

## 2024-04-24 RX ADMIN — HEPARIN SODIUM 5000 UNITS: 5000 INJECTION INTRAVENOUS; SUBCUTANEOUS at 06:15

## 2024-04-24 RX ADMIN — SODIUM CHLORIDE, PRESERVATIVE FREE 10 ML: 5 INJECTION INTRAVENOUS at 21:42

## 2024-04-24 RX ADMIN — POTASSIUM CHLORIDE 20 MEQ: 1500 TABLET, EXTENDED RELEASE ORAL at 08:15

## 2024-04-24 RX ADMIN — ACYCLOVIR 200 MG: 400 TABLET ORAL at 08:15

## 2024-04-24 RX ADMIN — FUROSEMIDE 40 MG: 40 TABLET ORAL at 16:33

## 2024-04-24 RX ADMIN — ACYCLOVIR 200 MG: 400 TABLET ORAL at 21:41

## 2024-04-24 RX ADMIN — POLYETHYLENE GLYCOL 3350 17 G: 17 POWDER, FOR SOLUTION ORAL at 08:16

## 2024-04-24 RX ADMIN — SODIUM CHLORIDE, PRESERVATIVE FREE 10 ML: 5 INJECTION INTRAVENOUS at 08:16

## 2024-04-24 RX ADMIN — LEVOFLOXACIN 500 MG: 500 TABLET, FILM COATED ORAL at 08:15

## 2024-04-24 RX ADMIN — ROSUVASTATIN CALCIUM 10 MG: 10 TABLET, FILM COATED ORAL at 21:41

## 2024-04-24 RX ADMIN — ERYTHROMYCIN ETHYLSUCCINATE 400 MG: 400 TABLET ORAL at 16:33

## 2024-04-24 RX ADMIN — DOCUSATE SODIUM 50 MG AND SENNOSIDES 8.6 MG 1 TABLET: 8.6; 5 TABLET, FILM COATED ORAL at 21:40

## 2024-04-24 RX ADMIN — DOCUSATE SODIUM 50 MG AND SENNOSIDES 8.6 MG 1 TABLET: 8.6; 5 TABLET, FILM COATED ORAL at 08:15

## 2024-04-24 RX ADMIN — MIRTAZAPINE 7.5 MG: 15 TABLET, FILM COATED ORAL at 21:41

## 2024-04-24 RX ADMIN — HEPARIN SODIUM 5000 UNITS: 5000 INJECTION INTRAVENOUS; SUBCUTANEOUS at 12:50

## 2024-04-24 RX ADMIN — METOCLOPRAMIDE 5 MG: 10 TABLET ORAL at 08:16

## 2024-04-24 RX ADMIN — HEPARIN SODIUM 5000 UNITS: 5000 INJECTION INTRAVENOUS; SUBCUTANEOUS at 21:41

## 2024-04-24 ASSESSMENT — PAIN SCALES - GENERAL: PAINLEVEL_OUTOF10: 0

## 2024-04-24 NOTE — PROGRESS NOTES
Hospitalist Progress Note   Admit Date:  4/10/2024 12:06 PM   Name:  Tayla Escobar   Age:  84 y.o.  Sex:  female  :  1940   MRN:  [id number]   Room:  [facility]/    Presenting/Chief Complaint: Post-op Problem     Reason(s) for Admission: ANTWON (acute kidney injury) (HCC) [N17.9]     Hospital Course:   Mrs. Escobar is an 83 y/o WF with a h/o HH s/p repair 3/2024, HTN, HLD and PE on Eliquis (dx 10/2023) who was admitted to our service on 4/10 due to poor PO intake and ANTWON. PO intake had been decreased since HH surgery 3/4/24. Cr was 4.7 on admission (baseline ~1.2). She was started on IVFs and offending meds held. She was seen by Nephrology and felt pre-renal, ATN. CT a/p showed known b/l inguinal hernias and s/p HH repair. She was evaluated by Surgery. UGI series showed changes suggestive of esophagitis with delayed transit, possibly representing GOO. Not felt to have a stricture. Her Cr peaked at 4.9 but has remained stable in the mid 4 range since admission. Total serum calcium elevated since 2024. GI consulted  for UGI series findings. EGD done on  and showed food in the stomach so the procedure was aborted and rescheduled for 4/15 which was ultimately normal. She was recommended to have a gastroparesis diet. Oncology was consulted on 4/15 due to elevated kappa/lambda light chains and concerns for plasma cell disease. Calcium is elevated, Cr is stable but remains >4, her Hgb is worse than baseline (7-8s vs 10-11 in the Fall ). Skeletal survey xrays  showed osteopenia, kyphosis, destructive changes of lumbar and sacrum, shoulder arthritis. Seen by PC on  and made DNR. IR consulted and she underwent CT guided BM bx on . CyBorD started .     Subjective & 24hr Events:   No acute overnight meds. No complaints this morning. Pt and family deciding on home health vs SNF.     Assessment & Plan:     Multiple myeloma  -Oncology managing as primary  -S/p bone marrow biopsy on  4/17  -C1D1 CyBorD given 4/20 with next dose due ton 4/27     Volume overload  -Nephrology following  -Lasix 40mg PO BID    ANTWON on CKD stage 3  -As evidenced by increase in serum creatinine greater than 0.3 in less than 48 hours  -Creatinine 4.9 with baseline 1.2  -Due to hypotension and multiple myeloma  -Lasix 40mg PO BID   -Creatinine stable   -Nephrology following    Abdominal pain  -Due to constipation?  -Analgesics PRN  -Continue senokot and miralax     Acute hypoxic respiratory failure  -As evidenced by SpO2 less than 90% on room air and the presence of SOB  -Currently on 1L BNC  -Wean O2 as tolerated    Bilateral pneumonia (due to suspected gram negative bacteria)   -Will complete levaquin x 7 days today (4/24)    Acute on chronic anemia  -Hgb stable with no evidence of active bleeding  -Trend CBC    Thrombocytopenia  -Platelet count stable    Constipation  -Senokot and Miralax     Hyperlipidemia  -Atorvastatin     Hx pulmonary embolism (10/2023)   -Holding eliquis     Moderate protein caloric malnutrition  -Dietician following  -Remeron nightly     Will continue to follow.    Hospital Problems:  Principal Problem:    ANTWON (acute kidney injury) (HCC)  Active Problems:    Mixed hyperlipidemia    Benign essential hypertension    Moderate protein-calorie malnutrition (HCC)    Hx of hiatal hernia    Failure to thrive in adult    Anemia    Epigastric pain    Esophagitis    Gastric outlet obstruction    Multiple myeloma not having achieved remission (HCC)    Encounter for antineoplastic chemotherapy    Thrombocytopenia (HCC)    Hypokalemia    Hypomagnesemia    Edema    Fatigue    Frailty    Debility    Constipation    Encounter for palliative care  Resolved Problems:    * No resolved hospital problems. *      Objective:   Patient Vitals for the past 24 hrs:   Temp Pulse Resp BP SpO2   04/24/24 0809 98.2 °F (36.8 °C) 73 16 127/69 92 %   04/24/24 0256 97.9 °F (36.6 °C) 69 16 131/62 95 %   04/23/24 2329 97.7 °F (36.5

## 2024-04-24 NOTE — PROGRESS NOTES
Comprehensive Nutrition Assessment    Type and Reason for Visit: Reassess  Malnutrition Screening Tool: Malnutrition Screen  Have you recently lost weight without trying?: 14 to 23 pounds (2 points)  Have you been eating poorly because of a decreased appetite?: Yes (1 point)  Malnutrition Screening Tool Score: 3  Consult for poor PO intake     Nutrition Recommendations/Plan:   Meals and Snacks:  Diet: Continue current order   Nutrition Supplement Therapy:  Medical food supplement therapy:  Continue Ensure Enlive three times per day (this provides 350 kcal and 20 grams protein per bottle)  Trial Magic Cup once per day (290 kcal and 9 g protein)     Malnutrition Assessment:  Malnutrition Status: Moderate malnutrition  Context: Acute Illness  Findings of clinical characteristics of malnutrition:   Energy Intake:  Mild decrease in energy intake (Comment) (family reports very poor intake over the past month)  Weight Loss:  Greater than 7.5% over 3 months (10.5% body weight loss in ~ 3 months)     Body Fat Loss:  Mild body fat loss Orbital, Triceps, Buccal region   Muscle Mass Loss:  Mild muscle mass loss Temples (temporalis), Clavicles (pectoralis & deltoids), Hand (interosseous)  Fluid Accumulation:  Unable to assess     Strength:  Not Performed       Nutrition Assessment:  Nutrition History: 4/11: Daughter assists patient in providing nutrition hx. For 2-3 weeks prior to hiatal hernia reports patient appetite was limited, but ever since she was discharged 3/11 patient has taken little to no PO on a daily basis. Patient endorses that she has had consistent nausea (varying in intensity) for the past month. Reports that when she eats solid foods they get \"stuck\" and points to her sternum. Daughter reports she is able to take some fluids, but has declined any oral nutrition supplements as of late. Drinking small amounts of fluids and pureed foods in the month prior to admission. Additionally reports constipation x 1  meat    Current Intake:   Average Meal Intake: 1-25% Average Supplements Intake: 51-75%      Anthropometric Measures:  Height: 160 cm (5' 3\")  Current Body Wt: 75.8 kg (167 lb 1.7 oz), Weight source: Bed Scale  BMI: 29.6, Overweight (BMI 25.0-29.9)  Admission Body Weight: 73 kg (161 lb) (stated 4/10 which is consistent with office visit same day)  Ideal Body Weight (Kg) (Calculated): 52 kg (115 lbs), 140 %  BMI Category Overweight (BMI 25.0-29.9)    Estimated Daily Nutrient Needs:  Energy (kcal/day): 1740-7626 (20-25 kcal/kg) (Kcal/kg Weight used: 73 kg Admission  Protein (g/day): 73-88 (1-1.2 g/kg) Weight Used: (Admission) 73 kg  Fluid (ml/day):   (1 ml/kcal)    Nutrition Diagnosis:   Inadequate oral intake related to  (poor appetite) as evidenced by  (reported barrier to PO, intake as above)  Moderate malnutrition related to inadequate protein-energy intake as evidenced by Criteria as identified in malnutrition assessment    Nutrition Interventions:   Food and/or Nutrient Delivery: Continue Current Diet, Start Oral Nutrition Supplement, Continue Oral Nutrition Supplement     Coordination of Nutrition Care: Continue to monitor while inpatient    Goals:   Previous Goal Met: No Progress toward Goal(s)  Active Goal: PO intake 50% or greater, by next RD assessment       Nutrition Monitoring and Evaluation:      Food/Nutrient Intake Outcomes: Food and Nutrient Intake, Supplement Intake  Physical Signs/Symptoms Outcomes: Fluid Status or Edema, Meal Time Behavior, Weight    Discharge Planning:    Continue Oral Nutrition Supplement    BRAYDEN ODOM, SCARLETT

## 2024-04-24 NOTE — PROGRESS NOTES
[facility] Hematology & Oncology        Inpatient Hematology / Oncology Progress Note    Reason for Consult:  ANTWON (acute kidney injury) (HCC) [N17.9]  Referring Physician:  Audrey Verma MD    24 Hour Events:  Afebrile, VSS, on O2 @ 1L  Cr stable at 4.0, Neph following  S/p BMBx 4/17, path pending  C1D1 CyBorD given 4/20, D8 due 4/27 - plans to give while admitted  Completes LVQ (D7) for pneumonia  Awaiting decision from pt/family - STR vs HH  Family at bedside    Transfusions: None  Replacements: K+      ROS:  Constitutional: +weakness, fatigue. Negative for fever, chills.  CV: Negative for chest pain, palpitations, edema.  Respiratory: Negative for cough, wheezing.  GI: +constipation.  Negative for abdominal pain, diarrhea.    10 point review of systems is otherwise negative with the exception of the elements mentioned above in the HPI.         No Known Allergies  Past Medical History:   Diagnosis Date    Acute respiratory failure with hypoxemia (HCC)     Benign essential hypertension 2/11/2015    medication    Bilateral leg edema 4/26/2017    Cancer (HCC)     skin    Hiatal hernia     \"large\"    Hypercholesteremia 2/11/2015    IFG (impaired fasting glucose) 4/26/2017    Iron deficiency anemia 5/12/2016    Low oxygen saturation     per office notes- patient's daughter reported O2 sat drops to 85% when supine, she uses O2    Mixed hyperlipidemia 2/11/2015    Morbid obesity (HCC) 10/26/2017    Nipple discharge     not current as of 7/17/13    Primary insomnia 4/26/2018    Primary osteoarthritis of both knees 4/26/2018    Pulmonary embolism (HCC) 10/15/2023    on eliquis- Dr. Arnold recommended eliquis full dose x 6 months    Stage 3 chronic kidney disease (HCC) 7/17/2019     Past Surgical History:   Procedure Laterality Date    HIATAL HERNIA REPAIR N/A 3/4/2024    ROBOTIC HIATAL HERNIA REPAIR performed by Thomas Lozano MD at [facility] MAIN OR    MALIGNANT SKIN LESION EXCISION      OTHER SURGICAL HISTORY  noted.        ASSESSMENT:  Principal Problem:    ANTWON (acute kidney injury) (HCC)  Active Problems:    Mixed hyperlipidemia    Benign essential hypertension    Moderate protein-calorie malnutrition (HCC)    Hx of hiatal hernia    Failure to thrive in adult    Anemia    Epigastric pain    Esophagitis    Gastric outlet obstruction    Multiple myeloma not having achieved remission (HCC)    Encounter for antineoplastic chemotherapy    Thrombocytopenia (HCC)    Hypokalemia    Hypomagnesemia    Edema    Fatigue    Frailty    Debility    Constipation    Encounter for palliative care  Resolved Problems:    * No resolved hospital problems. *    Ms. Escobar is a 84 y.o. female admitted on 4/10/2024. The primary encounter diagnosis was ANTWON (acute kidney injury) (HCC). Diagnoses of Epigastric pain, Esophagitis, and Gastric outlet obstruction were also pertinent to this visit..      Her PMH includes HTN, HLD, PE on Eliquis, renal stones, and CKD3A.  She is a former patient of Dr. Wilkinson, followed for PE.  She presented to ED from PCP with ANTWON/CKD.  She is s/p hiatal hernia surgery on 3/4, reports eating and drinking poorly and malaise since surgery.  Cr 4.9 (b/l ~1.2).  Hgb 10.8, Plt 136k.  CCa++ 12.1.  Renal US neg.  CT AP w/o contrast with small L pleural effusion.  Neph following.  FLC with kappa >7300 and ratio 665.   SPEP pending.  We were consulted for elevated kappa light chains.      RECOMMENDATIONS:  Elevated KLC / Multiple myeloma  - FLC with kappa >7300 and ratio 665  - SPEP pending  - Beta 2 ordered  - Dr. Serrato discussed diagnosis of MM.  Pt/family to discuss GOC.  She will need BMBx followed by Andrae if she chooses to pursue work-up/treatment as well as OP PET/CT.  4/16 SPEP with m-spike @ 0.3 and 0.1, biclonal IgA protein with kappa specificity.  Pt wishes to proceed with BMBx.  IR consulted.  She is undecided whether she wants to treatment right now.  Consult PC to assist with GOC.  If pt wishes to pursue

## 2024-04-24 NOTE — PLAN OF CARE
Problem: Discharge Planning  Goal: Discharge to home or other facility with appropriate resources  4/24/2024 0057 by Elodia Jeffers, RN  Outcome: Progressing  4/23/2024 1105 by Todd Rangel RN  Outcome: Progressing     Problem: Pain  Goal: Verbalizes/displays adequate comfort level or baseline comfort level  4/24/2024 0057 by Elodia Jeffers, RN  Outcome: Progressing  4/23/2024 1105 by Todd Rangel RN  Outcome: Progressing     Problem: Skin/Tissue Integrity  Goal: Absence of new skin breakdown  Description: 1.  Monitor for areas of redness and/or skin breakdown  2.  Assess vascular access sites hourly  3.  Every 4-6 hours minimum:  Change oxygen saturation probe site  4.  Every 4-6 hours:  If on nasal continuous positive airway pressure, respiratory therapy assess nares and determine need for appliance change or resting period.  Outcome: Progressing     Problem: Safety - Adult  Goal: Free from fall injury  Outcome: Progressing     Problem: ABCDS Injury Assessment  Goal: Absence of physical injury  Outcome: Progressing

## 2024-04-24 NOTE — PROGRESS NOTES
[facility] Progress Note    Subjective:    Seen in the room.  Family present. Denies new complaints    ROS:  Denies CP, SOB, nausea/ vomitting     Objective:    Current Facility-Administered Medications   Medication Dose Route Frequency    potassium chloride (KLOR-CON M) extended release tablet 20 mEq  20 mEq Oral BID    sennosides-docusate sodium (SENOKOT-S) 8.6-50 MG tablet 1 tablet  1 tablet Oral BID    polyethylene glycol (GLYCOLAX) packet 17 g  17 g Oral Daily    furosemide (LASIX) tablet 40 mg  40 mg Oral BID    mirtazapine (REMERON) tablet 7.5 mg  7.5 mg Oral Nightly    heparin (porcine) injection 5,000 Units  5,000 Units SubCUTAneous 3 times per day    sodium phosphate 15 mmol in sodium chloride 0.9 % 250 mL IVPB  15 mmol IntraVENous PRN    ondansetron (ZOFRAN) injection 4 mg  4 mg IntraVENous Q6H PRN    acyclovir (ZOVIRAX) tablet 200 mg  200 mg Oral BID    allopurinol (ZYLOPRIM) tablet 50 mg  50 mg Oral Once per day on Mon Thu    glucose chewable tablet 16 g  4 tablet Oral PRN    dextrose bolus 10% 125 mL  125 mL IntraVENous PRN    Or    dextrose bolus 10% 250 mL  250 mL IntraVENous PRN    Glucagon Emergency KIT 1 mg  1 mg SubCUTAneous PRN    dextrose 10 % infusion   IntraVENous Continuous PRN    erythromycin ethylsuccinate (EES) tablet 400 mg  400 mg Oral TID WC    metoclopramide (REGLAN) tablet 5 mg  5 mg Oral Daily    melatonin tablet 6 mg  6 mg Oral Nightly PRN    rosuvastatin (CRESTOR) tablet 10 mg  10 mg Oral Nightly    traMADol (ULTRAM) tablet 50 mg  50 mg Oral Q12H PRN    hydrALAZINE (APRESOLINE) injection 10 mg  10 mg IntraVENous Q6H PRN    sodium chloride flush 0.9 % injection 5-40 mL  5-40 mL IntraVENous 2 times per day    sodium chloride flush 0.9 % injection 5-40 mL  5-40 mL IntraVENous PRN    0.9 % sodium chloride infusion   IntraVENous PRN    polyethylene glycol (GLYCOLAX) packet 17 g  17 g Oral Daily PRN    bisacodyl (DULCOLAX) suppository 10 mg  10 mg Rectal Daily PRN    famotidine  (PEPCID) tablet 10 mg  10 mg Oral Daily PRN    aluminum & magnesium hydroxide-simethicone (MAALOX) 200-200-20 MG/5ML suspension 30 mL  30 mL Oral Q6H PRN    acetaminophen (TYLENOL) tablet 650 mg  650 mg Oral Q6H PRN    Or    acetaminophen (TYLENOL) suppository 650 mg  650 mg Rectal Q6H PRN       Exam:  Vitals:    04/23/24 2109 04/23/24 2329 04/24/24 0256 04/24/24 0809   BP:  132/74 131/62 127/69   Pulse:  73 69 73   Resp: 18 17 16 16   Temp:  97.7 °F (36.5 °C) 97.9 °F (36.6 °C) 98.2 °F (36.8 °C)   TempSrc:  Oral Oral Oral   SpO2:  95% 95% 92%   Weight:       Height:             Intake/Output Summary (Last 24 hours) at 4/24/2024 1055  Last data filed at 4/24/2024 0025  Gross per 24 hour   Intake 175 ml   Output 1300 ml   Net -1125 ml         Gen: comfortable , NAD  HEENT: moist membranes  CV: S1, S2  Lungs: Clear bilaterally  Extem: trace edema        Labs  Recent Labs     04/22/24  0410 04/23/24  0321 04/24/24  0500   WBC 9.2 7.9 5.1   HGB 7.9* 7.7* 7.5*   HCT 24.0* 23.3* 22.8*   * 114* 92*       Recent Labs     04/22/24  0410 04/23/24  0321 04/24/24  0500   * 136 137   K 3.6 3.7 3.3*    101* 96*   CO2 26 30 30*   BUN 31* 37* 40*   CREATININE 4.30* 4.30* 4.01*   MG 2.2 1.8 1.8   PHOS 4.0* 4.2* 4.1           Problem List:  Patient Active Problem List    Diagnosis Date Noted    Seasonal allergic rhinitis due to pollen 01/06/2023    Fatigue 04/23/2024    Frailty 04/23/2024    Debility 04/23/2024    Constipation 04/23/2024    Encounter for palliative care 04/23/2024    Edema 04/22/2024    Hypomagnesemia 04/19/2024    Thrombocytopenia (HCC) 04/18/2024    Hypokalemia 04/18/2024    Encounter for antineoplastic chemotherapy 04/17/2024    Multiple myeloma not having achieved remission ([facility]) 04/15/2024    Hx of hiatal hernia 04/12/2024    Failure to thrive in adult 04/12/2024    Anemia 04/12/2024    ANTWON (acute kidney injury) ([facility]) 04/10/2024    Epigastric pain 04/10/2024    Esophagitis 04/10/2024    Gastric

## 2024-04-24 NOTE — PROGRESS NOTES
[] [] [] [] [x] [] [] [] [] []    Distance 2  feet     DME Rolling Walker    Gait Quality Decreased burke , Decreased step clearance, Decreased step length, and Trunk sway increased    Weightbearing Status      Stairs      I=Independent, Mod I=Modified Independent, S=Supervision, SBA=Standby Assistance, CGA=Contact Guard Assistance,   Min=Minimal Assistance, Mod=Moderate Assistance, Max=Maximal Assistance, Total=Total Assistance, NT=Not Tested    PLAN:   FREQUENCY AND DURATION: 3 times/week for duration of hospital stay or until stated goals are met, whichever comes first.    TREATMENT:   TREATMENT:   Co-Treatment PT/OT necessary due to patient's decreased overall endurance/tolerance levels, as well as need for high level skilled assistance to complete functional transfers/mobility and functional tasks  Therapeutic Activity (24 Minutes): Therapeutic activity included Rolling, Supine to Sit, Scooting, Transfer Training, Ambulation on level ground, Sitting balance , and Standing balance to improve functional Activity tolerance, Balance, Coordination, Strength, and ROM.    TREATMENT GRID:  N/A    AFTER TREATMENT PRECAUTIONS: Alarm Activated, Bed/Chair Locked, Call light within reach, Chair, Needs within reach, and RN notified    INTERDISCIPLINARY COLLABORATION:  RN/ PCT, PT/ PTA, and OT/ PRATT    EDUCATION:      TIME IN/OUT:  Time In: 1349  Time Out: 1413  Minutes: 24    Alicia Noonan PTA

## 2024-04-24 NOTE — PROGRESS NOTES
ACUTE OCCUPATIONAL THERAPY GOALS:   (Developed with and agreed upon by patient and/or caregiver.)  1. Patient will complete full body bathing and dressing with min A, additional time, verbal cues and adaptive equipment as needed.   2. Patient will complete toileting with SBA.   3. Patient will complete functional transfers with SBA and adaptive equipment as needed.   4. Patient will tolerate at least 15 minutes of OT activity with less than 2 rest breaks while maintaining O2 sats >90%.   5. Patient will verbalize at least 3 energy conservation technique to utilize during ADL/IADL.   6. Patient will complete grooming in standing at sink level with SBA.  7. Patient will complete household distances with SBA and adaptive equipment as needed.     Timeframe: 7 visits        OCCUPATIONAL THERAPY: Daily Note PM   OT Visit Days: 4   Time In/Out  OT Charge Capture  Rehab Caseload Tracker  OT Orders    Tayla Escobar is a 84 y.o. female   PRIMARY DIAGNOSIS: ANTWON (acute kidney injury) (HCC)  ANTWON (acute kidney injury) (HCC) [N17.9]  Procedure(s) (LRB):  ESOPHAGOGASTRODUODENOSCOPY (N/A)  9 Days Post-Op  Inpatient: Payor: [employer] MEDICARE / Plan: AET MEDICARE-ADVANTAGE PPO / Product Type: Medicare /     ASSESSMENT:     REHAB RECOMMENDATIONS:   Recommendation to date pending progress:  Setting:  Short-term Rehab    Equipment:    To Be Determined--pt has rollator, rolling walker, bedside commode, hospital bed, and lift chair at home     ASSESSMENT:  Ms. Escobar presents in supine upon arrival with her son in the room. Pt agreeable to treatment and transferred to supine with mod a x 2. Pt became dizzy upon sitting and sat edge of bed at a total of about 15 minutes. Her BP was checked and was a little low. Pt completed sit to stand and stand pivot transfer to the chair with a rolling walker and min a x 2. Pt not as dizzy but still felt she was too dizzy for further mobility. Pt left up in the chair with belongings in reach. Good

## 2024-04-24 NOTE — CARE COORDINATION
LOS 14d  IDR and chart reviewed for transition of care planning  24 Hour Events:  Afebrile, VSS, on O2 @ 1L  Cr stable at 4.0, Neph following  S/p BMBx 4/17, path pending  C1D1 CyBorD given 4/20, D8 due 4/27 - plans to give while admitted  Completes LVQ (D7) for pneumonia  Awaiting decision from pt/family - STR vs HH  Family at bedside  Transfusions: None  Replacements: K+    Transition of care planning is HH vs STRH.Patient to receive next chemo treatment on 4/27 and patient will be ready for discharge in 4/29. Family is still indecisive on HH vs STRH. Rehab is strongly encouraged by Oncology at the completion of treatment. PT/OT continue to work with patient.    Transitions of Care plan is ongoing, no further concerns as of present.   Please consult  if any new issues arise.  Will continue to follow.

## 2024-04-25 ENCOUNTER — APPOINTMENT (OUTPATIENT)
Dept: GENERAL RADIOLOGY | Age: 84
DRG: 682 | End: 2024-04-25
Payer: MEDICARE

## 2024-04-25 ENCOUNTER — APPOINTMENT (OUTPATIENT)
Dept: CT IMAGING | Age: 84
DRG: 682 | End: 2024-04-25
Payer: MEDICARE

## 2024-04-25 LAB
ALBUMIN SERPL-MCNC: 3 G/DL (ref 3.2–4.6)
ALBUMIN/GLOB SERPL: 1.5 (ref 1–1.9)
ALP SERPL-CCNC: 57 U/L (ref 35–104)
ALT SERPL-CCNC: 9 U/L (ref 12–65)
ANION GAP SERPL CALC-SCNC: 11 MMOL/L (ref 9–18)
APPEARANCE UR: CLEAR
AST SERPL-CCNC: 17 U/L (ref 15–37)
BACTERIA URNS QL MICRO: 0 /HPF
BASOPHILS # BLD: 0 K/UL (ref 0–0.2)
BASOPHILS NFR BLD: 0 % (ref 0–2)
BILIRUB SERPL-MCNC: 0.6 MG/DL (ref 0–1.2)
BILIRUB UR QL: NEGATIVE
BUN SERPL-MCNC: 42 MG/DL (ref 8–23)
CALCIUM SERPL-MCNC: 8.4 MG/DL (ref 8.8–10.2)
CHLORIDE SERPL-SCNC: 95 MMOL/L (ref 98–107)
CO2 SERPL-SCNC: 32 MMOL/L (ref 20–28)
COLOR UR: ABNORMAL
CREAT SERPL-MCNC: 3.75 MG/DL (ref 0.6–1.1)
DIFFERENTIAL METHOD BLD: ABNORMAL
EOSINOPHIL # BLD: 0 K/UL (ref 0–0.8)
EOSINOPHIL NFR BLD: 0 % (ref 0.5–7.8)
ERYTHROCYTE [DISTWIDTH] IN BLOOD BY AUTOMATED COUNT: 19.4 % (ref 11.9–14.6)
GLOBULIN SER CALC-MCNC: 2 G/DL (ref 2.3–3.5)
GLUCOSE SERPL-MCNC: 108 MG/DL (ref 70–99)
GLUCOSE UR STRIP.AUTO-MCNC: NEGATIVE MG/DL
HCT VFR BLD AUTO: 25.8 % (ref 35.8–46.3)
HGB BLD-MCNC: 8.4 G/DL (ref 11.7–15.4)
HGB UR QL STRIP: NEGATIVE
IMM GRANULOCYTES # BLD AUTO: 0 K/UL (ref 0–0.5)
IMM GRANULOCYTES NFR BLD AUTO: 0 % (ref 0–5)
KETONES UR QL STRIP.AUTO: NEGATIVE MG/DL
LEUKOCYTE ESTERASE UR QL STRIP.AUTO: NEGATIVE
LYMPHOCYTES # BLD: 0.4 K/UL (ref 0.5–4.6)
LYMPHOCYTES NFR BLD: 7 % (ref 13–44)
MAGNESIUM SERPL-MCNC: 1.7 MG/DL (ref 1.8–2.4)
MCH RBC QN AUTO: 30.3 PG (ref 26.1–32.9)
MCHC RBC AUTO-ENTMCNC: 32.6 G/DL (ref 31.4–35)
MCV RBC AUTO: 93.1 FL (ref 82–102)
MONOCYTES # BLD: 0.5 K/UL (ref 0.1–1.3)
MONOCYTES NFR BLD: 9 % (ref 4–12)
NEUTS SEG # BLD: 4.5 K/UL (ref 1.7–8.2)
NEUTS SEG NFR BLD: 84 % (ref 43–78)
NITRITE UR QL STRIP.AUTO: NEGATIVE
NRBC # BLD: 0 K/UL (ref 0–0.2)
OTHER OBSERVATIONS: ABNORMAL
PH UR STRIP: 6.5 (ref 5–9)
PLATELET # BLD AUTO: 82 K/UL (ref 150–450)
PMV BLD AUTO: 11.6 FL (ref 9.4–12.3)
POTASSIUM SERPL-SCNC: 3.7 MMOL/L (ref 3.5–5.1)
PROT SERPL-MCNC: 4.9 G/DL (ref 6.3–8.2)
PROT UR STRIP-MCNC: ABNORMAL MG/DL
RBC # BLD AUTO: 2.77 M/UL (ref 4.05–5.2)
SODIUM SERPL-SCNC: 137 MMOL/L (ref 136–145)
SP GR UR REFRACTOMETRY: 1.01 (ref 1–1.02)
UROBILINOGEN UR QL STRIP.AUTO: 0.2 EU/DL (ref 0.2–1)
WBC # BLD AUTO: 5.4 K/UL (ref 4.3–11.1)
WBC URNS QL MICRO: ABNORMAL /HPF
YEAST URNS QL MICRO: ABNORMAL

## 2024-04-25 PROCEDURE — 71045 X-RAY EXAM CHEST 1 VIEW: CPT

## 2024-04-25 PROCEDURE — 6360000002 HC RX W HCPCS: Performed by: INTERNAL MEDICINE

## 2024-04-25 PROCEDURE — 6370000000 HC RX 637 (ALT 250 FOR IP): Performed by: HOSPITALIST

## 2024-04-25 PROCEDURE — 36415 COLL VENOUS BLD VENIPUNCTURE: CPT

## 2024-04-25 PROCEDURE — 6370000000 HC RX 637 (ALT 250 FOR IP): Performed by: INTERNAL MEDICINE

## 2024-04-25 PROCEDURE — 1100000000 HC RM PRIVATE

## 2024-04-25 PROCEDURE — 71250 CT THORAX DX C-: CPT

## 2024-04-25 PROCEDURE — 6370000000 HC RX 637 (ALT 250 FOR IP): Performed by: SURGERY

## 2024-04-25 PROCEDURE — 36430 TRANSFUSION BLD/BLD COMPNT: CPT

## 2024-04-25 PROCEDURE — APPSS45 APP SPLIT SHARED TIME 31-45 MINUTES: Performed by: NURSE PRACTITIONER

## 2024-04-25 PROCEDURE — 30233N1 TRANSFUSION OF NONAUTOLOGOUS RED BLOOD CELLS INTO PERIPHERAL VEIN, PERCUTANEOUS APPROACH: ICD-10-PCS | Performed by: INTERNAL MEDICINE

## 2024-04-25 PROCEDURE — 6370000000 HC RX 637 (ALT 250 FOR IP): Performed by: FAMILY MEDICINE

## 2024-04-25 PROCEDURE — 80053 COMPREHEN METABOLIC PANEL: CPT

## 2024-04-25 PROCEDURE — 83735 ASSAY OF MAGNESIUM: CPT

## 2024-04-25 PROCEDURE — 85025 COMPLETE CBC W/AUTO DIFF WBC: CPT

## 2024-04-25 PROCEDURE — P9040 RBC LEUKOREDUCED IRRADIATED: HCPCS

## 2024-04-25 PROCEDURE — 99232 SBSQ HOSP IP/OBS MODERATE 35: CPT | Performed by: INTERNAL MEDICINE

## 2024-04-25 PROCEDURE — 81001 URINALYSIS AUTO W/SCOPE: CPT

## 2024-04-25 PROCEDURE — 51798 US URINE CAPACITY MEASURE: CPT

## 2024-04-25 PROCEDURE — 6370000000 HC RX 637 (ALT 250 FOR IP): Performed by: NURSE PRACTITIONER

## 2024-04-25 PROCEDURE — 2580000003 HC RX 258: Performed by: FAMILY MEDICINE

## 2024-04-25 RX ORDER — LANOLIN ALCOHOL/MO/W.PET/CERES
400 CREAM (GRAM) TOPICAL 2 TIMES DAILY
Status: DISCONTINUED | OUTPATIENT
Start: 2024-04-25 | End: 2024-05-04 | Stop reason: HOSPADM

## 2024-04-25 RX ORDER — DIPHENHYDRAMINE HCL 25 MG
25 CAPSULE ORAL EVERY 6 HOURS PRN
Status: DISCONTINUED | OUTPATIENT
Start: 2024-04-25 | End: 2024-05-04 | Stop reason: HOSPADM

## 2024-04-25 RX ADMIN — ALLOPURINOL 50 MG: 100 TABLET ORAL at 08:47

## 2024-04-25 RX ADMIN — HEPARIN SODIUM 5000 UNITS: 5000 INJECTION INTRAVENOUS; SUBCUTANEOUS at 22:00

## 2024-04-25 RX ADMIN — ACYCLOVIR 200 MG: 400 TABLET ORAL at 19:53

## 2024-04-25 RX ADMIN — ACETAMINOPHEN 650 MG: 325 TABLET ORAL at 00:43

## 2024-04-25 RX ADMIN — SODIUM CHLORIDE, PRESERVATIVE FREE 10 ML: 5 INJECTION INTRAVENOUS at 08:48

## 2024-04-25 RX ADMIN — MAGNESIUM GLUCONATE 500 MG ORAL TABLET 400 MG: 500 TABLET ORAL at 19:53

## 2024-04-25 RX ADMIN — DOCUSATE SODIUM 50 MG AND SENNOSIDES 8.6 MG 1 TABLET: 8.6; 5 TABLET, FILM COATED ORAL at 08:47

## 2024-04-25 RX ADMIN — METOCLOPRAMIDE 5 MG: 10 TABLET ORAL at 08:48

## 2024-04-25 RX ADMIN — HEPARIN SODIUM 5000 UNITS: 5000 INJECTION INTRAVENOUS; SUBCUTANEOUS at 14:47

## 2024-04-25 RX ADMIN — SODIUM CHLORIDE, PRESERVATIVE FREE 10 ML: 5 INJECTION INTRAVENOUS at 19:54

## 2024-04-25 RX ADMIN — ROSUVASTATIN CALCIUM 10 MG: 10 TABLET, FILM COATED ORAL at 19:53

## 2024-04-25 RX ADMIN — MAGNESIUM GLUCONATE 500 MG ORAL TABLET 400 MG: 500 TABLET ORAL at 08:47

## 2024-04-25 RX ADMIN — Medication 6 MG: at 21:39

## 2024-04-25 RX ADMIN — MIRTAZAPINE 7.5 MG: 15 TABLET, FILM COATED ORAL at 19:53

## 2024-04-25 RX ADMIN — DOCUSATE SODIUM 50 MG AND SENNOSIDES 8.6 MG 1 TABLET: 8.6; 5 TABLET, FILM COATED ORAL at 19:53

## 2024-04-25 RX ADMIN — TRAMADOL HYDROCHLORIDE 50 MG: 50 TABLET ORAL at 21:39

## 2024-04-25 RX ADMIN — POLYETHYLENE GLYCOL 3350 17 G: 17 POWDER, FOR SOLUTION ORAL at 08:54

## 2024-04-25 RX ADMIN — ERYTHROMYCIN ETHYLSUCCINATE 400 MG: 400 TABLET ORAL at 14:46

## 2024-04-25 RX ADMIN — ACYCLOVIR 200 MG: 400 TABLET ORAL at 08:47

## 2024-04-25 RX ADMIN — HEPARIN SODIUM 5000 UNITS: 5000 INJECTION INTRAVENOUS; SUBCUTANEOUS at 05:53

## 2024-04-25 RX ADMIN — ERYTHROMYCIN ETHYLSUCCINATE 400 MG: 400 TABLET ORAL at 17:55

## 2024-04-25 ASSESSMENT — PAIN SCALES - GENERAL
PAINLEVEL_OUTOF10: 1
PAINLEVEL_OUTOF10: 0
PAINLEVEL_OUTOF10: 0
PAINLEVEL_OUTOF10: 4

## 2024-04-25 ASSESSMENT — PAIN DESCRIPTION - ONSET: ONSET: GRADUAL

## 2024-04-25 ASSESSMENT — PAIN DESCRIPTION - FREQUENCY: FREQUENCY: CONTINUOUS

## 2024-04-25 ASSESSMENT — PAIN DESCRIPTION - PAIN TYPE: TYPE: ACUTE PAIN

## 2024-04-25 ASSESSMENT — PAIN DESCRIPTION - LOCATION: LOCATION: GROIN

## 2024-04-25 ASSESSMENT — PAIN DESCRIPTION - ORIENTATION: ORIENTATION: LEFT

## 2024-04-25 ASSESSMENT — PAIN DESCRIPTION - DESCRIPTORS: DESCRIPTORS: ACHING

## 2024-04-25 NOTE — PROGRESS NOTES
[facility] Progress Note    Subjective:    Seen in the room.  Family present. Denies new complaints    ROS:  Denies CP, SOB, nausea/ vomitting     Objective:    Current Facility-Administered Medications   Medication Dose Route Frequency    diphenhydrAMINE (BENADRYL) capsule 25 mg  25 mg Oral Q6H PRN    magnesium oxide (MAG-OX) tablet 400 mg  400 mg Oral BID    sennosides-docusate sodium (SENOKOT-S) 8.6-50 MG tablet 1 tablet  1 tablet Oral BID    polyethylene glycol (GLYCOLAX) packet 17 g  17 g Oral Daily    [Held by provider] furosemide (LASIX) tablet 40 mg  40 mg Oral BID    mirtazapine (REMERON) tablet 7.5 mg  7.5 mg Oral Nightly    heparin (porcine) injection 5,000 Units  5,000 Units SubCUTAneous 3 times per day    sodium phosphate 15 mmol in sodium chloride 0.9 % 250 mL IVPB  15 mmol IntraVENous PRN    ondansetron (ZOFRAN) injection 4 mg  4 mg IntraVENous Q6H PRN    acyclovir (ZOVIRAX) tablet 200 mg  200 mg Oral BID    allopurinol (ZYLOPRIM) tablet 50 mg  50 mg Oral Once per day on Mon Thu    glucose chewable tablet 16 g  4 tablet Oral PRN    dextrose bolus 10% 125 mL  125 mL IntraVENous PRN    Or    dextrose bolus 10% 250 mL  250 mL IntraVENous PRN    Glucagon Emergency KIT 1 mg  1 mg SubCUTAneous PRN    dextrose 10 % infusion   IntraVENous Continuous PRN    erythromycin ethylsuccinate (EES) tablet 400 mg  400 mg Oral TID WC    metoclopramide (REGLAN) tablet 5 mg  5 mg Oral Daily    melatonin tablet 6 mg  6 mg Oral Nightly PRN    rosuvastatin (CRESTOR) tablet 10 mg  10 mg Oral Nightly    traMADol (ULTRAM) tablet 50 mg  50 mg Oral Q12H PRN    hydrALAZINE (APRESOLINE) injection 10 mg  10 mg IntraVENous Q6H PRN    sodium chloride flush 0.9 % injection 5-40 mL  5-40 mL IntraVENous 2 times per day    sodium chloride flush 0.9 % injection 5-40 mL  5-40 mL IntraVENous PRN    0.9 % sodium chloride infusion   IntraVENous PRN    polyethylene glycol (GLYCOLAX) packet 17 g  17 g Oral Daily PRN    bisacodyl  pain 04/10/2024    Esophagitis 04/10/2024    Gastric outlet obstruction 04/10/2024    Surgical follow-up care 03/28/2024    Moderate protein-calorie malnutrition (HCC) 03/05/2024    Pulmonary embolism without acute cor pulmonale, unspecified chronicity, unspecified pulmonary embolism type (HCC) 10/15/2023    Primary osteoarthritis of both knees 04/26/2018    Morbid obesity (HCC) 10/26/2017    Bilateral leg edema 04/26/2017    IFG (impaired fasting glucose) 04/26/2017    Vitamin D deficiency 05/27/2015    Mixed hyperlipidemia 02/11/2015    Benign essential hypertension 02/11/2015       Assessment/Plan:  1) ANTWON/ CKD stage 3A - baseline Cr about 1.2, already had ANTWON as outpt   - In setting of hypotension, poor PO intake   - Peak Cr 4.9, improved and stabilized   - new diagnosis myeloma with elevated K/L rato ~600. M spike on SPEP. Proteinuria 800mg  -Hopeful that myeloma treatment could improve kidney function by knocking down the light chains.  - chemo per oncology  -renal function is stable      2) Hypotension - BP meds on hold.  Blood pressures have stabilized     3) fluid overload.  Diuretics changed to po    Nephrology will sign off. Will make arrangements to see outpatient

## 2024-04-25 NOTE — PROGRESS NOTES
Pt with complaints of needing to void, pt attempted and was unable to void.     0915 pt bladder scanned, it read 25 ml, Radha Faye NP was notified, orders received.    Pt in and out cath to obtain specimen, drained 1000 ml of clear malodorous urine.  TOM Zuniga informed     Bladder Scanned and 496 in bladder, Obtained an order for a forrest cath. Placed and pulled 575.

## 2024-04-25 NOTE — PROGRESS NOTES
biopsy.    Plan:  Bedrest observation for 1 hour.    Xray Result (most recent):  XR ABDOMEN (KUB) (SINGLE AP VIEW) 04/22/2024    Narrative  KUB    INDICATION:   Left-sided abdominal pain.    COMPARISON:  None    TECHNIQUE: Supine views of the abdomen were obtained.    FINDINGS: There is scarring or atelectasis in both lung bases. There is a normal  bowel gas pattern. There is no evidence of obstruction. There is residual oral  contrast in the left colon. No renal calculi are seen. Lung bases are clear.  There is multilevel degenerative disc disease of the lumbar spine.    Impression  No acute findings in the abdomen. Other findings as noted.        ASSESSMENT:  Principal Problem:    ANTWON (acute kidney injury) (HCC)  Active Problems:    Mixed hyperlipidemia    Benign essential hypertension    Moderate protein-calorie malnutrition (HCC)    Hx of hiatal hernia    Failure to thrive in adult    Anemia    Epigastric pain    Esophagitis    Gastric outlet obstruction    Multiple myeloma not having achieved remission (HCC)    Encounter for antineoplastic chemotherapy    Thrombocytopenia (HCC)    Hypokalemia    Hypomagnesemia    Edema    Fatigue    Frailty    Debility    Constipation    Encounter for palliative care  Resolved Problems:    * No resolved hospital problems. *    Ms. Escobar is a 84 y.o. female admitted on 4/10/2024. The primary encounter diagnosis was ANTWON (acute kidney injury) (HCC). Diagnoses of Epigastric pain, Esophagitis, and Gastric outlet obstruction were also pertinent to this visit..      Her PMH includes HTN, HLD, PE on Eliquis, renal stones, and CKD3A.  She is a former patient of Dr. Wilkinson, followed for PE.  She presented to ED from PCP with ANTWON/CKD.  She is s/p hiatal hernia surgery on 3/4, reports eating and drinking poorly and malaise since surgery.  Cr 4.9 (b/l ~1.2).  Hgb 10.8, Plt 136k.  CCa++ 12.1.  Renal US neg.  CT AP w/o contrast with small L pleural effusion.  Neph following.  FLC with kappa  to 5L.  Check CXR  4/18 Down to 2L NC. CXR with BL PNA and small L pleural effusion. Start LVQ. D-dimer 2.1. Doppler legs, doppler LUE for increased swelling, VQ scan.  4/24 On O2 @ 1L, asked RN to wean O2.  Completes LVQ (D7) for pneumonia today.   4/25 Remains on O2 @ 1L.  Asked RN to wean O2.    LUE / BLE edema  4/18 Dopp ordered  4/19 LUE/BLE dopp negative     Anemia secondary to chemotherapy  - Transfuse prn to keep Hgb >7  4/22 No Fe, folate, or B12 deficiencies noted.  Check Vit D, C, Copper, and Zinc in AM.  4/25 Vit D wnl.  Vit C, copper, and zinc pending.    Poor appetite  4/22 RD following.  Start low dose Remeron.  4/23 Reports difficulty chewing breakfast this AM.  Diet changed to easy to chew.  4/24 Pt not able to choose blueberry muffin for breakfast, will change diet back to regular.    Constipation  4/23 Miralax ordered.  Con't senna  4/24 Change senna to pericolace BID.  Con't Miralax.  Reports small BM.    Continue home meds  Ppx: Acyclovir  Supportive care  Heparin for VTE ppx    Goals and plan of care reviewed with the patient.  All questions answered to the best of our ability.      Dispo:  Anticipate discharge home with HH on Mon, 4/29.  Pt declines STR.         Nadia Vidal, RAQUEL - CNP   [facility] On[facility]  [address] [place] [address]  Office : [phone]  Fax : [phone]

## 2024-04-25 NOTE — SIGNIFICANT EVENT
CXR reviewed.  Radiologist recommends CT chest.  CT chest w/o contrast ordered.      RAQUEL Joshi - CNP  [facility] Hematology & Oncology

## 2024-04-25 NOTE — CONSENT
Informed Consent for Blood Component Transfusion Note    I have discussed with the patient the rationale for blood component transfusion; its benefits in treating or preventing fatigue, organ damage, or death; and its risk which includes mild transfusion reactions, rare risk of blood borne infection, or more serious but rare reactions. I have discussed the alternatives to transfusion, including the risk and consequences of not receiving transfusion. The patient had an opportunity to ask questions and had agreed to proceed with transfusion of blood components.    Electronically signed by RAQUEL Joshi CNP on 4/25/24 at 9:41 AM EDT

## 2024-04-25 NOTE — PROGRESS NOTES
Hospitalist Progress Note   Admit Date:  4/10/2024 12:06 PM   Name:  Tayla Escobar   Age:  84 y.o.  Sex:  female  :  1940   MRN:  [id number]   Room:  [facility]/    Presenting/Chief Complaint: Post-op Problem     Reason(s) for Admission: ANTWON (acute kidney injury) (HCC) [N17.9]     Hospital Course:   Mrs. Escobar is an 83 y/o WF with a h/o HH s/p repair 3/2024, HTN, HLD and PE on Eliquis (dx 10/2023) who was admitted to our service on 4/10 due to poor PO intake and ANTWON. PO intake had been decreased since HH surgery 3/4/24. Cr was 4.7 on admission (baseline ~1.2). She was started on IVFs and offending meds held. She was seen by Nephrology and felt pre-renal, ATN. CT a/p showed known b/l inguinal hernias and s/p HH repair. She was evaluated by Surgery. UGI series showed changes suggestive of esophagitis with delayed transit, possibly representing GOO. Not felt to have a stricture. Her Cr peaked at 4.9 but has remained stable in the mid 4 range since admission. Total serum calcium elevated since 2024. GI consulted  for UGI series findings. EGD done on  and showed food in the stomach so the procedure was aborted and rescheduled for 4/15 which was ultimately normal. She was recommended to have a gastroparesis diet. Oncology was consulted on 4/15 due to elevated kappa/lambda light chains and concerns for plasma cell disease. Calcium is elevated, Cr is stable but remains >4, her Hgb is worse than baseline (7-8s vs 10-11 in the Fall ). Skeletal survey xrays  showed osteopenia, kyphosis, destructive changes of lumbar and sacrum, shoulder arthritis. Seen by PC on  and made DNR. IR consulted and she underwent CT guided BM bx on . CyBorD started .     Subjective & 24hr Events:   Patient seen and evaluated.  New patient for me today.  Persistent retrocardiac consolidation noted on chest x-ray-CT ordered for further evaluation  Denies chest pain nausea vomiting fevers or

## 2024-04-25 NOTE — CARE COORDINATION
LOS 4d  IDR and Chart reviewed for transition of care planning.  24 Hour Events:  Afebrile, VSS, on O2 @ 1L  Orthostatic, holding Lasix  Cr down to 3.75, Neph following  S/p BMBx 4/17, path pending  C1D1 CyBorD given 4/20, D8 due 4/27 - plans to give while admitted  C/o urinary retention, UA ordered  Ordering CXR for crackles on R  Pt declines STR at discharge  RNCM will discuss with PT/OT  family choice  Patient to receive chem on 4/27 and will plan for discharge home with HH on 4/29    Transition of care is Home with Home Health at the the completion of Chemo on 4/27.  PT/OT recommendation is for [facility] (family declined [facility]).    Transitions of Care plan is ongoing, no further concerns as of present.   Please consult  if any new issues arise.  Will continue to follow

## 2024-04-26 PROBLEM — J18.9 PNEUMONIA OF BOTH LUNGS DUE TO INFECTIOUS ORGANISM: Status: ACTIVE | Noted: 2024-04-26

## 2024-04-26 PROBLEM — R33.9 URINARY RETENTION: Status: ACTIVE | Noted: 2024-04-26

## 2024-04-26 LAB
ALBUMIN SERPL-MCNC: 2.6 G/DL (ref 3.2–4.6)
ALBUMIN/GLOB SERPL: 1.5 (ref 1–1.9)
ALP SERPL-CCNC: 48 U/L (ref 35–104)
ALT SERPL-CCNC: 7 U/L (ref 12–65)
ANION GAP SERPL CALC-SCNC: 8 MMOL/L (ref 9–18)
APTT PPP: 38.9 SEC (ref 23.3–37.4)
AST SERPL-CCNC: 17 U/L (ref 15–37)
B PERT DNA SPEC QL NAA+PROBE: NOT DETECTED
BASOPHILS # BLD: 0 K/UL (ref 0–0.2)
BASOPHILS NFR BLD: 0 % (ref 0–2)
BILIRUB DIRECT SERPL-MCNC: 0.2 MG/DL (ref 0–0.4)
BILIRUB INDIRECT SERPL-MCNC: 0.2 MG/DL (ref 0–1.1)
BILIRUB SERPL-MCNC: 0.4 MG/DL (ref 0–1.2)
BILIRUB SERPL-MCNC: 0.5 MG/DL (ref 0–1.2)
BORDETELLA PARAPERTUSSIS BY PCR: NOT DETECTED
BUN SERPL-MCNC: 38 MG/DL (ref 8–23)
C PNEUM DNA SPEC QL NAA+PROBE: NOT DETECTED
CALCIUM SERPL-MCNC: 8.3 MG/DL (ref 8.8–10.2)
CHLORIDE SERPL-SCNC: 97 MMOL/L (ref 98–107)
CO2 SERPL-SCNC: 33 MMOL/L (ref 20–28)
COPPER SERPL-MCNC: 70 UG/DL (ref 80–158)
CREAT SERPL-MCNC: 3.36 MG/DL (ref 0.6–1.1)
D DIMER PPP FEU-MCNC: 1.28 UG/ML(FEU)
DIFFERENTIAL METHOD BLD: ABNORMAL
EOSINOPHIL # BLD: 0 K/UL (ref 0–0.8)
EOSINOPHIL NFR BLD: 0 % (ref 0.5–7.8)
ERYTHROCYTE [DISTWIDTH] IN BLOOD BY AUTOMATED COUNT: 19.8 % (ref 11.9–14.6)
FIBRINOGEN PPP-MCNC: 331 MG/DL (ref 190–501)
FLUAV SUBTYP SPEC NAA+PROBE: NOT DETECTED
FLUBV RNA SPEC QL NAA+PROBE: NOT DETECTED
GLOBULIN SER CALC-MCNC: 1.7 G/DL (ref 2.3–3.5)
GLUCOSE SERPL-MCNC: 100 MG/DL (ref 70–99)
HADV DNA SPEC QL NAA+PROBE: NOT DETECTED
HCOV 229E RNA SPEC QL NAA+PROBE: NOT DETECTED
HCOV HKU1 RNA SPEC QL NAA+PROBE: NOT DETECTED
HCOV NL63 RNA SPEC QL NAA+PROBE: NOT DETECTED
HCOV OC43 RNA SPEC QL NAA+PROBE: NOT DETECTED
HCT VFR BLD AUTO: 20.4 % (ref 35.8–46.3)
HCT VFR BLD AUTO: 25.4 % (ref 35.8–46.3)
HEMOCCULT STL QL: NEGATIVE
HGB BLD-MCNC: 6.7 G/DL (ref 11.7–15.4)
HGB BLD-MCNC: 8.5 G/DL (ref 11.7–15.4)
HGB RETIC QN AUTO: 39 PG (ref 29–35)
HISTORY CHECK: NORMAL
HMPV RNA SPEC QL NAA+PROBE: NOT DETECTED
HPIV1 RNA SPEC QL NAA+PROBE: NOT DETECTED
HPIV2 RNA SPEC QL NAA+PROBE: NOT DETECTED
HPIV3 RNA SPEC QL NAA+PROBE: NOT DETECTED
HPIV4 RNA SPEC QL NAA+PROBE: NOT DETECTED
IMM GRANULOCYTES # BLD AUTO: 0 K/UL (ref 0–0.5)
IMM GRANULOCYTES NFR BLD AUTO: 1 % (ref 0–5)
IMM RETICS NFR: 10.5 % (ref 3–15.9)
INR PPP: 1
LACTATE SERPL-SCNC: 1.8 MMOL/L (ref 0.5–2)
LACTATE SERPL-SCNC: 2.1 MMOL/L (ref 0.5–2)
LDH SERPL L TO P-CCNC: 211 U/L (ref 127–281)
LYMPHOCYTES # BLD: 0.5 K/UL (ref 0.5–4.6)
LYMPHOCYTES NFR BLD: 8 % (ref 13–44)
M PNEUMO DNA SPEC QL NAA+PROBE: NOT DETECTED
MAGNESIUM SERPL-MCNC: 1.7 MG/DL (ref 1.8–2.4)
MCH RBC QN AUTO: 30.7 PG (ref 26.1–32.9)
MCHC RBC AUTO-ENTMCNC: 32.8 G/DL (ref 31.4–35)
MCV RBC AUTO: 93.6 FL (ref 82–102)
MONOCYTES # BLD: 0.5 K/UL (ref 0.1–1.3)
MONOCYTES NFR BLD: 8 % (ref 4–12)
NEUTS SEG # BLD: 4.8 K/UL (ref 1.7–8.2)
NEUTS SEG NFR BLD: 83 % (ref 43–78)
NRBC # BLD: 0 K/UL (ref 0–0.2)
PLATELET # BLD AUTO: 58 K/UL (ref 150–450)
PMV BLD AUTO: 12.5 FL (ref 9.4–12.3)
POTASSIUM SERPL-SCNC: 3.5 MMOL/L (ref 3.5–5.1)
PROCALCITONIN SERPL-MCNC: 0.12 NG/ML (ref 0–0.1)
PROT SERPL-MCNC: 4.4 G/DL (ref 6.3–8.2)
PROTHROMBIN TIME: 13.9 SEC (ref 11.3–14.9)
RBC # BLD AUTO: 2.18 M/UL (ref 4.05–5.2)
RETICS # AUTO: 0 M/UL (ref 0.03–0.1)
RETICS/RBC NFR AUTO: 0.2 % (ref 0.3–2)
RSV RNA SPEC QL NAA+PROBE: NOT DETECTED
RV+EV RNA SPEC QL NAA+PROBE: NOT DETECTED
SARS-COV-2 RNA RESP QL NAA+PROBE: NOT DETECTED
SODIUM SERPL-SCNC: 137 MMOL/L (ref 136–145)
VIT C SERPL-MCNC: 0.8 MG/DL (ref 0.4–2)
WBC # BLD AUTO: 5.8 K/UL (ref 4.3–11.1)
ZINC SERPL-MCNC: 53 UG/DL (ref 44–115)

## 2024-04-26 PROCEDURE — 83615 LACTATE (LD) (LDH) ENZYME: CPT

## 2024-04-26 PROCEDURE — 85379 FIBRIN DEGRADATION QUANT: CPT

## 2024-04-26 PROCEDURE — 82247 BILIRUBIN TOTAL: CPT

## 2024-04-26 PROCEDURE — 6370000000 HC RX 637 (ALT 250 FOR IP): Performed by: FAMILY MEDICINE

## 2024-04-26 PROCEDURE — 2580000003 HC RX 258: Performed by: INTERNAL MEDICINE

## 2024-04-26 PROCEDURE — 6360000002 HC RX W HCPCS: Performed by: INTERNAL MEDICINE

## 2024-04-26 PROCEDURE — 85384 FIBRINOGEN ACTIVITY: CPT

## 2024-04-26 PROCEDURE — 85018 HEMOGLOBIN: CPT

## 2024-04-26 PROCEDURE — 6370000000 HC RX 637 (ALT 250 FOR IP): Performed by: NURSE PRACTITIONER

## 2024-04-26 PROCEDURE — 85610 PROTHROMBIN TIME: CPT

## 2024-04-26 PROCEDURE — 36415 COLL VENOUS BLD VENIPUNCTURE: CPT

## 2024-04-26 PROCEDURE — 86644 CMV ANTIBODY: CPT

## 2024-04-26 PROCEDURE — 80053 COMPREHEN METABOLIC PANEL: CPT

## 2024-04-26 PROCEDURE — 82248 BILIRUBIN DIRECT: CPT

## 2024-04-26 PROCEDURE — P9040 RBC LEUKOREDUCED IRRADIATED: HCPCS

## 2024-04-26 PROCEDURE — 82272 OCCULT BLD FECES 1-3 TESTS: CPT

## 2024-04-26 PROCEDURE — 6360000002 HC RX W HCPCS: Performed by: NURSE PRACTITIONER

## 2024-04-26 PROCEDURE — APPSS45 APP SPLIT SHARED TIME 31-45 MINUTES: Performed by: NURSE PRACTITIONER

## 2024-04-26 PROCEDURE — 85025 COMPLETE CBC W/AUTO DIFF WBC: CPT

## 2024-04-26 PROCEDURE — 85014 HEMATOCRIT: CPT

## 2024-04-26 PROCEDURE — 85730 THROMBOPLASTIN TIME PARTIAL: CPT

## 2024-04-26 PROCEDURE — 36430 TRANSFUSION BLD/BLD COMPNT: CPT

## 2024-04-26 PROCEDURE — 84145 PROCALCITONIN (PCT): CPT

## 2024-04-26 PROCEDURE — 0202U NFCT DS 22 TRGT SARS-COV-2: CPT

## 2024-04-26 PROCEDURE — 83010 ASSAY OF HAPTOGLOBIN QUANT: CPT

## 2024-04-26 PROCEDURE — 99221 1ST HOSP IP/OBS SF/LOW 40: CPT | Performed by: PHYSICIAN ASSISTANT

## 2024-04-26 PROCEDURE — 2580000003 HC RX 258: Performed by: FAMILY MEDICINE

## 2024-04-26 PROCEDURE — 99233 SBSQ HOSP IP/OBS HIGH 50: CPT | Performed by: INTERNAL MEDICINE

## 2024-04-26 PROCEDURE — 6370000000 HC RX 637 (ALT 250 FOR IP): Performed by: SURGERY

## 2024-04-26 PROCEDURE — 2580000003 HC RX 258: Performed by: NURSE PRACTITIONER

## 2024-04-26 PROCEDURE — 87040 BLOOD CULTURE FOR BACTERIA: CPT

## 2024-04-26 PROCEDURE — 1100000000 HC RM PRIVATE

## 2024-04-26 PROCEDURE — 83605 ASSAY OF LACTIC ACID: CPT

## 2024-04-26 PROCEDURE — 83735 ASSAY OF MAGNESIUM: CPT

## 2024-04-26 PROCEDURE — 85046 RETICYTE/HGB CONCENTRATE: CPT

## 2024-04-26 RX ORDER — SODIUM CHLORIDE 9 MG/ML
INJECTION, SOLUTION INTRAVENOUS PRN
Status: DISCONTINUED | OUTPATIENT
Start: 2024-04-26 | End: 2024-05-04 | Stop reason: HOSPADM

## 2024-04-26 RX ORDER — FUROSEMIDE 10 MG/ML
20 INJECTION INTRAMUSCULAR; INTRAVENOUS ONCE
Status: COMPLETED | OUTPATIENT
Start: 2024-04-26 | End: 2024-04-26

## 2024-04-26 RX ORDER — LACTULOSE 10 G/15ML
20 SOLUTION ORAL ONCE
Status: COMPLETED | OUTPATIENT
Start: 2024-04-26 | End: 2024-04-26

## 2024-04-26 RX ADMIN — LACTULOSE 20 G: 10 SOLUTION ORAL at 12:25

## 2024-04-26 RX ADMIN — SODIUM CHLORIDE, PRESERVATIVE FREE 10 ML: 5 INJECTION INTRAVENOUS at 20:33

## 2024-04-26 RX ADMIN — AZITHROMYCIN MONOHYDRATE 500 MG: 500 INJECTION, POWDER, LYOPHILIZED, FOR SOLUTION INTRAVENOUS at 10:20

## 2024-04-26 RX ADMIN — ERYTHROMYCIN ETHYLSUCCINATE 400 MG: 400 TABLET ORAL at 17:47

## 2024-04-26 RX ADMIN — POLYETHYLENE GLYCOL 3350 17 G: 17 POWDER, FOR SOLUTION ORAL at 08:27

## 2024-04-26 RX ADMIN — MIRTAZAPINE 7.5 MG: 15 TABLET, FILM COATED ORAL at 20:31

## 2024-04-26 RX ADMIN — ACYCLOVIR 200 MG: 400 TABLET ORAL at 08:27

## 2024-04-26 RX ADMIN — SODIUM CHLORIDE, PRESERVATIVE FREE 10 ML: 5 INJECTION INTRAVENOUS at 08:28

## 2024-04-26 RX ADMIN — SODIUM CHLORIDE: 9 INJECTION, SOLUTION INTRAVENOUS at 10:18

## 2024-04-26 RX ADMIN — WATER 1000 MG: 1 INJECTION INTRAMUSCULAR; INTRAVENOUS; SUBCUTANEOUS at 10:08

## 2024-04-26 RX ADMIN — MAGNESIUM GLUCONATE 500 MG ORAL TABLET 400 MG: 500 TABLET ORAL at 20:30

## 2024-04-26 RX ADMIN — ERYTHROMYCIN ETHYLSUCCINATE 400 MG: 400 TABLET ORAL at 08:58

## 2024-04-26 RX ADMIN — ROSUVASTATIN CALCIUM 10 MG: 10 TABLET, FILM COATED ORAL at 20:30

## 2024-04-26 RX ADMIN — ERYTHROMYCIN ETHYLSUCCINATE 400 MG: 400 TABLET ORAL at 12:25

## 2024-04-26 RX ADMIN — ACYCLOVIR 200 MG: 400 TABLET ORAL at 20:31

## 2024-04-26 RX ADMIN — FUROSEMIDE 20 MG: 10 INJECTION, SOLUTION INTRAMUSCULAR; INTRAVENOUS at 08:27

## 2024-04-26 RX ADMIN — ACETAMINOPHEN 650 MG: 325 TABLET ORAL at 11:59

## 2024-04-26 RX ADMIN — METOCLOPRAMIDE 5 MG: 10 TABLET ORAL at 08:26

## 2024-04-26 RX ADMIN — MAGNESIUM GLUCONATE 500 MG ORAL TABLET 400 MG: 500 TABLET ORAL at 08:26

## 2024-04-26 RX ADMIN — HEPARIN SODIUM 5000 UNITS: 5000 INJECTION INTRAVENOUS; SUBCUTANEOUS at 05:02

## 2024-04-26 RX ADMIN — DOCUSATE SODIUM 50 MG AND SENNOSIDES 8.6 MG 1 TABLET: 8.6; 5 TABLET, FILM COATED ORAL at 08:26

## 2024-04-26 ASSESSMENT — PAIN SCALES - WONG BAKER: WONGBAKER_NUMERICALRESPONSE: NO HURT

## 2024-04-26 ASSESSMENT — PAIN SCALES - GENERAL
PAINLEVEL_OUTOF10: 0

## 2024-04-26 NOTE — CARE COORDINATION
LOS 16d  IDR and chart reviewed by RNCM for discharge planning.    24 Hour Events:  Afebrile, hypotensive at times, on RA  Orthostatic, holding Lasix   Cr down to 3.36 Neph signed off  S/p BMBx 4/17, path +MM with 10-15% involvement by kappa-monotypic plasma cells  C1D1 CyBorD given 4/20, D8 due 4/27 - may hold off tomorrow given pt condition  C/o urinary retention, forrest placed yesterday, urology consulted  CT chest with b/l pneumonia, starting CTX/Azith  C/o L hip/groin pain  Family at bedside   Transfusions: 1 unit PRBCs  Replacements: Mg++    Transition of care is treatment on 4/27 and discharge home with  on 4/29.    Transitions of Care plan is ongoing, no further concerns as of present.   Please consult  if any new issues arise.  Will continue to follow.

## 2024-04-26 NOTE — PROGRESS NOTES
chills    Assessment & Plan:   Assessment and plan remain unchanged unless specifically outlined below:    Multiple myeloma  -Oncology managing as primary  -S/p bone marrow biopsy on 4/17  -C1D1 CyBorD given 4/20 with next dose due tomorrow 4/27 4/26/2024  As above  Will defer to hem whether or not to give    Volume overload  -Nephrology following  -Lasix 40mg PO BID    4/26/2024  Continue management as above    ANTWON on CKD stage 3  -As evidenced by increase in serum creatinine greater than 0.3 in less than 48 hours  -Creatinine 4.9 with baseline 1.2  -Due to hypotension and multiple myeloma  -Lasix 40mg PO BID   -Creatinine stable   -Nephrology following  4/26/2024  As above    Abdominal pain  -Due to constipation?  -Analgesics PRN  -Continue senokot and miralax   4/26/2024  Currently resolved    Acute hypoxic respiratory failure  -As evidenced by SpO2 less than 90% on room air and the presence of SOB  -Currently on 1L BNC  -Wean O2 as tolerated  4/26/2024  Noted    Bilateral pneumonia (due to suspected gram negative bacteria)   4/26/2024  Ct Chest done 04/25/24- shows infiltrates but she already Status post 7 days of Levaquin;  Starting rocephin and azithromycin    Acute on chronic anemia  -Hgb stable with no evidence of active bleeding  -Trend CBC  4/26/2024  Hemoglobin trended down   For 1 unit of PRBC'S today    Thrombocytopenia  4/26/2024  -Platelet count trending down    Constipation  4/26/2024  -Continue Senokot and Miralax     Hyperlipidemia  4/26/2024  -Continue atorvastatin     Hx pulmonary embolism (10/2023)   4/26/2024  -continue Holding eliquis     Moderate protein caloric malnutrition  4/26/2024  -Dietician following  -Remeron nightly     Retrocardiac Mass  4/26/2024  Per ct done 04/25/24 showed: \"Increased soft tissue density in the right hilar/infrahilar region that could  potentially represent underlying tumor/adenopathy versus congested vasculature  that could be clarified by contrasted  imaging.\"  Will defer further workup to oncology     We will sign off- d/w with oncology & agreeable  Please reconsult as needed    Hospital Problems:  Principal Problem:    ANTWON (acute kidney injury) (HCC)  Active Problems:    Mixed hyperlipidemia    Benign essential hypertension    Moderate protein-calorie malnutrition (HCC)    Hx of hiatal hernia    Failure to thrive in adult    Anemia    Epigastric pain    Esophagitis    Gastric outlet obstruction    Multiple myeloma not having achieved remission (HCC)    Encounter for antineoplastic chemotherapy    Thrombocytopenia (HCC)    Hypokalemia    Hypomagnesemia    Edema    Fatigue    Frailty    Debility    Constipation    Encounter for palliative care  Resolved Problems:    * No resolved hospital problems. *      Objective:   Patient Vitals for the past 24 hrs:   Temp Pulse Resp BP SpO2   04/26/24 1438 98.1 °F (36.7 °C) 70 18 (!) 112/58 93 %   04/26/24 1239 97.3 °F (36.3 °C) 74 19 100/61 92 %   04/26/24 1150 97.5 °F (36.4 °C) 71 16 (!) 99/56 93 %   04/26/24 1110 -- -- -- (!) 89/56 --   04/26/24 0809 98.2 °F (36.8 °C) 72 20 113/60 92 %   04/26/24 0350 98.2 °F (36.8 °C) 70 20 (!) 115/59 95 %   04/25/24 2254 98.1 °F (36.7 °C) 74 20 (!) 112/58 97 %   04/25/24 1950 98.1 °F (36.7 °C) 83 18 97/63 99 %         Physical Exam:   General: in no acute distress.  Elderly  Head: no scleral icterus   CV: regular rate and rhythm   Lungs: Diminished breath sounds bilaterally   Abdomen: abdomen is soft, non-tender, non-distended   Extremities: trace BLE edema   Skin: warm and dry    Neuro: no focal neurological deficits   Psych: normal affect     I have personally reviewed recent labs and imaging.    Signed:  Kayce Luo MD

## 2024-04-26 NOTE — PROGRESS NOTES
Physical Therapy Note:    Attempted to see pt for PT treatment session--pt hypotensive supine in bed (89/56). RN requested to hold mobility at this time. Will continue to follow and attempt to see as schedule allows.     Maryana Arguelles, PANTERA     Rehab Caseload Tracker

## 2024-04-26 NOTE — PROGRESS NOTES
Occupational Therapy Note:    Attempted to see pt for OT treatment session--pt hypotensive supine in  bed (89/56). RN requested to hold mobility at this time. Will continue to follow and attempt to see as schedule allows.    Thank you,  Elodia Serrano, OTR/L

## 2024-04-26 NOTE — PROGRESS NOTES
END OF SHIFT SUMMARY:    Significant vitals this shift:  Hypotensive laying down today map staid above 65. Now is low 100s after 1 unit RBCs and IV abx  Significant labs this shift:  HGB 6.7 this AM now 8.5, lactic acid was 2.1 now 1.8  Tests performed this shift:  NA  Orders to be followed up on:  monitor labs and BP  Blood products given this shift:  1 unit RBCs  Additional events this shift:  c/o constipation this AM, 1 large BM today after miralax, senna and lactulose.     I/Os:  +/- this shift:   No intake/output data recorded.  Unmeasured Occurrences this Shift:  BM 1, Emesis 0      Bedside shift report given to CHANTEL Cadet RN

## 2024-04-26 NOTE — PROGRESS NOTES
[facility] Hematology & Oncology        Inpatient Hematology / Oncology Progress Note    Reason for Consult:  ANTWON (acute kidney injury) ([facility]) [N17.9]  Referring Physician:  Audrey Verma MD    24 Hour Events:  Afebrile, hypotensive at times, on RA  Orthostatic, holding Lasix   Cr down to 3.36 Neph signed off  S/p BMBx 4/17, path +MM with 10-15% involvement by kappa-monotypic plasma cells  C1D1 CyBorD given 4/20, D8 due 4/27 - may hold off tomorrow given pt condition  C/o urinary retention, forrest placed yesterday, urology consulted  CT chest with b/l pneumonia, starting CTX/Azith  C/o L hip/groin pain  Family at bedside    Transfusions: 1 unit PRBCs  Replacements: Mg++      ROS:  Constitutional: +weakness, fatigue. Negative for fever, chills.  CV: Negative for chest pain, palpitations, edema.  Respiratory: Negative for cough, wheezing.  GI: +constipation. Negative for abdominal pain, diarrhea.  MSK: +L groin/hip pain    10 point review of systems is otherwise negative with the exception of the elements mentioned above in the HPI.         No Known Allergies  Past Medical History:   Diagnosis Date    Acute respiratory failure with hypoxemia (HCC)     Benign essential hypertension 2/11/2015    medication    Bilateral leg edema 4/26/2017    Cancer (HCC)     skin    Hiatal hernia     \"large\"    Hypercholesteremia 2/11/2015    IFG (impaired fasting glucose) 4/26/2017    Iron deficiency anemia 5/12/2016    Low oxygen saturation     per office notes- patient's daughter reported O2 sat drops to 85% when supine, she uses O2    Mixed hyperlipidemia 2/11/2015    Morbid obesity (HCC) 10/26/2017    Nipple discharge     not current as of 7/17/13    Primary insomnia 4/26/2018    Primary osteoarthritis of both knees 4/26/2018    Pulmonary embolism (HCC) 10/15/2023    on eliquis- Dr. Arnold recommended eliquis full dose x 6 months    Stage 3 chronic kidney disease (HCC) 7/17/2019     Past Surgical History:   Procedure  [phone]  Fax : [phone]

## 2024-04-26 NOTE — PLAN OF CARE
Problem: Discharge Planning  Goal: Discharge to home or other facility with appropriate resources  4/26/2024 1603 by Yanet Martin RN  Outcome: Progressing  4/26/2024 1602 by Yanet Martin RN  Outcome: Progressing     Problem: Pain  Goal: Verbalizes/displays adequate comfort level or baseline comfort level  4/26/2024 1603 by Yanet Martin RN  Outcome: Progressing  4/26/2024 1602 by Yanet Martin RN  Outcome: Progressing     Problem: Skin/Tissue Integrity  Goal: Absence of new skin breakdown  Description: 1.  Monitor for areas of redness and/or skin breakdown  2.  Assess vascular access sites hourly  3.  Every 4-6 hours minimum:  Change oxygen saturation probe site  4.  Every 4-6 hours:  If on nasal continuous positive airway pressure, respiratory therapy assess nares and determine need for appliance change or resting period.  4/26/2024 1603 by Yanet Martin RN  Outcome: Progressing  4/26/2024 1602 by Yanet Martin RN  Outcome: Progressing     Problem: Safety - Adult  Goal: Free from fall injury  4/26/2024 1603 by Yanet Martin RN  Outcome: Progressing  4/26/2024 1602 by Yanet Martin RN  Outcome: Progressing     Problem: ABCDS Injury Assessment  Goal: Absence of physical injury  4/26/2024 1603 by Yanet Martin RN  Outcome: Progressing  4/26/2024 1602 by Yanet Martin RN  Outcome: Progressing  Flowsheets (Taken 4/26/2024 0800 by Sarah Albarran RN)  Absence of Physical Injury: Implement safety measures based on patient assessment

## 2024-04-26 NOTE — CONSULTS
CONSULT                      Date: 4/26/2024        Patient Name: Tayla Escobar     YOB: 1940      Age:  84 y.o.      History of Present Illness     84 y.o.   female   with a h/o HH s/p repair 3/2024, HTN, HLD and PE on Eliquis (dx 10/2023) who was admitted to our service on 4/10 due to poor PO intake and ANTWON. PO intake had been decreased since HH surgery 3/4/24. Cr was 4.7 on admission (baseline ~1.2). She was started on IVFs and offending meds held. She was seen by Nephrology and felt pre-renal, ATN. CT a/p showed known b/l inguinal hernias and s/p HH repair. She was evaluated by Surgery. UGI series showed changes suggestive of esophagitis with delayed transit, possibly representing GOO. Not felt to have a stricture. Her Cr peaked at 4.9 but has remained stable in the mid 4 range since admission. Total serum calcium elevated since Feb 2024. GI consulted 4/12 for UGI series findings. EGD done on 4/13 and showed food in the stomach so the procedure was aborted and rescheduled for 4/15 which was ultimately normal. She was recommended to have a gastroparesis diet. Oncology was consulted on 4/15 due to elevated kappa/lambda light chains and concerns for plasma cell disease. Calcium is elevated, Cr is stable but remains >4, her Hgb is worse than baseline (7-8s vs 10-11 in the Fall 2023). Skeletal survey xrays 4/16 showed osteopenia, kyphosis, destructive changes of lumbar and sacrum, shoulder arthritis. Seen by PC on 4/17 and made DNR. IR consulted and she underwent CT guided BM bx on 4/17. CyBorD started 4/20.      Urology consult for urinary retention. Patient very weak and poor historian so hx is limited. She developed difficulty voiding over the past several days, and yesterday felt the urge but was unable to void at all. She was straight cathed for 1000ml, then forrest was placed with another 575ml clear yellow urine returned. Patient has been constipated.    Past Medical History     Past  Systems     Pertinent information in HPI    Physical Exam     /61   Pulse 74   Temp 97.3 °F (36.3 °C)   Resp 19   Ht 1.6 m (5' 3\")   Wt 76.4 kg (168 lb 6 oz)   SpO2 92%   BMI 29.83 kg/m²     Physical Exam:  GENERAL ASSESSMENT: alert, oriented to person, place and time, no acute distress and no anxiety, depression or agitation  Chest: Easy work of breathing  CVS exam: RRR  ABDOMEN: not done  Neurological exam reveals alert, oriented, normal speech, no focal findings or movement disorder noted.  FEMALE GENITOURINARY EXAM: forrest draining clear yellow urine  MALE GENITAL EXAM: not done      Labs      Recent Results (from the past 24 hour(s))   Comprehensive Metabolic Panel    Collection Time: 04/26/24  4:24 AM   Result Value Ref Range    Sodium 137 136 - 145 mmol/L    Potassium 3.5 3.5 - 5.1 mmol/L    Chloride 97 (L) 98 - 107 mmol/L    CO2 33 (H) 20 - 28 mmol/L    Anion Gap 8 (L) 9 - 18 mmol/L    Glucose 100 (H) 70 - 99 mg/dL    BUN 38 (H) 8 - 23 MG/DL    Creatinine 3.36 (H) 0.60 - 1.10 MG/DL    Est, Glom Filt Rate 13 (L) >60 ml/min/1.73m2    Calcium 8.3 (L) 8.8 - 10.2 MG/DL    Total Bilirubin 0.5 0.0 - 1.2 MG/DL    ALT 7 (L) 12 - 65 U/L    AST 17 15 - 37 U/L    Alk Phosphatase 48 35 - 104 U/L    Total Protein 4.4 (L) 6.3 - 8.2 g/dL    Albumin 2.6 (L) 3.2 - 4.6 g/dL    Globulin 1.7 (L) 2.3 - 3.5 g/dL    Albumin/Globulin Ratio 1.5 1.0 - 1.9     Magnesium    Collection Time: 04/26/24  4:24 AM   Result Value Ref Range    Magnesium 1.7 (L) 1.8 - 2.4 mg/dL   CBC with Auto Differential    Collection Time: 04/26/24  4:24 AM   Result Value Ref Range    WBC 5.8 4.3 - 11.1 K/uL    RBC 2.18 (L) 4.05 - 5.2 M/uL    Hemoglobin 6.7 (LL) 11.7 - 15.4 g/dL    Hematocrit 20.4 (L) 35.8 - 46.3 %    MCV 93.6 82 - 102 FL    MCH 30.7 26.1 - 32.9 PG    MCHC 32.8 31.4 - 35.0 g/dL    RDW 19.8 (H) 11.9 - 14.6 %    Platelets 58 (L) 150 - 450 K/uL    MPV 12.5 (H) 9.4 - 12.3 FL    nRBC 0.00 0.0 - 0.2 K/uL    Differential Type AUTOMATED

## 2024-04-26 NOTE — PROGRESS NOTES
Hourly rounds in progress. Denies needs or pain at this time.  Garcia patent. Rested well.  Bed in low locked position. Call light within reach.  Bed alarm on.  Daughter at bedside.  Will continue to monitor and give report to oncoming day shift nurse.

## 2024-04-27 PROBLEM — Z09 SURGICAL FOLLOW-UP CARE: Status: RESOLVED | Noted: 2024-03-28 | Resolved: 2024-04-27

## 2024-04-27 LAB
ABO + RH BLD: NORMAL
ALBUMIN SERPL-MCNC: 2.6 G/DL (ref 3.2–4.6)
ALBUMIN/GLOB SERPL: 1.4 (ref 1–1.9)
ALP SERPL-CCNC: 48 U/L (ref 35–104)
ALT SERPL-CCNC: 7 U/L (ref 12–65)
ANION GAP SERPL CALC-SCNC: 10 MMOL/L (ref 9–18)
AST SERPL-CCNC: 18 U/L (ref 15–37)
BASOPHILS # BLD: 0 K/UL (ref 0–0.2)
BASOPHILS NFR BLD: 0 % (ref 0–2)
BILIRUB SERPL-MCNC: 0.5 MG/DL (ref 0–1.2)
BLD PROD TYP BPU: NORMAL
BLD PROD TYP BPU: NORMAL
BLOOD BANK BLOOD PRODUCT EXPIRATION DATE: NORMAL
BLOOD BANK BLOOD PRODUCT EXPIRATION DATE: NORMAL
BLOOD BANK DISPENSE STATUS: NORMAL
BLOOD BANK DISPENSE STATUS: NORMAL
BLOOD BANK ISBT PRODUCT BLOOD TYPE: 5100
BLOOD BANK ISBT PRODUCT BLOOD TYPE: 5100
BLOOD BANK PRODUCT CODE: NORMAL
BLOOD BANK UNIT TYPE AND RH: NORMAL
BLOOD BANK UNIT TYPE AND RH: NORMAL
BLOOD GROUP ANTIBODIES SERPL: NORMAL
BPU ID: NORMAL
BPU ID: NORMAL
BUN SERPL-MCNC: 33 MG/DL (ref 8–23)
CALCIUM SERPL-MCNC: 8.2 MG/DL (ref 8.8–10.2)
CHLORIDE SERPL-SCNC: 98 MMOL/L (ref 98–107)
CO2 SERPL-SCNC: 30 MMOL/L (ref 20–28)
CREAT SERPL-MCNC: 2.79 MG/DL (ref 0.6–1.1)
CROSSMATCH RESULT: NORMAL
CROSSMATCH RESULT: NORMAL
DIFFERENTIAL METHOD BLD: ABNORMAL
EOSINOPHIL # BLD: 0 K/UL (ref 0–0.8)
EOSINOPHIL NFR BLD: 0 % (ref 0.5–7.8)
ERYTHROCYTE [DISTWIDTH] IN BLOOD BY AUTOMATED COUNT: 19.1 % (ref 11.9–14.6)
GLOBULIN SER CALC-MCNC: 1.8 G/DL (ref 2.3–3.5)
GLUCOSE SERPL-MCNC: 106 MG/DL (ref 70–99)
HCT VFR BLD AUTO: 22.7 % (ref 35.8–46.3)
HGB BLD-MCNC: 7.4 G/DL (ref 11.7–15.4)
IMM GRANULOCYTES # BLD AUTO: 0.1 K/UL (ref 0–0.5)
IMM GRANULOCYTES NFR BLD AUTO: 1 % (ref 0–5)
LYMPHOCYTES # BLD: 0.4 K/UL (ref 0.5–4.6)
LYMPHOCYTES NFR BLD: 6 % (ref 13–44)
MAGNESIUM SERPL-MCNC: 1.8 MG/DL (ref 1.8–2.4)
MCH RBC QN AUTO: 30.7 PG (ref 26.1–32.9)
MCHC RBC AUTO-ENTMCNC: 32.6 G/DL (ref 31.4–35)
MCV RBC AUTO: 94.2 FL (ref 82–102)
MONOCYTES # BLD: 0.4 K/UL (ref 0.1–1.3)
MONOCYTES NFR BLD: 6 % (ref 4–12)
NEUTS SEG # BLD: 6.3 K/UL (ref 1.7–8.2)
NEUTS SEG NFR BLD: 87 % (ref 43–78)
NRBC # BLD: 0 K/UL (ref 0–0.2)
PLATELET # BLD AUTO: 53 K/UL (ref 150–450)
PMV BLD AUTO: 12.3 FL (ref 9.4–12.3)
POTASSIUM SERPL-SCNC: 3.3 MMOL/L (ref 3.5–5.1)
PROT SERPL-MCNC: 4.4 G/DL (ref 6.3–8.2)
RBC # BLD AUTO: 2.41 M/UL (ref 4.05–5.2)
SODIUM SERPL-SCNC: 139 MMOL/L (ref 136–145)
SPECIMEN EXP DATE BLD: NORMAL
UNIT DIVISION: 0
UNIT DIVISION: 0
UNIT ISSUE DATE/TIME: NORMAL
UNIT ISSUE DATE/TIME: NORMAL
WBC # BLD AUTO: 7.2 K/UL (ref 4.3–11.1)

## 2024-04-27 PROCEDURE — 6370000000 HC RX 637 (ALT 250 FOR IP): Performed by: HOSPITALIST

## 2024-04-27 PROCEDURE — 6360000002 HC RX W HCPCS: Performed by: NURSE PRACTITIONER

## 2024-04-27 PROCEDURE — 36415 COLL VENOUS BLD VENIPUNCTURE: CPT

## 2024-04-27 PROCEDURE — 99232 SBSQ HOSP IP/OBS MODERATE 35: CPT | Performed by: INTERNAL MEDICINE

## 2024-04-27 PROCEDURE — 51702 INSERT TEMP BLADDER CATH: CPT

## 2024-04-27 PROCEDURE — 2580000003 HC RX 258: Performed by: FAMILY MEDICINE

## 2024-04-27 PROCEDURE — 2580000003 HC RX 258: Performed by: INTERNAL MEDICINE

## 2024-04-27 PROCEDURE — 80053 COMPREHEN METABOLIC PANEL: CPT

## 2024-04-27 PROCEDURE — 85025 COMPLETE CBC W/AUTO DIFF WBC: CPT

## 2024-04-27 PROCEDURE — 83735 ASSAY OF MAGNESIUM: CPT

## 2024-04-27 PROCEDURE — 2580000003 HC RX 258: Performed by: NURSE PRACTITIONER

## 2024-04-27 PROCEDURE — 6370000000 HC RX 637 (ALT 250 FOR IP): Performed by: NURSE PRACTITIONER

## 2024-04-27 PROCEDURE — 1100000000 HC RM PRIVATE

## 2024-04-27 PROCEDURE — 6370000000 HC RX 637 (ALT 250 FOR IP): Performed by: SURGERY

## 2024-04-27 PROCEDURE — 6370000000 HC RX 637 (ALT 250 FOR IP): Performed by: FAMILY MEDICINE

## 2024-04-27 PROCEDURE — 6360000002 HC RX W HCPCS: Performed by: INTERNAL MEDICINE

## 2024-04-27 RX ORDER — POTASSIUM CHLORIDE 20 MEQ/1
40 TABLET, EXTENDED RELEASE ORAL 2 TIMES DAILY
Status: DISCONTINUED | OUTPATIENT
Start: 2024-04-27 | End: 2024-04-29

## 2024-04-27 RX ADMIN — Medication 6 MG: at 22:23

## 2024-04-27 RX ADMIN — ERYTHROMYCIN ETHYLSUCCINATE 400 MG: 400 TABLET ORAL at 12:32

## 2024-04-27 RX ADMIN — SODIUM CHLORIDE, PRESERVATIVE FREE 10 ML: 5 INJECTION INTRAVENOUS at 08:36

## 2024-04-27 RX ADMIN — MAGNESIUM GLUCONATE 500 MG ORAL TABLET 400 MG: 500 TABLET ORAL at 22:15

## 2024-04-27 RX ADMIN — SODIUM CHLORIDE: 9 INJECTION, SOLUTION INTRAVENOUS at 10:10

## 2024-04-27 RX ADMIN — ACYCLOVIR 200 MG: 400 TABLET ORAL at 08:34

## 2024-04-27 RX ADMIN — METOCLOPRAMIDE 5 MG: 10 TABLET ORAL at 08:34

## 2024-04-27 RX ADMIN — MIRTAZAPINE 7.5 MG: 15 TABLET, FILM COATED ORAL at 22:15

## 2024-04-27 RX ADMIN — POTASSIUM CHLORIDE 40 MEQ: 1500 TABLET, EXTENDED RELEASE ORAL at 08:35

## 2024-04-27 RX ADMIN — POTASSIUM CHLORIDE 40 MEQ: 1500 TABLET, EXTENDED RELEASE ORAL at 22:15

## 2024-04-27 RX ADMIN — ACYCLOVIR 200 MG: 400 TABLET ORAL at 22:15

## 2024-04-27 RX ADMIN — WATER 1000 MG: 1 INJECTION INTRAMUSCULAR; INTRAVENOUS; SUBCUTANEOUS at 10:11

## 2024-04-27 RX ADMIN — ERYTHROMYCIN ETHYLSUCCINATE 400 MG: 400 TABLET ORAL at 17:48

## 2024-04-27 RX ADMIN — DOCUSATE SODIUM 50 MG AND SENNOSIDES 8.6 MG 1 TABLET: 8.6; 5 TABLET, FILM COATED ORAL at 22:15

## 2024-04-27 RX ADMIN — MAGNESIUM GLUCONATE 500 MG ORAL TABLET 400 MG: 500 TABLET ORAL at 08:34

## 2024-04-27 RX ADMIN — AZITHROMYCIN MONOHYDRATE 500 MG: 500 INJECTION, POWDER, LYOPHILIZED, FOR SOLUTION INTRAVENOUS at 10:16

## 2024-04-27 RX ADMIN — ROSUVASTATIN CALCIUM 10 MG: 10 TABLET, FILM COATED ORAL at 22:15

## 2024-04-27 RX ADMIN — ERYTHROMYCIN ETHYLSUCCINATE 400 MG: 400 TABLET ORAL at 08:35

## 2024-04-27 ASSESSMENT — PAIN SCALES - GENERAL
PAINLEVEL_OUTOF10: 0

## 2024-04-27 ASSESSMENT — PAIN SCALES - WONG BAKER
WONGBAKER_NUMERICALRESPONSE: NO HURT
WONGBAKER_NUMERICALRESPONSE: NO HURT

## 2024-04-27 NOTE — PROGRESS NOTES
MD Jovan   5,000 Units at 04/26/24 0502    sodium phosphate 15 mmol in sodium chloride 0.9 % 250 mL IVPB  15 mmol IntraVENous PRN Fawad Maldonado MD        ondansetron (ZOFRAN) injection 4 mg  4 mg IntraVENous Q6H PRN Fawad Maldonado MD   4 mg at 04/20/24 0259    acyclovir (ZOVIRAX) tablet 200 mg  200 mg Oral BID Nadia Vidal APRN - CNP   200 mg at 04/27/24 0834    allopurinol (ZYLOPRIM) tablet 50 mg  50 mg Oral Once per day on Mon Thu Nadia Vidal APRN - CNP   50 mg at 04/25/24 0847    glucose chewable tablet 16 g  4 tablet Oral PRN Fawad Maldonado MD        dextrose bolus 10% 125 mL  125 mL IntraVENous PRN Fawad Maldonado MD        Or    dextrose bolus 10% 250 mL  250 mL IntraVENous PRN Fawad Maldonado MD        Glucagon Emergency KIT 1 mg  1 mg SubCUTAneous PRN Fawad Maldonado MD        dextrose 10 % infusion   IntraVENous Continuous PRN Fawad Maldonado MD   Stopped at 04/14/24 2038    erythromycin ethylsuccinate (EES) tablet 400 mg  400 mg Oral TID  Thomas Lozano MD   400 mg at 04/27/24 1748    metoclopramide (REGLAN) tablet 5 mg  5 mg Oral Daily Thomas Lozano MD   5 mg at 04/27/24 0834    melatonin tablet 6 mg  6 mg Oral Nightly PRN Tadeo Raman MD   6 mg at 04/25/24 2139    rosuvastatin (CRESTOR) tablet 10 mg  10 mg Oral Nightly Laina Ro, DO   10 mg at 04/26/24 2030    traMADol (ULTRAM) tablet 50 mg  50 mg Oral Q12H PRN Laina Ro DO   50 mg at 04/25/24 2139    hydrALAZINE (APRESOLINE) injection 10 mg  10 mg IntraVENous Q6H PRN Laina Ro DO        sodium chloride flush 0.9 % injection 5-40 mL  5-40 mL IntraVENous 2 times per day Laina Ro DO   10 mL at 04/27/24 0836    sodium chloride flush 0.9 % injection 5-40 mL  5-40 mL IntraVENous PRN Laina Ro,         0.9 % sodium chloride infusion   IntraVENous PRN Laina Ro,  10 mL/hr at 04/27/24 1752 Rate Verify at 04/27/24 1752    polyethylene glycol (GLYCOLAX) packet 17 g  17  chew.  4/24 Pt not able to choose blueberry muffin for breakfast, will change diet back to regular.  4/27 encouraged adequate PO intake     Constipation  4/23 Miralax ordered.  Con't senna  4/24 Change senna to pericolace BID.  Con't Miralax.  Reports small BM.  4/26 Ongoing constipation.  Lactulose x 1 ordered.   4/27 BMx1 large after Miralax/senna and lactulose     Hypotension  4/26 Con't to hold Lasix.  Check LA/PCT, Bcx.  On CTX/Azith for pneumonia.    L hip/groin pain  4/26 c/o L hip groin pain.  Pt wishes to hold off on imaging today.  If pain persists, will need L hip xray tomorrow.  4/27 self resolved, pt declined need for imaging     Continue home meds  Ppx: Acyclovir  Supportive care  AC contraindicated d/t thrombocytopenia    Goals and plan of care reviewed with the patient.  All questions answered to the best of our ability.      Dispo:  TBD pending clinical course.  Pt declines STR.         Audrey Verma MD   [facility] Hematology & Oncology  [address] [place] [address]  Office : [phone]  Fax : [phone]

## 2024-04-27 NOTE — PLAN OF CARE
Problem: Discharge Planning  Goal: Discharge to home or other facility with appropriate resources  4/27/2024 0424 by Chichi Farmer RN  Outcome: Progressing  4/26/2024 1603 by Yanet Martin RN  Outcome: Progressing  4/26/2024 1602 by Yanet Martin RN  Outcome: Progressing  Flowsheets  Taken 4/26/2024 1400  Discharge to home or other facility with appropriate resources: Identify barriers to discharge with patient and caregiver  Taken 4/26/2024 0809  Discharge to home or other facility with appropriate resources: Identify barriers to discharge with patient and caregiver     Problem: Pain  Goal: Verbalizes/displays adequate comfort level or baseline comfort level  4/27/2024 0424 by Chichi Farmer RN  Outcome: Progressing  4/26/2024 1603 by Yanet Martin RN  Outcome: Progressing  4/26/2024 1602 by Yanet Martin RN  Outcome: Progressing     Problem: Skin/Tissue Integrity  Goal: Absence of new skin breakdown  Description: 1.  Monitor for areas of redness and/or skin breakdown  2.  Assess vascular access sites hourly  3.  Every 4-6 hours minimum:  Change oxygen saturation probe site  4.  Every 4-6 hours:  If on nasal continuous positive airway pressure, respiratory therapy assess nares and determine need for appliance change or resting period.  4/27/2024 0424 by Chichi Farmer RN  Outcome: Progressing  4/26/2024 1603 by Yanet Martin RN  Outcome: Progressing  4/26/2024 1602 by Yanet Martin RN  Outcome: Progressing     Problem: Safety - Adult  Goal: Free from fall injury  4/27/2024 0424 by Chichi Farmer RN  Outcome: Progressing  4/26/2024 1603 by Yanet Martin RN  Outcome: Progressing  4/26/2024 1602 by Yanet Martin RN  Outcome: Progressing

## 2024-04-27 NOTE — PLAN OF CARE
Pt was turned every two hours due to increased weakness  Pt was placed with heal protectors and SCD's   Pt intake for despite encourage   Oral potassium was given for low potassium of 3.3  Stool softeners was held for loose stools from overnight  One small stool today   Family updated at bedside  Vitals stable  No signs of distress noted  Bed low, locked, and call light within reach  Pt rounded on hourly by nursing staff  Bedside shift report given to oncoming RN    Problem: Discharge Planning  Goal: Discharge to home or other facility with appropriate resources  4/27/2024 1421 by Yanet Martin RN  Outcome: Progressing  Flowsheets (Taken 4/27/2024 0818)  Discharge to home or other facility with appropriate resources: Identify barriers to discharge with patient and caregiver  4/27/2024 0424 by Chichi Farmer RN  Outcome: Progressing     Problem: Pain  Goal: Verbalizes/displays adequate comfort level or baseline comfort level  4/27/2024 1421 by Yanet Martin RN  Outcome: Progressing  4/27/2024 0424 by Chichi Farmer RN  Outcome: Progressing     Problem: Skin/Tissue Integrity  Goal: Absence of new skin breakdown  Description: 1.  Monitor for areas of redness and/or skin breakdown  2.  Assess vascular access sites hourly  3.  Every 4-6 hours minimum:  Change oxygen saturation probe site  4.  Every 4-6 hours:  If on nasal continuous positive airway pressure, respiratory therapy assess nares and determine need for appliance change or resting period.  4/27/2024 1421 by Yanet Martin RN  Outcome: Progressing  4/27/2024 0424 by Chichi Farmer RN  Outcome: Progressing     Problem: Safety - Adult  Goal: Free from fall injury  4/27/2024 1421 by Yanet Martin RN  Outcome: Progressing  Flowsheets (Taken 4/27/2024 0818)  Free From Fall Injury: Instruct family/caregiver on patient safety  4/27/2024 0424 by Chichi Farmer RN  Outcome: Progressing  Flowsheets  Taken 4/27/2024 0355  Free From Fall Injury: Instruct

## 2024-04-28 PROBLEM — T80.1XXA INTRAVENOUS INFILTRATION: Status: ACTIVE | Noted: 2024-04-28

## 2024-04-28 PROBLEM — Z78.9 POOR INTRAVENOUS ACCESS: Status: ACTIVE | Noted: 2024-04-28

## 2024-04-28 LAB
ALBUMIN SERPL-MCNC: 2.4 G/DL (ref 3.2–4.6)
ALBUMIN/GLOB SERPL: 1.2 (ref 1–1.9)
ALP SERPL-CCNC: 45 U/L (ref 35–104)
ALT SERPL-CCNC: 9 U/L (ref 12–65)
ANION GAP SERPL CALC-SCNC: 6 MMOL/L (ref 9–18)
AST SERPL-CCNC: 21 U/L (ref 15–37)
BASOPHILS # BLD: 0 K/UL (ref 0–0.2)
BASOPHILS NFR BLD: 0 % (ref 0–2)
BILIRUB SERPL-MCNC: 0.5 MG/DL (ref 0–1.2)
BUN SERPL-MCNC: 24 MG/DL (ref 8–23)
CALCIUM SERPL-MCNC: 8.3 MG/DL (ref 8.8–10.2)
CHLORIDE SERPL-SCNC: 101 MMOL/L (ref 98–107)
CO2 SERPL-SCNC: 31 MMOL/L (ref 20–28)
CREAT SERPL-MCNC: 2.52 MG/DL (ref 0.6–1.1)
DIFFERENTIAL METHOD BLD: ABNORMAL
EOSINOPHIL # BLD: 0 K/UL (ref 0–0.8)
EOSINOPHIL NFR BLD: 1 % (ref 0.5–7.8)
ERYTHROCYTE [DISTWIDTH] IN BLOOD BY AUTOMATED COUNT: 17.9 % (ref 11.9–14.6)
GLOBULIN SER CALC-MCNC: 2 G/DL (ref 2.3–3.5)
GLUCOSE SERPL-MCNC: 99 MG/DL (ref 70–99)
HCT VFR BLD AUTO: 20.7 % (ref 35.8–46.3)
HGB BLD-MCNC: 6.8 G/DL (ref 11.7–15.4)
HISTORY CHECK: NORMAL
IMM GRANULOCYTES # BLD AUTO: 0 K/UL (ref 0–0.5)
IMM GRANULOCYTES NFR BLD AUTO: 1 % (ref 0–5)
LYMPHOCYTES # BLD: 0.3 K/UL (ref 0.5–4.6)
LYMPHOCYTES NFR BLD: 8 % (ref 13–44)
MAGNESIUM SERPL-MCNC: 1.8 MG/DL (ref 1.8–2.4)
MCH RBC QN AUTO: 31.2 PG (ref 26.1–32.9)
MCHC RBC AUTO-ENTMCNC: 32.9 G/DL (ref 31.4–35)
MCV RBC AUTO: 95 FL (ref 82–102)
MONOCYTES # BLD: 0.4 K/UL (ref 0.1–1.3)
MONOCYTES NFR BLD: 11 % (ref 4–12)
NEUTS SEG # BLD: 3.2 K/UL (ref 1.7–8.2)
NEUTS SEG NFR BLD: 80 % (ref 43–78)
NRBC # BLD: 0 K/UL (ref 0–0.2)
PLATELET # BLD AUTO: 50 K/UL (ref 150–450)
PMV BLD AUTO: 12.4 FL (ref 9.4–12.3)
POTASSIUM SERPL-SCNC: 4.4 MMOL/L (ref 3.5–5.1)
PROT SERPL-MCNC: 4.4 G/DL (ref 6.3–8.2)
RBC # BLD AUTO: 2.18 M/UL (ref 4.05–5.2)
SODIUM SERPL-SCNC: 138 MMOL/L (ref 136–145)
URATE SERPL-MCNC: 4.3 MG/DL (ref 2.5–7.1)
WBC # BLD AUTO: 4 K/UL (ref 4.3–11.1)

## 2024-04-28 PROCEDURE — 6360000002 HC RX W HCPCS: Performed by: INTERNAL MEDICINE

## 2024-04-28 PROCEDURE — 84550 ASSAY OF BLOOD/URIC ACID: CPT

## 2024-04-28 PROCEDURE — 2580000003 HC RX 258: Performed by: FAMILY MEDICINE

## 2024-04-28 PROCEDURE — 2580000003 HC RX 258: Performed by: NURSE PRACTITIONER

## 2024-04-28 PROCEDURE — 36430 TRANSFUSION BLD/BLD COMPNT: CPT

## 2024-04-28 PROCEDURE — 6370000000 HC RX 637 (ALT 250 FOR IP): Performed by: HOSPITALIST

## 2024-04-28 PROCEDURE — 6370000000 HC RX 637 (ALT 250 FOR IP): Performed by: SURGERY

## 2024-04-28 PROCEDURE — 86850 RBC ANTIBODY SCREEN: CPT

## 2024-04-28 PROCEDURE — 99233 SBSQ HOSP IP/OBS HIGH 50: CPT | Performed by: INTERNAL MEDICINE

## 2024-04-28 PROCEDURE — 6360000002 HC RX W HCPCS: Performed by: NURSE PRACTITIONER

## 2024-04-28 PROCEDURE — 80053 COMPREHEN METABOLIC PANEL: CPT

## 2024-04-28 PROCEDURE — 2580000003 HC RX 258: Performed by: INTERNAL MEDICINE

## 2024-04-28 PROCEDURE — 76937 US GUIDE VASCULAR ACCESS: CPT

## 2024-04-28 PROCEDURE — 1100000000 HC RM PRIVATE

## 2024-04-28 PROCEDURE — 86901 BLOOD TYPING SEROLOGIC RH(D): CPT

## 2024-04-28 PROCEDURE — 6370000000 HC RX 637 (ALT 250 FOR IP): Performed by: INTERNAL MEDICINE

## 2024-04-28 PROCEDURE — 6370000000 HC RX 637 (ALT 250 FOR IP): Performed by: FAMILY MEDICINE

## 2024-04-28 PROCEDURE — 85025 COMPLETE CBC W/AUTO DIFF WBC: CPT

## 2024-04-28 PROCEDURE — 86923 COMPATIBILITY TEST ELECTRIC: CPT

## 2024-04-28 PROCEDURE — P9040 RBC LEUKOREDUCED IRRADIATED: HCPCS

## 2024-04-28 PROCEDURE — 36415 COLL VENOUS BLD VENIPUNCTURE: CPT

## 2024-04-28 PROCEDURE — 86900 BLOOD TYPING SEROLOGIC ABO: CPT

## 2024-04-28 PROCEDURE — 83735 ASSAY OF MAGNESIUM: CPT

## 2024-04-28 PROCEDURE — 6370000000 HC RX 637 (ALT 250 FOR IP): Performed by: NURSE PRACTITIONER

## 2024-04-28 RX ORDER — SODIUM CHLORIDE 9 MG/ML
INJECTION, SOLUTION INTRAVENOUS PRN
Status: DISCONTINUED | OUTPATIENT
Start: 2024-04-28 | End: 2024-05-03

## 2024-04-28 RX ADMIN — WATER 1000 MG: 1 INJECTION INTRAMUSCULAR; INTRAVENOUS; SUBCUTANEOUS at 08:43

## 2024-04-28 RX ADMIN — ERYTHROMYCIN ETHYLSUCCINATE 400 MG: 400 TABLET ORAL at 16:44

## 2024-04-28 RX ADMIN — METOCLOPRAMIDE 5 MG: 10 TABLET ORAL at 08:43

## 2024-04-28 RX ADMIN — ACYCLOVIR 200 MG: 400 TABLET ORAL at 21:10

## 2024-04-28 RX ADMIN — MIRTAZAPINE 7.5 MG: 15 TABLET, FILM COATED ORAL at 21:09

## 2024-04-28 RX ADMIN — ERYTHROMYCIN ETHYLSUCCINATE 400 MG: 400 TABLET ORAL at 08:30

## 2024-04-28 RX ADMIN — DIPHENHYDRAMINE HYDROCHLORIDE 25 MG: 25 CAPSULE ORAL at 10:08

## 2024-04-28 RX ADMIN — MAGNESIUM GLUCONATE 500 MG ORAL TABLET 400 MG: 500 TABLET ORAL at 21:09

## 2024-04-28 RX ADMIN — AZITHROMYCIN MONOHYDRATE 500 MG: 500 INJECTION, POWDER, LYOPHILIZED, FOR SOLUTION INTRAVENOUS at 08:58

## 2024-04-28 RX ADMIN — ACYCLOVIR 200 MG: 400 TABLET ORAL at 08:42

## 2024-04-28 RX ADMIN — POLYETHYLENE GLYCOL 3350 17 G: 17 POWDER, FOR SOLUTION ORAL at 08:43

## 2024-04-28 RX ADMIN — ERYTHROMYCIN ETHYLSUCCINATE 400 MG: 400 TABLET ORAL at 11:30

## 2024-04-28 RX ADMIN — MAGNESIUM GLUCONATE 500 MG ORAL TABLET 400 MG: 500 TABLET ORAL at 08:43

## 2024-04-28 RX ADMIN — DOCUSATE SODIUM 50 MG AND SENNOSIDES 8.6 MG 1 TABLET: 8.6; 5 TABLET, FILM COATED ORAL at 08:43

## 2024-04-28 RX ADMIN — POTASSIUM CHLORIDE 40 MEQ: 1500 TABLET, EXTENDED RELEASE ORAL at 08:43

## 2024-04-28 RX ADMIN — POTASSIUM CHLORIDE 40 MEQ: 1500 TABLET, EXTENDED RELEASE ORAL at 21:09

## 2024-04-28 RX ADMIN — ACETAMINOPHEN 650 MG: 325 TABLET ORAL at 10:08

## 2024-04-28 RX ADMIN — Medication 6 MG: at 21:18

## 2024-04-28 RX ADMIN — ROSUVASTATIN CALCIUM 10 MG: 10 TABLET, FILM COATED ORAL at 21:10

## 2024-04-28 RX ADMIN — SODIUM CHLORIDE, PRESERVATIVE FREE 10 ML: 5 INJECTION INTRAVENOUS at 21:20

## 2024-04-28 RX ADMIN — SODIUM CHLORIDE, PRESERVATIVE FREE 10 ML: 5 INJECTION INTRAVENOUS at 08:44

## 2024-04-28 ASSESSMENT — PAIN SCALES - WONG BAKER
WONGBAKER_NUMERICALRESPONSE: NO HURT
WONGBAKER_NUMERICALRESPONSE: NO HURT

## 2024-04-28 ASSESSMENT — PAIN SCALES - GENERAL
PAINLEVEL_OUTOF10: 0
PAINLEVEL_OUTOF10: 0

## 2024-04-28 NOTE — PLAN OF CARE
Problem: Discharge Planning  Goal: Discharge to home or other facility with appropriate resources  4/28/2024 1035 by Todd Rangel, RN  Outcome: Progressing     Problem: Pain  Goal: Verbalizes/displays adequate comfort level or baseline comfort level  4/28/2024 1035 by Todd Rangel, RN  Outcome: Progressing     Problem: Safety - Adult  Goal: Free from fall injury  4/28/2024 1035 by Todd Rangel, RN  Outcome: Progressing

## 2024-04-28 NOTE — PROGRESS NOTES
[facility] Hematology & Oncology        Inpatient Hematology / Oncology Progress Note    Reason for Consult:  ANTWON (acute kidney injury) (HCC) [N17.9]    24 Hour Events:  Afebrile, BP stable, on RA  Hgb 6.8-SOB  Cr down to 2.5 Neph   S/p BMBx 4/17, path +MM with 10-15% involvement by kappa-monotypic plasma cells  C1D1 CyBorD given 4/20, D8 due 4/27 - holding as pt not feeling well /weak   CT chest with b/l pneumonia - on CTX/Azith/ RVP neg       Transfusions: none   Replacements: K    ROS:  Constitutional: +weakness, +fatigue. Negative for fever, chills.  CV: Negative for chest pain, palpitations, edema.  Respiratory: Negative for cough, wheezing.  GI:  Negative for abdominal pain, diarrhea.    10 point review of systems is otherwise negative with the exception of the elements mentioned above in the HPI.     No Known Allergies  Past Medical History:   Diagnosis Date    Acute respiratory failure with hypoxemia (HCC)     Benign essential hypertension 2/11/2015    medication    Bilateral leg edema 4/26/2017    Cancer (HCC)     skin    Hiatal hernia     \"large\"    Hypercholesteremia 2/11/2015    IFG (impaired fasting glucose) 4/26/2017    Iron deficiency anemia 5/12/2016    Low oxygen saturation     per office notes- patient's daughter reported O2 sat drops to 85% when supine, she uses O2    Mixed hyperlipidemia 2/11/2015    Morbid obesity (HCC) 10/26/2017    Nipple discharge     not current as of 7/17/13    Primary insomnia 4/26/2018    Primary osteoarthritis of both knees 4/26/2018    Pulmonary embolism (HCC) 10/15/2023    on eliquis- Dr. Arnold recommended eliquis full dose x 6 months    Stage 3 chronic kidney disease (HCC) 7/17/2019     Past Surgical History:   Procedure Laterality Date    HIATAL HERNIA REPAIR N/A 3/4/2024    ROBOTIC HIATAL HERNIA REPAIR performed by Thomas Lozano MD at [facility] MAIN OR    MALIGNANT SKIN LESION EXCISION      OTHER SURGICAL HISTORY      skin cancer    UPPER GASTROINTESTINAL

## 2024-04-28 NOTE — PLAN OF CARE
Problem: Discharge Planning  Goal: Discharge to home or other facility with appropriate resources  4/28/2024 0315 by Yudy Mai RN  Outcome: Progressing  Flowsheets  Taken 4/27/2024 2315 by Yudy Mai RN  Discharge to home or other facility with appropriate resources: Identify barriers to discharge with patient and caregiver  Taken 4/27/2024 1435 by Yanet Martin RN  Discharge to home or other facility with appropriate resources: Identify barriers to discharge with patient and caregiver  4/27/2024 1421 by Yanet Martin RN  Outcome: Progressing  Flowsheets (Taken 4/27/2024 0818)  Discharge to home or other facility with appropriate resources: Identify barriers to discharge with patient and caregiver     Problem: Pain  Goal: Verbalizes/displays adequate comfort level or baseline comfort level  4/28/2024 0315 by Yudy Mai RN  Outcome: Progressing  4/27/2024 1421 by Yanet Martin RN  Outcome: Progressing     Problem: Skin/Tissue Integrity  Goal: Absence of new skin breakdown  Description: 1.  Monitor for areas of redness and/or skin breakdown  2.  Assess vascular access sites hourly  3.  Every 4-6 hours minimum:  Change oxygen saturation probe site  4.  Every 4-6 hours:  If on nasal continuous positive airway pressure, respiratory therapy assess nares and determine need for appliance change or resting period.  4/28/2024 0315 by Yudy Mai RN  Outcome: Progressing  4/27/2024 1421 by Yanet Martin RN  Outcome: Progressing     Problem: Safety - Adult  Goal: Free from fall injury  4/28/2024 0315 by Yudy Mai RN  Outcome: Progressing  Flowsheets (Taken 4/27/2024 2315)  Free From Fall Injury: Instruct family/caregiver on patient safety  4/27/2024 1421 by Yanet Martin RN  Outcome: Progressing  Flowsheets (Taken 4/27/2024 0818)  Free From Fall Injury: Instruct family/caregiver on patient safety     Problem: ABCDS Injury Assessment  Goal:

## 2024-04-29 LAB
ALBUMIN SERPL-MCNC: 2.6 G/DL (ref 3.2–4.6)
ALBUMIN/GLOB SERPL: 1.2 (ref 1–1.9)
ALP SERPL-CCNC: 50 U/L (ref 35–104)
ALT SERPL-CCNC: 12 U/L (ref 12–65)
ANION GAP SERPL CALC-SCNC: 6 MMOL/L (ref 9–18)
AST SERPL-CCNC: 25 U/L (ref 15–37)
BASOPHILS # BLD: 0 K/UL (ref 0–0.2)
BASOPHILS NFR BLD: 0 % (ref 0–2)
BILIRUB SERPL-MCNC: 0.5 MG/DL (ref 0–1.2)
BUN SERPL-MCNC: 19 MG/DL (ref 8–23)
CALCIUM SERPL-MCNC: 8.5 MG/DL (ref 8.8–10.2)
CHLORIDE SERPL-SCNC: 104 MMOL/L (ref 98–107)
CO2 SERPL-SCNC: 29 MMOL/L (ref 20–28)
CREAT SERPL-MCNC: 2.21 MG/DL (ref 0.6–1.1)
DIFFERENTIAL METHOD BLD: ABNORMAL
EOSINOPHIL # BLD: 0.1 K/UL (ref 0–0.8)
EOSINOPHIL NFR BLD: 2 % (ref 0.5–7.8)
ERYTHROCYTE [DISTWIDTH] IN BLOOD BY AUTOMATED COUNT: 18.8 % (ref 11.9–14.6)
GLOBULIN SER CALC-MCNC: 2.2 G/DL (ref 2.3–3.5)
GLUCOSE SERPL-MCNC: 98 MG/DL (ref 70–99)
HAPTOGLOB SERPL-MCNC: 144 MG/DL (ref 30–200)
HCT VFR BLD AUTO: 24.9 % (ref 35.8–46.3)
HGB BLD-MCNC: 7.9 G/DL (ref 11.7–15.4)
IMM GRANULOCYTES # BLD AUTO: 0 K/UL (ref 0–0.5)
IMM GRANULOCYTES NFR BLD AUTO: 1 % (ref 0–5)
LYMPHOCYTES # BLD: 0.4 K/UL (ref 0.5–4.6)
LYMPHOCYTES NFR BLD: 13 % (ref 13–44)
MAGNESIUM SERPL-MCNC: 1.9 MG/DL (ref 1.8–2.4)
MCH RBC QN AUTO: 30.3 PG (ref 26.1–32.9)
MCHC RBC AUTO-ENTMCNC: 31.7 G/DL (ref 31.4–35)
MCV RBC AUTO: 95.4 FL (ref 82–102)
MONOCYTES # BLD: 0.4 K/UL (ref 0.1–1.3)
MONOCYTES NFR BLD: 14 % (ref 4–12)
NEUTS SEG # BLD: 2 K/UL (ref 1.7–8.2)
NEUTS SEG NFR BLD: 71 % (ref 43–78)
NRBC # BLD: 0 K/UL (ref 0–0.2)
PLATELET # BLD AUTO: 60 K/UL (ref 150–450)
PMV BLD AUTO: 11.8 FL (ref 9.4–12.3)
POTASSIUM SERPL-SCNC: 5.3 MMOL/L (ref 3.5–5.1)
PROT SERPL-MCNC: 4.7 G/DL (ref 6.3–8.2)
RBC # BLD AUTO: 2.61 M/UL (ref 4.05–5.2)
SODIUM SERPL-SCNC: 139 MMOL/L (ref 136–145)
WBC # BLD AUTO: 2.9 K/UL (ref 4.3–11.1)

## 2024-04-29 PROCEDURE — 6370000000 HC RX 637 (ALT 250 FOR IP): Performed by: NURSE PRACTITIONER

## 2024-04-29 PROCEDURE — 6370000000 HC RX 637 (ALT 250 FOR IP): Performed by: HOSPITALIST

## 2024-04-29 PROCEDURE — 36415 COLL VENOUS BLD VENIPUNCTURE: CPT

## 2024-04-29 PROCEDURE — 2580000003 HC RX 258: Performed by: INTERNAL MEDICINE

## 2024-04-29 PROCEDURE — 6360000002 HC RX W HCPCS: Performed by: NURSE PRACTITIONER

## 2024-04-29 PROCEDURE — 97535 SELF CARE MNGMENT TRAINING: CPT

## 2024-04-29 PROCEDURE — 85025 COMPLETE CBC W/AUTO DIFF WBC: CPT

## 2024-04-29 PROCEDURE — A4216 STERILE WATER/SALINE, 10 ML: HCPCS | Performed by: INTERNAL MEDICINE

## 2024-04-29 PROCEDURE — 1100000000 HC RM PRIVATE

## 2024-04-29 PROCEDURE — 6370000000 HC RX 637 (ALT 250 FOR IP): Performed by: SURGERY

## 2024-04-29 PROCEDURE — 2580000003 HC RX 258: Performed by: NURSE PRACTITIONER

## 2024-04-29 PROCEDURE — 99233 SBSQ HOSP IP/OBS HIGH 50: CPT | Performed by: INTERNAL MEDICINE

## 2024-04-29 PROCEDURE — 97530 THERAPEUTIC ACTIVITIES: CPT

## 2024-04-29 PROCEDURE — 6370000000 HC RX 637 (ALT 250 FOR IP): Performed by: FAMILY MEDICINE

## 2024-04-29 PROCEDURE — 80053 COMPREHEN METABOLIC PANEL: CPT

## 2024-04-29 PROCEDURE — 6360000002 HC RX W HCPCS: Performed by: INTERNAL MEDICINE

## 2024-04-29 PROCEDURE — 83735 ASSAY OF MAGNESIUM: CPT

## 2024-04-29 PROCEDURE — 2580000003 HC RX 258: Performed by: FAMILY MEDICINE

## 2024-04-29 PROCEDURE — APPSS30 APP SPLIT SHARED TIME 16-30 MINUTES: Performed by: NURSE PRACTITIONER

## 2024-04-29 PROCEDURE — 97112 NEUROMUSCULAR REEDUCATION: CPT

## 2024-04-29 RX ORDER — ONDANSETRON 2 MG/ML
8 INJECTION INTRAMUSCULAR; INTRAVENOUS ONCE
Status: COMPLETED | OUTPATIENT
Start: 2024-04-29 | End: 2024-04-29

## 2024-04-29 RX ADMIN — ALLOPURINOL 50 MG: 100 TABLET ORAL at 08:42

## 2024-04-29 RX ADMIN — Medication 6 MG: at 22:18

## 2024-04-29 RX ADMIN — DOCUSATE SODIUM 50 MG AND SENNOSIDES 8.6 MG 1 TABLET: 8.6; 5 TABLET, FILM COATED ORAL at 08:41

## 2024-04-29 RX ADMIN — CYCLOPHOSPHAMIDE 580 MG: 1 INJECTION, POWDER, FOR SOLUTION INTRAVENOUS; ORAL at 17:23

## 2024-04-29 RX ADMIN — MAGNESIUM GLUCONATE 500 MG ORAL TABLET 400 MG: 500 TABLET ORAL at 20:33

## 2024-04-29 RX ADMIN — SODIUM CHLORIDE 3 MG: 9 INJECTION INTRAMUSCULAR; INTRAVENOUS; SUBCUTANEOUS at 17:20

## 2024-04-29 RX ADMIN — ERYTHROMYCIN ETHYLSUCCINATE 400 MG: 400 TABLET ORAL at 08:43

## 2024-04-29 RX ADMIN — SODIUM CHLORIDE: 9 INJECTION, SOLUTION INTRAVENOUS at 16:56

## 2024-04-29 RX ADMIN — METOCLOPRAMIDE 5 MG: 10 TABLET ORAL at 08:42

## 2024-04-29 RX ADMIN — DEXAMETHASONE SODIUM PHOSPHATE 40 MG: 10 INJECTION INTRAMUSCULAR; INTRAVENOUS at 16:57

## 2024-04-29 RX ADMIN — SODIUM CHLORIDE, PRESERVATIVE FREE 10 ML: 5 INJECTION INTRAVENOUS at 20:38

## 2024-04-29 RX ADMIN — MAGNESIUM GLUCONATE 500 MG ORAL TABLET 400 MG: 500 TABLET ORAL at 08:41

## 2024-04-29 RX ADMIN — ERYTHROMYCIN ETHYLSUCCINATE 400 MG: 400 TABLET ORAL at 16:59

## 2024-04-29 RX ADMIN — AZITHROMYCIN MONOHYDRATE 500 MG: 500 INJECTION, POWDER, LYOPHILIZED, FOR SOLUTION INTRAVENOUS at 08:54

## 2024-04-29 RX ADMIN — ROSUVASTATIN CALCIUM 10 MG: 10 TABLET, FILM COATED ORAL at 20:33

## 2024-04-29 RX ADMIN — SODIUM CHLORIDE, PRESERVATIVE FREE 10 ML: 5 INJECTION INTRAVENOUS at 17:24

## 2024-04-29 RX ADMIN — POLYETHYLENE GLYCOL 3350 17 G: 17 POWDER, FOR SOLUTION ORAL at 08:42

## 2024-04-29 RX ADMIN — ONDANSETRON 8 MG: 2 INJECTION INTRAMUSCULAR; INTRAVENOUS at 16:52

## 2024-04-29 RX ADMIN — SODIUM CHLORIDE: 9 INJECTION, SOLUTION INTRAVENOUS at 08:53

## 2024-04-29 RX ADMIN — ACYCLOVIR 200 MG: 400 TABLET ORAL at 08:42

## 2024-04-29 RX ADMIN — ERYTHROMYCIN ETHYLSUCCINATE 400 MG: 400 TABLET ORAL at 12:30

## 2024-04-29 RX ADMIN — HEPARIN SODIUM 5000 UNITS: 5000 INJECTION INTRAVENOUS; SUBCUTANEOUS at 12:33

## 2024-04-29 RX ADMIN — MIRTAZAPINE 7.5 MG: 15 TABLET, FILM COATED ORAL at 20:33

## 2024-04-29 RX ADMIN — WATER 1000 MG: 1 INJECTION INTRAMUSCULAR; INTRAVENOUS; SUBCUTANEOUS at 08:43

## 2024-04-29 RX ADMIN — ACYCLOVIR 200 MG: 400 TABLET ORAL at 20:33

## 2024-04-29 ASSESSMENT — PAIN SCALES - GENERAL
PAINLEVEL_OUTOF10: 0

## 2024-04-29 NOTE — PROGRESS NOTES
ACUTE PHYSICAL THERAPY GOALS:   (Developed with and agreed upon by patient and/or caregiver.)  LTG:  (1.)Ms. Escobar will move from supine to sit and sit to supine , scoot up and down, and roll side to side in bed with INDEPENDENT within 7 treatment day(s).    (2.)Ms. Escobar will transfer from bed to chair and chair to bed with MODIFIED INDEPENDENCE using the least restrictive device within 7 treatment day(s).    (3.)Ms. Escobar will ambulate with MODIFIED INDEPENDENCE for 75 feet with the least restrictive device within 7 treatment day(s).  (4.)Ms. Escobar will tolerate at least 23 min of dynamic standing activity to assist standing ADLs with the least restrictive device within 7 treatment days.     PHYSICAL THERAPY: Daily Note AM   (Link to Caseload Tracking: PT Visit Days : 6  Time In/Out PT Charge Capture  Rehab Caseload Tracker  Orders    Tayla Escobar is a 84 y.o. female   PRIMARY DIAGNOSIS: ANTWON (acute kidney injury) ([facility])  ANTWON (acute kidney injury) ([facility]) [N17.9]  Procedure(s) (LRB):  ESOPHAGOGASTRODUODENOSCOPY (N/A)  14 Days Post-Op  Inpatient: Payor: [employer] MEDICARE / Plan: A[employer] MEDICARE-ADVANTAGE PPO / Product Type: Medicare /     ASSESSMENT:     REHAB RECOMMENDATIONS:   Recommendation to date pending progress:  Setting:  Short-term Rehab    Equipment:    To Be Determined     ASSESSMENT:  Ms. Escobar was supine in bed on arrival and agreeable to therapy. She was able to get to edge of bed with SBA. Sit to stand several times with min A x2. Orthostatic BP taken and only a slight change. Pt with no dizziness noted, only fatigue. Transfer to chair with min A x2 due to pt being a little anxious with transfer. She was left with needs in reach and son present. Slow progress towards goals.       SUBJECTIVE:   Ms. Escobar states, \"I might have to use the restroom\"    Social/Functional Lives With: Son  Type of Home: House  Home Layout: One level  Home Access: Ramped entrance  Bathroom Shower/Tub: Walk-in

## 2024-04-29 NOTE — CARE COORDINATION
LOS 19d    24 Hour Events:  Afebrile, VSS  C1D8 CyBorD today  On CTX/Azith for pneumonia (D4)  Cr down to 2.21  Sitting up in bedside chair.  4/29 Proceed with C1D8 CyBorD  PT/OT continues to follow: recommendation is for [facility] (family declines)  Agreeable to Home health    Transition of care is Home health medically stable.    Transitions of Care plan is ongoing, no further concerns as of present.   Please consult  if any new issues arise.  Will continue to follow.

## 2024-04-29 NOTE — PROGRESS NOTES
[facility] Hematology & Oncology        Inpatient Hematology / Oncology Progress Note    Reason for Consult:  ANTWON (acute kidney injury) (HCC) [N17.9]    24 Hour Events:  Afebrile, VSS  C1D8 CyBorD today  On CTX/Azith for pneumonia (D4)  Cr down to 2.21  Sitting up in bedside chair  Son at bedside    Transfusions: None  Replacements: None    ROS:  Constitutional: +weakness, +fatigue. Negative for fever, chills.  CV: Negative for chest pain, palpitations, edema.  Respiratory: Negative for cough, wheezing.  GI:  Negative for abdominal pain, diarrhea.    10 point review of systems is otherwise negative with the exception of the elements mentioned above in the HPI.     No Known Allergies  Past Medical History:   Diagnosis Date    Acute respiratory failure with hypoxemia (HCC)     Benign essential hypertension 2/11/2015    medication    Bilateral leg edema 4/26/2017    Cancer (HCC)     skin    Hiatal hernia     \"large\"    Hypercholesteremia 2/11/2015    IFG (impaired fasting glucose) 4/26/2017    Iron deficiency anemia 5/12/2016    Low oxygen saturation     per office notes- patient's daughter reported O2 sat drops to 85% when supine, she uses O2    Mixed hyperlipidemia 2/11/2015    Morbid obesity (HCC) 10/26/2017    Nipple discharge     not current as of 7/17/13    Primary insomnia 4/26/2018    Primary osteoarthritis of both knees 4/26/2018    Pulmonary embolism (HCC) 10/15/2023    on eliquis- Dr. Arnold recommended eliquis full dose x 6 months    Stage 3 chronic kidney disease (HCC) 7/17/2019     Past Surgical History:   Procedure Laterality Date    HIATAL HERNIA REPAIR N/A 3/4/2024    ROBOTIC HIATAL HERNIA REPAIR performed by Thomas Lozano MD at [facility] MAIN OR    MALIGNANT SKIN LESION EXCISION      OTHER SURGICAL HISTORY      skin cancer    UPPER GASTROINTESTINAL ENDOSCOPY N/A 4/13/2024    ESOPHAGOGASTRODUODENOSCOPY performed by Alejandro Vieyra MD at [facility] ENDOSCOPY    UPPER GASTROINTESTINAL ENDOSCOPY N/A

## 2024-04-29 NOTE — PROGRESS NOTES
ACUTE OCCUPATIONAL THERAPY GOALS:   (Developed with and agreed upon by patient and/or caregiver.)  1. Patient will complete full body bathing and dressing with min A, additional time, verbal cues and adaptive equipment as needed.   2. Patient will complete toileting with SBA.   3. Patient will complete functional transfers with SBA and adaptive equipment as needed.   4. Patient will tolerate at least 15 minutes of OT activity with less than 2 rest breaks while maintaining O2 sats >90%.   5. Patient will verbalize at least 3 energy conservation technique to utilize during ADL/IADL.   6. Patient will complete grooming in standing at sink level with SBA.  7. Patient will complete household distances with SBA and adaptive equipment as needed.     Timeframe: 7 visits        OCCUPATIONAL THERAPY: Daily Note    OT Visit Days: 5   Time In/Out  OT Charge Capture  Rehab Caseload Tracker  OT Orders    Tayla Escobar is a 84 y.o. female   PRIMARY DIAGNOSIS: ANTWON (acute kidney injury) (HCC)  ANTWON (acute kidney injury) (HCC) [N17.9]  Procedure(s) (LRB):  ESOPHAGOGASTRODUODENOSCOPY (N/A)  14 Days Post-Op  Inpatient: Payor: [employer] MEDICARE / Plan: AET MEDICARE-ADVANTAGE PPO / Product Type: Medicare /     ASSESSMENT:     REHAB RECOMMENDATIONS:   Recommendation to date pending progress:  Setting:  Short-term Rehab    Equipment:    To Be Determined--pt has rollator, rolling walker, bedside commode, hospital bed, and lift chair at home     ASSESSMENT:  Ms. Escobar is slowly progressing towards OT goals. Today, pt was received supine in bed. Completed bed mobility with Aiyana. MaxA for LB dressing. Sit>stand and stand pivot transfer to chair with RW Aiyana x2. MaxA for toileting in standing. Pt required multiple, extended, seated rest breaks throughout session. Pt anxious and required constant reassurance. Orthostatics taken and listed below. Recommend further rehab at discharge. Ms. Escobar continues to demonstrate overall deficits in

## 2024-04-29 NOTE — PROGRESS NOTES
Comprehensive Nutrition Assessment    Type and Reason for Visit: Reassess  Malnutrition Screening Tool: Malnutrition Screen  Have you recently lost weight without trying?: 14 to 23 pounds (2 points)  Have you been eating poorly because of a decreased appetite?: Yes (1 point)  Malnutrition Screening Tool Score: 3  Consult for poor PO intake     Nutrition Recommendations/Plan:   Meals and Snacks:  Diet: Continue current order   Nutrition Supplement Therapy:  Medical food supplement therapy:  Change Ensure Enlive once per day (this provides 350 kcal and 20 grams protein per bottle) and Ensure Clear three times per day (this provides 240 kcal and 8 grams protein per bottle)     Malnutrition Assessment:  Malnutrition Status: Moderate malnutrition  Context: Acute Illness  Findings of clinical characteristics of malnutrition:   Energy Intake:  Mild decrease in energy intake (Comment) (family reports very poor intake over the past month)  Weight Loss:  Greater than 7.5% over 3 months (10.5% body weight loss in ~ 3 months)     Body Fat Loss:  Mild body fat loss Orbital, Triceps, Buccal region   Muscle Mass Loss:  Mild muscle mass loss Temples (temporalis), Clavicles (pectoralis & deltoids), Hand (interosseous)  Fluid Accumulation:  Unable to assess     Strength:  Not Performed       Nutrition Assessment:  Nutrition History: 4/11: Daughter assists patient in providing nutrition hx. For 2-3 weeks prior to hiatal hernia reports patient appetite was limited, but ever since she was discharged 3/11 patient has taken little to no PO on a daily basis. Patient endorses that she has had consistent nausea (varying in intensity) for the past month. Reports that when she eats solid foods they get \"stuck\" and points to her sternum. Daughter reports she is able to take some fluids, but has declined any oral nutrition supplements as of late. Drinking small amounts of fluids and pureed foods in the month prior to admission. Additionally  reports constipation x 1 week pta which resolved 4/10 with medications.      Do You Have Any Cultural, Scientologist, or Ethnic Food Preferences?: No   Weight History:  IM OV: 4/10/24: 161#, 1/16/24: 180#, 11/27/23: 186#, 11/1/23: 187#, 9/18/23: 190#, 7/12/23: 196#, 1/6/23: 197#  CBW of 161 (stated- 4/10); pt in chair at time of assessment, unable to obtain updated measured weight.   Nutrition Background:     Stage 2 PI to upper back  PMH: HTN, HLD, morbid obesity, pulmonary embolism. Admitted with ANTWON after taking little to no PO intake after hiatal hernia surgery March 4th. Also noted constipation on admission.  4/12 [place] UGI impression: Tertiary contraction of the esophagus with mucosal irregularity suggestive of  esophagitis. There is delayed transit of contrast into the small bowel loops may represent a  gastric outlet obstruction.   4/13: EGD aborted 2/2 food in stomach, erythromycin and reglan initiated.   Repeat EGD 4/15 normal.   4/15: Oncology consulted 2/2 elevated kappa light chains. Dx of multiple myeloma. S/p chemo 4/20.  Nutrition Interval:  4/10:  Easy to Chew per SLP.  4/12: NPO for procedure. 4/15: Diet advanced to full liquids per surgery.  4/16: Diet advanced to soft and bite size per SLP, family okay to bring foods, advance as tolerated. 4/23: diet advanced to Easy to chew. 4/25: advanced back to regular per oncology because selections limited.     Patient reclined in bed with son present. Her son states she is mainly only ordering and eating fruit. He has offered several thing from home but she has declined. She is only drinking Ensure Enlive intermittently. Son has offered to dilute it for her. We discussed Ensure Clear and she was agreeable to try. Prepared over ice and she drank ~50% immediately. Filled with the remainder. She states she tolerates them much better. She was agreeable to continuing at least 1 Ensure Enlive per day.     Senokot nightly and PRN glycolax  Started remeron

## 2024-04-30 DIAGNOSIS — C90.00 MULTIPLE MYELOMA NOT HAVING ACHIEVED REMISSION (HCC): Primary | ICD-10-CM

## 2024-04-30 LAB
ALBUMIN SERPL-MCNC: 2.5 G/DL (ref 3.2–4.6)
ALBUMIN/GLOB SERPL: 1.1 (ref 1–1.9)
ALP SERPL-CCNC: 50 U/L (ref 35–104)
ALT SERPL-CCNC: 12 U/L (ref 12–65)
ANION GAP SERPL CALC-SCNC: 8 MMOL/L (ref 9–18)
AST SERPL-CCNC: 18 U/L (ref 15–37)
BASOPHILS # BLD: 0 K/UL (ref 0–0.2)
BASOPHILS NFR BLD: 0 % (ref 0–2)
BILIRUB SERPL-MCNC: 0.4 MG/DL (ref 0–1.2)
BUN SERPL-MCNC: 19 MG/DL (ref 8–23)
CALCIUM SERPL-MCNC: 8.5 MG/DL (ref 8.8–10.2)
CHLORIDE SERPL-SCNC: 104 MMOL/L (ref 98–107)
CO2 SERPL-SCNC: 26 MMOL/L (ref 20–28)
CREAT SERPL-MCNC: 2 MG/DL (ref 0.6–1.1)
DIFFERENTIAL METHOD BLD: ABNORMAL
EOSINOPHIL # BLD: 0 K/UL (ref 0–0.8)
EOSINOPHIL NFR BLD: 0 % (ref 0.5–7.8)
ERYTHROCYTE [DISTWIDTH] IN BLOOD BY AUTOMATED COUNT: 17.9 % (ref 11.9–14.6)
GLOBULIN SER CALC-MCNC: 2.3 G/DL (ref 2.3–3.5)
GLUCOSE SERPL-MCNC: 160 MG/DL (ref 70–99)
HCT VFR BLD AUTO: 24 % (ref 35.8–46.3)
HGB BLD-MCNC: 7.6 G/DL (ref 11.7–15.4)
HISTORY CHECK: NORMAL
IMM GRANULOCYTES # BLD AUTO: 0 K/UL (ref 0–0.5)
IMM GRANULOCYTES NFR BLD AUTO: 1 % (ref 0–5)
LYMPHOCYTES # BLD: 0.2 K/UL (ref 0.5–4.6)
LYMPHOCYTES NFR BLD: 7 % (ref 13–44)
MAGNESIUM SERPL-MCNC: 2.1 MG/DL (ref 1.8–2.4)
MCH RBC QN AUTO: 29.8 PG (ref 26.1–32.9)
MCHC RBC AUTO-ENTMCNC: 31.7 G/DL (ref 31.4–35)
MCV RBC AUTO: 94.1 FL (ref 82–102)
MONOCYTES # BLD: 0.1 K/UL (ref 0.1–1.3)
MONOCYTES NFR BLD: 4 % (ref 4–12)
NEUTS SEG # BLD: 1.9 K/UL (ref 1.7–8.2)
NEUTS SEG NFR BLD: 88 % (ref 43–78)
NRBC # BLD: 0 K/UL (ref 0–0.2)
PLATELET # BLD AUTO: 63 K/UL (ref 150–450)
PMV BLD AUTO: 10.2 FL (ref 9.4–12.3)
POTASSIUM SERPL-SCNC: 4.6 MMOL/L (ref 3.5–5.1)
PROT SERPL-MCNC: 4.8 G/DL (ref 6.3–8.2)
RBC # BLD AUTO: 2.55 M/UL (ref 4.05–5.2)
SODIUM SERPL-SCNC: 138 MMOL/L (ref 136–145)
URATE SERPL-MCNC: 3.8 MG/DL (ref 2.5–7.1)
WBC # BLD AUTO: 2.2 K/UL (ref 4.3–11.1)

## 2024-04-30 PROCEDURE — 6360000002 HC RX W HCPCS: Performed by: INTERNAL MEDICINE

## 2024-04-30 PROCEDURE — 2580000003 HC RX 258: Performed by: INTERNAL MEDICINE

## 2024-04-30 PROCEDURE — 6360000002 HC RX W HCPCS: Performed by: NURSE PRACTITIONER

## 2024-04-30 PROCEDURE — 97112 NEUROMUSCULAR REEDUCATION: CPT

## 2024-04-30 PROCEDURE — 6370000000 HC RX 637 (ALT 250 FOR IP): Performed by: FAMILY MEDICINE

## 2024-04-30 PROCEDURE — 6370000000 HC RX 637 (ALT 250 FOR IP): Performed by: NURSE PRACTITIONER

## 2024-04-30 PROCEDURE — P9040 RBC LEUKOREDUCED IRRADIATED: HCPCS

## 2024-04-30 PROCEDURE — 6370000000 HC RX 637 (ALT 250 FOR IP): Performed by: SURGERY

## 2024-04-30 PROCEDURE — 84550 ASSAY OF BLOOD/URIC ACID: CPT

## 2024-04-30 PROCEDURE — 36415 COLL VENOUS BLD VENIPUNCTURE: CPT

## 2024-04-30 PROCEDURE — 99233 SBSQ HOSP IP/OBS HIGH 50: CPT | Performed by: INTERNAL MEDICINE

## 2024-04-30 PROCEDURE — 6370000000 HC RX 637 (ALT 250 FOR IP): Performed by: INTERNAL MEDICINE

## 2024-04-30 PROCEDURE — 83735 ASSAY OF MAGNESIUM: CPT

## 2024-04-30 PROCEDURE — 85025 COMPLETE CBC W/AUTO DIFF WBC: CPT

## 2024-04-30 PROCEDURE — 2580000003 HC RX 258: Performed by: NURSE PRACTITIONER

## 2024-04-30 PROCEDURE — 2580000003 HC RX 258: Performed by: FAMILY MEDICINE

## 2024-04-30 PROCEDURE — 1100000000 HC RM PRIVATE

## 2024-04-30 PROCEDURE — 36430 TRANSFUSION BLD/BLD COMPNT: CPT

## 2024-04-30 PROCEDURE — 80053 COMPREHEN METABOLIC PANEL: CPT

## 2024-04-30 PROCEDURE — 97530 THERAPEUTIC ACTIVITIES: CPT

## 2024-04-30 PROCEDURE — 97535 SELF CARE MNGMENT TRAINING: CPT

## 2024-04-30 PROCEDURE — 97164 PT RE-EVAL EST PLAN CARE: CPT

## 2024-04-30 PROCEDURE — 6370000000 HC RX 637 (ALT 250 FOR IP): Performed by: HOSPITALIST

## 2024-04-30 PROCEDURE — APPSS45 APP SPLIT SHARED TIME 31-45 MINUTES: Performed by: NURSE PRACTITIONER

## 2024-04-30 RX ORDER — SODIUM CHLORIDE 9 MG/ML
INJECTION, SOLUTION INTRAVENOUS PRN
Status: DISCONTINUED | OUTPATIENT
Start: 2024-04-30 | End: 2024-05-02

## 2024-04-30 RX ORDER — LOPERAMIDE HYDROCHLORIDE 2 MG/1
2 CAPSULE ORAL PRN
Status: DISCONTINUED | OUTPATIENT
Start: 2024-04-30 | End: 2024-05-04 | Stop reason: HOSPADM

## 2024-04-30 RX ORDER — FUROSEMIDE 10 MG/ML
20 INJECTION INTRAMUSCULAR; INTRAVENOUS SEE ADMIN INSTRUCTIONS
Status: DISCONTINUED | OUTPATIENT
Start: 2024-04-30 | End: 2024-05-02

## 2024-04-30 RX ADMIN — HEPARIN SODIUM 5000 UNITS: 5000 INJECTION INTRAVENOUS; SUBCUTANEOUS at 13:08

## 2024-04-30 RX ADMIN — ERYTHROMYCIN ETHYLSUCCINATE 400 MG: 400 TABLET ORAL at 09:23

## 2024-04-30 RX ADMIN — LOPERAMIDE HYDROCHLORIDE 2 MG: 2 CAPSULE ORAL at 23:44

## 2024-04-30 RX ADMIN — ACYCLOVIR 200 MG: 400 TABLET ORAL at 21:00

## 2024-04-30 RX ADMIN — ERYTHROMYCIN ETHYLSUCCINATE 400 MG: 400 TABLET ORAL at 13:06

## 2024-04-30 RX ADMIN — METOCLOPRAMIDE 5 MG: 10 TABLET ORAL at 09:21

## 2024-04-30 RX ADMIN — ERYTHROMYCIN ETHYLSUCCINATE 400 MG: 400 TABLET ORAL at 16:39

## 2024-04-30 RX ADMIN — DIPHENHYDRAMINE HYDROCHLORIDE 25 MG: 25 CAPSULE ORAL at 16:38

## 2024-04-30 RX ADMIN — WATER 1000 MG: 1 INJECTION INTRAMUSCULAR; INTRAVENOUS; SUBCUTANEOUS at 09:23

## 2024-04-30 RX ADMIN — MIRTAZAPINE 7.5 MG: 15 TABLET, FILM COATED ORAL at 22:13

## 2024-04-30 RX ADMIN — Medication 6 MG: at 22:14

## 2024-04-30 RX ADMIN — LOPERAMIDE HYDROCHLORIDE 2 MG: 2 CAPSULE ORAL at 18:40

## 2024-04-30 RX ADMIN — MAGNESIUM GLUCONATE 500 MG ORAL TABLET 400 MG: 500 TABLET ORAL at 09:23

## 2024-04-30 RX ADMIN — AZITHROMYCIN MONOHYDRATE 500 MG: 500 INJECTION, POWDER, LYOPHILIZED, FOR SOLUTION INTRAVENOUS at 11:07

## 2024-04-30 RX ADMIN — LOPERAMIDE HYDROCHLORIDE 2 MG: 2 CAPSULE ORAL at 22:13

## 2024-04-30 RX ADMIN — SODIUM CHLORIDE, PRESERVATIVE FREE 10 ML: 5 INJECTION INTRAVENOUS at 09:34

## 2024-04-30 RX ADMIN — ROSUVASTATIN CALCIUM 10 MG: 10 TABLET, FILM COATED ORAL at 22:14

## 2024-04-30 RX ADMIN — SODIUM CHLORIDE, PRESERVATIVE FREE 10 ML: 5 INJECTION INTRAVENOUS at 22:12

## 2024-04-30 RX ADMIN — ACETAMINOPHEN 650 MG: 325 TABLET ORAL at 16:39

## 2024-04-30 RX ADMIN — ACYCLOVIR 200 MG: 400 TABLET ORAL at 09:23

## 2024-04-30 NOTE — PROGRESS NOTES
chair. Further mobility limited today due to pt having continuous diarrhea this morning and reporting feeling very fatigued. RN aware. Overall her progress has waxed and waned throughout admission. Goals updated appropriately. Continue to recommend further rehab at d/c, she is mainly limited by poor activity tolerance. Overall, she presents as functioning below her baseline, with deficits in mobility including transfers, gait, balance, and activity tolerance. Pt will continue to benefit from skilled therapy services to address stated deficits to promote return to highest level of function, independence, and safety. Will continue to follow.     [facility] AM-PAC™ “6 Clicks” Basic Mobility Inpatient Short Form  AM-PAC Basic Mobility - Inpatient   How much help is needed turning from your back to your side while in a flat bed without using bedrails?: A Little  How much help is needed moving from lying on your back to sitting on the side of a flat bed without using bedrails?: A Little  How much help is needed moving to and from a bed to a chair?: A Little  How much help is needed standing up from a chair using your arms?: A Little  How much help is needed walking in hospital room?: A Little  How much help is needed climbing 3-5 steps with a railing?: A Lot  AM-PAC Inpatient Mobility Raw Score : 17  AM-PAC Inpatient T-Scale Score : 42.13  Mobility Inpatient CMS 0-100% Score: 50.57  Mobility Inpatient CMS G-Code Modifier : CK    SUBJECTIVE:   Ms. Escobar states, \"I don't know how much I can do today\"     Social/Functional Lives With: Son  Type of Home: House  Home Layout: One level  Home Access: Ramped entrance  Bathroom Shower/Tub: Walk-in shower  Bathroom Toilet: Standard  Bathroom Equipment: 3-in-1 commode, Grab bars in shower, Hand-held shower  Bathroom Accessibility: Accessible  Home Equipment: Wheelchair-manual, Walker, rolling, Rollator, Hospital bed, Lift chair, Grab bars  Has the patient had two or more  balance to improve functional Activity tolerance, Balance, Coordination, Mobility, Strength, and ROM.    TREATMENT GRID:  N/A    AFTER TREATMENT PRECAUTIONS: Alarm Activated, Bed/Chair Locked, Call light within reach, Chair, Needs within reach, and RN notified    INTERDISCIPLINARY COLLABORATION:  RN/ PCT and OT/ PRATT    EDUCATION:      TIME IN/OUT:  Time In: 1451  Time Out: 1518  Minutes: 27    CECILY YOUNGER, PT

## 2024-04-30 NOTE — PROGRESS NOTES
ACUTE OCCUPATIONAL THERAPY GOALS:   (Developed with and agreed upon by patient and/or caregiver.)  1. Patient will complete full body bathing and dressing with min A, additional time, verbal cues and adaptive equipment as needed.   2. Patient will complete toileting with SBA.   3. Patient will complete functional transfers with SBA and adaptive equipment as needed.   4. Patient will tolerate at least 15 minutes of OT activity with less than 2 rest breaks while maintaining O2 sats >90%.   5. Patient will verbalize at least 3 energy conservation technique to utilize during ADL/IADL.   6. Patient will complete grooming in standing at sink level with SBA.  7. Patient will complete household distances with SBA and adaptive equipment as needed.     Timeframe: 7 visits        OCCUPATIONAL THERAPY: Daily Note PM  OT Visit Days: 6   Time In/Out  OT Charge Capture  Rehab Caseload Tracker  OT Orders    Tayla Escobar is a 84 y.o. female   PRIMARY DIAGNOSIS: ANTWON (acute kidney injury) (HCC)  ANTWON (acute kidney injury) (HCC) [N17.9]  Procedure(s) (LRB):  ESOPHAGOGASTRODUODENOSCOPY (N/A)  15 Days Post-Op  Inpatient: Payor: [employer]MIN MEDICARE / Plan: AET MEDICARE-ADVANTAGE PPO / Product Type: Medicare /     ASSESSMENT:     REHAB RECOMMENDATIONS:   Recommendation to date pending progress:  Setting:  Short-term Rehab    Equipment:    To Be Determined--pt has rollator, rolling walker, bedside commode, hospital bed, and lift chair at home     ASSESSMENT:  Ms. Escobar is doing fair today. Pt presents supine upon arrival. Pt required min A x2 for bed mobility today. Fair sitting balance noted at edge of bed. Pt was able to stand with min A. Transferred over to bedside commode with min A x2. Dependent for BM hygiene at this time. Pt was then transferred over to chair with same amount of assistance. Making minimal progress with goals. Will continue to benefit from skilled OT during stay.       SUBJECTIVE:     Ms. Escobar states, \"I am

## 2024-04-30 NOTE — PROGRESS NOTES
Physical Therapy Note:    Attempted to see patient this AM for physical therapy treatment  session. Patient just got back in bed and reports fatigue from getting up often this morning due to diarrhea, requested PT return at a different time. Will follow and re-attempt as schedule permits/patient available. Thank you,    CECILY YOUNGER, PT     Rehab Caseload Tracker

## 2024-04-30 NOTE — PROGRESS NOTES
[facility] Hematology & Oncology        Inpatient Hematology / Oncology Progress Note    Reason for Consult:  ANTWON (acute kidney injury) (HCC) [N17.9]    24 Hour Events:  Afebrile, VSS  S/p C1D8 CyBorD yesterday, D15 due 5/6  On CTX/Azith for pneumonia (D5)  Cr down to 2.0  C/o diarrhea  Dc'ing forrest for voiding trial  Son at bedside    Transfusions: 2 units PRBCs per Dr. Fernández  Replacements: None    ROS:  Constitutional: +weakness, +fatigue. Negative for fever, chills.  CV: Negative for chest pain, palpitations, edema.  Respiratory: Negative for cough, wheezing.  GI:  +diarrhea. Negative for abdominal pain.    10 point review of systems is otherwise negative with the exception of the elements mentioned above in the HPI.     No Known Allergies  Past Medical History:   Diagnosis Date    Acute respiratory failure with hypoxemia (HCC)     Benign essential hypertension 2/11/2015    medication    Bilateral leg edema 4/26/2017    Cancer (HCC)     skin    Hiatal hernia     \"large\"    Hypercholesteremia 2/11/2015    IFG (impaired fasting glucose) 4/26/2017    Iron deficiency anemia 5/12/2016    Low oxygen saturation     per office notes- patient's daughter reported O2 sat drops to 85% when supine, she uses O2    Mixed hyperlipidemia 2/11/2015    Morbid obesity (HCC) 10/26/2017    Nipple discharge     not current as of 7/17/13    Primary insomnia 4/26/2018    Primary osteoarthritis of both knees 4/26/2018    Pulmonary embolism (HCC) 10/15/2023    on eliquis- Dr. Arnold recommended eliquis full dose x 6 months    Stage 3 chronic kidney disease (HCC) 7/17/2019     Past Surgical History:   Procedure Laterality Date    HIATAL HERNIA REPAIR N/A 3/4/2024    ROBOTIC HIATAL HERNIA REPAIR performed by Thomas Lozano MD at [facility] MAIN OR    MALIGNANT SKIN LESION EXCISION      OTHER SURGICAL HISTORY      skin cancer    UPPER GASTROINTESTINAL ENDOSCOPY N/A 4/13/2024    ESOPHAGOGASTRODUODENOSCOPY performed by Alejandro Vieyra MD at

## 2024-05-01 LAB
ALBUMIN SERPL-MCNC: 2.6 G/DL (ref 3.2–4.6)
ALBUMIN/GLOB SERPL: 1.1 (ref 1–1.9)
ALP SERPL-CCNC: 51 U/L (ref 35–104)
ALT SERPL-CCNC: 12 U/L (ref 12–65)
ANION GAP SERPL CALC-SCNC: 9 MMOL/L (ref 9–18)
AST SERPL-CCNC: 21 U/L (ref 15–37)
BACTERIA SPEC CULT: NORMAL
BACTERIA SPEC CULT: NORMAL
BASOPHILS # BLD: 0 K/UL (ref 0–0.2)
BASOPHILS NFR BLD: 0 % (ref 0–2)
BILIRUB SERPL-MCNC: 0.4 MG/DL (ref 0–1.2)
BUN SERPL-MCNC: 26 MG/DL (ref 8–23)
C. DIFFICILE TOXIN MOLECULAR: NEGATIVE
CALCIUM SERPL-MCNC: 8.7 MG/DL (ref 8.8–10.2)
CHLORIDE SERPL-SCNC: 105 MMOL/L (ref 98–107)
CO2 SERPL-SCNC: 25 MMOL/L (ref 20–28)
CREAT SERPL-MCNC: 2.28 MG/DL (ref 0.6–1.1)
DIFFERENTIAL METHOD BLD: ABNORMAL
EOSINOPHIL # BLD: 0 K/UL (ref 0–0.8)
EOSINOPHIL NFR BLD: 0 % (ref 0.5–7.8)
ERYTHROCYTE [DISTWIDTH] IN BLOOD BY AUTOMATED COUNT: 18.6 % (ref 11.9–14.6)
FLOW CYTOMETRY RESULTS: NORMAL
GLOBULIN SER CALC-MCNC: 2.5 G/DL (ref 2.3–3.5)
GLUCOSE SERPL-MCNC: 148 MG/DL (ref 70–99)
HCT VFR BLD AUTO: 30.2 % (ref 35.8–46.3)
HGB BLD-MCNC: 10.2 G/DL (ref 11.7–15.4)
IMM GRANULOCYTES # BLD AUTO: 0 K/UL (ref 0–0.5)
IMM GRANULOCYTES NFR BLD AUTO: 1 % (ref 0–5)
LYMPHOCYTES # BLD: 0.2 K/UL (ref 0.5–4.6)
LYMPHOCYTES NFR BLD: 9 % (ref 13–44)
MAGNESIUM SERPL-MCNC: 2.2 MG/DL (ref 1.8–2.4)
MCH RBC QN AUTO: 29.9 PG (ref 26.1–32.9)
MCHC RBC AUTO-ENTMCNC: 33.8 G/DL (ref 31.4–35)
MCV RBC AUTO: 88.6 FL (ref 82–102)
MONOCYTES # BLD: 0.3 K/UL (ref 0.1–1.3)
MONOCYTES NFR BLD: 15 % (ref 4–12)
NEUTS SEG # BLD: 1.4 K/UL (ref 1.7–8.2)
NEUTS SEG NFR BLD: 74 % (ref 43–78)
NRBC # BLD: 0 K/UL (ref 0–0.2)
PLATELET # BLD AUTO: 47 K/UL (ref 150–450)
PMV BLD AUTO: 11 FL (ref 9.4–12.3)
POTASSIUM SERPL-SCNC: 3.3 MMOL/L (ref 3.5–5.1)
PROT SERPL-MCNC: 5.1 G/DL (ref 6.3–8.2)
RBC # BLD AUTO: 3.41 M/UL (ref 4.05–5.2)
SERVICE CMNT-IMP: NORMAL
SERVICE CMNT-IMP: NORMAL
SODIUM SERPL-SCNC: 139 MMOL/L (ref 136–145)
SPECIMEN SOURCE: NORMAL
TEST ORDERED: NORMAL
WBC # BLD AUTO: 1.9 K/UL (ref 4.3–11.1)

## 2024-05-01 PROCEDURE — APPSS30 APP SPLIT SHARED TIME 16-30 MINUTES: Performed by: NURSE PRACTITIONER

## 2024-05-01 PROCEDURE — 1100000000 HC RM PRIVATE

## 2024-05-01 PROCEDURE — 6370000000 HC RX 637 (ALT 250 FOR IP): Performed by: SURGERY

## 2024-05-01 PROCEDURE — 76937 US GUIDE VASCULAR ACCESS: CPT

## 2024-05-01 PROCEDURE — 87493 C DIFF AMPLIFIED PROBE: CPT

## 2024-05-01 PROCEDURE — 2500000003 HC RX 250 WO HCPCS: Performed by: INTERNAL MEDICINE

## 2024-05-01 PROCEDURE — 83735 ASSAY OF MAGNESIUM: CPT

## 2024-05-01 PROCEDURE — 80053 COMPREHEN METABOLIC PANEL: CPT

## 2024-05-01 PROCEDURE — 6360000002 HC RX W HCPCS: Performed by: INTERNAL MEDICINE

## 2024-05-01 PROCEDURE — 2580000003 HC RX 258: Performed by: FAMILY MEDICINE

## 2024-05-01 PROCEDURE — 97530 THERAPEUTIC ACTIVITIES: CPT

## 2024-05-01 PROCEDURE — 36415 COLL VENOUS BLD VENIPUNCTURE: CPT

## 2024-05-01 PROCEDURE — 99233 SBSQ HOSP IP/OBS HIGH 50: CPT | Performed by: INTERNAL MEDICINE

## 2024-05-01 PROCEDURE — 6370000000 HC RX 637 (ALT 250 FOR IP): Performed by: INTERNAL MEDICINE

## 2024-05-01 PROCEDURE — 6370000000 HC RX 637 (ALT 250 FOR IP): Performed by: FAMILY MEDICINE

## 2024-05-01 PROCEDURE — 6370000000 HC RX 637 (ALT 250 FOR IP): Performed by: NURSE PRACTITIONER

## 2024-05-01 PROCEDURE — 2580000003 HC RX 258: Performed by: INTERNAL MEDICINE

## 2024-05-01 PROCEDURE — 85025 COMPLETE CBC W/AUTO DIFF WBC: CPT

## 2024-05-01 PROCEDURE — 6360000002 HC RX W HCPCS: Performed by: NURSE PRACTITIONER

## 2024-05-01 RX ORDER — POTASSIUM CHLORIDE 20 MEQ/1
20 TABLET, EXTENDED RELEASE ORAL 2 TIMES DAILY
Status: DISCONTINUED | OUTPATIENT
Start: 2024-05-01 | End: 2024-05-01

## 2024-05-01 RX ORDER — POTASSIUM CHLORIDE 7.45 MG/ML
10 INJECTION INTRAVENOUS
Status: COMPLETED | OUTPATIENT
Start: 2024-05-01 | End: 2024-05-01

## 2024-05-01 RX ADMIN — ERYTHROMYCIN ETHYLSUCCINATE 400 MG: 400 TABLET ORAL at 08:02

## 2024-05-01 RX ADMIN — METOCLOPRAMIDE 5 MG: 10 TABLET ORAL at 08:02

## 2024-05-01 RX ADMIN — ERYTHROMYCIN ETHYLSUCCINATE 400 MG: 400 TABLET ORAL at 11:08

## 2024-05-01 RX ADMIN — ACYCLOVIR 200 MG: 400 TABLET ORAL at 20:33

## 2024-05-01 RX ADMIN — WATER 1000 MG: 1 INJECTION INTRAMUSCULAR; INTRAVENOUS; SUBCUTANEOUS at 08:51

## 2024-05-01 RX ADMIN — LOPERAMIDE HYDROCHLORIDE 2 MG: 2 CAPSULE ORAL at 06:26

## 2024-05-01 RX ADMIN — ANTI-FUNGAL POWDER MICONAZOLE NITRATE TALC FREE: 1.42 POWDER TOPICAL at 21:00

## 2024-05-01 RX ADMIN — ACYCLOVIR 200 MG: 400 TABLET ORAL at 08:02

## 2024-05-01 RX ADMIN — MIRTAZAPINE 7.5 MG: 15 TABLET, FILM COATED ORAL at 20:32

## 2024-05-01 RX ADMIN — ROSUVASTATIN CALCIUM 10 MG: 10 TABLET, FILM COATED ORAL at 20:33

## 2024-05-01 RX ADMIN — ERYTHROMYCIN ETHYLSUCCINATE 400 MG: 400 TABLET ORAL at 17:15

## 2024-05-01 RX ADMIN — POTASSIUM CHLORIDE 10 MEQ: 7.46 INJECTION, SOLUTION INTRAVENOUS at 10:57

## 2024-05-01 RX ADMIN — SODIUM CHLORIDE, PRESERVATIVE FREE 10 ML: 5 INJECTION INTRAVENOUS at 20:34

## 2024-05-01 RX ADMIN — POTASSIUM CHLORIDE 20 MEQ: 1500 TABLET, EXTENDED RELEASE ORAL at 08:03

## 2024-05-01 RX ADMIN — POTASSIUM CHLORIDE 10 MEQ: 7.46 INJECTION, SOLUTION INTRAVENOUS at 08:59

## 2024-05-01 RX ADMIN — SODIUM CHLORIDE, PRESERVATIVE FREE 10 ML: 5 INJECTION INTRAVENOUS at 08:03

## 2024-05-01 NOTE — PROGRESS NOTES
shower, Hand-held shower  Bathroom Accessibility: Accessible  Home Equipment: Wheelchair-manual, Walker, rolling, Rollator, Hospital bed, Lift chair, Grab bars  Has the patient had two or more falls in the past year or any fall with injury in the past year?: No  Receives Help From: Family  ADL Assistance: Needs assistance  Homemaking Assistance: Needs assistance  Homemaking Responsibilities: Yes  Ambulation Assistance: Independent  Transfer Assistance: Independent  Active : Yes  Mode of Transportation: Car  Occupation: Retired  OBJECTIVE:     PAIN: VITALS / O2: PRECAUTION / LINES / DRAINS:   Pre Treatment:   Pain Assessment: None - Denies Pain      Post Treatment: 0 Vitals        Oxygen    IV    RESTRICTIONS/PRECAUTIONS:        MOBILITY: I Mod I S SBA CGA Min Mod Max Total  NT x2 Comments:   Bed Mobility    Rolling [] [] [] [] [] [x] [] [] [] [] []    Supine to Sit [] [] [] [] [] [] [] [] [] [x] []    Scooting [] [] [] [] [] [x] [] [] [] [] []    Sit to Supine [] [] [] [] [] [x] [] [] [] [] []    Transfers    Sit to Stand [] [] [] [] [] [x] [] [] [] [] []    Bed to Chair [] [] [] [] [] [x] [] [] [] [] []    Stand to Sit [] [] [] [] [] [x] [] [] [] [] []     [] [] [] [] [] [] [] [] [] [] []    I=Independent, Mod I=Modified Independent, S=Supervision, SBA=Standby Assistance, CGA=Contact Guard Assistance,   Min=Minimal Assistance, Mod=Moderate Assistance, Max=Maximal Assistance, Total=Total Assistance, NT=Not Tested    BALANCE: Good Fair+ Fair Fair- Poor NT Comments   Sitting Static [] [x] [] [] [] []    Sitting Dynamic [] [] [x] [] [] []              Standing Static [] [] [x] [] [] []    Standing Dynamic [] [] [x] [] [] []      GAIT: I Mod I S SBA CGA Min Mod Max Total  NT x2 Comments:   Level of Assistance [] [] [] [] [] [x] [] [] [] [] []    Distance 3 feet    DME Rolling Walker    Gait Quality Decreased burke  and Decreased step length    Weightbearing Status      Stairs      I=Independent, Mod I=Modified  Independent, S=Supervision, SBA=Standby Assistance, CGA=Contact Guard Assistance,   Min=Minimal Assistance, Mod=Moderate Assistance, Max=Maximal Assistance, Total=Total Assistance, NT=Not Tested    PLAN:   FREQUENCY AND DURATION: 3 times/week for duration of hospital stay or until stated goals are met, whichever comes first.    TREATMENT:   TREATMENT:   Therapeutic Activity (27 Minutes): Therapeutic activity included Rolling, Sit to Supine, Scooting, Transfer Training, Ambulation on level ground, Sitting balance , and Standing balance to improve functional Activity tolerance, Balance, Mobility, and Strength.    TREATMENT GRID:  N/A    AFTER TREATMENT PRECAUTIONS: Bed, Bed/Chair Locked, Call light within reach, Needs within reach, RN notified, and Visitors at bedside    INTERDISCIPLINARY COLLABORATION:  RN/ PCT    EDUCATION:      TIME IN/OUT:  Time In: 1100  Time Out: 1127  Minutes: 27    CECILY YOUNGER, PT

## 2024-05-01 NOTE — PROGRESS NOTES
Comprehensive Nutrition Assessment    Type and Reason for Visit: Reassess  Malnutrition Screening Tool: Malnutrition Screen  Have you recently lost weight without trying?: 14 to 23 pounds (2 points)  Have you been eating poorly because of a decreased appetite?: Yes (1 point)  Malnutrition Screening Tool Score: 3  Consult for poor PO intake     Nutrition Recommendations/Plan:   Meals and Snacks:  Diet: Continue current order   Nutrition Supplement Therapy:  Medical food supplement therapy:  Continue Ensure Enlive once per day (this provides 350 kcal and 20 grams protein per bottle) and Ensure Clear three times per day (this provides 240 kcal and 8 grams protein per bottle)     Malnutrition Assessment:  Malnutrition Status: Moderate malnutrition  Context: Acute Illness  Findings of clinical characteristics of malnutrition:   Energy Intake:  Mild decrease in energy intake (Comment) (family reports very poor intake over the past month)  Weight Loss:  Greater than 7.5% over 3 months (10.5% body weight loss in ~ 3 months)     Body Fat Loss:  Mild body fat loss Orbital, Triceps, Buccal region   Muscle Mass Loss:  Mild muscle mass loss Temples (temporalis), Clavicles (pectoralis & deltoids), Hand (interosseous)  Fluid Accumulation:  Unable to assess     Strength:  Not Performed       Nutrition Assessment:  Nutrition History: 4/11: Daughter assists patient in providing nutrition hx. For 2-3 weeks prior to hiatal hernia reports patient appetite was limited, but ever since she was discharged 3/11 patient has taken little to no PO on a daily basis. Patient endorses that she has had consistent nausea (varying in intensity) for the past month. Reports that when she eats solid foods they get \"stuck\" and points to her sternum. Daughter reports she is able to take some fluids, but has declined any oral nutrition supplements as of late. Drinking small amounts of fluids and pureed foods in the month prior to admission.

## 2024-05-01 NOTE — PROGRESS NOTES
g  17 g Oral Daily PRN Laina Ro DO   17 g at 24 1746    bisacodyl (DULCOLAX) suppository 10 mg  10 mg Rectal Daily PRN Laina Ro DO        famotidine (PEPCID) tablet 10 mg  10 mg Oral Daily PRN Laina Ro DO        aluminum & magnesium hydroxide-simethicone (MAALOX) 200-200-20 MG/5ML suspension 30 mL  30 mL Oral Q6H PRN Laina Ro DO        acetaminophen (TYLENOL) tablet 650 mg  650 mg Oral Q6H PRN Laina Ro DO   650 mg at 24 1639    Or    acetaminophen (TYLENOL) suppository 650 mg  650 mg Rectal Q6H PRN Laina Ro DO           OBJECTIVE:  Patient Vitals for the past 8 hrs:   BP Temp Temp src Pulse Resp SpO2   24 0746 (!) 141/73 97.3 °F (36.3 °C) Oral 59 18 93 %   24 0225 133/76 98 °F (36.7 °C) -- 68 16 96 %     Temp (24hrs), Av.6 °F (36.4 °C), Min:97.3 °F (36.3 °C), Max:98 °F (36.7 °C)     0701 -  1900  In: 120 [P.O.:120]  Out: -     Physical Exam:  Constitutional: Weak appearing elderly female in no acute distress, resting comfortably in the bedside chair.   HEENT: Normocephalic and atraumatic. Oropharynx is clear, mucous membranes are moist.  Extraocular muscles are intact.  Sclerae anicteric.    Skin Warm and dry.  No bruising and no rash noted.  No erythema.  No pallor.    Neuro Grossly nonfocal with no obvious sensory or motor deficits.   MSK DROM due to general debility    Psych Appropriate mood and affect.    Full exam per attending MD    Labs:    Recent Results (from the past 24 hour(s))   PREPARE RBC (CROSSMATCH), 2 Units    Collection Time: 24  1:30 PM   Result Value Ref Range    History Check Historical check performed    Comprehensive Metabolic Panel    Collection Time: 24  4:40 AM   Result Value Ref Range    Sodium 139 136 - 145 mmol/L    Potassium 3.3 (L) 3.5 - 5.1 mmol/L    Chloride 105 98 - 107 mmol/L    CO2 25 20 - 28 mmol/L    Anion Gap 9 9 - 18 mmol/L    Glucose 148 (H) 70 - 99 mg/dL    BUN 26  to face diagnostic evaluation on this patient. I have reviewed and agree with the care plan as documented by Nadia Vidal N.P. 51 minutes were spent on patient care, including but not limited to, reviewing the chart and time with the patient and family, more than 50% of the time documented was spent in face-to-face contact with the patient and in the care of the patient on the floor/unit where the patient is located. My findings are as follows: She has Multiple Myeloma and ANTWON, appears weak, heart rate regular without murmurs, abdomen is non-tender, bowel sounds are positive, we will continue IV ABX and follow-up her renal function.              Jose F Fernández MD    [facility] Hematology/Oncology  [address] [place] [address]  Office : [phone]  Fax : [phone]

## 2024-05-01 NOTE — PLAN OF CARE
Problem: Discharge Planning  Goal: Discharge to home or other facility with appropriate resources  5/1/2024 0900 by Elodia Peterson, RN  Outcome: Progressing  4/30/2024 1950 by Emma Domínguez, RN  Outcome: Progressing     Problem: Discharge Planning  Goal: Discharge to home or other facility with appropriate resources  5/1/2024 0900 by Elodia Peterson, RN  Outcome: Progressing  4/30/2024 1950 by Emma Domínguez, RN  Outcome: Progressing     Problem: Skin/Tissue Integrity  Goal: Absence of new skin breakdown  Description: 1.  Monitor for areas of redness and/or skin breakdown  2.  Assess vascular access sites hourly  3.  Every 4-6 hours minimum:  Change oxygen saturation probe site  4.  Every 4-6 hours:  If on nasal continuous positive airway pressure, respiratory therapy assess nares and determine need for appliance change or resting period.  Outcome: Progressing

## 2024-05-01 NOTE — PROGRESS NOTES
US Guided PIV access-    Ultrasound was used to find the vein which was compressible and without any ultrasound features of an artery or nerve bundle. Skin was cleaned and disinfected prior to IV puncture.  Under real-time ultrasound guidance peripheral access was obtained in the right cephalic using 22 G 1.75\" Peripheral IV catheter after 1 attempt(s). Blood return was present and IV flushed without difficulty with no clinical signs of infiltration. IV dressing applied and no immediate complications noted. Patient tolerated the procedure well.

## 2024-05-01 NOTE — PROGRESS NOTES
Pt vSS pt A&O X's 4 pt had several bouts of diarrhea through out the morning shift pt K+ 3.3 2 bags K+ ordered 1 bag done in the middle of 2nd bag IV went bad called IV team to place IV to continue K+ pt voiding well in bed call light in reach

## 2024-05-02 LAB
ABO + RH BLD: NORMAL
ALBUMIN SERPL-MCNC: 2.6 G/DL (ref 3.2–4.6)
ALBUMIN/GLOB SERPL: 1.1 (ref 1–1.9)
ALP SERPL-CCNC: 54 U/L (ref 35–104)
ALT SERPL-CCNC: 11 U/L (ref 12–65)
ANION GAP SERPL CALC-SCNC: 8 MMOL/L (ref 9–18)
APPEARANCE UR: ABNORMAL
AST SERPL-CCNC: 18 U/L (ref 15–37)
BACTERIA URNS QL MICRO: ABNORMAL /HPF
BASOPHILS # BLD: 0 K/UL (ref 0–0.2)
BASOPHILS NFR BLD: 0 % (ref 0–2)
BILIRUB SERPL-MCNC: 0.3 MG/DL (ref 0–1.2)
BILIRUB UR QL: NEGATIVE
BLD PROD TYP BPU: NORMAL
BLOOD BANK BLOOD PRODUCT EXPIRATION DATE: NORMAL
BLOOD BANK DISPENSE STATUS: NORMAL
BLOOD BANK ISBT PRODUCT BLOOD TYPE: 5100
BLOOD BANK PRODUCT CODE: NORMAL
BLOOD BANK UNIT TYPE AND RH: NORMAL
BLOOD GROUP ANTIBODIES SERPL: NORMAL
BPU ID: NORMAL
BUN SERPL-MCNC: 32 MG/DL (ref 8–23)
CALCIUM SERPL-MCNC: 8.4 MG/DL (ref 8.8–10.2)
CHLORIDE SERPL-SCNC: 105 MMOL/L (ref 98–107)
CO2 SERPL-SCNC: 24 MMOL/L (ref 20–28)
COLOR UR: ABNORMAL
CREAT SERPL-MCNC: 2.32 MG/DL (ref 0.6–1.1)
CROSSMATCH RESULT: NORMAL
DIFFERENTIAL METHOD BLD: ABNORMAL
EOSINOPHIL # BLD: 0 K/UL (ref 0–0.8)
EOSINOPHIL NFR BLD: 0 % (ref 0.5–7.8)
EPI CELLS #/AREA URNS HPF: ABNORMAL /HPF
ERYTHROCYTE [DISTWIDTH] IN BLOOD BY AUTOMATED COUNT: 18.7 % (ref 11.9–14.6)
GLOBULIN SER CALC-MCNC: 2.4 G/DL (ref 2.3–3.5)
GLUCOSE SERPL-MCNC: 158 MG/DL (ref 70–99)
GLUCOSE UR STRIP.AUTO-MCNC: 100 MG/DL
HCT VFR BLD AUTO: 30.9 % (ref 35.8–46.3)
HGB BLD-MCNC: 10.4 G/DL (ref 11.7–15.4)
HGB UR QL STRIP: ABNORMAL
IMM GRANULOCYTES # BLD AUTO: 0 K/UL (ref 0–0.5)
IMM GRANULOCYTES NFR BLD AUTO: 0 % (ref 0–5)
KETONES UR QL STRIP.AUTO: NEGATIVE MG/DL
LEUKOCYTE ESTERASE UR QL STRIP.AUTO: ABNORMAL
LYMPHOCYTES # BLD: 0.2 K/UL (ref 0.5–4.6)
LYMPHOCYTES NFR BLD: 6 % (ref 13–44)
MAGNESIUM SERPL-MCNC: 2.2 MG/DL (ref 1.8–2.4)
MCH RBC QN AUTO: 29.7 PG (ref 26.1–32.9)
MCHC RBC AUTO-ENTMCNC: 33.7 G/DL (ref 31.4–35)
MCV RBC AUTO: 88.3 FL (ref 82–102)
MONOCYTES # BLD: 0.6 K/UL (ref 0.1–1.3)
MONOCYTES NFR BLD: 25 % (ref 4–12)
NEUTS SEG # BLD: 1.7 K/UL (ref 1.7–8.2)
NEUTS SEG NFR BLD: 69 % (ref 43–78)
NITRITE UR QL STRIP.AUTO: NEGATIVE
NRBC # BLD: 0 K/UL (ref 0–0.2)
OTHER OBSERVATIONS: ABNORMAL
PH UR STRIP: 5.5 (ref 5–9)
PLATELET # BLD AUTO: 49 K/UL (ref 150–450)
PLATELET COMMENT: ABNORMAL
PMV BLD AUTO: 10.6 FL (ref 9.4–12.3)
POTASSIUM SERPL-SCNC: 3.8 MMOL/L (ref 3.5–5.1)
PROT SERPL-MCNC: 5 G/DL (ref 6.3–8.2)
PROT UR STRIP-MCNC: 30 MG/DL
RBC # BLD AUTO: 3.5 M/UL (ref 4.05–5.2)
RBC MORPH BLD: ABNORMAL
SODIUM SERPL-SCNC: 137 MMOL/L (ref 136–145)
SP GR UR REFRACTOMETRY: 1.02 (ref 1–1.02)
SPECIMEN EXP DATE BLD: NORMAL
UNIT DIVISION: 0
UNIT ISSUE DATE/TIME: NORMAL
UROBILINOGEN UR QL STRIP.AUTO: 0.2 EU/DL (ref 0.2–1)
WBC # BLD AUTO: 2.5 K/UL (ref 4.3–11.1)
WBC MORPH BLD: ABNORMAL
WBC URNS QL MICRO: >100 /HPF
YEAST URNS QL MICRO: ABNORMAL

## 2024-05-02 PROCEDURE — 2580000003 HC RX 258: Performed by: FAMILY MEDICINE

## 2024-05-02 PROCEDURE — 97112 NEUROMUSCULAR REEDUCATION: CPT

## 2024-05-02 PROCEDURE — 6370000000 HC RX 637 (ALT 250 FOR IP): Performed by: INTERNAL MEDICINE

## 2024-05-02 PROCEDURE — 99233 SBSQ HOSP IP/OBS HIGH 50: CPT | Performed by: INTERNAL MEDICINE

## 2024-05-02 PROCEDURE — 2500000003 HC RX 250 WO HCPCS: Performed by: INTERNAL MEDICINE

## 2024-05-02 PROCEDURE — 85025 COMPLETE CBC W/AUTO DIFF WBC: CPT

## 2024-05-02 PROCEDURE — 6370000000 HC RX 637 (ALT 250 FOR IP): Performed by: SURGERY

## 2024-05-02 PROCEDURE — 51798 US URINE CAPACITY MEASURE: CPT

## 2024-05-02 PROCEDURE — 6370000000 HC RX 637 (ALT 250 FOR IP): Performed by: FAMILY MEDICINE

## 2024-05-02 PROCEDURE — 80053 COMPREHEN METABOLIC PANEL: CPT

## 2024-05-02 PROCEDURE — 87088 URINE BACTERIA CULTURE: CPT

## 2024-05-02 PROCEDURE — 87086 URINE CULTURE/COLONY COUNT: CPT

## 2024-05-02 PROCEDURE — APPSS30 APP SPLIT SHARED TIME 16-30 MINUTES: Performed by: NURSE PRACTITIONER

## 2024-05-02 PROCEDURE — 6370000000 HC RX 637 (ALT 250 FOR IP): Performed by: HOSPITALIST

## 2024-05-02 PROCEDURE — 36415 COLL VENOUS BLD VENIPUNCTURE: CPT

## 2024-05-02 PROCEDURE — 81001 URINALYSIS AUTO W/SCOPE: CPT

## 2024-05-02 PROCEDURE — 6360000002 HC RX W HCPCS: Performed by: INTERNAL MEDICINE

## 2024-05-02 PROCEDURE — 97168 OT RE-EVAL EST PLAN CARE: CPT

## 2024-05-02 PROCEDURE — 1100000000 HC RM PRIVATE

## 2024-05-02 PROCEDURE — 2580000003 HC RX 258: Performed by: INTERNAL MEDICINE

## 2024-05-02 PROCEDURE — 6370000000 HC RX 637 (ALT 250 FOR IP): Performed by: NURSE PRACTITIONER

## 2024-05-02 PROCEDURE — 97530 THERAPEUTIC ACTIVITIES: CPT

## 2024-05-02 PROCEDURE — 87186 SC STD MICRODIL/AGAR DIL: CPT

## 2024-05-02 PROCEDURE — 83735 ASSAY OF MAGNESIUM: CPT

## 2024-05-02 RX ORDER — MINERAL OIL/HYDROPHIL PETROLAT
OINTMENT (GRAM) TOPICAL PRN
Status: DISCONTINUED | OUTPATIENT
Start: 2024-05-02 | End: 2024-05-04 | Stop reason: HOSPADM

## 2024-05-02 RX ORDER — FUROSEMIDE 20 MG/1
20 TABLET ORAL 2 TIMES DAILY
Status: DISCONTINUED | OUTPATIENT
Start: 2024-05-02 | End: 2024-05-04 | Stop reason: HOSPADM

## 2024-05-02 RX ADMIN — ACYCLOVIR 200 MG: 400 TABLET ORAL at 22:53

## 2024-05-02 RX ADMIN — SODIUM CHLORIDE, PRESERVATIVE FREE 10 ML: 5 INJECTION INTRAVENOUS at 08:38

## 2024-05-02 RX ADMIN — ACYCLOVIR 200 MG: 400 TABLET ORAL at 08:56

## 2024-05-02 RX ADMIN — ERYTHROMYCIN ETHYLSUCCINATE 400 MG: 400 TABLET ORAL at 17:04

## 2024-05-02 RX ADMIN — ANTI-FUNGAL POWDER MICONAZOLE NITRATE TALC FREE: 1.42 POWDER TOPICAL at 10:56

## 2024-05-02 RX ADMIN — ERYTHROMYCIN ETHYLSUCCINATE 400 MG: 400 TABLET ORAL at 08:56

## 2024-05-02 RX ADMIN — ROSUVASTATIN CALCIUM 10 MG: 10 TABLET, FILM COATED ORAL at 21:12

## 2024-05-02 RX ADMIN — Medication: at 17:04

## 2024-05-02 RX ADMIN — FUROSEMIDE 20 MG: 20 TABLET ORAL at 08:56

## 2024-05-02 RX ADMIN — MIRTAZAPINE 7.5 MG: 15 TABLET, FILM COATED ORAL at 21:12

## 2024-05-02 RX ADMIN — ERYTHROMYCIN ETHYLSUCCINATE 400 MG: 400 TABLET ORAL at 14:01

## 2024-05-02 RX ADMIN — WATER 1000 MG: 1 INJECTION INTRAMUSCULAR; INTRAVENOUS; SUBCUTANEOUS at 08:50

## 2024-05-02 RX ADMIN — METOCLOPRAMIDE 5 MG: 10 TABLET ORAL at 08:56

## 2024-05-02 RX ADMIN — FUROSEMIDE 20 MG: 20 TABLET ORAL at 17:04

## 2024-05-02 RX ADMIN — Medication 6 MG: at 22:53

## 2024-05-02 RX ADMIN — ALLOPURINOL 50 MG: 100 TABLET ORAL at 08:57

## 2024-05-02 NOTE — PROGRESS NOTES
ACUTE OCCUPATIONAL THERAPY GOALS:   (Developed with and agreed upon by patient and/or caregiver.)  1. Patient will complete full body bathing and dressing with MOD I, additional time, verbal cues and adaptive equipment as needed.   2. Patient will complete toileting with MOD I.   3. Patient will complete functional transfers with MOD I and adaptive equipment as needed.   4. Patient will tolerate at least 30 minutes of OT activity with less than 2 rest breaks while maintaining O2 sats >90%.   5. Patient will verbalize at least 3 energy conservation technique to utilize during ADL/IADL.   6. Patient will complete grooming in standing at sink level with MOD I.  7. Patient will complete household distances with MOD I and adaptive equipment as needed.    Timeframe: 7 visits      OCCUPATIONAL THERAPY Daily Note and Re-evaluation       OT Visit Days: 1  Acknowledge Orders  Time  OT Charge Capture  Rehab Caseload Tracker      Tayla Escobar is a 84 y.o. female   PRIMARY DIAGNOSIS: ANTWON (acute kidney injury) (HCC)  ANTWON (acute kidney injury) (HCC) [N17.9]  Procedure(s) (LRB):  ESOPHAGOGASTRODUODENOSCOPY (N/A)  17 Days Post-Op  Reason for Referral: Generalized Muscle Weakness (M62.81)  Other lack of cordination (R27.8)  Difficulty in walking, Not elsewhere classified (R26.2)  Other abnormalities of gait and mobility (R26.89)  Dizziness and Giddiness (R42)  Inpatient: Payor: [facility] MEDICARE / [facility] PPO / Product Type: Medicare /     ASSESSMENT:     REHAB RECOMMENDATIONS:   Recommendation to date pending progress:  Setting:  Home Health Therapy    Equipment:    To Be Determined     ASSESSMENT:  Ms. Escobar presented to the initially presented to the hospital with ANTWON/CKD. Pt received MM diagnosis and began treatment this admission. Pt is now on hospital day 22. At baseline, pt is mod I for bADLs, mobility, and IADLs. Ambulates with a rolling walker. Pt seen for re-evaluation today due to 7th

## 2024-05-02 NOTE — PROGRESS NOTES
PT was in chair. PT updated CH on health and hospitalization. PT shared concerns and hopes. CH offered empathetic spiritual presence, active listening, and therapeutic communication. PT and CH discussed illness and grief theologically and Biblically. PT expressed comfort in knowing Elbert and going to heaven. CH offered prayer. CH thanked PT for visit and offered support.     Rev. Kathy Abdul M.Div.

## 2024-05-02 NOTE — PROGRESS NOTES
images were obtained through the chest without IV contrast.  Radiation dose reduction techniques were used for this study:  All CT scans  performed at this facility use one or all of the following: Automated exposure  control, adjustment of the mA and/or kVp according to patient's size, iterative  reconstruction.    COMPARISON: None    FINDINGS:  - LUNGS: Fullness of the right infrahilar region could potentially be related to  underlying tumor/adenopathy. The posterior aspect of the right lower lobe  demonstrates volume loss with atelectasis, air bronchograms and a small  effusion. Similarly in the left lower lobe posteriorly there is volume loss with  air bronchograms and a small to moderate effusion. The left upper lobe, right  upper lobe and right middle lobe are clear. No pneumothorax identified  - MEDIASTINUM/AXILLA: No significant mediastinal or axillary adenopathy.    - HEART/VESSELS: Borderline cardiomegaly with coronary artery atherosclerosis.  Normal caliber thoracic aorta with associated atherosclerosis.  - CHEST WALL/BONES: Bones are osteoporotic with a chronic midthoracic anterior  wedge compression fracture that produces relatively prominent kyphosis. Diffuse  bony changes of the thoracic spine are suspected to represent sequelae of a  chronic inflammatory arthropathy.  - UPPER ABDOMEN: No acute findings.    Impression  1. Bilateral lower lobe airspace opacification with volume loss and air  bronchograms suspected represent a combination of pneumonia and subsegmental  atelectasis.  2. Small to moderate pleural effusions bilaterally.  3. Increased soft tissue density in the right hilar/infrahilar region that could  potentially represent underlying tumor/adenopathy versus congested vasculature  that could be clarified by contrasted imaging.  4. Borderline cardiomegaly with coronary artery atherosclerosis  5. T9 chronic anterior wedge compression fracture with secondary kyphosis,  superimposed  C1D8 CyBorD, pharm notified  4/30 Tolerated D8 well yesterday.  C1D15 CyBorD due 5/6.    Risk for TLS  - monitor labs  - on IVF and allopurinol (renally dosed)  4/30 No evidence of TLS    ANTWON/CKD  - Renal US neg  - CT AP with small L pleur eff  - Neph follow  - KLC >7300  - Uric acid 9.5.  Rasburicase and renally dosed allopurinol ordered.  4/16 Cr stable 4.4.  Repeat uric acid down to 0.3  4/21 Cr stable at 4.0.  Neph following - diuresing.   4/24 Cr stable 4.0.  Neph following - changed diuretics to po yesterday.  4/25 Cr down to 3.75.  Pt orthostatic, holding Lasix.  Pt reports urinary retention, check UA.  4/26 Cr down to 3.36.  Con't to hold Lasix.  UA neg.  4/27 Cr down to 2.79; forrest in place for urinary retention  4/30 Cr down to 2.0, forrest remains  5/1 Cr 2.28.  Forrest removed yesterday, pt able to void.  5/2 Cr stable 2.32.  BPs improved, ok to resume Lasix as ordered by Nephrology.     Urinary retention  4/25 Forrest placed.  Check UA.  4/26 UA neg.  Consult urology.  4/27 urology rec voiding trial before dc   4/30 DC forrest for voiding trial  5/1 Pt able to void s/p forrest removal  5/2 Pt reports difficulty voiding again.  Replace forrest.  Will need to DC with forrest and f/u with urology as OP.    Acute hypoxic resp failure / Bilateral pneumonia  4/17 O2 up to 5L.  Check CXR  4/18 Down to 2L NC. CXR with BL PNA and small L pleural effusion. Start LVQ. D-dimer 2.1. Doppler legs, doppler LUE for increased swelling, VQ scan.  4/24 On O2 @ 1L, asked RN to wean O2.  Completes LVQ (D7) for pneumonia today.   4/25 Remains on O2 @ 1L.  Asked RN to wean O2.  4/26 on RA.  CT chest with b/l pneumonia and sm-mod b/l pleural effusions.  Check RVP.  Start CTX/Azith.  4/27 on RA; advised use of IS; on CTX/azithro D2; RVP neg   4/30 Remains on RA.  Con't CTX/Azith (D5)  5/2 Remains on RA.  Con't CTX (D7), completes today.    LUE / BLE edema  4/18 Dopp ordered  4/19 LUE/BLE dopp negative     Pancytopenia secondary to

## 2024-05-02 NOTE — PROGRESS NOTES
ACUTE PHYSICAL THERAPY GOALS:   (Developed with and agreed upon by patient and/or caregiver.)    LTG:  (1.)Ms. Escobar will move from supine to sit and sit to supine , scoot up and down, and roll side to side in bed with STAND BY ASSIST within 7 treatment day(s).    (2.)Ms. Escobar will transfer from bed to chair and chair to bed with  STAND BY ASSIST using the least restrictive device within 7 treatment day(s).    (3.)Ms. Escobar will ambulate with  STAND BY ASSIST for 75 feet with the least restrictive device within 7 treatment day(s).  (4.)Ms. Escobar will tolerate at least 23 min of dynamic standing activity to assist standing ADLs with the least restrictive device within 7 treatment days.    PHYSICAL THERAPY: Daily Note PM   (Link to Caseload Tracking: PT Visit Days : 3  Time In/Out PT Charge Capture  Rehab Caseload Tracker  Orders    Tayla Escobar is a 84 y.o. female   PRIMARY DIAGNOSIS: ANTWON (acute kidney injury) (HCC)  ANTWON (acute kidney injury) (HCC) [N17.9]  Procedure(s) (LRB):  ESOPHAGOGASTRODUODENOSCOPY (N/A)  17 Days Post-Op  Inpatient: Payor: [employer] MEDICARE / Plan: [employer] MEDICARE-ADVANTAGE PPO / Product Type: Medicare /     ASSESSMENT:     REHAB RECOMMENDATIONS:   Recommendation to date pending progress:  Setting:  Short-term Rehab    Equipment:    To Be Determined     ASSESSMENT:  Ms. Escobar was received supine in bed, agreeable to therapy. She was able to ambulate 30 ft x 2 with RW and CGA with a seated rest break. She fatigues easily but overall showed improvements today in balance and activity tolerance. Education provided on energy conservation and safety tips for mobility at home. Good progress, will continue to follow.      SUBJECTIVE:   Ms. Escobar states, \"I'm going home tomorrow\"     Social/Functional Lives With: Son  Type of Home: House  Home Layout: One level  Home Access: Ramped entrance  Bathroom Shower/Tub: Walk-in shower  Bathroom Toilet: Standard  Bathroom Equipment: 3-in-1

## 2024-05-02 NOTE — CARE COORDINATION
LOS 22d  IDR and chart reviewed for transition of care planning.    24 Hour Events:  Afebrile, hypertensive at times  S/p C1D8 CyBorD 4/29, D15 due 5/6  Completes CTX for pneumonia (D7)  C/o diarrhea, Cdiff neg  CM x 3 yesterday  Pt reports difficulty voiding again, replace forrest  PT/OT following    Transition of care will be home with home health. Patient will discharge home with forrest.    Transitions of Care plan is ongoing, no further concerns as of present.   Please consult  if any new issues arise.  Will continue to follow.

## 2024-05-03 ENCOUNTER — APPOINTMENT (OUTPATIENT)
Dept: GENERAL RADIOLOGY | Age: 84
DRG: 682 | End: 2024-05-03
Payer: MEDICARE

## 2024-05-03 LAB
ABO + RH BLD: NORMAL
ALBUMIN SERPL-MCNC: 2.6 G/DL (ref 3.2–4.6)
ALBUMIN/GLOB SERPL: 1.1 (ref 1–1.9)
ALP SERPL-CCNC: 56 U/L (ref 35–104)
ALT SERPL-CCNC: 12 U/L (ref 12–65)
ANION GAP SERPL CALC-SCNC: 10 MMOL/L (ref 9–18)
AST SERPL-CCNC: 20 U/L (ref 15–37)
BASOPHILS # BLD: 0 K/UL (ref 0–0.2)
BASOPHILS NFR BLD: 0 % (ref 0–2)
BILIRUB SERPL-MCNC: 0.4 MG/DL (ref 0–1.2)
BLOOD GROUP ANTIBODIES SERPL: NORMAL
BUN SERPL-MCNC: 31 MG/DL (ref 8–23)
CALCIUM SERPL-MCNC: 8.5 MG/DL (ref 8.8–10.2)
CHLORIDE SERPL-SCNC: 101 MMOL/L (ref 98–107)
CO2 SERPL-SCNC: 24 MMOL/L (ref 20–28)
CREAT SERPL-MCNC: 1.98 MG/DL (ref 0.6–1.1)
DIFFERENTIAL METHOD BLD: ABNORMAL
EOSINOPHIL # BLD: 0 K/UL (ref 0–0.8)
EOSINOPHIL NFR BLD: 0 % (ref 0.5–7.8)
ERYTHROCYTE [DISTWIDTH] IN BLOOD BY AUTOMATED COUNT: 18.1 % (ref 11.9–14.6)
GLOBULIN SER CALC-MCNC: 2.4 G/DL (ref 2.3–3.5)
GLUCOSE SERPL-MCNC: 97 MG/DL (ref 70–99)
HCT VFR BLD AUTO: 32.5 % (ref 35.8–46.3)
HGB BLD-MCNC: 10.6 G/DL (ref 11.7–15.4)
IMM GRANULOCYTES # BLD AUTO: 0 K/UL (ref 0–0.5)
IMM GRANULOCYTES NFR BLD AUTO: 0 % (ref 0–5)
LYMPHOCYTES # BLD: 0.2 K/UL (ref 0.5–4.6)
LYMPHOCYTES NFR BLD: 8 % (ref 13–44)
MAGNESIUM SERPL-MCNC: 2 MG/DL (ref 1.8–2.4)
MCH RBC QN AUTO: 29.5 PG (ref 26.1–32.9)
MCHC RBC AUTO-ENTMCNC: 32.6 G/DL (ref 31.4–35)
MCV RBC AUTO: 90.5 FL (ref 82–102)
MONOCYTES # BLD: 0.3 K/UL (ref 0.1–1.3)
MONOCYTES NFR BLD: 14 % (ref 4–12)
NEUTS SEG # BLD: 1.9 K/UL (ref 1.7–8.2)
NEUTS SEG NFR BLD: 78 % (ref 43–78)
NRBC # BLD: 0 K/UL (ref 0–0.2)
PLATELET # BLD AUTO: 34 K/UL (ref 150–450)
PMV BLD AUTO: 10.2 FL (ref 9.4–12.3)
POTASSIUM SERPL-SCNC: 3.6 MMOL/L (ref 3.5–5.1)
PROT SERPL-MCNC: 5 G/DL (ref 6.3–8.2)
RBC # BLD AUTO: 3.59 M/UL (ref 4.05–5.2)
SODIUM SERPL-SCNC: 136 MMOL/L (ref 136–145)
SPECIMEN EXP DATE BLD: NORMAL
WBC # BLD AUTO: 2.4 K/UL (ref 4.3–11.1)

## 2024-05-03 PROCEDURE — 51702 INSERT TEMP BLADDER CATH: CPT

## 2024-05-03 PROCEDURE — 86900 BLOOD TYPING SEROLOGIC ABO: CPT

## 2024-05-03 PROCEDURE — 85025 COMPLETE CBC W/AUTO DIFF WBC: CPT

## 2024-05-03 PROCEDURE — 6360000002 HC RX W HCPCS: Performed by: STUDENT IN AN ORGANIZED HEALTH CARE EDUCATION/TRAINING PROGRAM

## 2024-05-03 PROCEDURE — 1100000000 HC RM PRIVATE

## 2024-05-03 PROCEDURE — 71045 X-RAY EXAM CHEST 1 VIEW: CPT

## 2024-05-03 PROCEDURE — 2580000003 HC RX 258: Performed by: FAMILY MEDICINE

## 2024-05-03 PROCEDURE — 6370000000 HC RX 637 (ALT 250 FOR IP): Performed by: SURGERY

## 2024-05-03 PROCEDURE — 2580000003 HC RX 258: Performed by: NURSE PRACTITIONER

## 2024-05-03 PROCEDURE — 6370000000 HC RX 637 (ALT 250 FOR IP): Performed by: NURSE PRACTITIONER

## 2024-05-03 PROCEDURE — 99233 SBSQ HOSP IP/OBS HIGH 50: CPT | Performed by: INTERNAL MEDICINE

## 2024-05-03 PROCEDURE — 6370000000 HC RX 637 (ALT 250 FOR IP): Performed by: INTERNAL MEDICINE

## 2024-05-03 PROCEDURE — APPSS45 APP SPLIT SHARED TIME 31-45 MINUTES: Performed by: NURSE PRACTITIONER

## 2024-05-03 PROCEDURE — P9037 PLATE PHERES LEUKOREDU IRRAD: HCPCS

## 2024-05-03 PROCEDURE — 80053 COMPREHEN METABOLIC PANEL: CPT

## 2024-05-03 PROCEDURE — 36430 TRANSFUSION BLD/BLD COMPNT: CPT

## 2024-05-03 PROCEDURE — 86850 RBC ANTIBODY SCREEN: CPT

## 2024-05-03 PROCEDURE — 86901 BLOOD TYPING SEROLOGIC RH(D): CPT

## 2024-05-03 PROCEDURE — 6370000000 HC RX 637 (ALT 250 FOR IP): Performed by: FAMILY MEDICINE

## 2024-05-03 PROCEDURE — 83735 ASSAY OF MAGNESIUM: CPT

## 2024-05-03 PROCEDURE — 30233R1 TRANSFUSION OF NONAUTOLOGOUS PLATELETS INTO PERIPHERAL VEIN, PERCUTANEOUS APPROACH: ICD-10-PCS | Performed by: INTERNAL MEDICINE

## 2024-05-03 PROCEDURE — 36415 COLL VENOUS BLD VENIPUNCTURE: CPT

## 2024-05-03 PROCEDURE — 97530 THERAPEUTIC ACTIVITIES: CPT

## 2024-05-03 RX ORDER — CEPHALEXIN 250 MG/1
250 CAPSULE ORAL 2 TIMES DAILY
Status: DISCONTINUED | OUTPATIENT
Start: 2024-05-03 | End: 2024-05-04 | Stop reason: HOSPADM

## 2024-05-03 RX ORDER — SODIUM CHLORIDE 9 MG/ML
INJECTION, SOLUTION INTRAVENOUS PRN
Status: DISCONTINUED | OUTPATIENT
Start: 2024-05-03 | End: 2024-05-04 | Stop reason: HOSPADM

## 2024-05-03 RX ORDER — LIDOCAINE HYDROCHLORIDE 10 MG/ML
5 INJECTION, SOLUTION EPIDURAL; INFILTRATION; INTRACAUDAL; PERINEURAL ONCE
Status: DISCONTINUED | OUTPATIENT
Start: 2024-05-03 | End: 2024-05-04 | Stop reason: HOSPADM

## 2024-05-03 RX ORDER — SODIUM CHLORIDE 0.9 % (FLUSH) 0.9 %
5-40 SYRINGE (ML) INJECTION EVERY 12 HOURS SCHEDULED
Status: DISCONTINUED | OUTPATIENT
Start: 2024-05-03 | End: 2024-05-04 | Stop reason: HOSPADM

## 2024-05-03 RX ORDER — DIPHENHYDRAMINE HCL 25 MG
25 CAPSULE ORAL SEE ADMIN INSTRUCTIONS
Status: DISCONTINUED | OUTPATIENT
Start: 2024-05-03 | End: 2024-05-04 | Stop reason: HOSPADM

## 2024-05-03 RX ORDER — SODIUM CHLORIDE 9 MG/ML
25 INJECTION, SOLUTION INTRAVENOUS PRN
Status: DISCONTINUED | OUTPATIENT
Start: 2024-05-03 | End: 2024-05-04 | Stop reason: HOSPADM

## 2024-05-03 RX ORDER — ACETAMINOPHEN 325 MG/1
650 TABLET ORAL SEE ADMIN INSTRUCTIONS
Status: DISCONTINUED | OUTPATIENT
Start: 2024-05-03 | End: 2024-05-04 | Stop reason: HOSPADM

## 2024-05-03 RX ORDER — SODIUM CHLORIDE 0.9 % (FLUSH) 0.9 %
5-40 SYRINGE (ML) INJECTION PRN
Status: DISCONTINUED | OUTPATIENT
Start: 2024-05-03 | End: 2024-05-04 | Stop reason: HOSPADM

## 2024-05-03 RX ADMIN — SODIUM CHLORIDE, PRESERVATIVE FREE 10 ML: 5 INJECTION INTRAVENOUS at 21:43

## 2024-05-03 RX ADMIN — CEPHALEXIN 250 MG: 250 CAPSULE ORAL at 21:42

## 2024-05-03 RX ADMIN — ACYCLOVIR 200 MG: 400 TABLET ORAL at 21:42

## 2024-05-03 RX ADMIN — CEPHALEXIN 250 MG: 250 CAPSULE ORAL at 17:27

## 2024-05-03 RX ADMIN — ROSUVASTATIN CALCIUM 10 MG: 10 TABLET, FILM COATED ORAL at 21:43

## 2024-05-03 RX ADMIN — FUROSEMIDE 20 MG: 20 TABLET ORAL at 17:27

## 2024-05-03 RX ADMIN — SODIUM CHLORIDE, PRESERVATIVE FREE 10 ML: 5 INJECTION INTRAVENOUS at 08:46

## 2024-05-03 RX ADMIN — ANTI-FUNGAL POWDER MICONAZOLE NITRATE TALC FREE: 1.42 POWDER TOPICAL at 21:43

## 2024-05-03 RX ADMIN — SODIUM CHLORIDE, PRESERVATIVE FREE 10 ML: 5 INJECTION INTRAVENOUS at 00:25

## 2024-05-03 RX ADMIN — ACETAMINOPHEN 650 MG: 325 TABLET ORAL at 13:53

## 2024-05-03 RX ADMIN — MIRTAZAPINE 7.5 MG: 15 TABLET, FILM COATED ORAL at 21:42

## 2024-05-03 RX ADMIN — ANTI-FUNGAL POWDER MICONAZOLE NITRATE TALC FREE: 1.42 POWDER TOPICAL at 13:55

## 2024-05-03 RX ADMIN — DIPHENHYDRAMINE HYDROCHLORIDE 25 MG: 25 CAPSULE ORAL at 13:54

## 2024-05-03 RX ADMIN — ERYTHROMYCIN ETHYLSUCCINATE 400 MG: 400 TABLET ORAL at 17:26

## 2024-05-03 RX ADMIN — ONDANSETRON 4 MG: 2 INJECTION INTRAMUSCULAR; INTRAVENOUS at 08:45

## 2024-05-03 NOTE — PROGRESS NOTES
Occupational Therapy Note:    Attempted to see pt for OT treatment session 2x on this date--pt reported not feeling well enough to participate and being \"sick to her stomach.\" Educated on importance of OOB activity but pt continued to refuse. Will continue to follow and attempt to see as schedule allows.    Thank you,  Elodia Serrano, OTR/L

## 2024-05-03 NOTE — PROGRESS NOTES
[facility] Hematology & Oncology        Inpatient Hematology / Oncology Progress Note    Reason for Consult:  ANTWON (acute kidney injury) (HCC) [N17.9]    24 Hour Events:  Tmax 100.1, hypertensive at times  S/p C1D8 CyBorD 4/29, D15 due 5/6  UA c/w UTI  Garcia replaced yesterday d/t urinary retention  Ucx ordered  Family requests PICC, order placed  Family at bedside    Transfusions: 1 unit plt (for PICC)  Replacements: None    ROS:  Constitutional: +weakness, fatigue. Negative for fever, chills.  CV: Negative for chest pain, palpitations, edema.  Respiratory: Negative for cough, wheezing.  GI:  +diarrhea. Negative for abdominal pain.    10 point review of systems is otherwise negative with the exception of the elements mentioned above in the HPI.     No Known Allergies  Past Medical History:   Diagnosis Date    Acute respiratory failure with hypoxemia (HCC)     Benign essential hypertension 2/11/2015    medication    Bilateral leg edema 4/26/2017    Cancer (HCC)     skin    Hiatal hernia     \"large\"    Hypercholesteremia 2/11/2015    IFG (impaired fasting glucose) 4/26/2017    Iron deficiency anemia 5/12/2016    Low oxygen saturation     per office notes- patient's daughter reported O2 sat drops to 85% when supine, she uses O2    Mixed hyperlipidemia 2/11/2015    Morbid obesity (HCC) 10/26/2017    Nipple discharge     not current as of 7/17/13    Primary insomnia 4/26/2018    Primary osteoarthritis of both knees 4/26/2018    Pulmonary embolism (HCC) 10/15/2023    on eliquis- Dr. Arnold recommended eliquis full dose x 6 months    Stage 3 chronic kidney disease (HCC) 7/17/2019     Past Surgical History:   Procedure Laterality Date    HIATAL HERNIA REPAIR N/A 3/4/2024    ROBOTIC HIATAL HERNIA REPAIR performed by Thomas Lozano MD at [facility] MAIN OR    MALIGNANT SKIN LESION EXCISION      OTHER SURGICAL HISTORY      skin cancer    UPPER GASTROINTESTINAL ENDOSCOPY N/A 4/13/2024    ESOPHAGOGASTRODUODENOSCOPY  >100 0 /hpf    Epithelial Cells UA 0-3 0 /hpf    BACTERIA, URINE 3+ (H) 0 /hpf    Yeast, UA MARKED      Other observations RESULTS VERIFIED MANUALLY     Comprehensive Metabolic Panel    Collection Time: 05/03/24  4:49 AM   Result Value Ref Range    Sodium 136 136 - 145 mmol/L    Potassium 3.6 3.5 - 5.1 mmol/L    Chloride 101 98 - 107 mmol/L    CO2 24 20 - 28 mmol/L    Anion Gap 10 9 - 18 mmol/L    Glucose 97 70 - 99 mg/dL    BUN 31 (H) 8 - 23 MG/DL    Creatinine 1.98 (H) 0.60 - 1.10 MG/DL    Est, Glom Filt Rate 25 (L) >60 ml/min/1.73m2    Calcium 8.5 (L) 8.8 - 10.2 MG/DL    Total Bilirubin 0.4 0.0 - 1.2 MG/DL    ALT 12 12 - 65 U/L    AST 20 15 - 37 U/L    Alk Phosphatase 56 35 - 104 U/L    Total Protein 5.0 (L) 6.3 - 8.2 g/dL    Albumin 2.6 (L) 3.2 - 4.6 g/dL    Globulin 2.4 2.3 - 3.5 g/dL    Albumin/Globulin Ratio 1.1 1.0 - 1.9     Magnesium    Collection Time: 05/03/24  4:49 AM   Result Value Ref Range    Magnesium 2.0 1.8 - 2.4 mg/dL   CBC with Auto Differential    Collection Time: 05/03/24  4:49 AM   Result Value Ref Range    WBC 2.4 (L) 4.3 - 11.1 K/uL    RBC 3.59 (L) 4.05 - 5.2 M/uL    Hemoglobin 10.6 (L) 11.7 - 15.4 g/dL    Hematocrit 32.5 (L) 35.8 - 46.3 %    MCV 90.5 82 - 102 FL    MCH 29.5 26.1 - 32.9 PG    MCHC 32.6 31.4 - 35.0 g/dL    RDW 18.1 (H) 11.9 - 14.6 %    Platelets 34 (L) 150 - 450 K/uL    MPV 10.2 9.4 - 12.3 FL    nRBC 0.00 0.0 - 0.2 K/uL    Differential Type AUTOMATED      Neutrophils % 78 43 - 78 %    Lymphocytes % 8 (L) 13 - 44 %    Monocytes % 14 (H) 4.0 - 12.0 %    Eosinophils % 0 (L) 0.5 - 7.8 %    Basophils % 0 0.0 - 2.0 %    Immature Granulocytes % 0 0.0 - 5.0 %    Neutrophils Absolute 1.9 1.7 - 8.2 K/UL    Lymphocytes Absolute 0.2 (L) 0.5 - 4.6 K/UL    Monocytes Absolute 0.3 0.1 - 1.3 K/UL    Eosinophils Absolute 0.0 0.0 - 0.8 K/UL    Basophils Absolute 0.0 0.0 - 0.2 K/UL    Immature Granulocytes Absolute 0.0 0.0 - 0.5 K/UL     Imaging:  US RETROPERITONEAL COMPLETE

## 2024-05-03 NOTE — CARE COORDINATION
LOS 23d  IDR and chart reviewed for discharge planning. Pt is having Nausea and has politely requested not to work with PT/OT today.  Patient was to discharge home today but is not medically stable.    Transition of care is Home with home health (PT/OT recommendation is [facility] family declined)    Transitions of Care plan is ongoing, no further concerns as of present.   Please consult  if any new issues arise.  Will continue to follow.

## 2024-05-03 NOTE — PROGRESS NOTES
ACUTE PHYSICAL THERAPY GOALS:   (Developed with and agreed upon by patient and/or caregiver.)    LTG:  (1.)Ms. Escobar will move from supine to sit and sit to supine , scoot up and down, and roll side to side in bed with STAND BY ASSIST within 7 treatment day(s).    (2.)Ms. Escobar will transfer from bed to chair and chair to bed with  STAND BY ASSIST using the least restrictive device within 7 treatment day(s).    (3.)Ms. Escobar will ambulate with  STAND BY ASSIST for 75 feet with the least restrictive device within 7 treatment day(s).  (4.)Ms. Escobar will tolerate at least 23 min of dynamic standing activity to assist standing ADLs with the least restrictive device within 7 treatment days.    PHYSICAL THERAPY: Daily Note PM   (Link to Caseload Tracking: PT Visit Days : 4  Time In/Out PT Charge Capture  Rehab Caseload Tracker  Orders    Tayla Escobar is a 84 y.o. female   PRIMARY DIAGNOSIS: ANTWON (acute kidney injury) (HCC)  ANTWON (acute kidney injury) (HCC) [N17.9]  Procedure(s) (LRB):  ESOPHAGOGASTRODUODENOSCOPY (N/A)  18 Days Post-Op  Inpatient: Payor: AET MEDICARE / Plan: AETNA MEDICARE-ADVANTAGE PPO / Product Type: Medicare /     ASSESSMENT:     REHAB RECOMMENDATIONS:   Recommendation to date pending progress:  Setting:  Short-term Rehab    Equipment:    To Be Determined     ASSESSMENT:  Ms. Escobar was received supine in bed, agreeable to therapy with encouragement. She reports nausea and generally not feeling well today. She required extended time for all mobility. She was able to sit at EOB with CGA. She was able to stand, ambulate a few feet and transfer to chair with RW and CGA. Limited by activity tolerance today. Minimal progress, will continue to follow.      SUBJECTIVE:   Ms. Escobar states, \"I'm nauseous\"     Social/Functional Lives With: Son  Type of Home: House  Home Layout: One level  Home Access: Ramped entrance  Bathroom Shower/Tub: Walk-in shower  Bathroom Toilet: Standard  Bathroom

## 2024-05-03 NOTE — PROGRESS NOTES
EOS:    Patient was very lethargic and c/o nausea this morning;  initially refused to work with therapies and take meds, but worked with with PT this afternoon and sat up in the chair for almost 3 hours. Eventually took PO meds. Got a true standing weight when transferring patient back to bed. 1 unit of platelets given in preparation for PICC line placement. Vascular Access did not have time this afternoon, but said they would return in the morning.

## 2024-05-04 VITALS
SYSTOLIC BLOOD PRESSURE: 135 MMHG | DIASTOLIC BLOOD PRESSURE: 74 MMHG | RESPIRATION RATE: 9 BRPM | HEART RATE: 73 BPM | OXYGEN SATURATION: 93 % | TEMPERATURE: 97.6 F | HEIGHT: 63 IN | WEIGHT: 173.8 LBS | BODY MASS INDEX: 30.79 KG/M2

## 2024-05-04 DIAGNOSIS — R33.9 URINARY RETENTION: Primary | ICD-10-CM

## 2024-05-04 PROBLEM — Z78.9 POOR INTRAVENOUS ACCESS: Status: RESOLVED | Noted: 2024-04-28 | Resolved: 2024-05-04

## 2024-05-04 PROBLEM — K31.1 GASTRIC OUTLET OBSTRUCTION: Status: RESOLVED | Noted: 2024-04-10 | Resolved: 2024-05-04

## 2024-05-04 PROBLEM — R10.13 EPIGASTRIC PAIN: Status: RESOLVED | Noted: 2024-04-10 | Resolved: 2024-05-04

## 2024-05-04 PROBLEM — Z51.11 ENCOUNTER FOR ANTINEOPLASTIC CHEMOTHERAPY: Status: RESOLVED | Noted: 2024-04-17 | Resolved: 2024-05-04

## 2024-05-04 PROBLEM — E87.6 HYPOKALEMIA: Status: RESOLVED | Noted: 2024-04-18 | Resolved: 2024-05-04

## 2024-05-04 PROBLEM — E83.42 HYPOMAGNESEMIA: Status: RESOLVED | Noted: 2024-04-19 | Resolved: 2024-05-04

## 2024-05-04 PROBLEM — T80.1XXA INTRAVENOUS INFILTRATION: Status: RESOLVED | Noted: 2024-04-28 | Resolved: 2024-05-04

## 2024-05-04 PROBLEM — J18.9 PNEUMONIA OF BOTH LUNGS DUE TO INFECTIOUS ORGANISM: Status: RESOLVED | Noted: 2024-04-26 | Resolved: 2024-05-04

## 2024-05-04 PROBLEM — K20.90 ESOPHAGITIS: Status: RESOLVED | Noted: 2024-04-10 | Resolved: 2024-05-04

## 2024-05-04 PROBLEM — N17.9 AKI (ACUTE KIDNEY INJURY) (HCC): Status: RESOLVED | Noted: 2024-04-10 | Resolved: 2024-05-04

## 2024-05-04 PROBLEM — Z51.5 ENCOUNTER FOR PALLIATIVE CARE: Status: RESOLVED | Noted: 2024-04-23 | Resolved: 2024-05-04

## 2024-05-04 LAB
ALBUMIN SERPL-MCNC: 2.7 G/DL (ref 3.2–4.6)
ALBUMIN/GLOB SERPL: 1.5 (ref 1–1.9)
ALP SERPL-CCNC: 58 U/L (ref 35–104)
ALT SERPL-CCNC: 13 U/L (ref 12–65)
ANION GAP SERPL CALC-SCNC: 9 MMOL/L (ref 9–18)
AST SERPL-CCNC: 24 U/L (ref 15–37)
BASOPHILS # BLD: 0 K/UL (ref 0–0.2)
BASOPHILS NFR BLD: 0 % (ref 0–2)
BILIRUB SERPL-MCNC: 0.5 MG/DL (ref 0–1.2)
BLD PROD TYP BPU: NORMAL
BLOOD BANK BLOOD PRODUCT EXPIRATION DATE: NORMAL
BLOOD BANK DISPENSE STATUS: NORMAL
BLOOD BANK ISBT PRODUCT BLOOD TYPE: 5100
BLOOD BANK PRODUCT CODE: NORMAL
BLOOD BANK UNIT TYPE AND RH: NORMAL
BPU ID: NORMAL
BUN SERPL-MCNC: 27 MG/DL (ref 8–23)
CALCIUM SERPL-MCNC: 8.5 MG/DL (ref 8.8–10.2)
CHLORIDE SERPL-SCNC: 104 MMOL/L (ref 98–107)
CO2 SERPL-SCNC: 25 MMOL/L (ref 20–28)
CREAT SERPL-MCNC: 1.74 MG/DL (ref 0.6–1.1)
DIFFERENTIAL METHOD BLD: ABNORMAL
EOSINOPHIL # BLD: 0 K/UL (ref 0–0.8)
EOSINOPHIL NFR BLD: 1 % (ref 0.5–7.8)
ERYTHROCYTE [DISTWIDTH] IN BLOOD BY AUTOMATED COUNT: 17.1 % (ref 11.9–14.6)
GLOBULIN SER CALC-MCNC: 1.8 G/DL (ref 2.3–3.5)
GLUCOSE SERPL-MCNC: 90 MG/DL (ref 70–99)
HCT VFR BLD AUTO: 31.4 % (ref 35.8–46.3)
HGB BLD-MCNC: 10.1 G/DL (ref 11.7–15.4)
IMM GRANULOCYTES # BLD AUTO: 0 K/UL (ref 0–0.5)
IMM GRANULOCYTES NFR BLD AUTO: 0 % (ref 0–5)
LYMPHOCYTES # BLD: 0.2 K/UL (ref 0.5–4.6)
LYMPHOCYTES NFR BLD: 5 % (ref 13–44)
MAGNESIUM SERPL-MCNC: 1.8 MG/DL (ref 1.8–2.4)
MCH RBC QN AUTO: 29.3 PG (ref 26.1–32.9)
MCHC RBC AUTO-ENTMCNC: 32.2 G/DL (ref 31.4–35)
MCV RBC AUTO: 91 FL (ref 82–102)
MONOCYTES # BLD: 0.3 K/UL (ref 0.1–1.3)
MONOCYTES NFR BLD: 11 % (ref 4–12)
NEUTS SEG # BLD: 2.6 K/UL (ref 1.7–8.2)
NEUTS SEG NFR BLD: 83 % (ref 43–78)
NRBC # BLD: 0 K/UL (ref 0–0.2)
PLATELET # BLD AUTO: 65 K/UL (ref 150–450)
PMV BLD AUTO: 9.3 FL (ref 9.4–12.3)
POTASSIUM SERPL-SCNC: 3.7 MMOL/L (ref 3.5–5.1)
PROT SERPL-MCNC: 4.5 G/DL (ref 6.3–8.2)
RBC # BLD AUTO: 3.45 M/UL (ref 4.05–5.2)
SODIUM SERPL-SCNC: 137 MMOL/L (ref 136–145)
UNIT DIVISION: 0
UNIT ISSUE DATE/TIME: NORMAL
WBC # BLD AUTO: 3.1 K/UL (ref 4.3–11.1)

## 2024-05-04 PROCEDURE — C1751 CATH, INF, PER/CENT/MIDLINE: HCPCS

## 2024-05-04 PROCEDURE — 36573 INSJ PICC RS&I 5 YR+: CPT

## 2024-05-04 PROCEDURE — 99239 HOSP IP/OBS DSCHRG MGMT >30: CPT | Performed by: INTERNAL MEDICINE

## 2024-05-04 PROCEDURE — 36415 COLL VENOUS BLD VENIPUNCTURE: CPT

## 2024-05-04 PROCEDURE — 80053 COMPREHEN METABOLIC PANEL: CPT

## 2024-05-04 PROCEDURE — 6370000000 HC RX 637 (ALT 250 FOR IP): Performed by: SURGERY

## 2024-05-04 PROCEDURE — 83735 ASSAY OF MAGNESIUM: CPT

## 2024-05-04 PROCEDURE — 6370000000 HC RX 637 (ALT 250 FOR IP): Performed by: NURSE PRACTITIONER

## 2024-05-04 PROCEDURE — 85025 COMPLETE CBC W/AUTO DIFF WBC: CPT

## 2024-05-04 RX ORDER — MAGNESIUM HYDROXIDE/ALUMINUM HYDROXICE/SIMETHICONE 120; 1200; 1200 MG/30ML; MG/30ML; MG/30ML
30 SUSPENSION ORAL EVERY 6 HOURS PRN
Refills: 0 | Status: ON HOLD | COMMUNITY
Start: 2024-05-04

## 2024-05-04 RX ORDER — LOPERAMIDE HYDROCHLORIDE 2 MG/1
2 CAPSULE ORAL PRN
Qty: 30 CAPSULE | Refills: 0 | Status: ON HOLD | COMMUNITY
Start: 2024-05-04 | End: 2024-05-14

## 2024-05-04 RX ORDER — ONDANSETRON HYDROCHLORIDE 8 MG/1
8 TABLET, FILM COATED ORAL EVERY 8 HOURS PRN
Qty: 90 TABLET | Refills: 1 | Status: ON HOLD | OUTPATIENT
Start: 2024-05-04

## 2024-05-04 RX ORDER — POLYETHYLENE GLYCOL 3350 17 G/17G
17 POWDER, FOR SOLUTION ORAL DAILY PRN
Qty: 30 PACKET | Refills: 0 | Status: ON HOLD | COMMUNITY
Start: 2024-05-04 | End: 2024-06-03

## 2024-05-04 RX ORDER — FUROSEMIDE 20 MG/1
20 TABLET ORAL 2 TIMES DAILY
Qty: 60 TABLET | Refills: 0 | Status: ON HOLD | OUTPATIENT
Start: 2024-05-04

## 2024-05-04 RX ORDER — MINERAL OIL/HYDROPHIL PETROLAT
OINTMENT (GRAM) TOPICAL
Status: ON HOLD | COMMUNITY
Start: 2024-05-04

## 2024-05-04 RX ORDER — LANOLIN ALCOHOL/MO/W.PET/CERES
6 CREAM (GRAM) TOPICAL NIGHTLY PRN
Refills: 3 | Status: ON HOLD | COMMUNITY
Start: 2024-05-04

## 2024-05-04 RX ORDER — MIRTAZAPINE 7.5 MG/1
7.5 TABLET, FILM COATED ORAL NIGHTLY
Qty: 30 TABLET | Refills: 3 | Status: SHIPPED | OUTPATIENT
Start: 2024-05-04 | End: 2024-05-06 | Stop reason: SDUPTHER

## 2024-05-04 RX ORDER — ALLOPURINOL 100 MG/1
50 TABLET ORAL
Qty: 30 TABLET | Refills: 0 | Status: ON HOLD | OUTPATIENT
Start: 2024-05-06

## 2024-05-04 RX ORDER — ACYCLOVIR 400 MG/1
200 TABLET ORAL 2 TIMES DAILY
Qty: 30 TABLET | Refills: 1 | Status: ON HOLD | OUTPATIENT
Start: 2024-05-04 | End: 2024-07-03

## 2024-05-04 RX ORDER — CEPHALEXIN 250 MG/1
250 CAPSULE ORAL 2 TIMES DAILY
Qty: 11 CAPSULE | Refills: 0 | Status: ON HOLD | OUTPATIENT
Start: 2024-05-04 | End: 2024-05-10

## 2024-05-04 RX ADMIN — ACYCLOVIR 200 MG: 400 TABLET ORAL at 08:49

## 2024-05-04 RX ADMIN — CEPHALEXIN 250 MG: 250 CAPSULE ORAL at 08:49

## 2024-05-04 RX ADMIN — ERYTHROMYCIN ETHYLSUCCINATE 400 MG: 400 TABLET ORAL at 08:49

## 2024-05-04 RX ADMIN — FUROSEMIDE 20 MG: 20 TABLET ORAL at 08:49

## 2024-05-04 RX ADMIN — METOCLOPRAMIDE 5 MG: 10 TABLET ORAL at 08:49

## 2024-05-04 ASSESSMENT — PAIN SCALES - GENERAL
PAINLEVEL_OUTOF10: 0
PAINLEVEL_OUTOF10: 0

## 2024-05-04 NOTE — PROGRESS NOTES
Patient discharged home with family.     AVS reviewed with pt and family. Notified NP of need for zofran order. Stated she will call in to [facility] pharmacy. Forrest care gone over with patient. Leg bags sent home with family and perineal wipes for forrest hygiene.

## 2024-05-04 NOTE — CARE COORDINATION
CM reviewed / screen patient for discharge today.  CM reviewed medical chart / discharge summary: Disposition: Follow up Monday with NP as scheduled and CM weekend report: Discharge home with Home Health. [facility] sent updated packet on 5/3 will need d/c summary sent if d/c ILM given 5/3.  Previous CM has completed a referral for (HH) to [facility].  Patient has been accepted / selected and approved.  Family will transport patient home.  Patient has met all treatment goals / milestones.  CM will continue to follow and remain available for any needs, concerns or questions that may arise.             04/11/24 0670   Service Assessment   Patient Orientation Alert and Oriented   Cognition Alert   History Provided By Patient   Primary Caregiver Self   Accompanied By/Relationship Son   Support Systems Children   Patient's Healthcare Decision Maker is: Named in Scanned ACP Document   PCP Verified by CM Yes   Last Visit to PCP Within last 3 months   Prior Functional Level Independent in ADLs/IADLs   Current Functional Level Other (see comment)  (PT/OT consulted)   Can patient return to prior living arrangement Yes   Ability to make needs known: Good   Would you like for me to discuss the discharge plan with any other family members/significant others, and if so, who? Yes  (children)   Financial Resources Medicare   Community Resources ECF/Home Care  ([facility])   Social/Functional History   Lives With Son   Type of Home House   Home Layout One level   Home Access Ramped entrance   Bathroom Shower/Tub Walk-in shower   Bathroom Toilet Standard   Bathroom Equipment 3-in-1 commode;Grab bars in shower;Hand-held shower   Bathroom Accessibility Accessible   Home Equipment Wheelchair-manual;Walker, rolling;Rollator;Hospital bed;Lift chair;Grab bars   Receives Help From Family   ADL Assistance Needs assistance   Homemaking Assistance Needs assistance   Homemaking Responsibilities Yes   Ambulation Assistance  Independent   Transfer Assistance Independent   Active  Yes   Mode of Transportation Car   Occupation Retired   Discharge Planning   Type of Residence House   Living Arrangements Children   Current Services Prior To Admission Oxygen Therapy;Durable Medical Equipment  ([facility])   Current DME Prior to Arrival Bedside Commode;Wheelchair;Walker;Shower Chair;Oxygen Therapy (Comment);Hospital Bed   DME Ordered? No   Potential Assistance Purchasing Medications No   Type of Home Care Services None   Patient expects to be discharged to: House   One/Two Story Residence One story   History of falls? 0

## 2024-05-04 NOTE — DISCHARGE SUMMARY
[facility] Hematology & Oncology: Inpatient Hematology / Oncology Discharge Summary Note    Patient ID:  Tayla Escobar  [id number]  84 y.o.  1940    Admit Date: 4/10/2024    Discharge Date: 5/4/2024    Admission Diagnoses: ANTWON (acute kidney injury) (HCC) [N17.9]    Discharge Diagnoses:  Principal Diagnosis: ANTWON (acute kidney injury) (HCC)  Principal Problem (Resolved):    ANTWON (acute kidney injury) (HCC)  Active Problems:    Mixed hyperlipidemia    Benign essential hypertension    Moderate protein-calorie malnutrition (HCC)    Hx of hiatal hernia    Failure to thrive in adult    Anemia    Multiple myeloma not having achieved remission (HCC)    Thrombocytopenia (HCC)    Edema    Fatigue    Frailty    Debility    Constipation    Urinary retention  Resolved Problems:    Epigastric pain    Esophagitis    Gastric outlet obstruction    Encounter for antineoplastic chemotherapy    Hypokalemia    Hypomagnesemia    Encounter for palliative care    Pneumonia of both lungs due to infectious organism    Intravenous infiltration    Poor intravenous access      Hospital Course:  Ms. Escobar is an 84 year old female admitted on 4/10/2024 with ANTWON and esophagitis.  Her PMH includes HTN, HLD, PE on Eliquis, renal stones, and CKD3A. She is a former patient of Dr. Wilkinson, followed for PE. She presented to ED from PCP with ANTWON/CKD. She is s/p hiatal hernia surgery on 3/4, reports eating and drinking poorly and malaise since surgery. Cr 4.9 (b/l ~1.2). Hgb 10.8, Plt 136k. CCa++ 12.1. Renal US neg. CT AP w/o contrast with small L pleural effusion. Neph following. FLC with kappa >7300 and ratio 665 and SPEP with + M spike.  BMBx path with + MM with 10-15% kappa-monotypic plasma cells.  She was started on CyBorD, s/p C1D8 on 4/29/2024, D15 due on 5/6.  She had issues with urinary retention and required multiple forrest placements - she will d/c with forrest in place and FU with urology.  ANTWON on CKD with improvement in Cr, Lasix was

## 2024-05-04 NOTE — PROGRESS NOTES
Pt wanting to discuss treatment options with doctor. Feeling uncertain of treatment plan and questioning whether she would like to continue or not

## 2024-05-05 LAB
BACTERIA SPEC CULT: ABNORMAL
BACTERIA SPEC CULT: ABNORMAL
SERVICE CMNT-IMP: ABNORMAL

## 2024-05-05 NOTE — PROGRESS NOTES
MD at [facility] MAIN OR    MALIGNANT SKIN LESION EXCISION      OTHER SURGICAL HISTORY      skin cancer    UPPER GASTROINTESTINAL ENDOSCOPY N/A 4/13/2024    ESOPHAGOGASTRODUODENOSCOPY performed by Alejandro Vieyra MD at [facility] ENDOSCOPY    UPPER GASTROINTESTINAL ENDOSCOPY N/A 4/15/2024    ESOPHAGOGASTRODUODENOSCOPY performed by Alejandro Vieyra MD at [facility] ENDOSCOPY       Social History:  Social History     Tobacco Use    Smoking status: Never    Smokeless tobacco: Former     Types: Chew     Quit date: 2018    Tobacco comments:     Quit smoking: dip snuff   Vaping Use    Vaping Use: Never used   Substance Use Topics    Alcohol use: No     Alcohol/week: 0.0 standard drinks of alcohol    Drug use: No       Family History:  Family History   Problem Relation Age of Onset    No Known Problems Sister     Ovarian Cancer Neg Hx     Cancer Sister         lung    Breast Cancer Neg Hx     Hypertension Mother     Arrhythmia Mother     Cancer Sister     Cancer Brother         lung    Cancer Father          Physical Examination:  General Appearance: Healthy appearing patient in no acute distress.   Vital signs: BP (!) 165/110   Pulse 87   Temp 97.8 °F (36.6 °C) (Oral)   Resp 18   Ht 1.651 m (5' 5\")   Wt 74 kg (163 lb 1.6 oz)   SpO2 96%   BMI 27.14 kg/m²    Performance Status: ECOG Level  1  Distress Screening Score:PHQ-9 Total Score: 2 (5/6/2024  9:24 AM)      Pain Scale: 0 - No pain/10      HEENT: No oral exam performed.  There is no sinus tenderness.   Neck: Supple. There is no thyromegaly.   Lymph nodes: Deferred.  Breasts: Not examined.  Lungs: The lungs are clear to auscultation and percussion. There is no egophony. There is no chest wall tenderness and no  use of accessory respiratory musculature.  Heart: There is no jugular venous distention. The rate is normal and rhythm regular. The S1 and S2 are normal and there are no murmurs or rubs.    Abdomen: Soft, non-tender, bowel sounds present and normal, no appreciated hepatosplenomegaly.

## 2024-05-06 ENCOUNTER — OFFICE VISIT (OUTPATIENT)
Dept: ONCOLOGY | Age: 84
End: 2024-05-06
Payer: MEDICARE

## 2024-05-06 ENCOUNTER — TELEPHONE (OUTPATIENT)
Dept: INTERNAL MEDICINE CLINIC | Facility: CLINIC | Age: 84
End: 2024-05-06

## 2024-05-06 ENCOUNTER — CLINICAL DOCUMENTATION (OUTPATIENT)
Dept: CASE MANAGEMENT | Age: 84
End: 2024-05-06

## 2024-05-06 ENCOUNTER — HOSPITAL ENCOUNTER (OUTPATIENT)
Dept: LAB | Age: 84
Discharge: HOME | End: 2024-05-09
Payer: MEDICARE

## 2024-05-06 ENCOUNTER — HOSPITAL ENCOUNTER (OUTPATIENT)
Dept: INFUSION THERAPY | Age: 84
Setting detail: INFUSION SERIES
Discharge: HOME | End: 2024-05-06
Payer: MEDICARE

## 2024-05-06 VITALS
DIASTOLIC BLOOD PRESSURE: 110 MMHG | HEART RATE: 87 BPM | WEIGHT: 163.1 LBS | SYSTOLIC BLOOD PRESSURE: 165 MMHG | BODY MASS INDEX: 27.17 KG/M2 | OXYGEN SATURATION: 96 % | TEMPERATURE: 97.8 F | HEIGHT: 65 IN | RESPIRATION RATE: 18 BRPM

## 2024-05-06 DIAGNOSIS — C90.00 MULTIPLE MYELOMA NOT HAVING ACHIEVED REMISSION (HCC): ICD-10-CM

## 2024-05-06 DIAGNOSIS — R53.81 DEBILITY: ICD-10-CM

## 2024-05-06 DIAGNOSIS — E87.6 HYPOKALEMIA: ICD-10-CM

## 2024-05-06 DIAGNOSIS — C90.00 MULTIPLE MYELOMA NOT HAVING ACHIEVED REMISSION (HCC): Primary | ICD-10-CM

## 2024-05-06 DIAGNOSIS — E83.42 HYPOMAGNESEMIA: ICD-10-CM

## 2024-05-06 DIAGNOSIS — R63.0 POOR APPETITE: ICD-10-CM

## 2024-05-06 DIAGNOSIS — E87.6 HYPOKALEMIA: Primary | ICD-10-CM

## 2024-05-06 LAB
ALBUMIN SERPL-MCNC: 3.1 G/DL (ref 3.2–4.6)
ALBUMIN/GLOB SERPL: 1.3 (ref 1–1.9)
ALP SERPL-CCNC: 72 U/L (ref 35–104)
ALT SERPL-CCNC: 18 U/L (ref 12–65)
ANION GAP SERPL CALC-SCNC: 12 MMOL/L (ref 9–18)
AST SERPL-CCNC: 29 U/L (ref 15–37)
BASOPHILS # BLD: 0 K/UL (ref 0–0.2)
BASOPHILS NFR BLD: 0 % (ref 0–2)
BILIRUB SERPL-MCNC: 0.7 MG/DL (ref 0–1.2)
BUN SERPL-MCNC: 16 MG/DL (ref 8–23)
CALCIUM SERPL-MCNC: 8.7 MG/DL (ref 8.8–10.2)
CHLORIDE SERPL-SCNC: 99 MMOL/L (ref 98–107)
CO2 SERPL-SCNC: 29 MMOL/L (ref 20–28)
CREAT SERPL-MCNC: 1.35 MG/DL (ref 0.6–1.1)
DIFFERENTIAL METHOD BLD: ABNORMAL
EOSINOPHIL # BLD: 0 K/UL (ref 0–0.8)
EOSINOPHIL NFR BLD: 1 % (ref 0.5–7.8)
ERYTHROCYTE [DISTWIDTH] IN BLOOD BY AUTOMATED COUNT: 16.4 % (ref 11.9–14.6)
GLOBULIN SER CALC-MCNC: 2.4 G/DL (ref 2.3–3.5)
GLUCOSE SERPL-MCNC: 122 MG/DL (ref 70–99)
HCT VFR BLD AUTO: 33.4 % (ref 35.8–46.3)
HGB BLD-MCNC: 11 G/DL (ref 11.7–15.4)
IMM GRANULOCYTES # BLD AUTO: 0 K/UL (ref 0–0.5)
IMM GRANULOCYTES NFR BLD AUTO: 2 % (ref 0–5)
LYMPHOCYTES # BLD: 0.1 K/UL (ref 0.5–4.6)
LYMPHOCYTES NFR BLD: 6 % (ref 13–44)
MAGNESIUM SERPL-MCNC: 1.5 MG/DL (ref 1.8–2.4)
MCH RBC QN AUTO: 29.6 PG (ref 26.1–32.9)
MCHC RBC AUTO-ENTMCNC: 32.9 G/DL (ref 31.4–35)
MCV RBC AUTO: 89.8 FL (ref 82–102)
MONOCYTES # BLD: 0.4 K/UL (ref 0.1–1.3)
MONOCYTES NFR BLD: 17 % (ref 4–12)
NEUTS SEG # BLD: 1.7 K/UL (ref 1.7–8.2)
NEUTS SEG NFR BLD: 74 % (ref 43–78)
NRBC # BLD: 0.02 K/UL (ref 0–0.2)
PLATELET # BLD AUTO: 49 K/UL (ref 150–450)
PLATELET COMMENT: ABNORMAL
PMV BLD AUTO: 12.7 FL (ref 9.4–12.3)
POTASSIUM SERPL-SCNC: 3.2 MMOL/L (ref 3.5–5.1)
PROT SERPL-MCNC: 5.5 G/DL (ref 6.3–8.2)
RBC # BLD AUTO: 3.72 M/UL (ref 4.05–5.2)
RBC MORPH BLD: ABNORMAL
RBC MORPH BLD: ABNORMAL
SODIUM SERPL-SCNC: 140 MMOL/L (ref 136–145)
URATE SERPL-MCNC: 4.8 MG/DL (ref 2.5–7.1)
WBC # BLD AUTO: 2.2 K/UL (ref 4.3–11.1)
WBC MORPH BLD: ABNORMAL

## 2024-05-06 PROCEDURE — A4216 STERILE WATER/SALINE, 10 ML: HCPCS | Performed by: INTERNAL MEDICINE

## 2024-05-06 PROCEDURE — 83735 ASSAY OF MAGNESIUM: CPT

## 2024-05-06 PROCEDURE — 85025 COMPLETE CBC W/AUTO DIFF WBC: CPT

## 2024-05-06 PROCEDURE — 84550 ASSAY OF BLOOD/URIC ACID: CPT

## 2024-05-06 PROCEDURE — 96375 TX/PRO/DX INJ NEW DRUG ADDON: CPT

## 2024-05-06 PROCEDURE — 3080F DIAST BP >= 90 MM HG: CPT | Performed by: NURSE PRACTITIONER

## 2024-05-06 PROCEDURE — 3077F SYST BP >= 140 MM HG: CPT | Performed by: NURSE PRACTITIONER

## 2024-05-06 PROCEDURE — 2580000003 HC RX 258: Performed by: INTERNAL MEDICINE

## 2024-05-06 PROCEDURE — 80053 COMPREHEN METABOLIC PANEL: CPT

## 2024-05-06 PROCEDURE — 96367 TX/PROPH/DG ADDL SEQ IV INF: CPT

## 2024-05-06 PROCEDURE — 96401 CHEMO ANTI-NEOPL SQ/IM: CPT

## 2024-05-06 PROCEDURE — 1123F ACP DISCUSS/DSCN MKR DOCD: CPT | Performed by: NURSE PRACTITIONER

## 2024-05-06 PROCEDURE — 96413 CHEMO IV INFUSION 1 HR: CPT

## 2024-05-06 PROCEDURE — 6360000002 HC RX W HCPCS: Performed by: NURSE PRACTITIONER

## 2024-05-06 PROCEDURE — 6360000002 HC RX W HCPCS: Performed by: INTERNAL MEDICINE

## 2024-05-06 PROCEDURE — 96368 THER/DIAG CONCURRENT INF: CPT

## 2024-05-06 PROCEDURE — 99214 OFFICE O/P EST MOD 30 MIN: CPT | Performed by: NURSE PRACTITIONER

## 2024-05-06 RX ORDER — SODIUM CHLORIDE 9 MG/ML
5-250 INJECTION, SOLUTION INTRAVENOUS PRN
Status: DISCONTINUED | OUTPATIENT
Start: 2024-05-06 | End: 2024-05-07 | Stop reason: HOSPADM

## 2024-05-06 RX ORDER — EPINEPHRINE 1 MG/ML
0.3 INJECTION, SOLUTION, CONCENTRATE INTRAVENOUS PRN
Status: DISCONTINUED | OUTPATIENT
Start: 2024-05-06 | End: 2024-05-07 | Stop reason: HOSPADM

## 2024-05-06 RX ORDER — ONDANSETRON 2 MG/ML
8 INJECTION INTRAMUSCULAR; INTRAVENOUS
Status: DISCONTINUED | OUTPATIENT
Start: 2024-05-06 | End: 2024-05-07 | Stop reason: HOSPADM

## 2024-05-06 RX ORDER — MIRTAZAPINE 15 MG/1
15 TABLET, FILM COATED ORAL NIGHTLY
Qty: 30 TABLET | Refills: 1 | Status: ON HOLD | OUTPATIENT
Start: 2024-05-06

## 2024-05-06 RX ORDER — POTASSIUM CHLORIDE 7.45 MG/ML
10 INJECTION INTRAVENOUS ONCE
Status: COMPLETED | OUTPATIENT
Start: 2024-05-06 | End: 2024-05-06

## 2024-05-06 RX ORDER — MEPERIDINE HYDROCHLORIDE 25 MG/ML
12.5 INJECTION INTRAMUSCULAR; INTRAVENOUS; SUBCUTANEOUS PRN
Status: DISCONTINUED | OUTPATIENT
Start: 2024-05-06 | End: 2024-05-07 | Stop reason: HOSPADM

## 2024-05-06 RX ORDER — MAGNESIUM SULFATE IN WATER 40 MG/ML
2000 INJECTION, SOLUTION INTRAVENOUS ONCE
Status: COMPLETED | OUTPATIENT
Start: 2024-05-06 | End: 2024-05-06

## 2024-05-06 RX ORDER — ONDANSETRON 2 MG/ML
8 INJECTION INTRAMUSCULAR; INTRAVENOUS ONCE
Status: COMPLETED | OUTPATIENT
Start: 2024-05-06 | End: 2024-05-06

## 2024-05-06 RX ORDER — DIPHENHYDRAMINE HYDROCHLORIDE 50 MG/ML
50 INJECTION INTRAMUSCULAR; INTRAVENOUS
Status: DISCONTINUED | OUTPATIENT
Start: 2024-05-06 | End: 2024-05-07 | Stop reason: HOSPADM

## 2024-05-06 RX ORDER — SODIUM CHLORIDE 0.9 % (FLUSH) 0.9 %
5-40 SYRINGE (ML) INJECTION PRN
Status: DISCONTINUED | OUTPATIENT
Start: 2024-05-06 | End: 2024-05-07 | Stop reason: HOSPADM

## 2024-05-06 RX ORDER — POTASSIUM CHLORIDE 7.45 MG/ML
10 INJECTION INTRAVENOUS ONCE
Status: CANCELLED
Start: 2024-05-06

## 2024-05-06 RX ORDER — HEPARIN 100 UNIT/ML
500 SYRINGE INTRAVENOUS PRN
Status: DISCONTINUED | OUTPATIENT
Start: 2024-05-06 | End: 2024-05-07 | Stop reason: HOSPADM

## 2024-05-06 RX ORDER — ALBUTEROL SULFATE 90 UG/1
4 AEROSOL, METERED RESPIRATORY (INHALATION) PRN
Status: DISCONTINUED | OUTPATIENT
Start: 2024-05-06 | End: 2024-05-07 | Stop reason: HOSPADM

## 2024-05-06 RX ORDER — SODIUM CHLORIDE 0.9 % (FLUSH) 0.9 %
5-40 SYRINGE (ML) INJECTION PRN
Status: DISCONTINUED | OUTPATIENT
Start: 2024-05-06 | End: 2024-05-10 | Stop reason: HOSPADM

## 2024-05-06 RX ORDER — MAGNESIUM SULFATE IN WATER 40 MG/ML
2000 INJECTION, SOLUTION INTRAVENOUS ONCE
Status: CANCELLED
Start: 2024-05-06

## 2024-05-06 RX ORDER — SODIUM CHLORIDE 9 MG/ML
INJECTION, SOLUTION INTRAVENOUS CONTINUOUS
Status: DISCONTINUED | OUTPATIENT
Start: 2024-05-06 | End: 2024-05-07 | Stop reason: HOSPADM

## 2024-05-06 RX ORDER — ACETAMINOPHEN 325 MG/1
650 TABLET ORAL
Status: DISCONTINUED | OUTPATIENT
Start: 2024-05-06 | End: 2024-05-07 | Stop reason: HOSPADM

## 2024-05-06 RX ADMIN — SODIUM CHLORIDE 2.75 MG: 9 INJECTION INTRAMUSCULAR; INTRAVENOUS; SUBCUTANEOUS at 12:50

## 2024-05-06 RX ADMIN — ONDANSETRON 8 MG: 2 INJECTION INTRAMUSCULAR; INTRAVENOUS at 12:05

## 2024-05-06 RX ADMIN — SODIUM CHLORIDE, PRESERVATIVE FREE 10 ML: 5 INJECTION INTRAVENOUS at 11:23

## 2024-05-06 RX ADMIN — SODIUM CHLORIDE, PRESERVATIVE FREE 10 ML: 5 INJECTION INTRAVENOUS at 09:12

## 2024-05-06 RX ADMIN — POTASSIUM CHLORIDE 10 MEQ: 7.46 INJECTION, SOLUTION INTRAVENOUS at 12:11

## 2024-05-06 RX ADMIN — SODIUM CHLORIDE 100 ML/HR: 9 INJECTION, SOLUTION INTRAVENOUS at 12:08

## 2024-05-06 RX ADMIN — DEXAMETHASONE SODIUM PHOSPHATE 40 MG: 10 INJECTION INTRAMUSCULAR; INTRAVENOUS at 12:08

## 2024-05-06 RX ADMIN — CYCLOPHOSPHAMIDE 560 MG: 1 INJECTION, POWDER, FOR SOLUTION INTRAVENOUS; ORAL at 12:55

## 2024-05-06 RX ADMIN — MAGNESIUM SULFATE HEPTAHYDRATE 2000 MG: 40 INJECTION, SOLUTION INTRAVENOUS at 13:03

## 2024-05-06 ASSESSMENT — PATIENT HEALTH QUESTIONNAIRE - PHQ9
SUM OF ALL RESPONSES TO PHQ QUESTIONS 1-9: 2
SUM OF ALL RESPONSES TO PHQ QUESTIONS 1-9: 2
1. LITTLE INTEREST OR PLEASURE IN DOING THINGS: SEVERAL DAYS
SUM OF ALL RESPONSES TO PHQ QUESTIONS 1-9: 2
SUM OF ALL RESPONSES TO PHQ QUESTIONS 1-9: 2
SUM OF ALL RESPONSES TO PHQ9 QUESTIONS 1 & 2: 2
2. FEELING DOWN, DEPRESSED OR HOPELESS: SEVERAL DAYS

## 2024-05-06 NOTE — PROGRESS NOTES
Patient arrived to port lab for PICC access and lab draw   Port accessed and labs drawn per protocol   Patient discharged from port lab in a wheelchair

## 2024-05-06 NOTE — TELEPHONE ENCOUNTER
Care Transitions Initial Follow Up Call    Outreach made within 2 business days of discharge: Yes    LMTCB 2x with Daughter to attempt to complete TCM as well as schedule hospital follow up.

## 2024-05-06 NOTE — PROGRESS NOTES
5/6-Patient seen in the office with TOM Del Rosario, labs reviewed, patient will need K+ and Mag replacements, PICC line dressing change, and C1D15 of CyBorD today. Patient has been feeling poorly, decreased appetite and fluid intake, TOM Del Rosario will increase Remeron, nausea present but states Zofran is effective, weakness and fatigue but HH is due to call the daughter today to set up appointments, daughter does express the need for a wheelchair. Patient also has forrest catheter in place, urology saw patient IP and referral placed, navigation will call office in regards to an appointment. Patient with elevated BP, patient currently not taking BP medications d/t hypotension in the hospital, will continue to hold for now, instructed daughter to reach out to PCP if BP becomes more elevated and to keep a log, RD consulted for weight loss and food intake, taste changes and dry mouth. BLE edema noted, L more than R, doppler completed in the hospital. Patient takes Eliquis but currently not taking d/t thrombocytopenia. PICC line in place, line was placed on 5/5, old, dried blood noted underneath dressing, drsing change while patient is in infusion and weekly from there. Patient will follow up next week for D22 of treatment and then in two weeks with Dr. Serrato prior to C2. Financial assistance application provided to daughter to complete and bring back in, Financial Navigator contacted as well. Transportation issues addressed, patient's daughter or son will be bring patient to appointments, they have a friend who has loaned them a wheelchair accessible van for patient to use on appointment days. Navigator contact information provided to patient and daughter, as well as educational information on diagnosis and chemotherapy.

## 2024-05-06 NOTE — PATIENT INSTRUCTIONS
Patient Information from Today's Visit        Follow Up Instructions:   -We will follow up with Urology in regards to getting you an appointment.   -Keep a log of your blood pressures at this time. Do not restart blood pressure medications at this time.     -Try to push more fluids, eat smaller meals a day.   -Ensures are a good supplement if you aren't as hungry.     -Keep an eye out for signs of low platelets (bleeding of gums, nose, blood in urine or stool), if you cannot control the bleeding at home, contact your Navigator. If it's an emergency, after office hours, or the weekend, go to your closest Emergency room.     -If you are having lightheadedness, dizziness, especially when standing up, contact your Navigator. If it's an emergency, after office hours, or the weekend, go to your closest Emergency room.        Treatment Summary has been discussed and given to patient:Yes,   Agent Information: Chemotherapy      Diagnosis: Multiple Myeloma    Goal of Therapy: __ Palliative      _X_Curative Intent         Name of medication(s): Bortezomib (Velcade) + Cyclophosphamide (Cytoxan) + Dexamethasone    Number of Cycles Planned: 6 cycles                  Length of Cycle:  28 days      Chemotherapy plan is subject to change at your provider's discretion.    A copy of this plan has been discussed and given to patient by navigator.    Education Needed: No, received inpatient  Scheduled Date: Inpatient  Education Materials Given (eating hints, chemotherapy and you, chemotherapy education and self-care guidelines): Yes    Education Previously completed Yes  Date: Inpatient     Drug Materials:  Date Given: Inpatient    Consent for therapy:   Completed on: Inpatient    Hospital Outpatient Visit on 05/06/2024   Component Date Value Ref Range Status    Uric Acid 05/06/2024 4.8  2.5 - 7.1 MG/DL Final    Magnesium 05/06/2024 1.5 (L)  1.8 - 2.4 mg/dL Final    Sodium 05/06/2024 140  136 - 145 mmol/L Final    Potassium 05/06/2024

## 2024-05-06 NOTE — PROGRESS NOTES
Arrived to the Infusion Center.  CyBor-D, 2 g MG, 10 Meq K+ infusion and PICC dressing change completed.  Patient tolerated well.   Any issues or concerns during appointment: none.  Patient aware of next infusion appointment on 05/13/2024 (date) at 0915 (time).  Discharged in wheelchair.

## 2024-05-07 ENCOUNTER — APPOINTMENT (OUTPATIENT)
Dept: GENERAL RADIOLOGY | Age: 84
DRG: 689 | End: 2024-05-07
Payer: MEDICARE

## 2024-05-07 ENCOUNTER — HOSPITAL ENCOUNTER (INPATIENT)
Age: 84
LOS: 11 days | Discharge: HOME HEALTH | DRG: 689 | End: 2024-05-18
Attending: EMERGENCY MEDICINE | Admitting: HOSPITALIST
Payer: MEDICARE

## 2024-05-07 DIAGNOSIS — D61.818 PANCYTOPENIA (HCC): ICD-10-CM

## 2024-05-07 DIAGNOSIS — E87.6 HYPOKALEMIA: ICD-10-CM

## 2024-05-07 DIAGNOSIS — I10 BENIGN ESSENTIAL HYPERTENSION: ICD-10-CM

## 2024-05-07 DIAGNOSIS — R19.7 DIARRHEA, UNSPECIFIED TYPE: Primary | ICD-10-CM

## 2024-05-07 DIAGNOSIS — R82.79 POSITIVE URINE CULTURE: ICD-10-CM

## 2024-05-07 LAB
ALBUMIN SERPL-MCNC: 3.5 G/DL (ref 3.2–4.6)
ALBUMIN/GLOB SERPL: 1.7 (ref 1–1.9)
ALP SERPL-CCNC: 74 U/L (ref 35–104)
ALT SERPL-CCNC: 21 U/L (ref 12–65)
ANION GAP SERPL CALC-SCNC: 12 MMOL/L (ref 9–18)
APPEARANCE UR: CLEAR
AST SERPL-CCNC: 38 U/L (ref 15–37)
BACTERIA SPEC CULT: ABNORMAL
BACTERIA SPEC CULT: ABNORMAL
BACTERIA URNS QL MICRO: 0 /HPF
BASOPHILS # BLD: 0 K/UL (ref 0–0.2)
BASOPHILS NFR BLD: 0 % (ref 0–2)
BILIRUB SERPL-MCNC: 0.9 MG/DL (ref 0–1.2)
BILIRUB UR QL: ABNORMAL
BUN SERPL-MCNC: 21 MG/DL (ref 8–23)
C. DIFFICILE TOXIN MOLECULAR: NEGATIVE
CALCIUM SERPL-MCNC: 9.1 MG/DL (ref 8.8–10.2)
CASTS URNS QL MICRO: 0 /LPF
CHLORIDE SERPL-SCNC: 100 MMOL/L (ref 98–107)
CO2 SERPL-SCNC: 27 MMOL/L (ref 20–28)
COLOR UR: YELLOW
CREAT SERPL-MCNC: 1.57 MG/DL (ref 0.6–1.1)
CRYSTALS URNS QL MICRO: 0 /LPF
DIFFERENTIAL METHOD BLD: ABNORMAL
EOSINOPHIL # BLD: 0 K/UL (ref 0–0.8)
EOSINOPHIL NFR BLD: 0 % (ref 0.5–7.8)
EPI CELLS #/AREA URNS HPF: NORMAL /HPF
ERYTHROCYTE [DISTWIDTH] IN BLOOD BY AUTOMATED COUNT: 16.1 % (ref 11.9–14.6)
GLOBULIN SER CALC-MCNC: 2 G/DL (ref 2.3–3.5)
GLUCOSE SERPL-MCNC: 87 MG/DL (ref 70–99)
GLUCOSE UR STRIP.AUTO-MCNC: NEGATIVE MG/DL
HCT VFR BLD AUTO: 34.9 % (ref 35.8–46.3)
HEMOCCULT STL QL: POSITIVE
HGB BLD-MCNC: 11.6 G/DL (ref 11.7–15.4)
HGB UR QL STRIP: ABNORMAL
IMM GRANULOCYTES # BLD AUTO: 0.1 K/UL (ref 0–0.5)
IMM GRANULOCYTES NFR BLD AUTO: 2 % (ref 0–5)
KETONES UR QL STRIP.AUTO: NEGATIVE MG/DL
LACTATE SERPL-SCNC: 1 MMOL/L (ref 0.5–2)
LACTATE SERPL-SCNC: 1.6 MMOL/L (ref 0.5–2)
LEUKOCYTE ESTERASE UR QL STRIP.AUTO: ABNORMAL
LIPASE SERPL-CCNC: 46 U/L (ref 13–60)
LYMPHOCYTES # BLD: 0.1 K/UL (ref 0.5–4.6)
LYMPHOCYTES NFR BLD: 3 % (ref 13–44)
MAGNESIUM SERPL-MCNC: 1.8 MG/DL (ref 1.8–2.4)
MCH RBC QN AUTO: 29.5 PG (ref 26.1–32.9)
MCHC RBC AUTO-ENTMCNC: 33.2 G/DL (ref 31.4–35)
MCV RBC AUTO: 88.8 FL (ref 82–102)
MONOCYTES # BLD: 0.4 K/UL (ref 0.1–1.3)
MONOCYTES NFR BLD: 11 % (ref 4–12)
MUCOUS THREADS URNS QL MICRO: 0 /LPF
NEUTS SEG # BLD: 3 K/UL (ref 1.7–8.2)
NEUTS SEG NFR BLD: 85 % (ref 43–78)
NITRITE UR QL STRIP.AUTO: NEGATIVE
NRBC # BLD: 0 K/UL (ref 0–0.2)
OTHER OBSERVATIONS: NORMAL
PH UR STRIP: 6 (ref 5–9)
PLATELET # BLD AUTO: 42 K/UL (ref 150–450)
PMV BLD AUTO: 12 FL (ref 9.4–12.3)
POTASSIUM SERPL-SCNC: 2.5 MMOL/L (ref 3.5–5.1)
PROCALCITONIN SERPL-MCNC: 0.28 NG/ML (ref 0–0.1)
PROT SERPL-MCNC: 5.5 G/DL (ref 6.3–8.2)
PROT UR STRIP-MCNC: 100 MG/DL
RBC # BLD AUTO: 3.93 M/UL (ref 4.05–5.2)
RBC #/AREA URNS HPF: NORMAL /HPF
SERVICE CMNT-IMP: ABNORMAL
SODIUM SERPL-SCNC: 139 MMOL/L (ref 136–145)
SP GR UR REFRACTOMETRY: 1.02 (ref 1–1.02)
UROBILINOGEN UR QL STRIP.AUTO: 0.2 EU/DL (ref 0.2–1)
WBC # BLD AUTO: 3.6 K/UL (ref 4.3–11.1)
WBC URNS QL MICRO: NORMAL /HPF
YEAST URNS QL MICRO: NORMAL

## 2024-05-07 PROCEDURE — 81001 URINALYSIS AUTO W/SCOPE: CPT

## 2024-05-07 PROCEDURE — 87046 STOOL CULTR AEROBIC BACT EA: CPT

## 2024-05-07 PROCEDURE — 87493 C DIFF AMPLIFIED PROBE: CPT

## 2024-05-07 PROCEDURE — 93005 ELECTROCARDIOGRAM TRACING: CPT | Performed by: EMERGENCY MEDICINE

## 2024-05-07 PROCEDURE — 1100000000 HC RM PRIVATE

## 2024-05-07 PROCEDURE — 87045 FECES CULTURE AEROBIC BACT: CPT

## 2024-05-07 PROCEDURE — 96361 HYDRATE IV INFUSION ADD-ON: CPT

## 2024-05-07 PROCEDURE — 71045 X-RAY EXAM CHEST 1 VIEW: CPT

## 2024-05-07 PROCEDURE — 80053 COMPREHEN METABOLIC PANEL: CPT

## 2024-05-07 PROCEDURE — 87427 SHIGA-LIKE TOXIN AG IA: CPT

## 2024-05-07 PROCEDURE — 87507 IADNA-DNA/RNA PROBE TQ 12-25: CPT

## 2024-05-07 PROCEDURE — 82272 OCCULT BLD FECES 1-3 TESTS: CPT

## 2024-05-07 PROCEDURE — 87899 AGENT NOS ASSAY W/OPTIC: CPT

## 2024-05-07 PROCEDURE — 84145 PROCALCITONIN (PCT): CPT

## 2024-05-07 PROCEDURE — 1100000003 HC PRIVATE W/ TELEMETRY

## 2024-05-07 PROCEDURE — 87040 BLOOD CULTURE FOR BACTERIA: CPT

## 2024-05-07 PROCEDURE — 89055 LEUKOCYTE ASSESSMENT FECAL: CPT

## 2024-05-07 PROCEDURE — 6370000000 HC RX 637 (ALT 250 FOR IP): Performed by: HOSPITALIST

## 2024-05-07 PROCEDURE — 83735 ASSAY OF MAGNESIUM: CPT

## 2024-05-07 PROCEDURE — 96360 HYDRATION IV INFUSION INIT: CPT

## 2024-05-07 PROCEDURE — 6360000002 HC RX W HCPCS: Performed by: HOSPITALIST

## 2024-05-07 PROCEDURE — 83690 ASSAY OF LIPASE: CPT

## 2024-05-07 PROCEDURE — 6370000000 HC RX 637 (ALT 250 FOR IP): Performed by: EMERGENCY MEDICINE

## 2024-05-07 PROCEDURE — 2580000003 HC RX 258: Performed by: EMERGENCY MEDICINE

## 2024-05-07 PROCEDURE — 83605 ASSAY OF LACTIC ACID: CPT

## 2024-05-07 PROCEDURE — 85025 COMPLETE CBC W/AUTO DIFF WBC: CPT

## 2024-05-07 PROCEDURE — 99285 EMERGENCY DEPT VISIT HI MDM: CPT

## 2024-05-07 RX ORDER — ACETAMINOPHEN 325 MG/1
650 TABLET ORAL EVERY 6 HOURS PRN
Status: DISCONTINUED | OUTPATIENT
Start: 2024-05-07 | End: 2024-05-18 | Stop reason: HOSPADM

## 2024-05-07 RX ORDER — LINEZOLID 2 MG/ML
600 INJECTION, SOLUTION INTRAVENOUS ONCE
Status: DISCONTINUED | OUTPATIENT
Start: 2024-05-07 | End: 2024-05-07

## 2024-05-07 RX ORDER — SODIUM CHLORIDE, SODIUM LACTATE, POTASSIUM CHLORIDE, AND CALCIUM CHLORIDE .6; .31; .03; .02 G/100ML; G/100ML; G/100ML; G/100ML
2250 INJECTION, SOLUTION INTRAVENOUS
Status: COMPLETED | OUTPATIENT
Start: 2024-05-07 | End: 2024-05-07

## 2024-05-07 RX ORDER — ENOXAPARIN SODIUM 100 MG/ML
30 INJECTION SUBCUTANEOUS DAILY
Status: DISCONTINUED | OUTPATIENT
Start: 2024-05-08 | End: 2024-05-07

## 2024-05-07 RX ORDER — LANOLIN ALCOHOL/MO/W.PET/CERES
400 CREAM (GRAM) TOPICAL 2 TIMES DAILY
Status: DISCONTINUED | OUTPATIENT
Start: 2024-05-07 | End: 2024-05-09

## 2024-05-07 RX ORDER — SODIUM CHLORIDE 9 MG/ML
INJECTION, SOLUTION INTRAVENOUS PRN
Status: DISCONTINUED | OUTPATIENT
Start: 2024-05-07 | End: 2024-05-18 | Stop reason: HOSPADM

## 2024-05-07 RX ORDER — MIRTAZAPINE 15 MG/1
15 TABLET, FILM COATED ORAL NIGHTLY
Status: DISCONTINUED | OUTPATIENT
Start: 2024-05-07 | End: 2024-05-15

## 2024-05-07 RX ORDER — LINEZOLID 2 MG/ML
600 INJECTION, SOLUTION INTRAVENOUS EVERY 12 HOURS
Status: COMPLETED | OUTPATIENT
Start: 2024-05-07 | End: 2024-05-12

## 2024-05-07 RX ORDER — SODIUM CHLORIDE 0.9 % (FLUSH) 0.9 %
5-40 SYRINGE (ML) INJECTION PRN
Status: DISCONTINUED | OUTPATIENT
Start: 2024-05-07 | End: 2024-05-18 | Stop reason: HOSPADM

## 2024-05-07 RX ORDER — LANOLIN ALCOHOL/MO/W.PET/CERES
400 CREAM (GRAM) TOPICAL DAILY
Status: DISCONTINUED | OUTPATIENT
Start: 2024-05-07 | End: 2024-05-07

## 2024-05-07 RX ORDER — POTASSIUM CHLORIDE 20 MEQ/1
40 TABLET, EXTENDED RELEASE ORAL ONCE
Status: COMPLETED | OUTPATIENT
Start: 2024-05-07 | End: 2024-05-07

## 2024-05-07 RX ORDER — SODIUM CHLORIDE AND POTASSIUM CHLORIDE 300; 900 MG/100ML; MG/100ML
INJECTION, SOLUTION INTRAVENOUS CONTINUOUS
Status: DISPENSED | OUTPATIENT
Start: 2024-05-07 | End: 2024-05-08

## 2024-05-07 RX ORDER — ONDANSETRON 2 MG/ML
4 INJECTION INTRAMUSCULAR; INTRAVENOUS EVERY 6 HOURS PRN
Status: DISCONTINUED | OUTPATIENT
Start: 2024-05-07 | End: 2024-05-18 | Stop reason: HOSPADM

## 2024-05-07 RX ORDER — SODIUM CHLORIDE 0.9 % (FLUSH) 0.9 %
5-40 SYRINGE (ML) INJECTION EVERY 12 HOURS SCHEDULED
Status: DISCONTINUED | OUTPATIENT
Start: 2024-05-07 | End: 2024-05-18 | Stop reason: HOSPADM

## 2024-05-07 RX ORDER — ONDANSETRON 4 MG/1
4 TABLET, ORALLY DISINTEGRATING ORAL EVERY 8 HOURS PRN
Status: DISCONTINUED | OUTPATIENT
Start: 2024-05-07 | End: 2024-05-18 | Stop reason: HOSPADM

## 2024-05-07 RX ORDER — POLYETHYLENE GLYCOL 3350 17 G/17G
17 POWDER, FOR SOLUTION ORAL DAILY PRN
Status: DISCONTINUED | OUTPATIENT
Start: 2024-05-07 | End: 2024-05-07

## 2024-05-07 RX ORDER — LOPERAMIDE HYDROCHLORIDE 2 MG/1
4 CAPSULE ORAL 3 TIMES DAILY
Status: DISCONTINUED | OUTPATIENT
Start: 2024-05-08 | End: 2024-05-09

## 2024-05-07 RX ORDER — ACETAMINOPHEN 650 MG/1
650 SUPPOSITORY RECTAL EVERY 6 HOURS PRN
Status: DISCONTINUED | OUTPATIENT
Start: 2024-05-07 | End: 2024-05-09

## 2024-05-07 RX ORDER — ACYCLOVIR 400 MG/1
200 TABLET ORAL 2 TIMES DAILY
Status: DISCONTINUED | OUTPATIENT
Start: 2024-05-07 | End: 2024-05-18 | Stop reason: HOSPADM

## 2024-05-07 RX ORDER — POTASSIUM CHLORIDE 20 MEQ/1
40 TABLET, EXTENDED RELEASE ORAL ONCE
Status: COMPLETED | OUTPATIENT
Start: 2024-05-08 | End: 2024-05-08

## 2024-05-07 RX ORDER — LANOLIN ALCOHOL/MO/W.PET/CERES
6 CREAM (GRAM) TOPICAL NIGHTLY PRN
Status: DISCONTINUED | OUTPATIENT
Start: 2024-05-07 | End: 2024-05-14

## 2024-05-07 RX ORDER — LOPERAMIDE HYDROCHLORIDE 2 MG/1
4 CAPSULE ORAL 2 TIMES DAILY
Status: DISCONTINUED | OUTPATIENT
Start: 2024-05-08 | End: 2024-05-07

## 2024-05-07 RX ORDER — GRANULES FOR ORAL 3 G/1
3 POWDER ORAL ONCE
Status: DISCONTINUED | OUTPATIENT
Start: 2024-05-07 | End: 2024-05-07

## 2024-05-07 RX ADMIN — POTASSIUM CHLORIDE 40 MEQ: 1500 TABLET, EXTENDED RELEASE ORAL at 22:20

## 2024-05-07 RX ADMIN — ACYCLOVIR 200 MG: 400 TABLET ORAL at 23:57

## 2024-05-07 RX ADMIN — POTASSIUM CHLORIDE AND SODIUM CHLORIDE: 900; 300 INJECTION, SOLUTION INTRAVENOUS at 23:45

## 2024-05-07 RX ADMIN — SODIUM CHLORIDE, POTASSIUM CHLORIDE, SODIUM LACTATE AND CALCIUM CHLORIDE 2250 ML: 600; 310; 30; 20 INJECTION, SOLUTION INTRAVENOUS at 17:38

## 2024-05-07 ASSESSMENT — PAIN DESCRIPTION - LOCATION: LOCATION: ABDOMEN

## 2024-05-07 ASSESSMENT — PAIN DESCRIPTION - DESCRIPTORS: DESCRIPTORS: DULL

## 2024-05-07 ASSESSMENT — PAIN SCALES - GENERAL: PAINLEVEL_OUTOF10: 5

## 2024-05-07 ASSESSMENT — PAIN DESCRIPTION - ORIENTATION: ORIENTATION: UPPER

## 2024-05-07 NOTE — ED PROVIDER NOTES
Emergency Department Provider Note                   PCP:                Ricky García DO               Age: 84 y.o.      Sex: female   Final diagnosis/impression:  1. Diarrhea, unspecified type    2. Hypokalemia    3. Pancytopenia (HCC)    4. Positive urine culture       Disposition: Admit to Hospitalist    MDM/Clinical Course:  Patient seen by myself at the [facility] emergency department. Patient had signs symptoms and clinical history most consistent with diarrheal symptoms, recently diagnosed VRE UTI. My independent analysis/interpretation of laboratory work-up here shows CBC shows pancytopenia with white blood cell at 3.6 which is near baseline, 11.6 hemoglobin which is near baseline, platelets at 42 which is generally near baseline.  CMP shows evidence of hypokalemia at 2.5, magnesium borderline low at 1.8, creatinine elevated from previous, today 1.57 with elevated BUN at 21 suspect this is dehydration related overall, still improved from recent hospitalization where creatinine was over 2.  Lactic acid negative x 2.  Procalcitonin minimally elevated at 0.28 unclear etiology/significance potentially related to renal dysfunction versus nonspecific inflammatory versus sepsis all considered.  Urinalysis shows moderate urine blood but is otherwise generally unremarkable for signs of UTI.  That being said patient urinalysis from 5/2/2024 was noted to be positive for enteric coccus faecium that is multi resistant including to vancomycin. While under my care, patient received IV fluids, IV linezolid ordered after some discussion with pharmacy here in the ER.  In considertion of patient clinical presentation, laboratory/radiologic findings, diagnoses/conditions previously discussed and clinical course as previously discussed, I did feel it appropriate to admit the patient for further evaluation, observation and management.  I communicated with the hospitalist in detail about the patient and they

## 2024-05-07 NOTE — ED TRIAGE NOTES
Pt arrives via EMS from home. HH nurse states pt is dehydrated. Pt c/o diarrhea since last night and generalized weakness. Pt had chemo treatment yesterday. Pt reports abd discomfort.       150/80. HR 65. 2L that wears at home 91% RA. Bgl 102. 97.1 ax.

## 2024-05-08 PROBLEM — D61.818 PANCYTOPENIA (HCC): Status: ACTIVE | Noted: 2024-05-08

## 2024-05-08 LAB
ANION GAP SERPL CALC-SCNC: 8 MMOL/L (ref 9–18)
BUN SERPL-MCNC: 20 MG/DL (ref 8–23)
CALCIUM SERPL-MCNC: 8.5 MG/DL (ref 8.8–10.2)
CHLORIDE SERPL-SCNC: 105 MMOL/L (ref 98–107)
CO2 SERPL-SCNC: 24 MMOL/L (ref 20–28)
CREAT SERPL-MCNC: 1.5 MG/DL (ref 0.6–1.1)
EKG ATRIAL RATE: 65 BPM
EKG DIAGNOSIS: NORMAL
EKG P AXIS: 66 DEGREES
EKG P-R INTERVAL: 52 MS
EKG Q-T INTERVAL: 423 MS
EKG QRS DURATION: 113 MS
EKG QTC CALCULATION (BAZETT): 440 MS
EKG R AXIS: -35 DEGREES
EKG T AXIS: 75 DEGREES
EKG VENTRICULAR RATE: 65 BPM
GLUCOSE SERPL-MCNC: 121 MG/DL (ref 70–99)
MAGNESIUM SERPL-MCNC: 1.6 MG/DL (ref 1.8–2.4)
POTASSIUM SERPL-SCNC: 2.9 MMOL/L (ref 3.5–5.1)
POTASSIUM SERPL-SCNC: 3.2 MMOL/L (ref 3.5–5.1)
POTASSIUM SERPL-SCNC: 7.9 MMOL/L (ref 3.5–5.1)
SODIUM SERPL-SCNC: 136 MMOL/L (ref 136–145)

## 2024-05-08 PROCEDURE — 6360000002 HC RX W HCPCS: Performed by: HOSPITALIST

## 2024-05-08 PROCEDURE — 6360000002 HC RX W HCPCS: Performed by: NURSE PRACTITIONER

## 2024-05-08 PROCEDURE — 36592 COLLECT BLOOD FROM PICC: CPT

## 2024-05-08 PROCEDURE — 1100000003 HC PRIVATE W/ TELEMETRY

## 2024-05-08 PROCEDURE — 2580000003 HC RX 258: Performed by: HOSPITALIST

## 2024-05-08 PROCEDURE — 83735 ASSAY OF MAGNESIUM: CPT

## 2024-05-08 PROCEDURE — 93010 ELECTROCARDIOGRAM REPORT: CPT | Performed by: INTERNAL MEDICINE

## 2024-05-08 PROCEDURE — 80048 BASIC METABOLIC PNL TOTAL CA: CPT

## 2024-05-08 PROCEDURE — 6370000000 HC RX 637 (ALT 250 FOR IP): Performed by: HOSPITALIST

## 2024-05-08 PROCEDURE — 84132 ASSAY OF SERUM POTASSIUM: CPT

## 2024-05-08 RX ORDER — MAGNESIUM SULFATE IN WATER 40 MG/ML
2000 INJECTION, SOLUTION INTRAVENOUS ONCE
Status: COMPLETED | OUTPATIENT
Start: 2024-05-08 | End: 2024-05-08

## 2024-05-08 RX ADMIN — LOPERAMIDE HYDROCHLORIDE 4 MG: 2 CAPSULE ORAL at 08:20

## 2024-05-08 RX ADMIN — SODIUM CHLORIDE, PRESERVATIVE FREE 10 ML: 5 INJECTION INTRAVENOUS at 22:09

## 2024-05-08 RX ADMIN — Medication 6 MG: at 00:06

## 2024-05-08 RX ADMIN — POTASSIUM CHLORIDE 40 MEQ: 1500 TABLET, EXTENDED RELEASE ORAL at 08:20

## 2024-05-08 RX ADMIN — LINEZOLID 600 MG: 600 INJECTION, SOLUTION INTRAVENOUS at 00:02

## 2024-05-08 RX ADMIN — POTASSIUM CHLORIDE AND SODIUM CHLORIDE: 900; 300 INJECTION, SOLUTION INTRAVENOUS at 12:42

## 2024-05-08 RX ADMIN — LINEZOLID 600 MG: 600 INJECTION, SOLUTION INTRAVENOUS at 11:04

## 2024-05-08 RX ADMIN — SODIUM CHLORIDE, PRESERVATIVE FREE 10 ML: 5 INJECTION INTRAVENOUS at 00:03

## 2024-05-08 RX ADMIN — SODIUM CHLORIDE, PRESERVATIVE FREE 10 ML: 5 INJECTION INTRAVENOUS at 08:22

## 2024-05-08 RX ADMIN — Medication 6 MG: at 22:07

## 2024-05-08 RX ADMIN — LOPERAMIDE HYDROCHLORIDE 4 MG: 2 CAPSULE ORAL at 11:04

## 2024-05-08 RX ADMIN — MAGNESIUM GLUCONATE 500 MG ORAL TABLET 400 MG: 500 TABLET ORAL at 08:20

## 2024-05-08 RX ADMIN — MAGNESIUM GLUCONATE 500 MG ORAL TABLET 400 MG: 500 TABLET ORAL at 00:02

## 2024-05-08 RX ADMIN — MIRTAZAPINE 15 MG: 15 TABLET, FILM COATED ORAL at 22:07

## 2024-05-08 RX ADMIN — ACYCLOVIR 200 MG: 400 TABLET ORAL at 22:07

## 2024-05-08 RX ADMIN — LINEZOLID 600 MG: 600 INJECTION, SOLUTION INTRAVENOUS at 23:13

## 2024-05-08 RX ADMIN — ACYCLOVIR 200 MG: 400 TABLET ORAL at 08:20

## 2024-05-08 RX ADMIN — LOPERAMIDE HYDROCHLORIDE 4 MG: 2 CAPSULE ORAL at 00:35

## 2024-05-08 RX ADMIN — MAGNESIUM SULFATE HEPTAHYDRATE 2000 MG: 40 INJECTION, SOLUTION INTRAVENOUS at 14:25

## 2024-05-08 ASSESSMENT — PAIN SCALES - GENERAL
PAINLEVEL_OUTOF10: 0
PAINLEVEL_OUTOF10: 0

## 2024-05-08 NOTE — ACP (ADVANCE CARE PLANNING)
Advance Care Planning   Healthcare Decision Maker:    Primary Decision Maker: Anyi Felix - Child - [phone]    Click here to complete Healthcare Decision Makers including selection of the Healthcare Decision Maker Relationship (ie \"Primary\").  Today we documented Decision Maker(s) consistent with ACP documents on file.       If the relationship to the patient does NOT follow our state's Next of Kin hierarchy, the patient MUST complete an ACP Document to allow him/her to act on the patient's behalf. :  ACP document on file.  Full Code per physician order.

## 2024-05-08 NOTE — H&P
[unfilled]    INTERNAL MEDICINE H&P/CONSULT    Subjective:     84 y.o. female with past medical history of hypertension, obesity, hyperlipidemia, CKD2, multiple myeloma present w/ recurrent intense non-bloody diarrhea, generalized abdominal cramping since yesterday.   Of note, patient with urinalysis that is VRE positive with sample collected on 5/2/2024, it appears she has been on recent antibiotics including Keflex, Levaquin.  No reported fever/chills, no ongoing pain symptoms.    On further questioning, no fever or chills or sick contacts. Feel overall weak.  at bedside. No blood or mucus in her watery severe BM.    Past Medical History:   Diagnosis Date    Acute respiratory failure with hypoxemia (HCC)     Benign essential hypertension 2/11/2015    medication    Bilateral leg edema 4/26/2017    Cancer (HCC)     skin    Hiatal hernia     \"large\"    Hypercholesteremia 2/11/2015    IFG (impaired fasting glucose) 4/26/2017    Iron deficiency anemia 5/12/2016    Low oxygen saturation     per office notes- patient's daughter reported O2 sat drops to 85% when supine, she uses O2    Mixed hyperlipidemia 2/11/2015    Morbid obesity (HCC) 10/26/2017    Nipple discharge     not current as of 7/17/13    Primary insomnia 4/26/2018    Primary osteoarthritis of both knees 4/26/2018    Pulmonary embolism (HCC) 10/15/2023    on eliquis- Dr. Arnold recommended eliquis full dose x 6 months    Stage 3 chronic kidney disease (HCC) 7/17/2019      Past Surgical History:   Procedure Laterality Date    HIATAL HERNIA REPAIR N/A 3/4/2024    ROBOTIC HIATAL HERNIA REPAIR performed by Thomas Lozano MD at [facility] MAIN OR    MALIGNANT SKIN LESION EXCISION      OTHER SURGICAL HISTORY      skin cancer    UPPER GASTROINTESTINAL ENDOSCOPY N/A 4/13/2024    ESOPHAGOGASTRODUODENOSCOPY performed by Alejandro Vieyra MD at [facility] ENDOSCOPY    UPPER GASTROINTESTINAL ENDOSCOPY N/A 4/15/2024    ESOPHAGOGASTRODUODENOSCOPY performed by Jarrell

## 2024-05-08 NOTE — CARE COORDINATION
ASSESSMENT NOTE    Attending Physician: Abner Darden MD  Admit Problem: Hypokalemia [E87.6]  Acute diarrhea [R19.7]  Positive urine culture [R82.79]  Pancytopenia (HCC) [D61.818]  Diarrhea, unspecified type [R19.7]  Date/Time of Admission: 5/7/2024  4:13 PM  Problem List:  Patient Active Problem List   Diagnosis    Primary osteoarthritis of both knees    Mixed hyperlipidemia    Benign essential hypertension    Bilateral leg edema    IFG (impaired fasting glucose)    Morbid obesity (HCC)    Vitamin D deficiency    Seasonal allergic rhinitis due to pollen    Pulmonary embolism without acute cor pulmonale, unspecified chronicity, unspecified pulmonary embolism type (HCC)    Moderate protein-calorie malnutrition (HCC)    Hx of hiatal hernia    Failure to thrive in adult    Anemia    Multiple myeloma not having achieved remission (HCC)    Thrombocytopenia (HCC)    Hypokalemia    Hypomagnesemia    Edema    Fatigue    Frailty    Debility    Constipation    Urinary retention    Acute diarrhea       Service Assessment  Patient Orientation Alert and Oriented, Person, Place, Self   Cognition Alert   History Provided By Patient, Medical Record   Primary Caregiver Self   Accompanied By/Relationship N/A   Support Systems Children, Family Members, Other (Comment) (Current with [facility])   Patient's Healthcare Decision Maker is: Named in Scanned ACP Document   PCP Verified by CM Yes (Ricky García, DO)   Last Visit to PCP Within last 3 months   Prior Functional Level Assistance with the following:, Mobility   Current Functional Level Assistance with the following:, Mobility   Can patient return to prior living arrangement Yes   Ability to make needs known: Good   Family able to assist with home care needs: Yes   Would you like for me to discuss the discharge plan with any other family members/significant others, and if so, who? No   Financial Resources Medicare ([employer])   Community Resources None   CM/SW Referral Other

## 2024-05-08 NOTE — PROGRESS NOTES
Hospitalist Progress Note   Admit Date:  2024  4:13 PM   Name:  Tayla Escobar   Age:  84 y.o.  Sex:  female  :  1940   MRN:  [id number]   Room:  [facility]    Presenting/Chief Complaint: Diarrhea     Reason(s) for Admission: Hypokalemia [E87.6]  Acute diarrhea [R19.7]  Positive urine culture [R82.79]  Pancytopenia (HCC) [D61.818]  Diarrhea, unspecified type [R19.7]     Hospital Course:   Please refer to the admission H&P for details of presentation.      In summary, Tayla Escobar is a 84 y.o. female with medical history significant for hypertension, hyperlipidemia, CKD stage II, multiple myeloma, history of DVT who presented with nonbloody diarrhea, generalized abdominal pain x 1 day.    Subjective/24 hr Events (24) :  Patient is seen and examined at bedside.  No acute events reported overnight by nursing staff.  Patient laying in bed.  Alert awake and oriented x 3.  Patient reports that she has had diarrhea in the past after chemotherapy.  Patient had chemotherapy session last week.  Reports she had 4-5 episodes of loose-watery BM since yesterday.  Reports having abdominal pain with her bowel movements but otherwise  Patient denies fever, chills, chest pains, shortness of breath, n/v, abdominal pain.    Assessment & Plan:   Acute diarrhea  Possibly related to chemotherapy as patient states she has had diarrhea after chemotherapy in the past.  Patient last chemotherapy was 2024.  -Check stool sample.  -Antidiarrheal medication as needed  -Currently without any concern for C. difficile as patient's bowel movement has been loose/watery.  -IVF w/ Kcl+NS    UTI  +UA on  with culture showing VRE.  Start on IV linezolid      Hypokalemia: K+ of 2.9. replete with 40mEq of Kcl.  Recheck K+ tomorrow      Multiple myeloma // Pancytopenia: Patient is following with oncology. Resume acyclovir  PE: holding eliquis per chart review from oncology due to thrombocytopenia  Poor p.o. intake/poor  5-40 mL  5-40 mL IntraVENous PRN       Signed:  Abner Darden MD    Part of this note may have been written by using a voice dictation software.  The note has been proof read but may still contain some grammatical/other typographical errors.

## 2024-05-08 NOTE — ED NOTES
TRANSFER - OUT REPORT:    Verbal report given to CHANTEL Hodge on Tayla Escobar  being transferred to [facility] for routine progression of patient care       Report consisted of patient's Situation, Background, Assessment and   Recommendations(SBAR).     Information from the following report(s) Nurse Handoff Report was reviewed with the receiving nurse.    Susanna Fall Assessment:    Presents to emergency department  because of falls (Syncope, seizure, or loss of consciousness): No  Age > 70: No  Altered Mental Status, Intoxication with alcohol or substance confusion (Disorientation, impaired judgment, poor safety awaremess, or inability to follow instructions): No  Impaired Mobility: Ambulates or transfers with assistive devices or assistance; Unable to ambulate or transer.: No  Nursing Judgement: No          Lines:   PICC 05/04/24 Right Basilic (Active)       Peripheral IV Left Forearm (Active)        Opportunity for questions and clarification was provided.      Patient transported with:  Hussein Arechiga RN  05/07/24 [id number]

## 2024-05-08 NOTE — PROGRESS NOTES
Comprehensive Nutrition Assessment    Type and Reason for Visit: Initial  Malnutrition Screening Tool: Malnutrition Screen  Have you recently lost weight without trying?: 2 to 13 pounds (1 point)  Have you been eating poorly because of a decreased appetite?: Yes (1 point)  Malnutrition Screening Tool Score: 2    Nutrition Recommendations/Plan:   Meals and Snacks:  Diet: Continue current diet and advance as medically appropriate  Nutrition Supplement Therapy:  Medical food supplement therapy:  Initiate Ensure Enlive once per day (this provides 350 kcal and 20 grams protein per bottle) and Ensure Clear three times per day (this provides 240 kcal and 8 grams protein per bottle)       Malnutrition Assessment:  Malnutrition Status: Moderate malnutrition  Context:    Findings of clinical characteristics of malnutrition:   Energy Intake:  Mild decrease in energy intake (Comment)  Weight Loss:  No significant weight loss     Body Fat Loss:  Mild body fat loss Orbital, Buccal region, Triceps   Muscle Mass Loss:  Mild muscle mass loss Temples (temporalis), Clavicles (pectoralis & deltoids), Hand (interosseous)  Fluid Accumulation:  Unable to assess     Strength:  Not Performed  No tanika wasting  Nutrition Assessment:  Nutrition History: 4/11: Daughter assists patient in providing nutrition hx. For 2-3 weeks prior to hiatal hernia reports patient appetite was limited, but ever since she was discharged 3/11 patient has taken little to no PO on a daily basis. Patient endorses that she has had consistent nausea (varying in intensity) for the past month. Reports that when she eats solid foods they get \"stuck\" and points to her sternum. Daughter reports she is able to take some fluids, but has declined any oral nutrition supplements as of late. Drinking small amounts of fluids and pureed foods in the month prior to admission. Additionally reports constipation x 1 week pta which resolved 4/10 with medications.   5/8: Recent  Continue Current Diet, Start Oral Nutrition Supplement     Coordination of Nutrition Care: Continue to monitor while inpatient      Goals:      Active Goal: PO intake 50% or greater, by next RD assessment       Nutrition Monitoring and Evaluation:      Food/Nutrient Intake Outcomes: Food and Nutrient Intake, Supplement Intake  Physical Signs/Symptoms Outcomes: Weight, Diarrhea, Meal Time Behavior    Discharge Planning:    Continue Oral Nutrition Supplement    Kenia Pan, RD

## 2024-05-09 PROBLEM — N39.0 UTI (URINARY TRACT INFECTION) DUE TO ENTEROCOCCUS: Status: ACTIVE | Noted: 2024-05-09

## 2024-05-09 PROBLEM — B95.2 UTI (URINARY TRACT INFECTION) DUE TO ENTEROCOCCUS: Status: ACTIVE | Noted: 2024-05-09

## 2024-05-09 LAB
ANION GAP SERPL CALC-SCNC: 10 MMOL/L (ref 9–18)
BASOPHILS # BLD: 0 K/UL (ref 0–0.2)
BASOPHILS NFR BLD: 0 % (ref 0–2)
BUN SERPL-MCNC: 19 MG/DL (ref 8–23)
CALCIUM SERPL-MCNC: 8.3 MG/DL (ref 8.8–10.2)
CHLORIDE SERPL-SCNC: 104 MMOL/L (ref 98–107)
CO2 SERPL-SCNC: 22 MMOL/L (ref 20–28)
CREAT SERPL-MCNC: 1.41 MG/DL (ref 0.6–1.1)
DIFFERENTIAL METHOD BLD: ABNORMAL
EOSINOPHIL # BLD: 0 K/UL (ref 0–0.8)
EOSINOPHIL NFR BLD: 4 % (ref 0.5–7.8)
ERYTHROCYTE [DISTWIDTH] IN BLOOD BY AUTOMATED COUNT: 16.7 % (ref 11.9–14.6)
GASTROINTESTINAL PATHOGEN PANEL: NORMAL
GLUCOSE SERPL-MCNC: 99 MG/DL (ref 70–99)
HCT VFR BLD AUTO: 31.1 % (ref 35.8–46.3)
HGB BLD-MCNC: 9.9 G/DL (ref 11.7–15.4)
IMM GRANULOCYTES # BLD AUTO: 0 K/UL (ref 0–0.5)
IMM GRANULOCYTES NFR BLD AUTO: 1 % (ref 0–5)
LYMPHOCYTES # BLD: 0.1 K/UL (ref 0.5–4.6)
LYMPHOCYTES NFR BLD: 6 % (ref 13–44)
MCH RBC QN AUTO: 29.3 PG (ref 26.1–32.9)
MCHC RBC AUTO-ENTMCNC: 31.8 G/DL (ref 31.4–35)
MCV RBC AUTO: 92 FL (ref 82–102)
MONOCYTES # BLD: 0.2 K/UL (ref 0.1–1.3)
MONOCYTES NFR BLD: 20 % (ref 4–12)
NEUTS SEG # BLD: 0.6 K/UL (ref 1.7–8.2)
NEUTS SEG NFR BLD: 69 % (ref 43–78)
NRBC # BLD: 0.02 K/UL (ref 0–0.2)
PLATELET # BLD AUTO: 29 K/UL (ref 150–450)
PLATELET # BLD AUTO: 62 K/UL (ref 150–450)
PLATELET COMMENT: ABNORMAL
PMV BLD AUTO: ABNORMAL FL (ref 9.4–12.3)
POTASSIUM SERPL-SCNC: 3.8 MMOL/L (ref 3.5–5.1)
RBC # BLD AUTO: 3.38 M/UL (ref 4.05–5.2)
RBC MORPH BLD: ABNORMAL
SODIUM SERPL-SCNC: 135 MMOL/L (ref 136–145)
WBC # BLD AUTO: 0.9 K/UL (ref 4.3–11.1)
WBC MORPH BLD: ABNORMAL

## 2024-05-09 PROCEDURE — 36430 TRANSFUSION BLD/BLD COMPNT: CPT

## 2024-05-09 PROCEDURE — 1170000000 HC RM PRIVATE ONCOLOGY

## 2024-05-09 PROCEDURE — 30233R1 TRANSFUSION OF NONAUTOLOGOUS PLATELETS INTO PERIPHERAL VEIN, PERCUTANEOUS APPROACH: ICD-10-PCS | Performed by: INTERNAL MEDICINE

## 2024-05-09 PROCEDURE — 80048 BASIC METABOLIC PNL TOTAL CA: CPT

## 2024-05-09 PROCEDURE — 6370000000 HC RX 637 (ALT 250 FOR IP): Performed by: INTERNAL MEDICINE

## 2024-05-09 PROCEDURE — P9037 PLATE PHERES LEUKOREDU IRRAD: HCPCS

## 2024-05-09 PROCEDURE — APPSS60 APP SPLIT SHARED TIME 46-60 MINUTES: Performed by: NURSE PRACTITIONER

## 2024-05-09 PROCEDURE — 85025 COMPLETE CBC W/AUTO DIFF WBC: CPT

## 2024-05-09 PROCEDURE — 6360000002 HC RX W HCPCS: Performed by: NURSE PRACTITIONER

## 2024-05-09 PROCEDURE — 2580000003 HC RX 258: Performed by: NURSE PRACTITIONER

## 2024-05-09 PROCEDURE — 2580000003 HC RX 258: Performed by: INTERNAL MEDICINE

## 2024-05-09 PROCEDURE — 85049 AUTOMATED PLATELET COUNT: CPT

## 2024-05-09 PROCEDURE — A4216 STERILE WATER/SALINE, 10 ML: HCPCS | Performed by: NURSE PRACTITIONER

## 2024-05-09 PROCEDURE — C9113 INJ PANTOPRAZOLE SODIUM, VIA: HCPCS | Performed by: NURSE PRACTITIONER

## 2024-05-09 PROCEDURE — 36592 COLLECT BLOOD FROM PICC: CPT

## 2024-05-09 PROCEDURE — 6370000000 HC RX 637 (ALT 250 FOR IP): Performed by: HOSPITALIST

## 2024-05-09 PROCEDURE — 6370000000 HC RX 637 (ALT 250 FOR IP): Performed by: NURSE PRACTITIONER

## 2024-05-09 PROCEDURE — 6360000002 HC RX W HCPCS: Performed by: HOSPITALIST

## 2024-05-09 PROCEDURE — 99223 1ST HOSP IP/OBS HIGH 75: CPT | Performed by: INTERNAL MEDICINE

## 2024-05-09 PROCEDURE — 2580000003 HC RX 258: Performed by: HOSPITALIST

## 2024-05-09 PROCEDURE — 6370000000 HC RX 637 (ALT 250 FOR IP)

## 2024-05-09 RX ORDER — DIPHENHYDRAMINE HCL 25 MG
25 CAPSULE ORAL SEE ADMIN INSTRUCTIONS
Status: DISCONTINUED | OUTPATIENT
Start: 2024-05-09 | End: 2024-05-15

## 2024-05-09 RX ORDER — ACETAMINOPHEN 325 MG/1
650 TABLET ORAL SEE ADMIN INSTRUCTIONS
Status: DISCONTINUED | OUTPATIENT
Start: 2024-05-09 | End: 2024-05-15

## 2024-05-09 RX ORDER — MORPHINE SULFATE 2 MG/ML
2 INJECTION, SOLUTION INTRAMUSCULAR; INTRAVENOUS EVERY 4 HOURS PRN
Status: DISCONTINUED | OUTPATIENT
Start: 2024-05-09 | End: 2024-05-18 | Stop reason: HOSPADM

## 2024-05-09 RX ORDER — ALLOPURINOL 300 MG/1
300 TABLET ORAL DAILY
Status: DISCONTINUED | OUTPATIENT
Start: 2024-05-09 | End: 2024-05-18 | Stop reason: HOSPADM

## 2024-05-09 RX ORDER — LACTOBACILLUS RHAMNOSUS GG 10B CELL
1 CAPSULE ORAL
Status: DISCONTINUED | OUTPATIENT
Start: 2024-05-09 | End: 2024-05-18 | Stop reason: HOSPADM

## 2024-05-09 RX ORDER — HYDROCODONE BITARTRATE AND ACETAMINOPHEN 5; 325 MG/1; MG/1
1 TABLET ORAL EVERY 6 HOURS PRN
Status: DISCONTINUED | OUTPATIENT
Start: 2024-05-09 | End: 2024-05-18 | Stop reason: HOSPADM

## 2024-05-09 RX ORDER — TEMAZEPAM 15 MG/1
15 CAPSULE ORAL NIGHTLY PRN
Status: DISCONTINUED | OUTPATIENT
Start: 2024-05-09 | End: 2024-05-18 | Stop reason: HOSPADM

## 2024-05-09 RX ORDER — SODIUM CHLORIDE 9 MG/ML
INJECTION, SOLUTION INTRAVENOUS CONTINUOUS
Status: DISCONTINUED | OUTPATIENT
Start: 2024-05-09 | End: 2024-05-10

## 2024-05-09 RX ORDER — SODIUM CHLORIDE 9 MG/ML
INJECTION, SOLUTION INTRAVENOUS PRN
Status: DISCONTINUED | OUTPATIENT
Start: 2024-05-09 | End: 2024-05-15

## 2024-05-09 RX ORDER — LOPERAMIDE HYDROCHLORIDE 2 MG/1
2 CAPSULE ORAL 4 TIMES DAILY PRN
Status: DISCONTINUED | OUTPATIENT
Start: 2024-05-09 | End: 2024-05-16

## 2024-05-09 RX ADMIN — MIRTAZAPINE 15 MG: 15 TABLET, FILM COATED ORAL at 21:04

## 2024-05-09 RX ADMIN — TEMAZEPAM 15 MG: 15 CAPSULE ORAL at 21:03

## 2024-05-09 RX ADMIN — LINEZOLID 600 MG: 600 INJECTION, SOLUTION INTRAVENOUS at 12:51

## 2024-05-09 RX ADMIN — LINEZOLID 600 MG: 600 INJECTION, SOLUTION INTRAVENOUS at 23:24

## 2024-05-09 RX ADMIN — SODIUM CHLORIDE: 9 INJECTION, SOLUTION INTRAVENOUS at 09:43

## 2024-05-09 RX ADMIN — SODIUM CHLORIDE, PRESERVATIVE FREE 40 MG: 5 INJECTION INTRAVENOUS at 23:22

## 2024-05-09 RX ADMIN — SODIUM CHLORIDE, PRESERVATIVE FREE 10 ML: 5 INJECTION INTRAVENOUS at 08:14

## 2024-05-09 RX ADMIN — SODIUM CHLORIDE, PRESERVATIVE FREE 10 ML: 5 INJECTION INTRAVENOUS at 23:24

## 2024-05-09 RX ADMIN — ACYCLOVIR 200 MG: 400 TABLET ORAL at 21:04

## 2024-05-09 RX ADMIN — Medication 1 CAPSULE: at 08:13

## 2024-05-09 RX ADMIN — SODIUM CHLORIDE, PRESERVATIVE FREE 40 MG: 5 INJECTION INTRAVENOUS at 10:54

## 2024-05-09 RX ADMIN — ACYCLOVIR 200 MG: 400 TABLET ORAL at 08:13

## 2024-05-09 RX ADMIN — ALLOPURINOL 300 MG: 300 TABLET ORAL at 08:13

## 2024-05-09 NOTE — PROGRESS NOTES
Physician Progress Note      PATIENT:               ZIYAD MENDEZ  [facility] #:                  [id number]  :                       1940  ADMIT DATE:       2024 4:13 PM  DISCH DATE:  RESPONDING  PROVIDER #:        Abner Darden MD          QUERY TEXT:    Patient admitted with diarrhea and has documented pancytopenia If possible,   please document in progress notes and discharge summary further specificity   regarding pancytopenia:    The medical record reflects the following:  Risk Factors: 84 YOF, Multiple Myeloma on Chemo, CKD2,  Clinical Indicators: WBC 3.6 RBC 3.93 HGB 11.6 PLTs 42   PN: Multiple myeloma, Pancytopenia: Patient is following with oncology.   Resume acyclovir  Treatment: serial labs, holding Eliquis,    Thank You,  Elizabeth Jorge RN. C BSN  Clinical   O: [phone]   [email or web address]  Options provided:  -- Antineoplastic (chemotherapy) induced pancytopenia  -- Pancytopenia due to aplastic anemia  -- Pancytopenia due to, Please specify and add to PN or DCS.  -- Other - I will add my own diagnosis  -- Disagree - Not applicable / Not valid  -- Disagree - Clinically unable to determine / Unknown  -- Refer to Clinical Documentation Reviewer    PROVIDER RESPONSE TEXT:    Patient has antineoplastic (chemotherapy) induced pancytopenia.    Query created by: Elizabeth Jorge on 2024 9:08 PM      QUERY TEXT:    Patient admitted with hypovolemia and diarrhea. Noted to have moderate   malnutrition in RD note on 24. If possible, please document in progress   notes and discharge summary if you are evaluating and /or treating any of the   following:    The medical record reflects the following:  Risk Factors: 84 YOF,  multiple myeloma with chemo, CKD3,  DRISS,  Clinical Indicators: HT 5'5:, 82kg, BMI 30.09   ED Note: diarrhea, recent dx VRE UTI,   RD Note: : Moderate malnutrition poor PO intake since 3/11/24, mild fat,   muscle loss, no tanika

## 2024-05-09 NOTE — CONSULTS
[facility]        Inpatient Hematology / Oncology Consult    Reason for Consult:  Hypokalemia [E87.6]  Acute diarrhea [R19.7]  Positive urine culture [R82.79]  Pancytopenia (HCC) [D61.818]  Diarrhea, unspecified type [R19.7]  Referring Physician:  Abner Darden MD    History of Present Illness:  Ms. Escobar is a 84 y.o. female admitted on 5/7/2024. The primary encounter diagnosis was Diarrhea, unspecified type. Diagnoses of Hypokalemia, Pancytopenia (HCC), and Positive urine culture were also pertinent to this visit..      Her PMH includes HTN, HLD, PE on Eliquis, renal stones, and CKD3A. She is a patient of Dr. Serrato with recently diagnosed multiple myeloma s/p C1D15 CyBorD on 5/6.  C1D22 due 5/13.  She was recently admitted from 4/10 - 5/4/24 with ANTWON/CKD, dx with MM, b/l pneumonia, and UTI.  Ucx later revealed VRE after discharge.    She presented to ED with c/o diarrhea and generalized abdominal cramping.  K+ 2.5.  FOBT +ve.  UA neg.  Bcx-NGTD.  Cdiff neg.  GI panel and stool cx pending.  On Linezolid for VRE UTI.  We were consulted for recommendations for our patient.      No Known Allergies  Past Medical History:   Diagnosis Date    Acute respiratory failure with hypoxemia (HCC)     Benign essential hypertension 2/11/2015    medication    Bilateral leg edema 4/26/2017    Cancer (HCC)     skin    Hiatal hernia     \"large\"    Hypercholesteremia 2/11/2015    IFG (impaired fasting glucose) 4/26/2017    Iron deficiency anemia 5/12/2016    Low oxygen saturation     per office notes- patient's daughter reported O2 sat drops to 85% when supine, she uses O2    Mixed hyperlipidemia 2/11/2015    Morbid obesity (HCC) 10/26/2017    Nipple discharge     not current as of 7/17/13    Primary insomnia 4/26/2018    Primary osteoarthritis of both knees 4/26/2018    Pulmonary embolism (HCC) 10/15/2023    on eliquis- Dr. Arnold recommended eliquis full dose x 6 months    Stage 3 chronic kidney disease  Transportation     Lack of Transportation (Medical): No     Lack of Transportation (Non-Medical): No   Physical Activity: Inactive (2/21/2024)    Exercise Vital Sign     Days of Exercise per Week: 0 days     Minutes of Exercise per Session: 0 min   Stress: Not on file   Social Connections: Not on file   Intimate Partner Violence: Not on file   Housing Stability: Low Risk  (5/8/2024)    Housing Stability Vital Sign     Unable to Pay for Housing in the Last Year: No     Number of Places Lived in the Last Year: 1     Unstable Housing in the Last Year: No     Current Facility-Administered Medications   Medication Dose Route Frequency Provider Last Rate Last Admin    lactobacillus (CULTURELLE) capsule 1 capsule  1 capsule Oral Daily with breakfast Abner Darden MD        linezolid (ZYVOX) IVPB 600 mg  600 mg IntraVENous Q12H Castillo Bethea MD   Stopped at 05/09/24 0013    acyclovir (ZOVIRAX) tablet 200 mg  200 mg Oral BID Castillo Bethea MD   200 mg at 05/08/24 2207    melatonin tablet 6 mg  6 mg Oral Nightly PRN Castillo Bethea MD   6 mg at 05/08/24 2207    mirtazapine (REMERON) tablet 15 mg  15 mg Oral Nightly Castillo Bethea MD   15 mg at 05/08/24 2207    sodium chloride flush 0.9 % injection 5-40 mL  5-40 mL IntraVENous 2 times per day Castillo Bethea MD   10 mL at 05/08/24 2209    sodium chloride flush 0.9 % injection 5-40 mL  5-40 mL IntraVENous PRN Castillo Bethea MD        0.9 % sodium chloride infusion   IntraVENous PRN Castillo Bethea MD        ondansetron (ZOFRAN-ODT) disintegrating tablet 4 mg  4 mg Oral Q8H PRN Castillo Bethea MD        Or    ondansetron (ZOFRAN) injection 4 mg  4 mg IntraVENous Q6H PRN Castillo Bethea MD        acetaminophen (TYLENOL) tablet 650 mg  650 mg Oral Q6H PRN Castillo Bethea MD        loperamide (IMODIUM) capsule 4 mg  4 mg Oral TID Castillo Bethea MD   4 mg at 05/08/24 1104     Facility-Administered Medications Ordered in Other Encounters   Medication Dose Route Frequency

## 2024-05-09 NOTE — CONSENT
Informed Consent for Blood Component Transfusion Note    I have discussed with the patient the rationale for blood component transfusion; its benefits in treating or preventing fatigue, organ damage, or death; and its risk which includes mild transfusion reactions, rare risk of blood borne infection, or more serious but rare reactions. I have discussed the alternatives to transfusion, including the risk and consequences of not receiving transfusion. The patient had an opportunity to ask questions and had agreed to proceed with transfusion of blood components.    Electronically signed by RAQUEL Joshi CNP on 5/9/24 at 7:53 AM EDT

## 2024-05-09 NOTE — PLAN OF CARE
Problem: Pain  Goal: Verbalizes/displays adequate comfort level or baseline comfort level  5/9/2024 1310 by Karin Tovar, RN  Outcome: Progressing

## 2024-05-09 NOTE — PROGRESS NOTES
Hospitalist Progress Note   Admit Date:  2024  4:13 PM   Name:  Tayla Escobar   Age:  84 y.o.  Sex:  female  :  1940   MRN:  [id number]   Room:  [facility]    Presenting/Chief Complaint: Diarrhea     Reason(s) for Admission: Hypokalemia [E87.6]  Acute diarrhea [R19.7]  Positive urine culture [R82.79]  Pancytopenia (HCC) [D61.818]  Diarrhea, unspecified type [R19.7]     Hospital Course:   Please refer to the admission H&P for details of presentation.      In summary, Tayla Escobar is a 84 y.o. female with medical history significant for hypertension, hyperlipidemia, CKD stage II, multiple myeloma, history of DVT who presented with nonbloody diarrhea, generalized abdominal pain x 1 day.    Subjective/24 hr Events (24) :  Patient is seen and examined at bedside.  No acute events reported overnight by nursing staff.  Patient laying in bed.  Alert awake and oriented x 3.  Patient had 3-4 episodes of large loose watery bowel movement yesterday.  Did not have any bowel movement overnight.    Patient is very lethargic and weak in the room.  Drink a lot of Gatorade but did not eat much this morning.  Patient still on full liquid diet and is agreeable to advancing to regular diet.  Worsening pancytopenia with WBC of 0.9, platelet of 29 and hemoglobin 9.9.  Patient denies fever, chills, chest pains, shortness of breath, n/v, abdominal pain.    Assessment & Plan:   Acute diarrhea  Possibly related to chemotherapy as patient states she has had diarrhea after chemotherapy in the past.  Patient last chemotherapy was 2024.  Stool sample negative for C. Difficile  -Fecal leukocyte and GI panel is pending.  -Antidiarrheal medication as needed  -change iVF for NS for 1 more day      UTI  +UA on  with culture showing VRE.  Started on IV linezolid ( - )      Hypokalemia: resolved      Multiple myeloma // Pancytopenia:   - getting chemo with Oncology team.    - worsening pancytopenia today with WBC  calculated from the following:    Height as of this encounter: 1.651 m (5' 5\").    Weight as of this encounter: 82 kg (180 lb 12.8 oz).    Intake/Output Summary (Last 24 hours) at 5/9/2024 0910  Last data filed at 5/9/2024 0852  Gross per 24 hour   Intake 240 ml   Output 300 ml   Net -60 ml           Physical Exam:     General:    Ill appearing. weak   Head:  Normocephalic, atraumatic  Eyes:  Sclerae appear normal.  Pupils equally round.  ENT:  Nares appear normal.  Moist oral mucosa  Neck:  No restricted ROM.  Trachea midline   CV:   RRR.  No m/r/g.  No jugular venous distension.  Lungs:   CTAB.  No wheezing.  Symmetric expansion.  Abdomen:   Soft, nontender, nondistended.  Extremities: No cyanosis or clubbing.  No edema  Skin:     No rashes.  Normal coloration.   Warm and dry.    Neuro:  CN II-XII grossly intact.    Psych:  Normal mood and affect.      I have personally reviewed labs and tests:  Recent Labs:  Recent Results (from the past 48 hour(s))   EKG 12 Lead    Collection Time: 05/07/24  4:26 PM   Result Value Ref Range    Ventricular Rate 65 BPM    Atrial Rate 65 BPM    P-R Interval 52 ms    QRS Duration 113 ms    Q-T Interval 423 ms    QTc Calculation (Bazett) 440 ms    P Axis 66 degrees    R Axis -35 degrees    T Axis 75 degrees    Diagnosis       Sinus rhythm  Probable left atrial enlargement  Borderline IVCD with LAD  Consider anterior infarct  Artifact in lead(s) I II III aVR aVL aVF V1 V2    Confirmed by ROSALIE BORREGO ([id number]) on 5/8/2024 4:30:07 AM     CBC with Auto Differential    Collection Time: 05/07/24  5:34 PM   Result Value Ref Range    WBC 3.6 (L) 4.3 - 11.1 K/uL    RBC 3.93 (L) 4.05 - 5.2 M/uL    Hemoglobin 11.6 (L) 11.7 - 15.4 g/dL    Hematocrit 34.9 (L) 35.8 - 46.3 %    MCV 88.8 82 - 102 FL    MCH 29.5 26.1 - 32.9 PG    MCHC 33.2 31.4 - 35.0 g/dL    RDW 16.1 (H) 11.9 - 14.6 %    Platelets 42 (L) 150 - 450 K/uL    MPV 12.0 9.4 - 12.3 FL    nRBC 0.00 0.0 - 0.2 K/uL    Differential Type

## 2024-05-09 NOTE — PROGRESS NOTES
Physical Therapy Note:    Attempted to see patient this PM for physical therapy evaluation session. Patient declined due to fatigue, agreeable to therapy tomorrow. Will follow and re-attempt as schedule permits/patient available. Thank you,    CECILY YOUNGER, PT     Rehab Caseload Tracker

## 2024-05-10 ENCOUNTER — APPOINTMENT (OUTPATIENT)
Dept: GENERAL RADIOLOGY | Age: 84
DRG: 689 | End: 2024-05-10
Payer: MEDICARE

## 2024-05-10 LAB
ALBUMIN SERPL-MCNC: 2.7 G/DL (ref 3.2–4.6)
ALBUMIN/GLOB SERPL: 1.3 (ref 1–1.9)
ALP SERPL-CCNC: 66 U/L (ref 35–104)
ALT SERPL-CCNC: 15 U/L (ref 12–65)
ANION GAP SERPL CALC-SCNC: 8 MMOL/L (ref 9–18)
AST SERPL-CCNC: 23 U/L (ref 15–37)
BASOPHILS # BLD: 0 K/UL (ref 0–0.2)
BASOPHILS NFR BLD: 0 % (ref 0–2)
BILIRUB SERPL-MCNC: 0.4 MG/DL (ref 0–1.2)
BLD PROD TYP BPU: NORMAL
BLOOD BANK BLOOD PRODUCT EXPIRATION DATE: NORMAL
BLOOD BANK DISPENSE STATUS: NORMAL
BLOOD BANK ISBT PRODUCT BLOOD TYPE: 5100
BLOOD BANK PRODUCT CODE: NORMAL
BLOOD BANK UNIT TYPE AND RH: NORMAL
BPU ID: NORMAL
BUN SERPL-MCNC: 15 MG/DL (ref 8–23)
CALCIUM SERPL-MCNC: 8.2 MG/DL (ref 8.8–10.2)
CHLORIDE SERPL-SCNC: 106 MMOL/L (ref 98–107)
CO2 SERPL-SCNC: 22 MMOL/L (ref 20–28)
CREAT SERPL-MCNC: 1.37 MG/DL (ref 0.6–1.1)
DIFFERENTIAL METHOD BLD: ABNORMAL
EOSINOPHIL # BLD: 0.1 K/UL (ref 0–0.8)
EOSINOPHIL NFR BLD: 3 % (ref 0.5–7.8)
ERYTHROCYTE [DISTWIDTH] IN BLOOD BY AUTOMATED COUNT: 16.6 % (ref 11.9–14.6)
GLOBULIN SER CALC-MCNC: 2 G/DL (ref 2.3–3.5)
GLUCOSE SERPL-MCNC: 84 MG/DL (ref 70–99)
HCT VFR BLD AUTO: 28.6 % (ref 35.8–46.3)
HGB BLD-MCNC: 9.2 G/DL (ref 11.7–15.4)
IMM GRANULOCYTES # BLD AUTO: 0 K/UL (ref 0–0.5)
IMM GRANULOCYTES NFR BLD AUTO: 1 % (ref 0–5)
LYMPHOCYTES # BLD: 0.1 K/UL (ref 0.5–4.6)
LYMPHOCYTES NFR BLD: 3 % (ref 13–44)
MAGNESIUM SERPL-MCNC: 1.7 MG/DL (ref 1.8–2.4)
MCH RBC QN AUTO: 30.1 PG (ref 26.1–32.9)
MCHC RBC AUTO-ENTMCNC: 32.2 G/DL (ref 31.4–35)
MCV RBC AUTO: 93.5 FL (ref 82–102)
MONOCYTES # BLD: 0.2 K/UL (ref 0.1–1.3)
MONOCYTES NFR BLD: 7 % (ref 4–12)
NEUTS SEG # BLD: 2 K/UL (ref 1.7–8.2)
NEUTS SEG NFR BLD: 86 % (ref 43–78)
NRBC # BLD: 0.03 K/UL (ref 0–0.2)
PLATELET # BLD AUTO: 47 K/UL (ref 150–450)
PLATELET COMMENT: ABNORMAL
PMV BLD AUTO: 9.6 FL (ref 9.4–12.3)
POTASSIUM SERPL-SCNC: 3.7 MMOL/L (ref 3.5–5.1)
PROT SERPL-MCNC: 4.7 G/DL (ref 6.3–8.2)
RBC # BLD AUTO: 3.06 M/UL (ref 4.05–5.2)
RBC MORPH BLD: ABNORMAL
SODIUM SERPL-SCNC: 135 MMOL/L (ref 136–145)
UNIT DIVISION: 0
UNIT ISSUE DATE/TIME: NORMAL
WBC # BLD AUTO: 2.4 K/UL (ref 4.3–11.1)
WBC MORPH BLD: ABNORMAL

## 2024-05-10 PROCEDURE — 85025 COMPLETE CBC W/AUTO DIFF WBC: CPT

## 2024-05-10 PROCEDURE — 2580000003 HC RX 258: Performed by: HOSPITALIST

## 2024-05-10 PROCEDURE — 99232 SBSQ HOSP IP/OBS MODERATE 35: CPT | Performed by: INTERNAL MEDICINE

## 2024-05-10 PROCEDURE — 97530 THERAPEUTIC ACTIVITIES: CPT

## 2024-05-10 PROCEDURE — 6370000000 HC RX 637 (ALT 250 FOR IP): Performed by: INTERNAL MEDICINE

## 2024-05-10 PROCEDURE — 6360000002 HC RX W HCPCS: Performed by: NURSE PRACTITIONER

## 2024-05-10 PROCEDURE — 36592 COLLECT BLOOD FROM PICC: CPT

## 2024-05-10 PROCEDURE — 6370000000 HC RX 637 (ALT 250 FOR IP): Performed by: NURSE PRACTITIONER

## 2024-05-10 PROCEDURE — 94760 N-INVAS EAR/PLS OXIMETRY 1: CPT

## 2024-05-10 PROCEDURE — 1170000000 HC RM PRIVATE ONCOLOGY

## 2024-05-10 PROCEDURE — 2580000003 HC RX 258: Performed by: NURSE PRACTITIONER

## 2024-05-10 PROCEDURE — 6360000002 HC RX W HCPCS: Performed by: HOSPITALIST

## 2024-05-10 PROCEDURE — 71045 X-RAY EXAM CHEST 1 VIEW: CPT

## 2024-05-10 PROCEDURE — APPSS30 APP SPLIT SHARED TIME 16-30 MINUTES: Performed by: NURSE PRACTITIONER

## 2024-05-10 PROCEDURE — 97161 PT EVAL LOW COMPLEX 20 MIN: CPT

## 2024-05-10 PROCEDURE — 2700000000 HC OXYGEN THERAPY PER DAY

## 2024-05-10 PROCEDURE — 6370000000 HC RX 637 (ALT 250 FOR IP): Performed by: HOSPITALIST

## 2024-05-10 PROCEDURE — A4216 STERILE WATER/SALINE, 10 ML: HCPCS | Performed by: NURSE PRACTITIONER

## 2024-05-10 PROCEDURE — 83735 ASSAY OF MAGNESIUM: CPT

## 2024-05-10 PROCEDURE — 80053 COMPREHEN METABOLIC PANEL: CPT

## 2024-05-10 PROCEDURE — C9113 INJ PANTOPRAZOLE SODIUM, VIA: HCPCS | Performed by: NURSE PRACTITIONER

## 2024-05-10 RX ORDER — FUROSEMIDE 10 MG/ML
40 INJECTION INTRAMUSCULAR; INTRAVENOUS ONCE
Status: COMPLETED | OUTPATIENT
Start: 2024-05-10 | End: 2024-05-10

## 2024-05-10 RX ORDER — MAGNESIUM SULFATE IN WATER 40 MG/ML
2000 INJECTION, SOLUTION INTRAVENOUS ONCE
Status: COMPLETED | OUTPATIENT
Start: 2024-05-10 | End: 2024-05-10

## 2024-05-10 RX ADMIN — ACYCLOVIR 200 MG: 400 TABLET ORAL at 23:27

## 2024-05-10 RX ADMIN — SODIUM CHLORIDE, PRESERVATIVE FREE 10 ML: 5 INJECTION INTRAVENOUS at 23:27

## 2024-05-10 RX ADMIN — Medication 1 CAPSULE: at 08:45

## 2024-05-10 RX ADMIN — ALLOPURINOL 300 MG: 300 TABLET ORAL at 08:45

## 2024-05-10 RX ADMIN — MAGNESIUM SULFATE HEPTAHYDRATE 2000 MG: 40 INJECTION, SOLUTION INTRAVENOUS at 08:47

## 2024-05-10 RX ADMIN — LINEZOLID 600 MG: 600 INJECTION, SOLUTION INTRAVENOUS at 12:53

## 2024-05-10 RX ADMIN — LINEZOLID 600 MG: 600 INJECTION, SOLUTION INTRAVENOUS at 23:23

## 2024-05-10 RX ADMIN — ACETAMINOPHEN 650 MG: 325 TABLET ORAL at 04:54

## 2024-05-10 RX ADMIN — ACYCLOVIR 200 MG: 400 TABLET ORAL at 08:45

## 2024-05-10 RX ADMIN — SODIUM CHLORIDE: 9 INJECTION, SOLUTION INTRAVENOUS at 12:53

## 2024-05-10 RX ADMIN — MIRTAZAPINE 15 MG: 15 TABLET, FILM COATED ORAL at 23:27

## 2024-05-10 RX ADMIN — SODIUM CHLORIDE, PRESERVATIVE FREE 40 MG: 5 INJECTION INTRAVENOUS at 23:27

## 2024-05-10 RX ADMIN — ONDANSETRON 4 MG: 4 TABLET, ORALLY DISINTEGRATING ORAL at 06:41

## 2024-05-10 RX ADMIN — FUROSEMIDE 40 MG: 10 INJECTION, SOLUTION INTRAMUSCULAR; INTRAVENOUS at 12:48

## 2024-05-10 RX ADMIN — SODIUM CHLORIDE, PRESERVATIVE FREE 40 MG: 5 INJECTION INTRAVENOUS at 12:49

## 2024-05-10 RX ADMIN — HYDROCODONE BITARTRATE AND ACETAMINOPHEN 1 TABLET: 5; 325 TABLET ORAL at 06:41

## 2024-05-10 RX ADMIN — SODIUM CHLORIDE, PRESERVATIVE FREE 10 ML: 5 INJECTION INTRAVENOUS at 08:45

## 2024-05-10 ASSESSMENT — PAIN SCALES - GENERAL
PAINLEVEL_OUTOF10: 8
PAINLEVEL_OUTOF10: 0
PAINLEVEL_OUTOF10: 3

## 2024-05-10 ASSESSMENT — PAIN - FUNCTIONAL ASSESSMENT: PAIN_FUNCTIONAL_ASSESSMENT: PREVENTS OR INTERFERES SOME ACTIVE ACTIVITIES AND ADLS

## 2024-05-10 ASSESSMENT — PAIN DESCRIPTION - DESCRIPTORS: DESCRIPTORS: ACHING

## 2024-05-10 ASSESSMENT — PAIN DESCRIPTION - LOCATION: LOCATION: HEAD

## 2024-05-10 ASSESSMENT — PAIN DESCRIPTION - ORIENTATION: ORIENTATION: MID

## 2024-05-10 NOTE — PROGRESS NOTES
ACUTE PHYSICAL THERAPY GOALS:   (Developed with and agreed upon by patient and/or caregiver.)    (1.) Tayla Escobar  will move from supine to sit and sit to supine  with STAND BY ASSIST within 7 treatment day(s).    (2.) Tayla Escobar will transfer from bed to chair and chair to bed with STAND BY ASSIST using the least restrictive device within 7 treatment day(s).    (3.) Tayla Escobar will ambulate with STAND BY ASSIST for 50 feet with the least restrictive device within 7 treatment day(s).   (4.) Tayla Escobar will perform standing static and dynamic balance activities x 10 minutes with MINIMAL ASSIST to improve safety within 7 treatment day(s).  (5.) Tayla Escobar will perform therapeutic exercises x 20 min for HEP with INDEPENDENCE to improve strength, endurance, and functional mobility within 7 treatment day(s).       PHYSICAL THERAPY Initial Assessment, Daily Note, and PM  (Link to Caseload Tracking: PT Visit Days : 1  Acknowledge Orders  Time In/Out  PT Charge Capture  Rehab Caseload Tracker    Tayla Escobar is a 84 y.o. female   PRIMARY DIAGNOSIS: Diarrhea  Hypokalemia [E87.6]  Acute diarrhea [R19.7]  Positive urine culture [R82.79]  Pancytopenia (HCC) [D61.818]  Diarrhea, unspecified type [R19.7]       Reason for Referral: Generalized Muscle Weakness (M62.81)  Difficulty in walking, Not elsewhere classified (R26.2)  Inpatient: Payor: [facility] MEDICARE / Plan: [employer] MEDICARE-ADVANTAGE PPO / Product Type: Medicare /     ASSESSMENT:     REHAB RECOMMENDATIONS:   Recommendation to date pending progress:  Setting:  Short-term Rehab    Equipment:    To Be Determined     ASSESSMENT:  Ms. Escobar  is a 84 year old F who presents with diarrhea, abdominal pain. PMHx includes multiple myeloma undergoing treatment. Pt is familiar to therapy team from recent admission, just discharged home last week. She was only home for one full day. This date pt performs mobility including bed mobility with

## 2024-05-10 NOTE — PLAN OF CARE
Problem: Pain  Goal: Verbalizes/displays adequate comfort level or baseline comfort level  5/9/2024 2307 by Shayy Tong, RN  Outcome: Progressing  Flowsheets (Taken 5/9/2024 1927)  Verbalizes/displays adequate comfort level or baseline comfort level:   Encourage patient to monitor pain and request assistance   Assess pain using appropriate pain scale  5/9/2024 1310 by Karin Tovar, RN  Outcome: Progressing     Problem: Skin/Tissue Integrity  Goal: Absence of new skin breakdown  Description: 1.  Monitor for areas of redness and/or skin breakdown  2.  Assess vascular access sites hourly  3.  Every 4-6 hours minimum:  Change oxygen saturation probe site  4.  Every 4-6 hours:  If on nasal continuous positive airway pressure, respiratory therapy assess nares and determine need for appliance change or resting period.  Outcome: Progressing     Problem: Safety - Adult  Goal: Free from fall injury  Outcome: Progressing

## 2024-05-10 NOTE — CONSULTS
edema 4/26/2017    Cancer (HCC)     skin    Hiatal hernia     \"large\"    Hypercholesteremia 2/11/2015    IFG (impaired fasting glucose) 4/26/2017    Iron deficiency anemia 5/12/2016    Low oxygen saturation     per office notes- patient's daughter reported O2 sat drops to 85% when supine, she uses O2    Mixed hyperlipidemia 2/11/2015    Morbid obesity (HCC) 10/26/2017    Nipple discharge     not current as of 7/17/13    Primary insomnia 4/26/2018    Primary osteoarthritis of both knees 4/26/2018    Pulmonary embolism (HCC) 10/15/2023    on eliquis- Dr. Arnold recommended eliquis full dose x 6 months    Stage 3 chronic kidney disease (HCC) 7/17/2019        Past Surgical History     Past Surgical History:   Procedure Laterality Date    HIATAL HERNIA REPAIR N/A 3/4/2024    ROBOTIC HIATAL HERNIA REPAIR performed by Thomas Lozano MD at [facility] MAIN OR    MALIGNANT SKIN LESION EXCISION      OTHER SURGICAL HISTORY      skin cancer    UPPER GASTROINTESTINAL ENDOSCOPY N/A 4/13/2024    ESOPHAGOGASTRODUODENOSCOPY performed by Alejandro Vieyra MD at [facility] ENDOSCOPY    UPPER GASTROINTESTINAL ENDOSCOPY N/A 4/15/2024    ESOPHAGOGASTRODUODENOSCOPY performed by Alejandro Vieyra MD at [facility] ENDOSCOPY        Medications     Prior to Admission medications    Medication Sig Start Date End Date Taking? Authorizing Provider   mirtazapine (REMERON) 15 MG tablet Take 1 tablet by mouth nightly 5/6/24   Carin Stewart APRN - NP   aluminum & magnesium hydroxide-simethicone (MAALOX) 200-200-20 MG/5ML SUSP suspension Take 30 mLs by mouth every 6 hours as needed for Indigestion 5/4/24   Carin Stewart APRN - NP   loperamide (IMODIUM) 2 MG capsule Take 1 capsule by mouth as needed for Diarrhea 5/4/24 5/14/24  Carin Stewart APRN - NP   acyclovir (ZOVIRAX) 400 MG tablet Take 0.5 tablets by mouth 2 times daily 5/4/24 7/3/24  Carin Stewart APRN - NP   cephALEXin (KEFLEX) 250 MG capsule Take 1 capsule by mouth in the morning and at bedtime  PRN  morphine injection 2 mg, Q4H PRN  pantoprazole (PROTONIX) 40 mg in sodium chloride (PF) 0.9 % 10 mL injection, Q12H  temazepam (RESTORIL) capsule 15 mg, Nightly PRN  linezolid (ZYVOX) IVPB 600 mg, Q12H  acyclovir (ZOVIRAX) tablet 200 mg, BID  melatonin tablet 6 mg, Nightly PRN  mirtazapine (REMERON) tablet 15 mg, Nightly  sodium chloride flush 0.9 % injection 5-40 mL, 2 times per day  sodium chloride flush 0.9 % injection 5-40 mL, PRN  0.9 % sodium chloride infusion, PRN  ondansetron (ZOFRAN-ODT) disintegrating tablet 4 mg, Q8H PRN   Or  ondansetron (ZOFRAN) injection 4 mg, Q6H PRN  acetaminophen (TYLENOL) tablet 650 mg, Q6H PRN        Allergies   Patient has no known allergies.    Social History     Social History       Tobacco History       Smoking Status  Never      Smokeless Tobacco Use  Former Quit Date  2018 Smokeless Tobacco Type  Chew      Tobacco Comments  Quit smoking: dip snuff              Alcohol History       Alcohol Use Status  No              Drug Use       Drug Use Status  No              Sexual Activity       Sexually Active  Not Currently Partners  Male                    Family History     Family History   Problem Relation Age of Onset    No Known Problems Sister     Ovarian Cancer Neg Hx     Cancer Sister         lung    Breast Cancer Neg Hx     Hypertension Mother     Arrhythmia Mother     Cancer Sister     Cancer Brother         lung    Cancer Father        Review of Systems   Review of Systems   Constitutional:  Positive for appetite change.   Gastrointestinal:  Positive for abdominal pain (resolved) and diarrhea (resolved). Negative for anal bleeding, blood in stool, constipation, nausea and vomiting.   All other systems reviewed and are negative.       Physical Exam   BP (!) 167/84   Pulse 75   Temp 97.7 °F (36.5 °C)   Resp 16   Ht 1.651 m (5' 5\")   Wt 80.3 kg (177 lb 1.6 oz)   SpO2 100%   BMI 29.47 kg/m²      Physical Exam  Vitals and nursing note reviewed.   Constitutional:

## 2024-05-10 NOTE — PROGRESS NOTES
Physician Progress Note      PATIENT:               ZIYAD MENDEZ  CSN #:                  [id number]  :                       1940  ADMIT DATE:       2024 4:13 PM  DISCH DATE:  RESPONDING  PROVIDER #:        Nadia Vidal NP          QUERY TEXT:    Pt admitted with UTI and Multiple Myeloma.  Pt noted to have chronic   indwelling urinary catheter present on admission. If possible, please document   in the progress notes and discharge summary if you are evaluating and/or   treating any of the following:    The medical record reflects the following:  Risk Factors: 84 YOF, Multiple myeloma on chemo, CKD2, pancytopenia  Clinical Indicators: urinary retention, forrest present on admission, VRE UTI   PN: +UA on  with culture showing VRE.  Started on IV linezolid ( - )  Treatment: IVFs, NS 2.25L IV bolus, IV Zyvox,    Thank You,  Elizabeth Jorge RN. C BSN  Clinical   O: [phone]   [email or web address]  Options provided:  -- UTI due to chronic indwelling urinary catheter  -- UTI not due to indwelling urinary catheter  -- Other - I will add my own diagnosis  -- Disagree - Not applicable / Not valid  -- Disagree - Clinically unable to determine / Unknown  -- Refer to Clinical Documentation Reviewer    PROVIDER RESPONSE TEXT:    UTI is not due to the indwelling urinary catheter.    Query created by: Elizabeth Jorge on 5/10/2024 10:13 AM      Electronically signed by:  Nadia Vidal NP 5/10/2024 10:17 AM

## 2024-05-10 NOTE — PROGRESS NOTES
[facility] Hematology & Oncology        Inpatient Hematology / Oncology Progress Note    Reason for Consult:  Hypokalemia [E87.6]  Acute diarrhea [R19.7]  Positive urine culture [R82.79]  Pancytopenia (HCC) [D61.818]  Diarrhea, unspecified type [R19.7]  Referring Physician:  Ike Vega MD    24 Hour Events:  Afebrile, hypertensive, on O2 @ 6L  Stool studies neg  Diarrhea resolved  C/o dyspnea  CXR ordered  GI consult pending for +FOBT  Son at bedside    Transfusions: None  Replacements: Mg++       ROS:  Constitutional: +fatigue, weakness. Negative for fever, chills.  CV: Negative for chest pain, palpitations.  Respiratory: +dyspnea. Negative for cough, wheezing.  GI: Negative for nausea, abdominal pain, diarrhea.    10 point review of systems is otherwise negative with the exception of the elements mentioned above in the HPI.       No Known Allergies  Past Medical History:   Diagnosis Date    Acute respiratory failure with hypoxemia (HCC)     Benign essential hypertension 2/11/2015    medication    Bilateral leg edema 4/26/2017    Cancer (HCC)     skin    Hiatal hernia     \"large\"    Hypercholesteremia 2/11/2015    IFG (impaired fasting glucose) 4/26/2017    Iron deficiency anemia 5/12/2016    Low oxygen saturation     per office notes- patient's daughter reported O2 sat drops to 85% when supine, she uses O2    Mixed hyperlipidemia 2/11/2015    Morbid obesity (HCC) 10/26/2017    Nipple discharge     not current as of 7/17/13    Primary insomnia 4/26/2018    Primary osteoarthritis of both knees 4/26/2018    Pulmonary embolism (HCC) 10/15/2023    on anushaqusixto- Dr. Arnold recommended eliquis full dose x 6 months    Stage 3 chronic kidney disease (HCC) 7/17/2019     Past Surgical History:   Procedure Laterality Date    HIATAL HERNIA REPAIR N/A 3/4/2024    ROBOTIC HIATAL HERNIA REPAIR performed by Thomas Lozano MD at [facility] MAIN OR    MALIGNANT SKIN LESION EXCISION      OTHER SURGICAL HISTORY      skin

## 2024-05-10 NOTE — PROGRESS NOTES
Occupational Therapy Note:    OT consult received and chart reviewed--pt getting chest x-ray this AM. RN requested hold until after chest x-ray. Will continue to follow and attempt to see as schedule allows.    Thank you,  Elodia Serrano, OTR/L

## 2024-05-11 ENCOUNTER — APPOINTMENT (OUTPATIENT)
Dept: CT IMAGING | Age: 84
DRG: 689 | End: 2024-05-11
Payer: MEDICARE

## 2024-05-11 PROBLEM — I50.23 ACUTE ON CHRONIC SYSTOLIC CONGESTIVE HEART FAILURE (HCC): Status: ACTIVE | Noted: 2024-05-11

## 2024-05-11 PROBLEM — J96.01 ACUTE HYPOXEMIC RESPIRATORY FAILURE (HCC): Status: ACTIVE | Noted: 2024-05-11

## 2024-05-11 LAB
ALBUMIN SERPL-MCNC: 2.7 G/DL (ref 3.2–4.6)
ALBUMIN/GLOB SERPL: 1.2 (ref 1–1.9)
ALP SERPL-CCNC: 77 U/L (ref 35–104)
ALT SERPL-CCNC: 21 U/L (ref 12–65)
ANION GAP BLD CALC-SCNC: ABNORMAL MMOL/L
ANION GAP SERPL CALC-SCNC: 8 MMOL/L (ref 9–18)
ARTERIAL PATENCY WRIST A: POSITIVE
ARTERIAL PATENCY WRIST A: POSITIVE
AST SERPL-CCNC: 35 U/L (ref 15–37)
B PERT DNA SPEC QL NAA+PROBE: NOT DETECTED
BACTERIA SPEC CULT: NORMAL
BACTERIA SPEC CULT: NORMAL
BASE DEFICIT BLD-SCNC: 0.9 MMOL/L
BASE DEFICIT BLD-SCNC: 3.9 MMOL/L
BASOPHILS # BLD: 0 K/UL (ref 0–0.2)
BASOPHILS NFR BLD: 1 % (ref 0–2)
BDY SITE: ABNORMAL
BDY SITE: ABNORMAL
BILIRUB SERPL-MCNC: 0.4 MG/DL (ref 0–1.2)
BORDETELLA PARAPERTUSSIS BY PCR: NOT DETECTED
BUN SERPL-MCNC: 13 MG/DL (ref 8–23)
C PNEUM DNA SPEC QL NAA+PROBE: NOT DETECTED
CA-I BLD-MCNC: 1.27 MMOL/L (ref 1.12–1.32)
CALCIUM SERPL-MCNC: 8.3 MG/DL (ref 8.8–10.2)
CHLORIDE SERPL-SCNC: 101 MMOL/L (ref 98–107)
CO2 BLD-SCNC: 24 MMOL/L (ref 13–23)
CO2 SERPL-SCNC: 24 MMOL/L (ref 20–28)
CREAT SERPL-MCNC: 1.27 MG/DL (ref 0.6–1.1)
DIFFERENTIAL METHOD BLD: ABNORMAL
EOSINOPHIL # BLD: 0 K/UL (ref 0–0.8)
EOSINOPHIL NFR BLD: 2 % (ref 0.5–7.8)
ERYTHROCYTE [DISTWIDTH] IN BLOOD BY AUTOMATED COUNT: 16.3 % (ref 11.9–14.6)
FIO2 ON VENT: 35 %
FLUAV SUBTYP SPEC NAA+PROBE: NOT DETECTED
FLUBV RNA SPEC QL NAA+PROBE: NOT DETECTED
GAS FLOW.O2 O2 DELIVERY SYS: ABNORMAL
GAS FLOW.O2 O2 DELIVERY SYS: ABNORMAL
GLOBULIN SER CALC-MCNC: 2.2 G/DL (ref 2.3–3.5)
GLUCOSE BLD STRIP.AUTO-MCNC: 85 MG/DL (ref 65–100)
GLUCOSE SERPL-MCNC: 112 MG/DL (ref 70–99)
HADV DNA SPEC QL NAA+PROBE: NOT DETECTED
HCO3 BLD-SCNC: 23.7 MMOL/L (ref 22–26)
HCO3 BLD-SCNC: 24.9 MMOL/L (ref 22–26)
HCOV 229E RNA SPEC QL NAA+PROBE: NOT DETECTED
HCOV HKU1 RNA SPEC QL NAA+PROBE: NOT DETECTED
HCOV NL63 RNA SPEC QL NAA+PROBE: NOT DETECTED
HCOV OC43 RNA SPEC QL NAA+PROBE: NOT DETECTED
HCT VFR BLD AUTO: 29.7 % (ref 35.8–46.3)
HGB BLD-MCNC: 9.6 G/DL (ref 11.7–15.4)
HMPV RNA SPEC QL NAA+PROBE: NOT DETECTED
HPIV1 RNA SPEC QL NAA+PROBE: NOT DETECTED
HPIV2 RNA SPEC QL NAA+PROBE: NOT DETECTED
HPIV3 RNA SPEC QL NAA+PROBE: NOT DETECTED
HPIV4 RNA SPEC QL NAA+PROBE: NOT DETECTED
IMM GRANULOCYTES # BLD AUTO: 0.1 K/UL (ref 0–0.5)
IMM GRANULOCYTES NFR BLD AUTO: 3 % (ref 0–5)
INSPIRATION.DURATION SETTING TIME VENT: 0.9 SEC
IPAP/PIP: 18
LYMPHOCYTES # BLD: 0.1 K/UL (ref 0.5–4.6)
LYMPHOCYTES NFR BLD: 5 % (ref 13–44)
M PNEUMO DNA SPEC QL NAA+PROBE: NOT DETECTED
MAGNESIUM SERPL-MCNC: 1.8 MG/DL (ref 1.8–2.4)
MCH RBC QN AUTO: 29.8 PG (ref 26.1–32.9)
MCHC RBC AUTO-ENTMCNC: 32.3 G/DL (ref 31.4–35)
MCV RBC AUTO: 92.2 FL (ref 82–102)
MONOCYTES # BLD: 0.2 K/UL (ref 0.1–1.3)
MONOCYTES NFR BLD: 9 % (ref 4–12)
NEUTS SEG # BLD: 1.4 K/UL (ref 1.7–8.2)
NEUTS SEG NFR BLD: 80 % (ref 43–78)
NRBC # BLD: 0 K/UL (ref 0–0.2)
NT PRO BNP: 3128 PG/ML (ref 0–450)
PCO2 BLD: 45.4 MMHG (ref 35–45)
PCO2 BLD: 54.8 MMHG (ref 35–45)
PH BLD: 7.24 (ref 7.35–7.45)
PH BLD: 7.35 (ref 7.35–7.45)
PLATELET # BLD AUTO: 40 K/UL (ref 150–450)
PLATELET COMMENT: ABNORMAL
PMV BLD AUTO: 11.3 FL (ref 9.4–12.3)
PO2 BLD: 117 MMHG (ref 75–100)
PO2 BLD: 98 MMHG (ref 75–100)
POC FIO2: 3
POTASSIUM BLD-SCNC: 3.6 MMOL/L (ref 3.5–5.1)
POTASSIUM SERPL-SCNC: 3.5 MMOL/L (ref 3.5–5.1)
PROCALCITONIN SERPL-MCNC: 0.39 NG/ML (ref 0–0.1)
PROT SERPL-MCNC: 5 G/DL (ref 6.3–8.2)
RBC # BLD AUTO: 3.22 M/UL (ref 4.05–5.2)
RBC MORPH BLD: ABNORMAL
RESPIRATORY RATE, POC: 32 (ref 5–40)
RESPIRATORY RATE: 22
RSV RNA NPH QL NAA+PROBE: NOT DETECTED
RSV RNA SPEC QL NAA+PROBE: NOT DETECTED
RV+EV RNA SPEC QL NAA+PROBE: NOT DETECTED
SAO2 % BLD: 96.1 % (ref 95–98)
SAO2 % BLD: 98 %
SARS-COV-2 RNA RESP QL NAA+PROBE: NOT DETECTED
SERVICE CMNT-IMP: ABNORMAL
SERVICE CMNT-IMP: NORMAL
SODIUM BLD-SCNC: 135 MMOL/L (ref 136–145)
SODIUM SERPL-SCNC: 133 MMOL/L (ref 136–145)
SPECIMEN SITE: ABNORMAL
SPECIMEN TYPE: ABNORMAL
VENTILATION MODE VENT: ABNORMAL
WBC # BLD AUTO: 1.8 K/UL (ref 4.3–11.1)
WBC MORPH BLD: ABNORMAL

## 2024-05-11 PROCEDURE — 0202U NFCT DS 22 TRGT SARS-COV-2: CPT

## 2024-05-11 PROCEDURE — 6360000004 HC RX CONTRAST MEDICATION: Performed by: INTERNAL MEDICINE

## 2024-05-11 PROCEDURE — 83880 ASSAY OF NATRIURETIC PEPTIDE: CPT

## 2024-05-11 PROCEDURE — 36592 COLLECT BLOOD FROM PICC: CPT

## 2024-05-11 PROCEDURE — 80053 COMPREHEN METABOLIC PANEL: CPT

## 2024-05-11 PROCEDURE — 6370000000 HC RX 637 (ALT 250 FOR IP): Performed by: INTERNAL MEDICINE

## 2024-05-11 PROCEDURE — 6370000000 HC RX 637 (ALT 250 FOR IP): Performed by: NURSE PRACTITIONER

## 2024-05-11 PROCEDURE — 2000000000 HC ICU R&B

## 2024-05-11 PROCEDURE — A4216 STERILE WATER/SALINE, 10 ML: HCPCS | Performed by: NURSE PRACTITIONER

## 2024-05-11 PROCEDURE — 2580000003 HC RX 258: Performed by: NURSE PRACTITIONER

## 2024-05-11 PROCEDURE — 36600 WITHDRAWAL OF ARTERIAL BLOOD: CPT

## 2024-05-11 PROCEDURE — 94660 CPAP INITIATION&MGMT: CPT

## 2024-05-11 PROCEDURE — 6360000002 HC RX W HCPCS: Performed by: NURSE PRACTITIONER

## 2024-05-11 PROCEDURE — 82803 BLOOD GASES ANY COMBINATION: CPT

## 2024-05-11 PROCEDURE — 82330 ASSAY OF CALCIUM: CPT

## 2024-05-11 PROCEDURE — 99291 CRITICAL CARE FIRST HOUR: CPT | Performed by: INTERNAL MEDICINE

## 2024-05-11 PROCEDURE — 87634 RSV DNA/RNA AMP PROBE: CPT

## 2024-05-11 PROCEDURE — 83735 ASSAY OF MAGNESIUM: CPT

## 2024-05-11 PROCEDURE — 6360000002 HC RX W HCPCS: Performed by: HOSPITALIST

## 2024-05-11 PROCEDURE — APPSS30 APP SPLIT SHARED TIME 16-30 MINUTES: Performed by: NURSE PRACTITIONER

## 2024-05-11 PROCEDURE — C9113 INJ PANTOPRAZOLE SODIUM, VIA: HCPCS | Performed by: NURSE PRACTITIONER

## 2024-05-11 PROCEDURE — 2580000003 HC RX 258: Performed by: HOSPITALIST

## 2024-05-11 PROCEDURE — 6370000000 HC RX 637 (ALT 250 FOR IP): Performed by: HOSPITALIST

## 2024-05-11 PROCEDURE — 2700000000 HC OXYGEN THERAPY PER DAY

## 2024-05-11 PROCEDURE — 84132 ASSAY OF SERUM POTASSIUM: CPT

## 2024-05-11 PROCEDURE — 84295 ASSAY OF SERUM SODIUM: CPT

## 2024-05-11 PROCEDURE — 71260 CT THORAX DX C+: CPT

## 2024-05-11 PROCEDURE — 82947 ASSAY GLUCOSE BLOOD QUANT: CPT

## 2024-05-11 PROCEDURE — 84145 PROCALCITONIN (PCT): CPT

## 2024-05-11 PROCEDURE — 94760 N-INVAS EAR/PLS OXIMETRY 1: CPT

## 2024-05-11 PROCEDURE — 85025 COMPLETE CBC W/AUTO DIFF WBC: CPT

## 2024-05-11 PROCEDURE — 99233 SBSQ HOSP IP/OBS HIGH 50: CPT | Performed by: INTERNAL MEDICINE

## 2024-05-11 RX ORDER — PROCHLORPERAZINE EDISYLATE 5 MG/ML
5 INJECTION INTRAMUSCULAR; INTRAVENOUS EVERY 6 HOURS PRN
Status: DISCONTINUED | OUTPATIENT
Start: 2024-05-11 | End: 2024-05-18 | Stop reason: HOSPADM

## 2024-05-11 RX ORDER — ALBUMIN (HUMAN) 12.5 G/50ML
25 SOLUTION INTRAVENOUS ONCE
Status: DISCONTINUED | OUTPATIENT
Start: 2024-05-11 | End: 2024-05-11

## 2024-05-11 RX ORDER — FUROSEMIDE 10 MG/ML
40 INJECTION INTRAMUSCULAR; INTRAVENOUS 2 TIMES DAILY
Status: COMPLETED | OUTPATIENT
Start: 2024-05-11 | End: 2024-05-12

## 2024-05-11 RX ADMIN — SODIUM CHLORIDE, PRESERVATIVE FREE 40 MG: 5 INJECTION INTRAVENOUS at 12:18

## 2024-05-11 RX ADMIN — LINEZOLID 600 MG: 600 INJECTION, SOLUTION INTRAVENOUS at 23:08

## 2024-05-11 RX ADMIN — FUROSEMIDE 40 MG: 10 INJECTION, SOLUTION INTRAMUSCULAR; INTRAVENOUS at 17:31

## 2024-05-11 RX ADMIN — IOPAMIDOL 100 ML: 755 INJECTION, SOLUTION INTRAVENOUS at 10:55

## 2024-05-11 RX ADMIN — SODIUM CHLORIDE, PRESERVATIVE FREE 10 ML: 5 INJECTION INTRAVENOUS at 21:10

## 2024-05-11 RX ADMIN — MIRTAZAPINE 15 MG: 15 TABLET, FILM COATED ORAL at 21:05

## 2024-05-11 RX ADMIN — ACYCLOVIR 200 MG: 400 TABLET ORAL at 21:05

## 2024-05-11 RX ADMIN — SODIUM CHLORIDE, PRESERVATIVE FREE 10 ML: 5 INJECTION INTRAVENOUS at 07:53

## 2024-05-11 RX ADMIN — SODIUM CHLORIDE, PRESERVATIVE FREE 40 MG: 5 INJECTION INTRAVENOUS at 21:09

## 2024-05-11 RX ADMIN — SODIUM CHLORIDE: 9 INJECTION, SOLUTION INTRAVENOUS at 12:19

## 2024-05-11 RX ADMIN — FUROSEMIDE 40 MG: 10 INJECTION, SOLUTION INTRAMUSCULAR; INTRAVENOUS at 12:18

## 2024-05-11 RX ADMIN — ACYCLOVIR 200 MG: 400 TABLET ORAL at 07:53

## 2024-05-11 RX ADMIN — ALLOPURINOL 300 MG: 300 TABLET ORAL at 07:53

## 2024-05-11 RX ADMIN — PROCHLORPERAZINE EDISYLATE 5 MG: 5 INJECTION INTRAMUSCULAR; INTRAVENOUS at 03:45

## 2024-05-11 RX ADMIN — Medication 1 CAPSULE: at 07:53

## 2024-05-11 RX ADMIN — LINEZOLID 600 MG: 600 INJECTION, SOLUTION INTRAVENOUS at 12:23

## 2024-05-11 RX ADMIN — SODIUM CHLORIDE 0.5 MG: 9 INJECTION INTRAMUSCULAR; INTRAVENOUS; SUBCUTANEOUS at 07:50

## 2024-05-11 RX ADMIN — ONDANSETRON 4 MG: 2 INJECTION INTRAMUSCULAR; INTRAVENOUS at 01:32

## 2024-05-11 ASSESSMENT — PAIN SCALES - GENERAL
PAINLEVEL_OUTOF10: 0

## 2024-05-11 NOTE — CONSULTS
History and Physical Initial Visit NOTE           5/11/2024    Tayla Escobar                        Date of Admission:  5/7/2024    The patient's chart is reviewed and the patient is discussed with the staff.    Subjective:     Patient is a 84 y.o.  female seen and evaluated at the request of Nadia Vidal NP with oncology for dyspnea and hypoxia. PMX HTN, hyperlipidemia, CKD stage 2, multiple myeloma recently dx'd and on first round of CyBorD, last dose 5/6, Hx of DVT/PE on eliquis.  She presented with diarrhea, generalized abd pain x1 day.  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   Has had worsening SOB during admission.  CXR 5/10 with B pleural effusion, pulmonary vascular congestion, received 1 dose lasix.  Went for CTA this am due to ongoing dyspnea and off eliquis due to thrombocytopenia.  Negative for PE, but B pleural effusion and pulmonary consolidations.    Pt is currently sedated after getting ativan.  She did receive 1 dose of lasix yesterday with 3L of UOP after.  Currently on 4L NC, did wear bipap this am for a while.  ABG with hypercapnea with pH 7.2, improved on repeat.      No lung history per daughter and son who provide history.  Diarrhea for <2 days then stopped.  Appetite is poor. She does snore and does not sleep well, more concerning for ANAM, not diagosed.  Hx of diastolic heart failure from echo 2023.      Review of Systems: Unable to obtain due to patient factors.     Current Outpatient Medications   Medication Instructions    acetaminophen (TYLENOL) 500 mg, Oral, EVERY 6 HOURS PRN    acyclovir (ZOVIRAX) 200 mg, Oral, 2 TIMES DAILY    allopurinol (ZYLOPRIM) 50 mg, Oral, TWICE WEEKLY    aluminum & magnesium hydroxide-simethicone (MAALOX) 200-200-20 MG/5ML SUSP suspension 30 mLs, Oral, EVERY 6 HOURS PRN    fexofenadine (ALLEGRA ALLERGY) 180 mg, Oral, DAILY    furosemide (LASIX) 20 mg, Oral, 2 TIMES DAILY    loperamide (IMODIUM) 2  replace as needed.      Pancytopenia (HCC)  Plan: increased risk for infections.  Check sputum with atelectasis vs consolidation once awake.        UTI (urinary tract infection) due to Enterococcus  Plan: linezolid for VRE UTI;      No more benzos for patient.  Can do low dose morphine for SOB, ordered.  Can use bipap for now as needed, but if needed more than not, needs to transfer to ICU.  Verified with son/daughter (POA) that pt would want intubation if needed.        Full Code    Thank you very much for this referral.  We appreciate the opportunity to participate in this patient's care.  Will follow along with above stated plan.    In this split/shared evaluation I performed performed a medically appropriate history and exam, counseled and educated the patient and/or family member, ordered medications, tests or procedures, documented information in EMR, and coordinated care. which accounted for 18 minutes clinical time.     Gladys Olivas, RAQUEL - CNP     In this split/shared evaluation I performed reviewed the patients's H&P, available images, labs, cultures., performed a medically appropriate history and exam, documented information in EMR, independently interpreted images, and coordinated care. which accounted for 22 minutes clinical time.     Impression: Patient with labored breathing using accessory muscles.  Fairly weak.  Will put the patient back on BiPAP at this time and send her to ICU.  Unfortunately she is high risk to decompensate.  Earlier ABG noted pH was 7.2 range and that improved.  However given her current increased work of breathing, will likely decompensate again.  I spoke to the son and would like aggressive care.  Therefore we will send the patient down to the ICU.  I agree with the above will try to avoid any benzodiazepines for now.  Continue remaining treatment as per above    Spoke with nursing coordinator on the fifth floor as well       Polo Lloyd MD

## 2024-05-11 NOTE — PROGRESS NOTES
completed initial visit with patient and family at bedside(daughter and niece).  Patient was not able to respond, but appeared comfortable.  Family engaged in life review and spiritual reflection, stating that they had been reading scripture together, per patient's request.   provided pastoral presence, prayer and empathetic listening.  Peace be with you,  Signed by  SHAYY BailonDiv.   [phone]

## 2024-05-11 NOTE — PROGRESS NOTES
Occupational Therapy Note:    Attempted to see patient this AM for occupational therapy evaluation session. Patient placed back on BIPAP, may be transferring to unit, will hold for today. Will follow and re-attempt as schedule permits/patient available. Thank you,    DEB Healy, OT    Rehab Caseload Tracker

## 2024-05-11 NOTE — PROGRESS NOTES
05/11/24 0846   NIV Type   NIV Started/Stopped On   Equipment Type v60   Mode Bilevel   Mask Type Under the nose   Mask Size Medium   Breath Sounds   Breath Sounds Bilateral Clear;Diminished   Settings/Measurements   PIP Observed 17 cm H20   IPAP 18 cmH20   CPAP/EPAP 6 cmH2O   Vt (Measured) 397 mL   Rate Ordered 22   Insp Rise Time (%) 4 %   FiO2  35 %   I Time/ I Time % 0.9 s   Minute Volume (L/min) 10.2 Liters   Mask Leak (lpm) 0 lpm   Patient's Home Machine No   Alarm Settings   Alarms On Y   Low Pressure (cmH2O) 5 cmH2O   High Pressure (cmH2O) 35 cmH2O   Apnea (secs) 20 secs   RR Low (bpm) 8   RR High (bpm) 50 br/min     Patient placed on bipap due to SOB and ABG. Nurse notified and informed of hospital policy regarding use of the V60. Patient currently resting. Family around bed with patient. SAT and vitals are good at current time.

## 2024-05-11 NOTE — PROGRESS NOTES
Patient called RN to room. Patient stated she felt like she couldn't breathe. Currently on 3L NC. Checked O2 saturation and it was 94%. Bumped up O2 to 5L NC to help patient feel airflow. Patient still stating she felt like she couldn't breathe and was very restless. Using accessory muscles with very labored breathing at 40 times a minute. Informed oncology NP who ordered ABG's, 1x dose of ativan 0.25ml, BNP lab as well as a chest CT. ABG's according to respiratory showed that she could use a few hours on BiPAP. Patient placed on BiPAP around 0900 and orders to recheck ABG's at 11am. When ABG's rechecked at 1100 it showed improvement so patient taken off of BiPAP and transported down to CT. When patient arrived back on floor she stated she felt slightly better but still felt like she couldn't breathe well. Pulmonology came to see patient who wanted patient back on BiPAP so transfer orders to ICU were placed. Patient given IV lasix before being transferred down to ICU. Patient transferred down with myself, another RN and respiratory on BiPAP.     Verbal report given to CHANTEL Gómez in ICU on patient.

## 2024-05-11 NOTE — PLAN OF CARE
Problem: Pain  Goal: Verbalizes/displays adequate comfort level or baseline comfort level  5/11/2024 0223 by Shayy Tong, RN  Outcome: Progressing  5/10/2024 1641 by Kaylee Solo, RN  Outcome: Progressing     Problem: Skin/Tissue Integrity  Goal: Absence of new skin breakdown  Description: 1.  Monitor for areas of redness and/or skin breakdown  2.  Assess vascular access sites hourly  3.  Every 4-6 hours minimum:  Change oxygen saturation probe site  4.  Every 4-6 hours:  If on nasal continuous positive airway pressure, respiratory therapy assess nares and determine need for appliance change or resting period.  5/11/2024 0223 by Shayy Tong, RN  Outcome: Progressing  5/10/2024 1641 by Kaylee Solo, RN  Outcome: Progressing     Problem: Safety - Adult  Goal: Free from fall injury  5/11/2024 0223 by Shayy Tong, RN  Outcome: Progressing  5/10/2024 1641 by Kaylee Solo, RN  Outcome: Progressing

## 2024-05-11 NOTE — PROGRESS NOTES
[facility] Hematology & Oncology        Inpatient Hematology / Oncology Progress Note    Reason for Consult:  Hypokalemia [E87.6]  Acute diarrhea [R19.7]  Positive urine culture [R82.79]  Pancytopenia (HCC) [D61.818]  Diarrhea, unspecified type [R19.7]  Referring Physician:  Ike Vega MD    24 Hour Events:  Afebrile, VSS, on BiPAP  CXR with bibasilar consolidations and effusions  No plans for endoscopy per GI, no active bleeding  On Linezolid (D4)  S/p Lasix yesterday, diuresed well (UOP 3.6L)  C/o worsening dyspnea  Son and daughter and bedside    Transfusions: None  Replacements: None       ROS:  Constitutional: +fatigue, weakness. Negative for fever, chills.  CV: Negative for chest pain, palpitations.  Respiratory: +dyspnea. Negative for cough, wheezing.  GI: Negative for nausea, abdominal pain, diarrhea.    10 point review of systems is otherwise negative with the exception of the elements mentioned above in the HPI.       No Known Allergies  Past Medical History:   Diagnosis Date    Acute respiratory failure with hypoxemia (HCC)     Benign essential hypertension 2/11/2015    medication    Bilateral leg edema 4/26/2017    Cancer (HCC)     skin    Hiatal hernia     \"large\"    Hypercholesteremia 2/11/2015    IFG (impaired fasting glucose) 4/26/2017    Iron deficiency anemia 5/12/2016    Low oxygen saturation     per office notes- patient's daughter reported O2 sat drops to 85% when supine, she uses O2    Mixed hyperlipidemia 2/11/2015    Morbid obesity (HCC) 10/26/2017    Nipple discharge     not current as of 7/17/13    Primary insomnia 4/26/2018    Primary osteoarthritis of both knees 4/26/2018    Pulmonary embolism (HCC) 10/15/2023    on eliquis- Dr. Arnold recommended eliquis full dose x 6 months    Stage 3 chronic kidney disease (HCC) 7/17/2019     Past Surgical History:   Procedure Laterality Date    HIATAL HERNIA REPAIR N/A 3/4/2024    ROBOTIC HIATAL HERNIA REPAIR performed by Thomas Lozano  +ve  - antidiarrheals prn  5/10 Diarrhea resolved.  GI panel neg.  Stool cx-NG  RESOLVED    FOBT +ve  - PPI BID  - GI consulted  - Transfuse plt  5/11 No plans for endoscopy per GI, no active bleeding and pt declines procedure    VRE UTI  - Ucx 5/2 +VRE  - repeat UA neg  - on Linezolid  5/11 Continues on Linezolid (D4)    Pancytopenia secondary to disease / chemotherapy  - Transfuse prn to keep Hgb >7 and Plt >10k or >50k with active bleeding    Hx urinary retention  - has forrest  - urology f/u  5/10 Orders placed to exchange forrest    Dyspnea  5/10 O2 up to 6L, suspect volume overload.  CXR ordered.  DC IVF.  Lasix 40mg IV x 1 ordered.  Encouraged to get OOB.  5/11 Pt c/o worsening dyspnea.  ABG ordered - requiring BiPAP.  CXR with bibasilar consolidations and effusions.  Diuresed well s/p Lasix, UOP 3.6L.  RVP, BNP and CTA chest ordered.  Consult pulm.     Continue home meds  Ppx: Acyclovir  Supportive care  AC contraindicated d/t thrombocytopenia    Goals and plan of care reviewed with the patient.  All questions answered to the best of our ability.      Dispo:  TBD pending clinical course.  PT/OT recommend STR at discharge.         RAQUEL Joshi - CNP   [facility] Hematology & Oncology  [address] [place] [address]  Office : [phone]  Fax : [phone]       Attending Addendum:  I personally evaluated the patient with Nadia Vidal, N.P.,  and agree with the assessment, findings and plan as documented.  Appears fatigued, heart regular without murmur, lungs clear, abdomen benign.  Although her labs remain relatively unremarkable, her respiratory status is worsening, now requiring BiPAP.  She diuresed well yesterday but still seems to be a little hypervolemic.  Pulmonary following.  Diarrhea has resolved.  Renal function remains good.  Remains on Zyvox because Ucx showed VRE.  Stool studies negative. Transfuse per Lucrecia SOPs, pancytopenia likely treatment related but no replacements needed

## 2024-05-11 NOTE — PROGRESS NOTES
Physical Therapy Note:    Therapist is discontinuing acute PT services secondary to decline in medical status and transfer to the ICU. Please re-consult acute PT/OT services when medically appropriate.    Thank you,  Lorena Medina, PT, DPT

## 2024-05-11 NOTE — PROGRESS NOTES
4 Eyes Skin Assessment     NAME:  Tayla Escobar  YOB: 1940  MEDICAL RECORD NUMBER:  [id number]    The patient is being assessed for  Admission    I agree that at least one RN has performed a thorough Head to Toe Skin Assessment on the patient. ALL assessment sites listed below have been assessed.      Areas assessed by both nurses:    Head, Face, Ears, Shoulders, Back, Chest, Arms, Elbows, Hands, Sacrum. Buttock, Coccyx, Ischium, and Legs. Feet and Heels        Does the Patient have a Wound? Yes wound(s) were present on assessment. LDA wound assessment was Initiated and completed by RN  Pt. Has red non blanchable area on upper back, areaa covered with foam dressing. Pt.s also another small red area which is blanchable right below that area, foam dressing covers both areas. Pt.s has blood blister on R-buttocks about a quarter size. Pt.s has skin breakdown in gluteal cleft. Pt.s has scattered bruising on BUE.         Ayad Prevention initiated by RN: Yes  Wound Care Orders initiated by RN: No    Pressure Injury (Stage 3,4, Unstageable, DTI, NWPT, and Complex wounds) if present, place Wound referral order by RN under : No    New Ostomies, if present place, Ostomy referral order under : No     Nurse 1 eSignature: Electronically signed by Rico Schafer RN on 5/11/24 at 4:55 PM EDT    **SHARE this note so that the co-signing nurse can place an eSignature**    Nurse 2 eSignature: {Esignature:[id number]}

## 2024-05-11 NOTE — PLAN OF CARE
Problem: Pain  Goal: Verbalizes/displays adequate comfort level or baseline comfort level  5/11/2024 1313 by Kaylee Solo, RN  Outcome: Progressing  5/11/2024 0223 by Shayy Tong RN  Outcome: Progressing     Problem: Skin/Tissue Integrity  Goal: Absence of new skin breakdown  Description: 1.  Monitor for areas of redness and/or skin breakdown  2.  Assess vascular access sites hourly  3.  Every 4-6 hours minimum:  Change oxygen saturation probe site  4.  Every 4-6 hours:  If on nasal continuous positive airway pressure, respiratory therapy assess nares and determine need for appliance change or resting period.  5/11/2024 1313 by Kaylee Solo, RN  Outcome: Progressing  5/11/2024 0223 by Shayy Tong, RN  Outcome: Progressing     Problem: Safety - Adult  Goal: Free from fall injury  5/11/2024 1313 by Kaylee Solo, RN  Outcome: Progressing  5/11/2024 0223 by Shayy Tong RN  Outcome: Progressing     Problem: Discharge Planning  Goal: Discharge to home or other facility with appropriate resources  Outcome: Progressing

## 2024-05-11 NOTE — PROGRESS NOTES
05/11/24 1234   NIV Type   $NIV $Daily Charge   Ventilator ID [id number]   Suction Setup and Functional Yes   NIV Started/Stopped On   Equipment Type V60   Mode Bilevel   Mask Type Under the nose   Mask Size Medium   Settings/Measurements   PIP Observed 17 cm H20   IPAP 18 cmH20   CPAP/EPAP 6 cmH2O   Vt (Measured) 258 mL   Rate Ordered 38   Insp Rise Time (%) 4 %   FiO2  35 %   I Time/ I Time % 0.9 s   Minute Volume (L/min) 8.4 Liters   Mask Leak (lpm) 38 lpm   Patient's Home Machine No   Alarm Settings   Alarms On Y   Low Pressure (cmH2O) 5 cmH2O   High Pressure (cmH2O) 35 cmH2O   Apnea (secs) 20 secs   RR Low (bpm) 8   RR High (bpm) 50 br/min     Patient placed back on BIPAP per request of pulmonary. Patient being transported as soon as report called to the unit.

## 2024-05-11 NOTE — PLAN OF CARE
Problem: Pain  Goal: Verbalizes/displays adequate comfort level or baseline comfort level  5/11/2024 1432 by Rico Schafer RN  Outcome: Progressing  Flowsheets (Taken 5/11/2024 1316)  Verbalizes/displays adequate comfort level or baseline comfort level:   Encourage patient to monitor pain and request assistance   Assess pain using appropriate pain scale   Administer analgesics based on type and severity of pain and evaluate response  5/11/2024 1313 by Kaylee Solo RN  Outcome: Progressing  5/11/2024 0223 by Shayy Tong RN  Outcome: Progressing     Problem: Skin/Tissue Integrity  Goal: Absence of new skin breakdown  Description: 1.  Monitor for areas of redness and/or skin breakdown  2.  Assess vascular access sites hourly  3.  Every 4-6 hours minimum:  Change oxygen saturation probe site  4.  Every 4-6 hours:  If on nasal continuous positive airway pressure, respiratory therapy assess nares and determine need for appliance change or resting period.  5/11/2024 1432 by Rico Schafer RN  Outcome: Progressing  5/11/2024 1313 by Kaylee Solo RN  Outcome: Progressing  5/11/2024 0223 by Shayy Tong RN  Outcome: Progressing     Problem: Safety - Adult  Goal: Free from fall injury  5/11/2024 1432 by Rico Schafer RN  Outcome: Progressing  5/11/2024 1313 by Kaylee Solo, RN  Outcome: Progressing  5/11/2024 0223 by Shayy Tong RN  Outcome: Progressing     Problem: Discharge Planning  Goal: Discharge to home or other facility with appropriate resources  5/11/2024 1432 by Rico Schafer RN  Outcome: Progressing  Flowsheets (Taken 5/11/2024 1316)  Discharge to home or other facility with appropriate resources:   Identify barriers to discharge with patient and caregiver   Arrange for needed discharge resources and transportation as appropriate   Identify discharge learning needs (meds, wound care, etc)  5/11/2024 1313 by Kaylee Solo, RN  Outcome: Progressing

## 2024-05-11 NOTE — H&P (VIEW-ONLY)
Marital status:      Spouse name: Not on file    Number of children: Not on file    Years of education: Not on file    Highest education level: Not on file   Occupational History    Not on file   Tobacco Use    Smoking status: Never    Smokeless tobacco: Former     Types: Chew     Quit date: 2018    Tobacco comments:     Quit smoking: dip snuff   Vaping Use    Vaping Use: Never used   Substance and Sexual Activity    Alcohol use: No     Alcohol/week: 0.0 standard drinks of alcohol    Drug use: No    Sexual activity: Not Currently     Partners: Male   Other Topics Concern    Not on file   Social History Narrative    Not on file     Social Determinants of Health     Financial Resource Strain: Low Risk  (7/12/2023)    Overall Financial Resource Strain (CARDIA)     Difficulty of Paying Living Expenses: Not hard at all   Food Insecurity: No Food Insecurity (5/8/2024)    Hunger Vital Sign     Worried About Running Out of Food in the Last Year: Never true     Ran Out of Food in the Last Year: Never true   Transportation Needs: No Transportation Needs (5/8/2024)    PRAPARE - Transportation     Lack of Transportation (Medical): No     Lack of Transportation (Non-Medical): No   Physical Activity: Inactive (2/21/2024)    Exercise Vital Sign     Days of Exercise per Week: 0 days     Minutes of Exercise per Session: 0 min   Stress: Not on file   Social Connections: Not on file   Intimate Partner Violence: Not on file   Housing Stability: Low Risk  (5/8/2024)    Housing Stability Vital Sign     Unable to Pay for Housing in the Last Year: No     Number of Places Lived in the Last Year: 1     Unstable Housing in the Last Year: No     Family History   Problem Relation Age of Onset    No Known Problems Sister     Ovarian Cancer Neg Hx     Cancer Sister         lung    Breast Cancer Neg Hx     Hypertension Mother     Arrhythmia Mother     Cancer Sister     Cancer Brother         lung    Cancer Father      No Known  133 05/11/2024 05:06 AM    K 3.5 05/11/2024 05:06 AM     05/11/2024 05:06 AM    CO2 24 05/11/2024 05:06 AM    BUN 13 05/11/2024 05:06 AM    CREATININE 1.27 05/11/2024 05:06 AM    GLUCOSE 112 05/11/2024 05:06 AM    CALCIUM 8.3 05/11/2024 05:06 AM      No results found for: \"BNP\"    ECHO: 10/15/23    ECHO (TTE) COMPLETE (PRN CONTRAST/BUBBLE/STRAIN/3D) 10/16/2023 11:56 AM (Final)    Interpretation Summary    Left Ventricle: Normal left ventricular systolic function with a visually estimated EF of 60 - 65%. Left ventricle size is normal. Normal wall thickness. Normal wall motion. Abnormal diastolic function.    Mitral Valve: Mild annular calcification of the mitral valve. Mild regurgitation.    Tricuspid Valve: Mild regurgitation.    Left Atrium: Left atrium is mildly dilated.    Right Atrium: Right atrium is mildly dilated.    RVSP=53mmHg    Signed by: Domingo Lyons MD on 10/16/2023 11:56 AM    MICRO: No results for input(s): \"CULTURE\" in the last 72 hours.  No results for input(s): \"COVID19\" in the last 72 hours.  Assessment and Plan:  (Medical Decision Making)   Impression: 85 yo female with recent dx of multiple myeloma on 1st round of chemo, admitted with diarrhea, abd pain, found to have VRE UTI on zyvox; increased SOB, hypoxia and we were asked to consult.      Principal Problem:    Diarrhea  Plan: resolved.   Active Problems:     Pulmonary embolism without acute cor pulmonale, unspecified chronicity, unspecified pulmonary embolism type (HCC)  Plan: hx of and off eliquis due to thrombocytopenia.  Newest CTA without PE    Moderate protein-calorie malnutrition (HCC)  Plan: low blood protein and albumin, contributing to potential of 3rd spacing and volume issues.        Multiple myeloma not having achieved remission (HCC)  Plan: per oncology.  Mentions needing dose adjustment of chemo      Thrombocytopenia (HCC)  Plan: post chemo induced.  Hold anticoagulation.  Could do SCD's      Hypokalemia  Plan:

## 2024-05-12 ENCOUNTER — APPOINTMENT (OUTPATIENT)
Dept: GENERAL RADIOLOGY | Age: 84
DRG: 689 | End: 2024-05-12
Payer: MEDICARE

## 2024-05-12 LAB
ALBUMIN SERPL-MCNC: 2.7 G/DL (ref 3.2–4.6)
ALBUMIN/GLOB SERPL: 1.3 (ref 1–1.9)
ALP SERPL-CCNC: 75 U/L (ref 35–104)
ALT SERPL-CCNC: 21 U/L (ref 12–65)
ANION GAP SERPL CALC-SCNC: 10 MMOL/L (ref 9–18)
ARTERIAL PATENCY WRIST A: POSITIVE
AST SERPL-CCNC: 28 U/L (ref 15–37)
BACTERIA SPEC CULT: NORMAL
BACTERIA SPEC CULT: NORMAL
BASE EXCESS BLD CALC-SCNC: 1.7 MMOL/L
BASOPHILS # BLD: 0 K/UL (ref 0–0.2)
BASOPHILS NFR BLD: 0 % (ref 0–2)
BDY SITE: ABNORMAL
BILIRUB SERPL-MCNC: 0.5 MG/DL (ref 0–1.2)
BUN SERPL-MCNC: 14 MG/DL (ref 8–23)
CALCIUM SERPL-MCNC: 8.6 MG/DL (ref 8.8–10.2)
CHLORIDE SERPL-SCNC: 101 MMOL/L (ref 98–107)
CO2 SERPL-SCNC: 25 MMOL/L (ref 20–28)
CREAT SERPL-MCNC: 1.36 MG/DL (ref 0.6–1.1)
DIFFERENTIAL METHOD BLD: ABNORMAL
EOSINOPHIL # BLD: 0.1 K/UL (ref 0–0.8)
EOSINOPHIL NFR BLD: 3 % (ref 0.5–7.8)
ERYTHROCYTE [DISTWIDTH] IN BLOOD BY AUTOMATED COUNT: 16.7 % (ref 11.9–14.6)
GAS FLOW.O2 O2 DELIVERY SYS: ABNORMAL
GLOBULIN SER CALC-MCNC: 2.1 G/DL (ref 2.3–3.5)
GLUCOSE SERPL-MCNC: 86 MG/DL (ref 70–99)
HCO3 BLD-SCNC: 26.5 MMOL/L (ref 22–26)
HCT VFR BLD AUTO: 28.5 % (ref 35.8–46.3)
HGB BLD-MCNC: 9.3 G/DL (ref 11.7–15.4)
IMM GRANULOCYTES # BLD AUTO: 0 K/UL (ref 0–0.5)
IMM GRANULOCYTES NFR BLD AUTO: 2 % (ref 0–5)
IPAP/PIP/HIGH PEEP: 18
LYMPHOCYTES # BLD: 0.1 K/UL (ref 0.5–4.6)
LYMPHOCYTES NFR BLD: 6 % (ref 13–44)
MAGNESIUM SERPL-MCNC: 1.7 MG/DL (ref 1.8–2.4)
MAGNESIUM SERPL-MCNC: 1.9 MG/DL (ref 1.8–2.4)
MCH RBC QN AUTO: 29.3 PG (ref 26.1–32.9)
MCHC RBC AUTO-ENTMCNC: 32.6 G/DL (ref 31.4–35)
MCV RBC AUTO: 89.9 FL (ref 82–102)
MONOCYTES # BLD: 0.3 K/UL (ref 0.1–1.3)
MONOCYTES NFR BLD: 12 % (ref 4–12)
NEUTS SEG # BLD: 1.8 K/UL (ref 1.7–8.2)
NEUTS SEG NFR BLD: 77 % (ref 43–78)
NRBC # BLD: 0.02 K/UL (ref 0–0.2)
O2/TOTAL GAS SETTING VFR VENT: 35 %
PCO2 BLD: 41.2 MMHG (ref 35–45)
PEEP RESPIRATORY: 6 CMH2O
PH BLD: 7.42 (ref 7.35–7.45)
PLATELET # BLD AUTO: 44 K/UL (ref 150–450)
PLATELET COMMENT: ABNORMAL
PMV BLD AUTO: 11.1 FL (ref 9.4–12.3)
PO2 BLD: 107 MMHG (ref 75–100)
POTASSIUM SERPL-SCNC: 3 MMOL/L (ref 3.5–5.1)
POTASSIUM SERPL-SCNC: 3.4 MMOL/L (ref 3.5–5.1)
PRESSURE SUPPORT SETTING VENT: 18 CMH2O
PROT SERPL-MCNC: 4.8 G/DL (ref 6.3–8.2)
RBC # BLD AUTO: 3.17 M/UL (ref 4.05–5.2)
RBC MORPH BLD: ABNORMAL
RBC MORPH BLD: ABNORMAL
SAO2 % BLD: 98.2 % (ref 95–98)
SERVICE CMNT-IMP: ABNORMAL
SERVICE CMNT-IMP: NORMAL
SERVICE CMNT-IMP: NORMAL
SODIUM SERPL-SCNC: 136 MMOL/L (ref 136–145)
SPECIMEN TYPE: ABNORMAL
WBC # BLD AUTO: 2.3 K/UL (ref 4.3–11.1)
WBC MORPH BLD: ABNORMAL

## 2024-05-12 PROCEDURE — 36592 COLLECT BLOOD FROM PICC: CPT

## 2024-05-12 PROCEDURE — 94761 N-INVAS EAR/PLS OXIMETRY MLT: CPT

## 2024-05-12 PROCEDURE — 84132 ASSAY OF SERUM POTASSIUM: CPT

## 2024-05-12 PROCEDURE — 97535 SELF CARE MNGMENT TRAINING: CPT

## 2024-05-12 PROCEDURE — 99232 SBSQ HOSP IP/OBS MODERATE 35: CPT | Performed by: INTERNAL MEDICINE

## 2024-05-12 PROCEDURE — 71045 X-RAY EXAM CHEST 1 VIEW: CPT

## 2024-05-12 PROCEDURE — 36600 WITHDRAWAL OF ARTERIAL BLOOD: CPT

## 2024-05-12 PROCEDURE — 1100000003 HC PRIVATE W/ TELEMETRY

## 2024-05-12 PROCEDURE — 6360000002 HC RX W HCPCS: Performed by: HOSPITALIST

## 2024-05-12 PROCEDURE — 97162 PT EVAL MOD COMPLEX 30 MIN: CPT

## 2024-05-12 PROCEDURE — 2580000003 HC RX 258: Performed by: HOSPITALIST

## 2024-05-12 PROCEDURE — 6360000002 HC RX W HCPCS: Performed by: NURSE PRACTITIONER

## 2024-05-12 PROCEDURE — C9113 INJ PANTOPRAZOLE SODIUM, VIA: HCPCS | Performed by: NURSE PRACTITIONER

## 2024-05-12 PROCEDURE — 97530 THERAPEUTIC ACTIVITIES: CPT

## 2024-05-12 PROCEDURE — 6370000000 HC RX 637 (ALT 250 FOR IP): Performed by: NURSE PRACTITIONER

## 2024-05-12 PROCEDURE — 80053 COMPREHEN METABOLIC PANEL: CPT

## 2024-05-12 PROCEDURE — 6370000000 HC RX 637 (ALT 250 FOR IP): Performed by: HOSPITALIST

## 2024-05-12 PROCEDURE — 6370000000 HC RX 637 (ALT 250 FOR IP): Performed by: INTERNAL MEDICINE

## 2024-05-12 PROCEDURE — 85025 COMPLETE CBC W/AUTO DIFF WBC: CPT

## 2024-05-12 PROCEDURE — 99233 SBSQ HOSP IP/OBS HIGH 50: CPT | Performed by: INTERNAL MEDICINE

## 2024-05-12 PROCEDURE — APPSS30 APP SPLIT SHARED TIME 16-30 MINUTES: Performed by: NURSE PRACTITIONER

## 2024-05-12 PROCEDURE — 2700000000 HC OXYGEN THERAPY PER DAY

## 2024-05-12 PROCEDURE — 82803 BLOOD GASES ANY COMBINATION: CPT

## 2024-05-12 PROCEDURE — A4216 STERILE WATER/SALINE, 10 ML: HCPCS | Performed by: NURSE PRACTITIONER

## 2024-05-12 PROCEDURE — 97165 OT EVAL LOW COMPLEX 30 MIN: CPT

## 2024-05-12 PROCEDURE — 83735 ASSAY OF MAGNESIUM: CPT

## 2024-05-12 PROCEDURE — 94660 CPAP INITIATION&MGMT: CPT

## 2024-05-12 PROCEDURE — 6370000000 HC RX 637 (ALT 250 FOR IP)

## 2024-05-12 PROCEDURE — 2580000003 HC RX 258: Performed by: NURSE PRACTITIONER

## 2024-05-12 RX ORDER — POTASSIUM CHLORIDE 7.45 MG/ML
10 INJECTION INTRAVENOUS
Status: COMPLETED | OUTPATIENT
Start: 2024-05-12 | End: 2024-05-12

## 2024-05-12 RX ORDER — MAGNESIUM SULFATE IN WATER 40 MG/ML
2000 INJECTION, SOLUTION INTRAVENOUS ONCE
Status: COMPLETED | OUTPATIENT
Start: 2024-05-12 | End: 2024-05-12

## 2024-05-12 RX ORDER — POTASSIUM CHLORIDE 29.8 MG/ML
20 INJECTION INTRAVENOUS
Status: COMPLETED | OUTPATIENT
Start: 2024-05-12 | End: 2024-05-12

## 2024-05-12 RX ADMIN — SODIUM CHLORIDE, PRESERVATIVE FREE 40 MG: 5 INJECTION INTRAVENOUS at 23:22

## 2024-05-12 RX ADMIN — POTASSIUM CHLORIDE 10 MEQ: 7.46 INJECTION, SOLUTION INTRAVENOUS at 06:32

## 2024-05-12 RX ADMIN — SODIUM CHLORIDE, PRESERVATIVE FREE 40 MG: 5 INJECTION INTRAVENOUS at 12:30

## 2024-05-12 RX ADMIN — POTASSIUM CHLORIDE 10 MEQ: 7.46 INJECTION, SOLUTION INTRAVENOUS at 08:11

## 2024-05-12 RX ADMIN — MIRTAZAPINE 15 MG: 15 TABLET, FILM COATED ORAL at 20:27

## 2024-05-12 RX ADMIN — Medication 1 CAPSULE: at 08:01

## 2024-05-12 RX ADMIN — TEMAZEPAM 15 MG: 15 CAPSULE ORAL at 23:27

## 2024-05-12 RX ADMIN — ACYCLOVIR 200 MG: 400 TABLET ORAL at 20:26

## 2024-05-12 RX ADMIN — POTASSIUM CHLORIDE 20 MEQ: 400 INJECTION, SOLUTION INTRAVENOUS at 20:32

## 2024-05-12 RX ADMIN — ALLOPURINOL 300 MG: 300 TABLET ORAL at 08:01

## 2024-05-12 RX ADMIN — POTASSIUM CHLORIDE 10 MEQ: 7.46 INJECTION, SOLUTION INTRAVENOUS at 09:11

## 2024-05-12 RX ADMIN — LINEZOLID 600 MG: 600 INJECTION, SOLUTION INTRAVENOUS at 12:31

## 2024-05-12 RX ADMIN — POTASSIUM CHLORIDE 20 MEQ: 400 INJECTION, SOLUTION INTRAVENOUS at 17:40

## 2024-05-12 RX ADMIN — ACYCLOVIR 200 MG: 400 TABLET ORAL at 08:39

## 2024-05-12 RX ADMIN — SODIUM CHLORIDE, PRESERVATIVE FREE 10 ML: 5 INJECTION INTRAVENOUS at 20:33

## 2024-05-12 RX ADMIN — SODIUM CHLORIDE, PRESERVATIVE FREE 10 ML: 5 INJECTION INTRAVENOUS at 08:11

## 2024-05-12 RX ADMIN — FUROSEMIDE 40 MG: 10 INJECTION, SOLUTION INTRAMUSCULAR; INTRAVENOUS at 08:01

## 2024-05-12 RX ADMIN — FUROSEMIDE 40 MG: 10 INJECTION, SOLUTION INTRAMUSCULAR; INTRAVENOUS at 17:37

## 2024-05-12 RX ADMIN — POTASSIUM CHLORIDE 10 MEQ: 7.46 INJECTION, SOLUTION INTRAVENOUS at 05:22

## 2024-05-12 RX ADMIN — MAGNESIUM SULFATE HEPTAHYDRATE 2000 MG: 40 INJECTION, SOLUTION INTRAVENOUS at 08:03

## 2024-05-12 ASSESSMENT — PAIN SCALES - GENERAL
PAINLEVEL_OUTOF10: 0
PAINLEVEL_OUTOF10: 0

## 2024-05-12 NOTE — PROGRESS NOTES
UTI.  Stool studies negative. CBC is unchanged.  She may need dose modifications of her CyBorD based on her poor tolerance.           Ike Veag MD  [facility] Hematology and Oncology  [address] [place] [address]  Office : [phone]  Fax : [phone]

## 2024-05-12 NOTE — ICUWATCH
RRT Clinical Rounding Nurse Progress Report      SUBJECTIVE: Called to assess patient secondary to transfer from critical care.      Vitals:    05/12/24 0820 05/12/24 1111 05/12/24 1130 05/12/24 1502   BP:   117/77 119/70   Pulse: 86 (!) 106 93 85   Resp: 29  18 18   Temp:   97.5 °F (36.4 °C) 97.7 °F (36.5 °C)   TempSrc:   Oral Axillary   SpO2: 97% 97%  98%   Weight:       Height:              ASSESSMENT:  On arrival to room, I found patient to be resting in bed quietly. Patient is alert and oriented X 4 with documented periodic disorientation. Denies any pain or SOB. Respirations even and unlabored. Bilateral lung sounds diminished on Airvo 40L 50%. PT denies any needs or concerns at this time. Recent labs/notes/vitals reviewed and pt discussed with primary RN.    PLAN:  No concern per primary RN. Will follow per RRT Clinical Rounding Program protocol. Primary RN to call with concerns.    Gómez Owens RN  [place]: [phone]

## 2024-05-12 NOTE — PROGRESS NOTES
TRANSFER - OUT REPORT:    Verbal report given to Julia Jones on Tayla Escobar  being transferred to 5th floor for routine progression of patient care       Report consisted of patient's Situation, Background, Assessment and   Recommendations(SBAR).     Information from the following report(s) Nurse Handoff Report, Intake/Output, MAR, Recent Results, Med Rec Status, Cardiac Rhythm NSR, and Quality Measures was reviewed with the receiving nurse.           Lines:   PICC 05/04/24 Right Basilic (Active)   Central Line Being Utilized Yes 05/12/24 0816   Criteria for Appropriate Use Irritant/vesicant medication 05/12/24 0816   Site Assessment Clean, dry & intact 05/12/24 0816   Phlebitis Assessment No symptoms 05/12/24 0816   Infiltration Assessment 0 05/12/24 0816   Extremity Circumference (cm) 36 cm 05/04/24 1402   External Catheter Length (cm) 0 cm 05/04/24 1402   Lumen #1 Color/Status Purple;Flushed;Infusing 05/12/24 0816   Lumen #2 Color/Status Red;Flushed;Infusing 05/12/24 0816   Line Care Chlorhexidine wipes;Ports disinfected;Connections checked and tightened 05/12/24 0816   Alcohol Cap Used Yes 05/12/24 0816   Date of Last Dressing Change 05/06/24 05/12/24 0300   Dressing Type Transparent w/CHG gel 05/12/24 0816   Dressing Status Clean, dry & intact 05/12/24 0816   Dressing Intervention Other (Comment) 05/11/24 0412        Opportunity for questions and clarification was provided.      Patient transported with:  Registered Nurse via wheelchair; non-rebreather 15L; belongings with son at bedside including dentures.

## 2024-05-12 NOTE — PROGRESS NOTES
4 Eyes Skin Assessment     NAME:  Tayla Escobar  YOB: 1940  MEDICAL RECORD NUMBER:  [id number]    The patient is being assessed for  Admission    I agree that at least one RN has performed a thorough Head to Toe Skin Assessment on the patient. ALL assessment sites listed below have been assessed.      Areas assessed by both nurses:    Head, Face, Ears, Shoulders, Back, Chest, Arms, Elbows, Hands, Sacrum. Buttock, Coccyx, Ischium, Legs. Feet and Heels, and Under Medical Devices         Does the Patient have a Wound? Yes wound(s) were present on assessment. LDA wound assessment was Initiated and completed by RN       Ayad Prevention initiated by RN: Yes  Wound Care Orders initiated by RN: Yes    Pressure Injury (Stage 3,4, Unstageable, DTI, NWPT, and Complex wounds) if present, place Wound referral order by RN under : N/A    New Ostomies, if present place, Ostomy referral order under : No     Nurse 1 eSignature: Electronically signed by Alpa Mayorga RN on 5/12/24 at 2:13 PM EDT    **SHARE this note so that the co-signing nurse can place an eSignature**    Nurse 2 eSignature: Electronically signed by Kaylee Solo RN on 5/12/24 at 4:23 PM EDT

## 2024-05-12 NOTE — PROGRESS NOTES
ICU Daily ProgressNOTE           5/12/2024    Tayla Escobar                        Date of Admission:  5/7/2024    The patient's chart is reviewed and the patient is discussed with the staff.    Hospital course:  Patient is a 84 y.o.  female seen and evaluated at the request of Nadia Vidal NP with oncology for dyspnea and hypoxia. PMX HTN, hyperlipidemia, CKD stage 2, multiple myeloma recently dx'd and on first round of CyBorD, last dose 5/6, Hx of DVT/PE on eliquis.  She presented with diarrhea, generalized abd pain x1 day.  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   Has had worsening SOB during admission.  CXR 5/10 with B pleural effusion, pulmonary vascular congestion, received 1 dose lasix.  Went for CTA this am due to ongoing dyspnea and off eliquis due to thrombocytopenia.  Negative for PE, but B pleural effusion and pulmonary consolidations.    Pt is currently sedated after getting ativan.  She did receive 1 dose of lasix yesterday with 3L of UOP after.  Currently on 4L NC, did wear bipap this am for a while.  ABG with hypercapnea with pH 7.2, improved on repeat.      No lung history per daughter and son who provide history.  Diarrhea for <2 days then stopped.  Appetite is poor. She does snore and does not sleep well, more concerning for ANAM, not diagosed.  Hx of diastolic heart failure from echo 2023.      Subjective:     Came down to ICU for BIPAP. Patient today is awake alert. Feels can breath. On BIPAP at 16/8 and 35%        Review of Systems  -Fever  -Headaches  -Chest pain  -Dyspnea,- wheezing, -cough  -Abdominal pain,- constipation  -Leg swelling  All other organ systems grossly normal.      Current Outpatient Medications   Medication Instructions    acetaminophen (TYLENOL) 500 mg, Oral, EVERY 6 HOURS PRN    acyclovir (ZOVIRAX) 200 mg, Oral, 2 TIMES DAILY    allopurinol (ZYLOPRIM) 50 mg, Oral, TWICE WEEKLY    aluminum & magnesium  resolved.        Pulmonary embolism without acute cor pulmonale, unspecified chronicity, unspecified pulmonary embolism type (HCC)  Plan: hx of and off eliquis due to thrombocytopenia.  Newest CTA without PE.  Can likely stop    Moderate protein-calorie malnutrition (HCC)  Plan: low blood protein and albumin, contributing to potential of 3rd spacing and volume issues.        Multiple myeloma not having achieved remission (HCC)  Plan: per oncology.  Mentions needing dose adjustment of chemo      Thrombocytopenia (HCC)  Plan: post chemo induced.  Hold anticoagulation.  Could do SCD's      Hypokalemia  Plan: replace as needed.      Pancytopenia (HCC)  Plan: increased risk for infections.  Check sputum with atelectasis vs consolidation once awake.        UTI (urinary tract infection) due to Enterococcus  Plan: linezolid for VRE UTI;      No more benzos for patient.      Spoke with patient's daughter, nursing and respiratory    Full Code    The patient is critically ill with respiratory failure, circulatory failure and requires high complexity decision making for assessment and support including frequent ventilator adjustment, frequent evaluation and titration of therapies , application of advanced monitoring technologies and extensive interpretation of multiple databases    Polo Lloyd MD

## 2024-05-12 NOTE — PLAN OF CARE
Problem: Pain  Goal: Verbalizes/displays adequate comfort level or baseline comfort level  5/11/2024 2121 by Juan Pablo Chávez RN  Outcome: Progressing  Flowsheets (Taken 5/11/2024 1930)  Verbalizes/displays adequate comfort level or baseline comfort level:   Encourage patient to monitor pain and request assistance   Assess pain using appropriate pain scale   Administer analgesics based on type and severity of pain and evaluate response   Implement non-pharmacological measures as appropriate and evaluate response   Consider cultural and social influences on pain and pain management   Notify Licensed Independent Practitioner if interventions unsuccessful or patient reports new pain  5/11/2024 1432 by Rico Schafer RN  Outcome: Progressing  Flowsheets (Taken 5/11/2024 1316)  Verbalizes/displays adequate comfort level or baseline comfort level:   Encourage patient to monitor pain and request assistance   Assess pain using appropriate pain scale   Administer analgesics based on type and severity of pain and evaluate response  5/11/2024 1313 by Kaylee Solo, RN  Outcome: Progressing     Problem: Skin/Tissue Integrity  Goal: Absence of new skin breakdown  Description: 1.  Monitor for areas of redness and/or skin breakdown  2.  Assess vascular access sites hourly  3.  Every 4-6 hours minimum:  Change oxygen saturation probe site  4.  Every 4-6 hours:  If on nasal continuous positive airway pressure, respiratory therapy assess nares and determine need for appliance change or resting period.  5/11/2024 2121 by Juan Pablo Chávez RN  Outcome: Progressing  5/11/2024 1432 by Rico Schafer RN  Outcome: Progressing  5/11/2024 1313 by Kaylee Solo RN  Outcome: Progressing     Problem: Safety - Adult  Goal: Free from fall injury  5/11/2024 2121 by Juan Pablo Chávez, RN  Outcome: Progressing  5/11/2024 1432 by Rico Schafer RN  Outcome: Progressing  5/11/2024 1313 by Kaylee Solo RN  Outcome:  Progressing     Problem: Discharge Planning  Goal: Discharge to home or other facility with appropriate resources  5/11/2024 2121 by Juan Pablo Chávez, RN  Outcome: Progressing  Flowsheets (Taken 5/11/2024 1930)  Discharge to home or other facility with appropriate resources:   Identify barriers to discharge with patient and caregiver   Arrange for needed discharge resources and transportation as appropriate   Identify discharge learning needs (meds, wound care, etc)   Arrange for interpreters to assist at discharge as needed   Refer to discharge planning if patient needs post-hospital services based on physician order or complex needs related to functional status, cognitive ability or social support system  5/11/2024 1432 by Rico Schafer, RN  Outcome: Progressing  Flowsheets (Taken 5/11/2024 1316)  Discharge to home or other facility with appropriate resources:   Identify barriers to discharge with patient and caregiver   Arrange for needed discharge resources and transportation as appropriate   Identify discharge learning needs (meds, wound care, etc)  5/11/2024 1313 by Kaylee Solo, RN  Outcome: Progressing

## 2024-05-12 NOTE — PROGRESS NOTES
TRANSFER - IN REPORT:    Verbal report received from CHANTEL Boyle on Tayla Escobar  being received from ICU for routine progression of patient care      Report consisted of patient's Situation, Background, Assessment and   Recommendations(SBAR).     Information from the following report(s) Nurse Handoff Report was reviewed with the receiving nurse.    Opportunity for questions and clarification was provided.

## 2024-05-12 NOTE — PROGRESS NOTES
05/11/24 2003   NIV Type   Equipment Type v60   Mode Bilevel   Mask Type Under the nose   Mask Size Medium   Assessment   Pulse 96   Respirations 24   SpO2 95 %   Comfort Level Good   Using Accessory Muscles No   Mask Compliance Good   Skin Assessment Clean, dry, & intact   Settings/Measurements   PIP Observed 18 cm H20   IPAP 18 cmH20   CPAP/EPAP 6 cmH2O   Vt (Measured) 543 mL   Rate Ordered 22   Insp Rise Time (%) 4 %   FiO2  35 %   I Time/ I Time % 0.9 s   Minute Volume (L/min) 13 Liters   Mask Leak (lpm) 10 lpm   Patient's Home Machine No   Alarm Settings   Alarms On Y   Low Pressure (cmH2O) 5 cmH2O   High Pressure (cmH2O) 35 cmH2O   Apnea (secs) 20 secs   RR Low (bpm) 8   RR High (bpm) 50 br/min

## 2024-05-12 NOTE — PLAN OF CARE
Problem: Pain  Goal: Verbalizes/displays adequate comfort level or baseline comfort level  5/12/2024 0818 by Tamar Mejía RN  Outcome: Progressing  5/11/2024 2121 by Juan Pablo Chávez RN  Outcome: Progressing  Flowsheets (Taken 5/11/2024 1930)  Verbalizes/displays adequate comfort level or baseline comfort level:   Encourage patient to monitor pain and request assistance   Assess pain using appropriate pain scale   Administer analgesics based on type and severity of pain and evaluate response   Implement non-pharmacological measures as appropriate and evaluate response   Consider cultural and social influences on pain and pain management   Notify Licensed Independent Practitioner if interventions unsuccessful or patient reports new pain     Problem: Skin/Tissue Integrity  Goal: Absence of new skin breakdown  Description: 1.  Monitor for areas of redness and/or skin breakdown  2.  Assess vascular access sites hourly  3.  Every 4-6 hours minimum:  Change oxygen saturation probe site  4.  Every 4-6 hours:  If on nasal continuous positive airway pressure, respiratory therapy assess nares and determine need for appliance change or resting period.  5/12/2024 0818 by Tamar Mejía RN  Outcome: Progressing  5/11/2024 2121 by Juan Pablo Chávez, RN  Outcome: Progressing     Problem: Safety - Adult  Goal: Free from fall injury  5/12/2024 0818 by Tamar Mejía RN  Outcome: Progressing  5/11/2024 2121 by Juan Pablo Chávez, RN  Outcome: Progressing     Problem: Discharge Planning  Goal: Discharge to home or other facility with appropriate resources  5/12/2024 0818 by Tamar Mejía RN  Outcome: Progressing  5/11/2024 2121 by Juan Pablo Chávez, RN  Outcome: Progressing  Flowsheets (Taken 5/11/2024 1930)  Discharge to home or other facility with appropriate resources:   Identify barriers to discharge with patient and caregiver   Arrange for needed discharge resources and transportation as appropriate   Identify discharge  learning needs (meds, wound care, etc)   Arrange for interpreters to assist at discharge as needed   Refer to discharge planning if patient needs post-hospital services based on physician order or complex needs related to functional status, cognitive ability or social support system

## 2024-05-12 NOTE — PROGRESS NOTES
Treatment: 0/10 Vitals        Oxygen      IV and airvo    RESTRICTIONS/PRECAUTIONS:                    GROSS EVALUATION:  Intact Impaired (Comments):   AROM []  Weak but functional   PROM []    Strength []  >3/5   Balance [] Posture: Fair  Sitting - Static: Fair, +  Sitting - Dynamic: Fair  Standing - Static: Fair  Standing - Dynamic: Fair, -   Posture [] N/A   Sensation [x]     Coordination []      Tone []     Edema []    Activity Tolerance []      []      COGNITION/  PERCEPTION: Intact Impaired (Comments):   Orientation [x]     Vision [x]     Hearing [x]     Cognition  [x]       MOBILITY: I Mod I S SBA CGA Min Mod Max Total  NT x2 Comments:   Bed Mobility    Rolling [] [] [] [] [x] [] [] [] [] [] []    Supine to Sit [] [] [] [] [] [x] [] [] [] [] []    Scooting [] [] [] [] [] [x] [] [] [] [] []    Sit to Supine [] [] [] [] [] [] [] [] [] [x] []    Transfers    Sit to Stand [] [] [] [] [] [x] [] [] [] [] []    Bed to Chair [] [] [] [] [] [x] [] [] [] [] []    Stand to Sit [] [] [] [] [] [x] [] [] [] [] []     [] [] [] [] [] [] [] [] [] [] []    I=Independent, Mod I=Modified Independent, S=Supervision, SBA=Standby Assistance, CGA=Contact Guard Assistance,   Min=Minimal Assistance, Mod=Moderate Assistance, Max=Maximal Assistance, Total=Total Assistance, NT=Not Tested    GAIT: I Mod I S SBA CGA Min Mod Max Total  NT x2 Comments:   Level of Assistance [] [] [] [] [] [x] [] [] [] [] []    Distance 8 feet    DME Rolling Walker    Gait Quality Decreased burke , Decreased step clearance, and Decreased step length    Weightbearing Status      Stairs      I=Independent, Mod I=Modified Independent, S=Supervision, SBA=Standby Assistance, CGA=Contact Guard Assistance,   Min=Minimal Assistance, Mod=Moderate Assistance, Max=Maximal Assistance, Total=Total Assistance, NT=Not Tested    PLAN:   FREQUENCY AND DURATION: 3 times/week for duration of hospital stay or until stated goals are met, whichever comes first.    THERAPY  PROGNOSIS: Good    PROBLEM LIST:   (Skilled intervention is medically necessary to address:)  Decreased Activity Tolerance  Decreased AROM/PROM  Decreased Balance  Decreased Gait Ability  Decreased Safety Awareness  Decreased Strength  Decreased Transfer Abilities INTERVENTIONS PLANNED:   (Benefits and precautions of physical therapy have been discussed with the patient.)  Therapeutic Activity  Therapeutic Exercise/HEP  Neuromuscular Re-education  Gait Training  Education       TREATMENT:   EVALUATION: MODERATE COMPLEXITY: (Untimed Charge)  The initial evaluation charge encompasses clinical chart review, objective assessment, interpretation of assessment, and skilled monitoring of the patient's response to treatment in order to develop a plan of care.     TREATMENT:   Co-Treatment PT/OT necessary due to patient's decreased overall endurance/tolerance levels, as well as need for high level skilled assistance to complete functional transfers/mobility and functional tasks  Therapeutic Activity (15 Minutes): Therapeutic activity included Supine to Sit, Scooting, Transfer Training, Ambulation on level ground, Sitting balance , and Standing balance to improve functional Mobility.    TREATMENT GRID:  N/A    AFTER TREATMENT PRECAUTIONS: Call light within reach, Chair, Needs within reach, RN notified, and Visitors at bedside    INTERDISCIPLINARY COLLABORATION:  RN/ PCT, PT/ PTA, and OT/ PRATT    EDUCATION: Education Given To: Patient  Education Provided: Role of Therapy;Plan of Care  Education Method: Verbal  Barriers to Learning: None  Education Outcome: Verbalized understanding    TIME IN/OUT:  Time In: 0944  Time Out: 1009  Minutes: 25    KIMI TEE PT

## 2024-05-12 NOTE — PROGRESS NOTES
entrance  Bathroom Shower/Tub: Walk-in shower  Bathroom Toilet: Standard  Bathroom Equipment: 3-in-1 commode, Grab bars in shower, Hand-held shower, Toilet raiser  Bathroom Accessibility: Accessible  Home Equipment: Wheelchair-manual, Walker, rolling, Rollator, Hospital bed, Lift chair, Grab bars, Cane, Oxygen (Home O2 @ 2L NC @ baseline [facility])  Receives Help From: Family, Home health (Current with [facility] )  ADL Assistance: Needs assistance  Homemaking Assistance: Needs assistance  Homemaking Responsibilities: Yes  Ambulation Assistance: Needs assistance  Transfer Assistance: Independent  Active : Yes  Mode of Transportation: Car  Occupation: Retired  Lives with son who assists with bathing and dressing ADLs; Uses cane and rolling walker for mobility. Lives in 1-story home w/ ramp.  OBJECTIVE:     LINES / DRAINS / AIRWAY: Continuous Pulse Oximetry, Garcia Catheter, IV, and Telemetry     RESTRICTIONS/PRECAUTIONS:       PAIN: VITALS / O2:   Pre Treatment:      0/10    Post Treatment:     0/10   Vitals          Oxygen     Airvo 40L/50%       GROSS EVALUATION:  BUEs INTACT IMPAIRED   (See Comments)   UE AROM [] []Generally decreased w/ age related changes; distal AROM functional   UE PROM [] []   Strength [] Generally decreased; BUE functional for BADls     Posture / Balance [] Fair + unsupported sitting balance  Fair static standing balance  Fair dynamic standing balance x RW   Sensation [x]     Coordination [x]       Tone [x]       Edema [x]    Activity Tolerance [] Generally decreased w/ activity  SPO2 tolerated well w/ activity    Hand Dominance R [] L []      COGNITION/  PERCEPTION: INTACT IMPAIRED   (See Comments)   Orientation [x]     Vision [x]     Hearing [x]     Cognition  [x]     Perception [x]       MOBILITY: I Mod I S SBA CGA Min Mod Max Total  NT x2 Comments:   Bed Mobility    Rolling [] [] [] [] [] [] [] [] [] [] []    Supine to Sit [] [] [] [] [] [x] [] [] [] [] [x]   occupational therapy have been discussed with the patient.)  Self Care Training  Therapeutic Activity  Therapeutic Exercise/HEP  Neuromuscular Re-education  Manual Therapy  Education         TREATMENT:     EVALUATION: LOW COMPLEXITY: (Untimed Charge)  The initial evaluation charge encompasses clinical chart review, objective assessment, interpretation of assessment, and skilled monitoring of the patient's response to treatment in order to develop a plan of care.     TREATMENT:   Self Care (10 minutes): Patient participated in upper body dressing, lower body dressing, and grooming ADLs in unsupported sitting with minimal verbal cueing to increase independence, decrease assistance required, and increase activity tolerance. Patient also participated in bed mobility, functional mobility, functional transfer, and energy conservation training to increase independence, decrease assistance required, and increase activity tolerance.     TREATMENT GRID:  N/A    AFTER TREATMENT PRECAUTIONS: Alarm Activated, Bed/Chair Locked, Call light within reach, Chair, Needs within reach, and RN notified    INTERDISCIPLINARY COLLABORATION:  RN/ PCT, PT/ PTA, and OT/ PRATT    EDUCATION:  Education Given To: Patient  Education Provided: Role of Therapy;Plan of Care  Barriers to Learning: None  Education Outcome: Verbalized understanding;Demonstrated understanding      TOTAL TREATMENT DURATION AND TIME:  Time In: 0944  Time Out: 1008  Minutes: 24      Flori Milton, OT

## 2024-05-13 ENCOUNTER — APPOINTMENT (OUTPATIENT)
Dept: GENERAL RADIOLOGY | Age: 84
DRG: 689 | End: 2024-05-13
Payer: MEDICARE

## 2024-05-13 LAB
ALBUMIN SERPL-MCNC: 2.7 G/DL (ref 3.2–4.6)
ALBUMIN/GLOB SERPL: 1.2 (ref 1–1.9)
ALP SERPL-CCNC: 76 U/L (ref 35–104)
ALT SERPL-CCNC: 18 U/L (ref 12–65)
ANION GAP SERPL CALC-SCNC: 9 MMOL/L (ref 9–18)
AST SERPL-CCNC: 24 U/L (ref 15–37)
BASOPHILS # BLD: 0 K/UL (ref 0–0.2)
BASOPHILS NFR BLD: 1 % (ref 0–2)
BILIRUB SERPL-MCNC: 0.7 MG/DL (ref 0–1.2)
BUN SERPL-MCNC: 13 MG/DL (ref 8–23)
CALCIUM SERPL-MCNC: 8.7 MG/DL (ref 8.8–10.2)
CHLORIDE SERPL-SCNC: 99 MMOL/L (ref 98–107)
CO2 SERPL-SCNC: 29 MMOL/L (ref 20–28)
CREAT SERPL-MCNC: 1.28 MG/DL (ref 0.6–1.1)
CREAT UR-MCNC: 59.6 MG/DL (ref 28–217)
DIFFERENTIAL METHOD BLD: ABNORMAL
EOSINOPHIL # BLD: 0.1 K/UL (ref 0–0.8)
EOSINOPHIL NFR BLD: 5 % (ref 0.5–7.8)
ERYTHROCYTE [DISTWIDTH] IN BLOOD BY AUTOMATED COUNT: 16.8 % (ref 11.9–14.6)
GLOBULIN SER CALC-MCNC: 2.2 G/DL (ref 2.3–3.5)
GLUCOSE SERPL-MCNC: 86 MG/DL (ref 70–99)
HCT VFR BLD AUTO: 28 % (ref 35.8–46.3)
HGB BLD-MCNC: 9.1 G/DL (ref 11.7–15.4)
IMM GRANULOCYTES # BLD AUTO: 0 K/UL (ref 0–0.5)
IMM GRANULOCYTES NFR BLD AUTO: 2 % (ref 0–5)
LYMPHOCYTES # BLD: 0.1 K/UL (ref 0.5–4.6)
LYMPHOCYTES NFR BLD: 7 % (ref 13–44)
MAGNESIUM SERPL-MCNC: 1.5 MG/DL (ref 1.8–2.4)
MCH RBC QN AUTO: 29.2 PG (ref 26.1–32.9)
MCHC RBC AUTO-ENTMCNC: 32.5 G/DL (ref 31.4–35)
MCV RBC AUTO: 89.7 FL (ref 82–102)
MONOCYTES # BLD: 0.3 K/UL (ref 0.1–1.3)
MONOCYTES NFR BLD: 17 % (ref 4–12)
NEUTS SEG # BLD: 1.4 K/UL (ref 1.7–8.2)
NEUTS SEG NFR BLD: 68 % (ref 43–78)
NRBC # BLD: 0 K/UL (ref 0–0.2)
PLATELET # BLD AUTO: 51 K/UL (ref 150–450)
PLATELET COMMENT: ABNORMAL
PMV BLD AUTO: 10.7 FL (ref 9.4–12.3)
POTASSIUM SERPL-SCNC: 3.3 MMOL/L (ref 3.5–5.1)
PROT SERPL-MCNC: 4.9 G/DL (ref 6.3–8.2)
PROT UR-MCNC: 53 MG/DL
PROT/CREAT UR-RTO: 0.9
RBC # BLD AUTO: 3.12 M/UL (ref 4.05–5.2)
RBC MORPH BLD: ABNORMAL
SODIUM SERPL-SCNC: 136 MMOL/L (ref 136–145)
WBC # BLD AUTO: 1.9 K/UL (ref 4.3–11.1)
WBC MORPH BLD: ABNORMAL

## 2024-05-13 PROCEDURE — 94761 N-INVAS EAR/PLS OXIMETRY MLT: CPT

## 2024-05-13 PROCEDURE — A4216 STERILE WATER/SALINE, 10 ML: HCPCS | Performed by: NURSE PRACTITIONER

## 2024-05-13 PROCEDURE — 6360000002 HC RX W HCPCS: Performed by: NURSE PRACTITIONER

## 2024-05-13 PROCEDURE — 83735 ASSAY OF MAGNESIUM: CPT

## 2024-05-13 PROCEDURE — 6370000000 HC RX 637 (ALT 250 FOR IP): Performed by: HOSPITALIST

## 2024-05-13 PROCEDURE — 2580000003 HC RX 258: Performed by: HOSPITALIST

## 2024-05-13 PROCEDURE — 6370000000 HC RX 637 (ALT 250 FOR IP): Performed by: NURSE PRACTITIONER

## 2024-05-13 PROCEDURE — 80053 COMPREHEN METABOLIC PANEL: CPT

## 2024-05-13 PROCEDURE — 1100000003 HC PRIVATE W/ TELEMETRY

## 2024-05-13 PROCEDURE — 82570 ASSAY OF URINE CREATININE: CPT

## 2024-05-13 PROCEDURE — 85025 COMPLETE CBC W/AUTO DIFF WBC: CPT

## 2024-05-13 PROCEDURE — 99232 SBSQ HOSP IP/OBS MODERATE 35: CPT | Performed by: INTERNAL MEDICINE

## 2024-05-13 PROCEDURE — 6370000000 HC RX 637 (ALT 250 FOR IP)

## 2024-05-13 PROCEDURE — APPSS45 APP SPLIT SHARED TIME 31-45 MINUTES: Performed by: NURSE PRACTITIONER

## 2024-05-13 PROCEDURE — C9113 INJ PANTOPRAZOLE SODIUM, VIA: HCPCS | Performed by: NURSE PRACTITIONER

## 2024-05-13 PROCEDURE — 6370000000 HC RX 637 (ALT 250 FOR IP): Performed by: INTERNAL MEDICINE

## 2024-05-13 PROCEDURE — 71045 X-RAY EXAM CHEST 1 VIEW: CPT

## 2024-05-13 PROCEDURE — 99233 SBSQ HOSP IP/OBS HIGH 50: CPT | Performed by: INTERNAL MEDICINE

## 2024-05-13 PROCEDURE — 2700000000 HC OXYGEN THERAPY PER DAY

## 2024-05-13 PROCEDURE — 84156 ASSAY OF PROTEIN URINE: CPT

## 2024-05-13 PROCEDURE — 2580000003 HC RX 258: Performed by: NURSE PRACTITIONER

## 2024-05-13 RX ORDER — LANOLIN ALCOHOL/MO/W.PET/CERES
400 CREAM (GRAM) TOPICAL 2 TIMES DAILY
Status: DISCONTINUED | OUTPATIENT
Start: 2024-05-13 | End: 2024-05-18 | Stop reason: HOSPADM

## 2024-05-13 RX ORDER — POTASSIUM CHLORIDE 20 MEQ/1
20 TABLET, EXTENDED RELEASE ORAL 2 TIMES DAILY
Status: DISCONTINUED | OUTPATIENT
Start: 2024-05-13 | End: 2024-05-18 | Stop reason: HOSPADM

## 2024-05-13 RX ORDER — ENOXAPARIN SODIUM 100 MG/ML
40 INJECTION SUBCUTANEOUS EVERY 24 HOURS
Status: DISCONTINUED | OUTPATIENT
Start: 2024-05-13 | End: 2024-05-18 | Stop reason: HOSPADM

## 2024-05-13 RX ORDER — FUROSEMIDE 20 MG/1
20 TABLET ORAL DAILY
Status: DISCONTINUED | OUTPATIENT
Start: 2024-05-14 | End: 2024-05-16

## 2024-05-13 RX ADMIN — ACYCLOVIR 200 MG: 400 TABLET ORAL at 08:57

## 2024-05-13 RX ADMIN — ACYCLOVIR 200 MG: 400 TABLET ORAL at 20:39

## 2024-05-13 RX ADMIN — TEMAZEPAM 15 MG: 15 CAPSULE ORAL at 22:59

## 2024-05-13 RX ADMIN — WATER 2 MG: 1 INJECTION INTRAMUSCULAR; INTRAVENOUS; SUBCUTANEOUS at 11:07

## 2024-05-13 RX ADMIN — ENOXAPARIN SODIUM 40 MG: 100 INJECTION SUBCUTANEOUS at 08:58

## 2024-05-13 RX ADMIN — SODIUM CHLORIDE, PRESERVATIVE FREE 40 MG: 5 INJECTION INTRAVENOUS at 22:59

## 2024-05-13 RX ADMIN — MIRTAZAPINE 15 MG: 15 TABLET, FILM COATED ORAL at 20:39

## 2024-05-13 RX ADMIN — SODIUM CHLORIDE, PRESERVATIVE FREE 10 ML: 5 INJECTION INTRAVENOUS at 08:59

## 2024-05-13 RX ADMIN — POTASSIUM CHLORIDE 20 MEQ: 1500 TABLET, EXTENDED RELEASE ORAL at 20:39

## 2024-05-13 RX ADMIN — MAGNESIUM GLUCONATE 500 MG ORAL TABLET 400 MG: 500 TABLET ORAL at 08:56

## 2024-05-13 RX ADMIN — WATER 2 MG: 1 INJECTION INTRAMUSCULAR; INTRAVENOUS; SUBCUTANEOUS at 11:06

## 2024-05-13 RX ADMIN — POTASSIUM CHLORIDE 20 MEQ: 1500 TABLET, EXTENDED RELEASE ORAL at 08:55

## 2024-05-13 RX ADMIN — ALLOPURINOL 300 MG: 300 TABLET ORAL at 08:57

## 2024-05-13 RX ADMIN — MAGNESIUM GLUCONATE 500 MG ORAL TABLET 400 MG: 500 TABLET ORAL at 20:39

## 2024-05-13 RX ADMIN — SODIUM CHLORIDE, PRESERVATIVE FREE 40 MG: 5 INJECTION INTRAVENOUS at 12:20

## 2024-05-13 RX ADMIN — Medication 1 CAPSULE: at 08:57

## 2024-05-13 RX ADMIN — SODIUM CHLORIDE, PRESERVATIVE FREE 10 ML: 5 INJECTION INTRAVENOUS at 20:46

## 2024-05-13 NOTE — PLAN OF CARE
Problem: Pain  Goal: Verbalizes/displays adequate comfort level or baseline comfort level  5/13/2024 1802 by Sarah Albarran RN  Outcome: Progressing  5/13/2024 1721 by Sarah Albarran RN  Outcome: Progressing     Problem: Skin/Tissue Integrity  Goal: Absence of new skin breakdown  Description: 1.  Monitor for areas of redness and/or skin breakdown  2.  Assess vascular access sites hourly  3.  Every 4-6 hours minimum:  Change oxygen saturation probe site  4.  Every 4-6 hours:  If on nasal continuous positive airway pressure, respiratory therapy assess nares and determine need for appliance change or resting period.  5/13/2024 1802 by Sarah Albarran RN  Outcome: Progressing  5/13/2024 1721 by Sarah Albarran RN  Outcome: Progressing     Problem: Discharge Planning  Goal: Discharge to home or other facility with appropriate resources  5/13/2024 1802 by Sarah Albarran RN  Outcome: Progressing  5/13/2024 1721 by Sarah Albarran RN  Outcome: Progressing     Problem: Chronic Conditions and Co-morbidities  Goal: Patient's chronic conditions and co-morbidity symptoms are monitored and maintained or improved  5/13/2024 1802 by Sarah Albarran RN  Outcome: Progressing  5/13/2024 1721 by Sarah Albarran RN  Outcome: Progressing

## 2024-05-13 NOTE — ICUWATCH
RRT Clinical Rounding Nurse Update    Vitals:    05/12/24 2029 05/12/24 2147 05/12/24 2308 05/13/24 0252   BP: 128/70  125/77 117/70   Pulse: 95 98 93 93   Resp: 24 22 28 24   Temp: 98.6 °F (37 °C)  97.5 °F (36.4 °C) 97.3 °F (36.3 °C)   TempSrc: Oral  Oral Oral   SpO2: 98% 98% 99% 99%   Weight: 79.6 kg (175 lb 6.4 oz)      Height:            DETERIORATION INDEX SCORE: 50    ASSESSMENT:  Previous outreach assessment was reviewed. There have been no significant changes since previous assessment.    PLAN:  Will follow per RRT Clinical Rounding Program protocol.    Rosi Colindres RN  [facility]: [phone]  [facility]: [phone]

## 2024-05-13 NOTE — CARE COORDINATION
Chart screened by CM for d/c planing.  5/11: Pt transferred to ICU d/t requiring BIPAP.  512: D/C BIPAP and transferred back to 5th floor.  Pt currently requiring 4L HFO2.  Recently weaned off of Airvo.  D22 CyBorD due today - holding due to pt's condition.  PT/OT recommend HH at d/c.  CM will continue to follow.  LOS = 6 days

## 2024-05-13 NOTE — PLAN OF CARE
Patient has remained A&O x 3.   - Patient educated on new medications Lovenox, potassium and magnesium.   -up to BSC x3 today, 2 soft Bowel movements   -weaned oxygen from airvo to 4 L NC- tolerating well   -NSR today except for 1-2 mins while up to BSC. HR increased to 170 then quickly to 140 then back into the 90s. Pt denied any symptoms.   Patient rounded on hourly by myself or patient assistant and encouraged to call with any needs. No signs of distress noted. Bed low, locked, call bell within reach.   BSSR given to CHANTEL Clifton RN    Problem: Pain  Goal: Verbalizes/displays adequate comfort level or baseline comfort level  5/13/2024 1721 by Sarah Albarran RN  Outcome: Progressing  5/13/2024 0344 by Yary Medina RN  Outcome: Progressing     Problem: Skin/Tissue Integrity  Goal: Absence of new skin breakdown  Description: 1.  Monitor for areas of redness and/or skin breakdown  2.  Assess vascular access sites hourly  3.  Every 4-6 hours minimum:  Change oxygen saturation probe site  4.  Every 4-6 hours:  If on nasal continuous positive airway pressure, respiratory therapy assess nares and determine need for appliance change or resting period.  5/13/2024 1721 by Sarah Albarran RN  Outcome: Progressing  5/13/2024 0344 by Yary Medina RN  Outcome: Progressing     Problem: Safety - Adult  Goal: Free from fall injury  5/13/2024 1721 by Sarah Albarran RN  Outcome: Progressing  5/13/2024 0344 by Yary Medina RN  Outcome: Progressing     Problem: Discharge Planning  Goal: Discharge to home or other facility with appropriate resources  5/13/2024 1721 by Sarah Albarran RN  Outcome: Progressing  5/13/2024 0344 by Yary Medina RN  Outcome: Progressing     Problem: Chronic Conditions and Co-morbidities  Goal: Patient's chronic conditions and co-morbidity symptoms are monitored and maintained or improved  Outcome: Progressing

## 2024-05-13 NOTE — PROGRESS NOTES
ICU Daily ProgressNOTE           5/13/2024    Tayla Escobar                        Date of Admission:  5/7/2024    The patient's chart is reviewed and the patient is discussed with the staff.    Hospital course:  Patient is a 84 y.o.  female seen and evaluated at the request of Nadia Vidal NP with oncology for dyspnea and hypoxia. PMX HTN, hyperlipidemia, CKD stage 2, multiple myeloma recently dx'd and on first round of CyBorD, last dose 5/6, Hx of DVT/PE on eliquis.  She presented with diarrhea, generalized abd pain x1 day.  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   Has had worsening SOB during admission.  CXR 5/10 with B pleural effusion, pulmonary vascular congestion, received 1 dose lasix.  Went for CTA this am due to ongoing dyspnea and off eliquis due to thrombocytopenia.  Negative for PE, but B pleural effusion and pulmonary consolidations.    Pt is currently sedated after getting ativan.  She did receive 1 dose of lasix yesterday with 3L of UOP after.  Currently on 4L NC, did wear bipap this am for a while.  ABG with hypercapnea with pH 7.2, improved on repeat.      No lung history per daughter and son who provide history.  Diarrhea for <2 days then stopped.  Appetite is poor. She does snore and does not sleep well, more concerning for ANAM, not diagosed.  Hx of diastolic heart failure from echo 2023.      Subjective:     Moved to the ICU for BiPAP, improved and transferred back to the floor  Up to the chair, remains on airvo  2 l net negative over 24 hours, son at bedside.     Review of Systems  -Fever  -Headaches  -Chest pain  -Dyspnea,- wheezing, -cough  -Abdominal pain,- constipation  -Leg swelling  All other organ systems grossly normal.      Current Outpatient Medications   Medication Instructions    acetaminophen (TYLENOL) 500 mg, Oral, EVERY 6 HOURS PRN    acyclovir (ZOVIRAX) 200 mg, Oral, 2 TIMES DAILY    allopurinol (ZYLOPRIM) 50 mg,

## 2024-05-13 NOTE — PROGRESS NOTES
05/12/24 2142   Oxygen Therapy/Pulse Ox   O2 Therapy Oxygen humidified   O2 Device Heated high flow cannula   O2 Flow Rate (L/min) 45 L/min   FiO2  55 %   Pulse 98   Respirations 22   SpO2 98 %   Skin Assessment Clean, dry, & intact   $Pulse Oximeter $Spot check (multiple/continuous)     Pt tolerating HFNC (Airvo) well.

## 2024-05-13 NOTE — PROGRESS NOTES
Comprehensive Nutrition Assessment    Type and Reason for Visit: Reassess  Malnutrition Screening Tool: Malnutrition Screen  Have you recently lost weight without trying?: 2 to 13 pounds (1 point)  Have you been eating poorly because of a decreased appetite?: Yes (1 point)  Malnutrition Screening Tool Score: 2    Nutrition Recommendations/Plan:   Meals and Snacks:  Diet: Continue current diet and advance as medically appropriate  Nutrition Supplement Therapy:  Medical food supplement therapy:  Initiate Ensure Clear three times per day (this provides 240 kcal and 8 grams protein per bottle)  Discontinue Ensure Enlive, patient does not accept.     Malnutrition Assessment:  Malnutrition Status: Moderate malnutrition  Context:    Findings of clinical characteristics of malnutrition:   Energy Intake:  Mild decrease in energy intake (Comment)  Weight Loss:  No significant weight loss     Body Fat Loss:  Mild body fat loss Orbital, Buccal region, Triceps   Muscle Mass Loss:  Mild muscle mass loss Temples (temporalis), Clavicles (pectoralis & deltoids), Hand (interosseous)  Fluid Accumulation:  Unable to assess     Strength:  Not Performed  No tanika wasting  Nutrition Assessment:  Nutrition History: 4/11: Daughter assists patient in providing nutrition hx. For 2-3 weeks prior to hiatal hernia reports patient appetite was limited, but ever since she was discharged 3/11 patient has taken little to no PO on a daily basis. Patient endorses that she has had consistent nausea (varying in intensity) for the past month. Reports that when she eats solid foods they get \"stuck\" and points to her sternum. Daughter reports she is able to take some fluids, but has declined any oral nutrition supplements as of late. Drinking small amounts of fluids and pureed foods in the month prior to admission. Additionally reports constipation x 1 week pta which resolved 4/10 with medications.   5/8: Recent admission 4/10- 5/1-patient home about 1  by diarrhea, poor intake prior to admission (reported po barriers)  Moderate malnutrition related to inadequate protein-energy intake as evidenced by Criteria as identified in malnutrition assessment  Nutrition Interventions:   Food and/or Nutrient Delivery: Continue Current Diet, Modify Oral Nutrition Supplement     Coordination of Nutrition Care: Continue to monitor while inpatient      Goals:   Previous Goal Met: No Progress toward Goal(s)  Active Goal: PO intake 50% or greater, by next RD assessment       Nutrition Monitoring and Evaluation:      Food/Nutrient Intake Outcomes: Food and Nutrient Intake, Supplement Intake  Physical Signs/Symptoms Outcomes: GI Status, Meal Time Behavior, Weight    Discharge Planning:    Continue Oral Nutrition Supplement    BRAYDEN ODOM, RD

## 2024-05-13 NOTE — PROGRESS NOTES
RBC Comment ANISOCYTOSIS + POIKILOCYTOSIS      WBC Comment Result Confirmed By Smear      Platelet Comment MODERATE      Differential Type AUTOMATED         Imaging:  Xray Result (most recent):  XR CHEST PORTABLE 05/13/2024    Narrative  Chest X-ray    INDICATION: Follow-up    COMPARISON: May 12, 2024    TECHNIQUE: AP/PA view of the chest was obtained.    FINDINGS:    Moderate, slightly enlarged, bilateral pleural effusions are grossly unchanged.  Left perihilar subsegmental atelectasis.    Central vascular congestion has slightly increased since prior study. Right PICC  line is in SVC. No pneumothorax.    Impression  Moderate, slightly enlarged, bilateral effusions compared with May 12, 2024.    Central vascular congestion has also slightly increased.    Findings are otherwise unchanged compared with May 12, 2024.    Right PICC line is in the SVC. No pneumothorax.    Cardiac enlargement.    CT Result (most recent):  CT CHEST PULMONARY EMBOLISM W CONTRAST 05/11/2024    Narrative  EXAMINATION: CT CHEST PULMONARY EMBOLISM W CONTRAST 5/11/2024 11:07 AM    ACCESSION NUMBER: [id number]    COMPARISON: CT chest 4/25/2024  INDICATION: dyspnea, hypoxia    TECHNIQUE: Multiple contiguous 2D axial CT images of the chest were obtained  from the lung apices to the lung bases after the intravenous administration of  100mL Iso-flex 370 per pulmonary angiography protocol.  Coronal reconstructions  were performed.    Radiation dose reduction techniques were used for this study. Our CT scanners  use one or all of the following: Automated exposure control, adjustment of the  mA and/or kV according to patient size, iterative reconstruction.    FINDINGS:  STUDY QUALITY: Limited evaluation due to motion.    AIRWAYS: The central airways are patent.    LUNGS: Mild diffuse groundglass opacities. Bibasilar atelectasis. No significant  pulmonary nodules.  PLEURA: Small bilateral pleural effusions.  HEART: Cardiomegaly. No calcified coronary

## 2024-05-13 NOTE — ICUWATCH
RRT Clinical Rounding Nurse Progress Report      SUBJECTIVE: Patient assessed secondary to transfer from critical care.      Vitals:    05/12/24 2029 05/12/24 2147 05/12/24 2308 05/13/24 0252   BP: 128/70  125/77 117/70   Pulse: 95 98 93 93   Resp: 24 22 28 24   Temp: 98.6 °F (37 °C)  97.5 °F (36.4 °C) 97.3 °F (36.3 °C)   TempSrc: Oral  Oral    SpO2: 98% 98% 99% 99%   Weight: 79.6 kg (175 lb 6.4 oz)      Height:            DETERIORATION INDEX SCORE: 41    ASSESSMENT:  Pt in bed awake with family at bedside, A&Ox4. Reports episode earlier in night of SOB, RT adjusted Airvo settings and respiratory status improved. Currently on 45L/50% Airvo. Respirations even and unlabored, bilaterally diminished. Recent  labs/notes/vitals reviewed.     PLAN:  Will follow per RRT Clinical Rounding Program protocol.    Rosi Colindres RN  [facility]: [phone]  [facility]: [phone]

## 2024-05-14 PROBLEM — R41.0 DELIRIUM: Status: ACTIVE | Noted: 2024-01-01

## 2024-05-14 PROBLEM — R82.79 POSITIVE URINE CULTURE: Status: ACTIVE | Noted: 2024-01-01

## 2024-05-14 PROBLEM — J90 PLEURAL EFFUSION: Status: ACTIVE | Noted: 2024-05-14

## 2024-05-14 LAB
ALBUMIN SERPL-MCNC: 3 G/DL (ref 3.2–4.6)
ALBUMIN/GLOB SERPL: 1.3 (ref 1–1.9)
ALP SERPL-CCNC: 81 U/L (ref 35–104)
ALT SERPL-CCNC: 18 U/L (ref 12–65)
ANION GAP SERPL CALC-SCNC: 7 MMOL/L (ref 9–18)
AST SERPL-CCNC: 28 U/L (ref 15–37)
BASOPHILS # BLD: 0 K/UL (ref 0–0.2)
BASOPHILS NFR BLD: 1 % (ref 0–2)
BILIRUB SERPL-MCNC: 0.7 MG/DL (ref 0–1.2)
BUN SERPL-MCNC: 10 MG/DL (ref 8–23)
CALCIUM SERPL-MCNC: 8.8 MG/DL (ref 8.8–10.2)
CHLORIDE SERPL-SCNC: 101 MMOL/L (ref 98–107)
CO2 SERPL-SCNC: 29 MMOL/L (ref 20–28)
CREAT SERPL-MCNC: 1.07 MG/DL (ref 0.6–1.1)
DIFFERENTIAL METHOD BLD: ABNORMAL
EOSINOPHIL # BLD: 0.1 K/UL (ref 0–0.8)
EOSINOPHIL NFR BLD: 4 % (ref 0.5–7.8)
ERYTHROCYTE [DISTWIDTH] IN BLOOD BY AUTOMATED COUNT: 16.8 % (ref 11.9–14.6)
GLOBULIN SER CALC-MCNC: 2.3 G/DL (ref 2.3–3.5)
GLUCOSE SERPL-MCNC: 104 MG/DL (ref 70–99)
HCT VFR BLD AUTO: 29.8 % (ref 35.8–46.3)
HGB BLD-MCNC: 9.5 G/DL (ref 11.7–15.4)
IMM GRANULOCYTES # BLD AUTO: 0 K/UL (ref 0–0.5)
IMM GRANULOCYTES NFR BLD AUTO: 1 % (ref 0–5)
LYMPHOCYTES # BLD: 0.2 K/UL (ref 0.5–4.6)
LYMPHOCYTES NFR BLD: 6 % (ref 13–44)
MAGNESIUM SERPL-MCNC: 1.6 MG/DL (ref 1.8–2.4)
MCH RBC QN AUTO: 29.6 PG (ref 26.1–32.9)
MCHC RBC AUTO-ENTMCNC: 31.9 G/DL (ref 31.4–35)
MCV RBC AUTO: 92.8 FL (ref 82–102)
MONOCYTES # BLD: 0.4 K/UL (ref 0.1–1.3)
MONOCYTES NFR BLD: 17 % (ref 4–12)
NEUTS SEG # BLD: 1.8 K/UL (ref 1.7–8.2)
NEUTS SEG NFR BLD: 71 % (ref 43–78)
NRBC # BLD: 0 K/UL (ref 0–0.2)
PLATELET # BLD AUTO: 57 K/UL (ref 150–450)
PLATELET COMMENT: ABNORMAL
PMV BLD AUTO: 11.7 FL (ref 9.4–12.3)
POTASSIUM SERPL-SCNC: 3.7 MMOL/L (ref 3.5–5.1)
PROT SERPL-MCNC: 5.3 G/DL (ref 6.3–8.2)
RBC # BLD AUTO: 3.21 M/UL (ref 4.05–5.2)
RBC MORPH BLD: ABNORMAL
SODIUM SERPL-SCNC: 137 MMOL/L (ref 136–145)
SPECIMEN SOURCE: NORMAL
WBC # BLD AUTO: 2.5 K/UL (ref 4.3–11.1)
WBC MORPH BLD: ABNORMAL
WBC RESULT, STOOL: NORMAL
WHITE BLOOD CELLS (WBC), STOOL: NORMAL

## 2024-05-14 PROCEDURE — 6370000000 HC RX 637 (ALT 250 FOR IP): Performed by: NURSE PRACTITIONER

## 2024-05-14 PROCEDURE — 6370000000 HC RX 637 (ALT 250 FOR IP): Performed by: INTERNAL MEDICINE

## 2024-05-14 PROCEDURE — APPSS60 APP SPLIT SHARED TIME 46-60 MINUTES

## 2024-05-14 PROCEDURE — 97535 SELF CARE MNGMENT TRAINING: CPT

## 2024-05-14 PROCEDURE — C9113 INJ PANTOPRAZOLE SODIUM, VIA: HCPCS | Performed by: NURSE PRACTITIONER

## 2024-05-14 PROCEDURE — 2400000000

## 2024-05-14 PROCEDURE — 87899 AGENT NOS ASSAY W/OPTIC: CPT

## 2024-05-14 PROCEDURE — 80053 COMPREHEN METABOLIC PANEL: CPT

## 2024-05-14 PROCEDURE — A4216 STERILE WATER/SALINE, 10 ML: HCPCS | Performed by: NURSE PRACTITIONER

## 2024-05-14 PROCEDURE — 97530 THERAPEUTIC ACTIVITIES: CPT

## 2024-05-14 PROCEDURE — 85025 COMPLETE CBC W/AUTO DIFF WBC: CPT

## 2024-05-14 PROCEDURE — 99232 SBSQ HOSP IP/OBS MODERATE 35: CPT | Performed by: INTERNAL MEDICINE

## 2024-05-14 PROCEDURE — 36592 COLLECT BLOOD FROM PICC: CPT

## 2024-05-14 PROCEDURE — 6370000000 HC RX 637 (ALT 250 FOR IP): Performed by: PHYSICIAN ASSISTANT

## 2024-05-14 PROCEDURE — 2580000003 HC RX 258: Performed by: HOSPITALIST

## 2024-05-14 PROCEDURE — 87046 STOOL CULTR AEROBIC BACT EA: CPT

## 2024-05-14 PROCEDURE — 99233 SBSQ HOSP IP/OBS HIGH 50: CPT | Performed by: INTERNAL MEDICINE

## 2024-05-14 PROCEDURE — 2580000003 HC RX 258: Performed by: NURSE PRACTITIONER

## 2024-05-14 PROCEDURE — 87507 IADNA-DNA/RNA PROBE TQ 12-25: CPT

## 2024-05-14 PROCEDURE — 83735 ASSAY OF MAGNESIUM: CPT

## 2024-05-14 PROCEDURE — 87427 SHIGA-LIKE TOXIN AG IA: CPT

## 2024-05-14 PROCEDURE — 87493 C DIFF AMPLIFIED PROBE: CPT

## 2024-05-14 PROCEDURE — 6370000000 HC RX 637 (ALT 250 FOR IP): Performed by: HOSPITALIST

## 2024-05-14 PROCEDURE — 99222 1ST HOSP IP/OBS MODERATE 55: CPT | Performed by: PHYSICIAN ASSISTANT

## 2024-05-14 PROCEDURE — 6360000002 HC RX W HCPCS: Performed by: NURSE PRACTITIONER

## 2024-05-14 PROCEDURE — 87045 FECES CULTURE AEROBIC BACT: CPT

## 2024-05-14 PROCEDURE — 1100000003 HC PRIVATE W/ TELEMETRY

## 2024-05-14 RX ORDER — OLANZAPINE 5 MG/1
2.5 TABLET, ORALLY DISINTEGRATING ORAL 2 TIMES DAILY PRN
Status: DISCONTINUED | OUTPATIENT
Start: 2024-05-14 | End: 2024-05-18 | Stop reason: HOSPADM

## 2024-05-14 RX ORDER — QUETIAPINE FUMARATE 25 MG/1
12.5 TABLET, FILM COATED ORAL 2 TIMES DAILY PRN
Status: DISCONTINUED | OUTPATIENT
Start: 2024-05-14 | End: 2024-05-18 | Stop reason: HOSPADM

## 2024-05-14 RX ORDER — OLANZAPINE 5 MG/1
5 TABLET, ORALLY DISINTEGRATING ORAL 2 TIMES DAILY PRN
Status: DISCONTINUED | OUTPATIENT
Start: 2024-05-14 | End: 2024-05-14

## 2024-05-14 RX ORDER — LANOLIN ALCOHOL/MO/W.PET/CERES
3 CREAM (GRAM) TOPICAL NIGHTLY PRN
Status: DISCONTINUED | OUTPATIENT
Start: 2024-05-14 | End: 2024-05-18 | Stop reason: HOSPADM

## 2024-05-14 RX ORDER — QUETIAPINE FUMARATE 25 MG/1
25 TABLET, FILM COATED ORAL 2 TIMES DAILY PRN
Status: DISCONTINUED | OUTPATIENT
Start: 2024-05-14 | End: 2024-05-14

## 2024-05-14 RX ORDER — LORAZEPAM 1 MG/1
1 TABLET ORAL ONCE
Status: COMPLETED | OUTPATIENT
Start: 2024-05-14 | End: 2024-05-14

## 2024-05-14 RX ADMIN — ACYCLOVIR 200 MG: 400 TABLET ORAL at 09:50

## 2024-05-14 RX ADMIN — ACYCLOVIR 200 MG: 400 TABLET ORAL at 20:56

## 2024-05-14 RX ADMIN — LOPERAMIDE HYDROCHLORIDE 2 MG: 2 CAPSULE ORAL at 21:08

## 2024-05-14 RX ADMIN — SODIUM CHLORIDE, PRESERVATIVE FREE 10 ML: 5 INJECTION INTRAVENOUS at 20:58

## 2024-05-14 RX ADMIN — QUETIAPINE FUMARATE 12.5 MG: 25 TABLET ORAL at 21:08

## 2024-05-14 RX ADMIN — SODIUM CHLORIDE, PRESERVATIVE FREE 40 MG: 5 INJECTION INTRAVENOUS at 22:30

## 2024-05-14 RX ADMIN — HYDROCODONE BITARTRATE AND ACETAMINOPHEN 1 TABLET: 5; 325 TABLET ORAL at 22:29

## 2024-05-14 RX ADMIN — POTASSIUM CHLORIDE 20 MEQ: 1500 TABLET, EXTENDED RELEASE ORAL at 20:56

## 2024-05-14 RX ADMIN — SODIUM CHLORIDE, PRESERVATIVE FREE 40 MG: 5 INJECTION INTRAVENOUS at 09:50

## 2024-05-14 RX ADMIN — MAGNESIUM GLUCONATE 500 MG ORAL TABLET 400 MG: 500 TABLET ORAL at 20:56

## 2024-05-14 RX ADMIN — MIRTAZAPINE 15 MG: 15 TABLET, FILM COATED ORAL at 20:56

## 2024-05-14 RX ADMIN — FUROSEMIDE 20 MG: 20 TABLET ORAL at 09:51

## 2024-05-14 RX ADMIN — LORAZEPAM 1 MG: 1 TABLET ORAL at 02:15

## 2024-05-14 RX ADMIN — MAGNESIUM GLUCONATE 500 MG ORAL TABLET 400 MG: 500 TABLET ORAL at 09:50

## 2024-05-14 RX ADMIN — ENOXAPARIN SODIUM 40 MG: 100 INJECTION SUBCUTANEOUS at 09:51

## 2024-05-14 RX ADMIN — ALLOPURINOL 300 MG: 300 TABLET ORAL at 09:51

## 2024-05-14 RX ADMIN — Medication 1 CAPSULE: at 09:50

## 2024-05-14 RX ADMIN — SODIUM CHLORIDE, PRESERVATIVE FREE 10 ML: 5 INJECTION INTRAVENOUS at 09:52

## 2024-05-14 RX ADMIN — POTASSIUM CHLORIDE 20 MEQ: 1500 TABLET, EXTENDED RELEASE ORAL at 09:50

## 2024-05-14 ASSESSMENT — PAIN DESCRIPTION - PAIN TYPE: TYPE: ACUTE PAIN

## 2024-05-14 ASSESSMENT — PAIN DESCRIPTION - LOCATION: LOCATION: HEAD

## 2024-05-14 ASSESSMENT — PAIN DESCRIPTION - DESCRIPTORS: DESCRIPTORS: ACHING

## 2024-05-14 ASSESSMENT — PAIN DESCRIPTION - ORIENTATION: ORIENTATION: OUTER

## 2024-05-14 ASSESSMENT — PAIN DESCRIPTION - ONSET: ONSET: GRADUAL

## 2024-05-14 ASSESSMENT — PAIN DESCRIPTION - FREQUENCY: FREQUENCY: CONTINUOUS

## 2024-05-14 ASSESSMENT — PAIN - FUNCTIONAL ASSESSMENT: PAIN_FUNCTIONAL_ASSESSMENT: PREVENTS OR INTERFERES SOME ACTIVE ACTIVITIES AND ADLS

## 2024-05-14 ASSESSMENT — PAIN SCALES - GENERAL
PAINLEVEL_OUTOF10: 8
PAINLEVEL_OUTOF10: 5

## 2024-05-14 NOTE — PROGRESS NOTES
ACUTE OCCUPATIONAL THERAPY GOALS:   (Developed with and agreed upon by patient and/or caregiver.)  1. Patient will complete upper body bathing and dressing with SET-UP and adaptive equipment as needed.   2. Patient will complete toileting with SBA  3. Patient will tolerate 30 minutes of OT treatment with 2-3 rest breaks to increase activity tolerance for ADLs.   4. Patient will complete functional transfers with SBA and adaptive equipment as needed.   5. Patient will complete functional activity while seated edge of bed with SET-UP and adaptive equipment as needed.  6. Patient will complete functional mobility for household distances with SBA and adaptive equipment as needed.      Timeframe: 7 visits     OCCUPATIONAL THERAPY: Daily Note AM   OT Visit Days: 2   Time In/Out  OT Charge Capture  Rehab Caseload Tracker  OT Orders    Tayla Escobar is a 84 y.o. female   PRIMARY DIAGNOSIS: Diarrhea  Hypokalemia [E87.6]  Acute diarrhea [R19.7]  Positive urine culture [R82.79]  Pancytopenia (HCC) [D61.818]  Diarrhea, unspecified type [R19.7]  Procedure(s) (LRB):  PROCEDURE CANCELED (N/A)  1 Day Post-Op  Inpatient: Payor: AET MEDICARE / Plan: [employer] MEDICARE-ADVANTAGE PPO / Product Type: Medicare /     ASSESSMENT:     REHAB RECOMMENDATIONS:   Recommendation to date pending progress:  Setting:  Home Health Therapy    Equipment:    None     ASSESSMENT:  Ms. Escobar upon arrival reclined in bed, very lethargic, was given ativan last night. She needed max assist for supine to sit edge of bed, once seated patient more alert. While seated edge of bed was stand by assist for static sitting balance while MD updated patient and son. Patient then stood with rolling walker with min-mod x2 and took a few side steps, sat edge of bed for a seated rest break. Patient then completed stand pivot transfer with rolling walker min assist x2 to sit in bedside chair. Patient brushed hair and washed face with stand by assist while seated in

## 2024-05-14 NOTE — PROGRESS NOTES
Pt unable to ambulate / exercise this afternoon per RN. Required 3L O2 to maintain sat > 90% during PT/OT with their assistance.

## 2024-05-14 NOTE — PLAN OF CARE
Problem: Pain  Goal: Verbalizes/displays adequate comfort level or baseline comfort level  5/14/2024 0242 by Shayy Tong RN  Outcome: Progressing  Flowsheets (Taken 5/13/2024 1915)  Verbalizes/displays adequate comfort level or baseline comfort level:   Encourage patient to monitor pain and request assistance   Assess pain using appropriate pain scale  5/13/2024 1802 by Sarah Albarran RN  Outcome: Progressing  5/13/2024 1721 by Sarah Albarran RN  Outcome: Progressing     Problem: Skin/Tissue Integrity  Goal: Absence of new skin breakdown  Description: 1.  Monitor for areas of redness and/or skin breakdown  2.  Assess vascular access sites hourly  3.  Every 4-6 hours minimum:  Change oxygen saturation probe site  4.  Every 4-6 hours:  If on nasal continuous positive airway pressure, respiratory therapy assess nares and determine need for appliance change or resting period.  5/14/2024 0242 by Shayy Tong, RN  Outcome: Progressing  5/13/2024 1802 by Sarah Albarran RN  Outcome: Progressing  5/13/2024 1721 by Sarah Albarran RN  Outcome: Progressing     Problem: Safety - Adult  Goal: Free from fall injury  5/14/2024 0242 by Shayy Tong RN  Outcome: Progressing  5/13/2024 1802 by Sarah Albarran RN  Outcome: Progressing  5/13/2024 1721 by Sarah Albarran RN  Outcome: Progressing

## 2024-05-14 NOTE — PROGRESS NOTES
[facility] Hematology & Oncology        Inpatient Hematology / Oncology Progress Note    Reason for Consult:  Hypokalemia [E87.6]  Acute diarrhea [R19.7]  Positive urine culture [R82.79]  Pancytopenia (HCC) [D61.818]  Diarrhea, unspecified type [R19.7]  Referring Physician:  Christine Galeana MD    24 Hour Events:  Afebrile, VSS, on O2 @ 4L  +delirium d/t sleeping during day and up at night.  Nursing staff to keep stimulated during the day and cluster care at night.  Son and daughter wish to have [facility] convo with palliative.  HR up to 170 with ambulation yesterday.     Transfusions: None  Replacements: K+, Mg++       ROS:  Constitutional: +fatigue, weakness. Negative for fever, chills.  CV: Negative for chest pain, palpitations.  Respiratory: +dyspnea. Negative for cough, wheezing.  GI: Negative for nausea, abdominal pain, diarrhea.    10 point review of systems is otherwise negative with the exception of the elements mentioned above in the HPI.       No Known Allergies  Past Medical History:   Diagnosis Date    Acute respiratory failure with hypoxemia (HCC)     Benign essential hypertension 2/11/2015    medication    Bilateral leg edema 4/26/2017    Cancer (HCC)     skin    Hiatal hernia     \"large\"    Hypercholesteremia 2/11/2015    IFG (impaired fasting glucose) 4/26/2017    Iron deficiency anemia 5/12/2016    Low oxygen saturation     per office notes- patient's daughter reported O2 sat drops to 85% when supine, she uses O2    Mixed hyperlipidemia 2/11/2015    Morbid obesity (HCC) 10/26/2017    Nipple discharge     not current as of 7/17/13    Primary insomnia 4/26/2018    Primary osteoarthritis of both knees 4/26/2018    Pulmonary embolism (HCC) 10/15/2023    on eliquis- Dr. Arnold recommended eliquis full dose x 6 months    Stage 3 chronic kidney disease (HCC) 7/17/2019     Past Surgical History:   Procedure Laterality Date    HIATAL HERNIA REPAIR N/A 3/4/2024    ROBOTIC HIATAL HERNIA REPAIR performed by  visit..      Her PMH includes HTN, HLD, PE on Eliquis, renal stones, and CKD3A. She is a patient of Dr. Serrato with recently diagnosed multiple myeloma s/p C1D15 CyBorD on 5/6.  C1D22 due 5/13.  She was recently admitted from 4/10 - 5/4/24 with ANTWON/CKD, dx with MM, b/l pneumonia, and UTI.  Ucx later revealed VRE after discharge.    She presented to ED with c/o diarrhea and generalized abdominal cramping.  K+ 2.5.  FOBT +ve.  UA neg.  Bcx-NGTD.  Cdiff neg.  GI panel and stool cx pending.  On Linezolid for VRE UTI.  We were consulted for recommendations for our patient.      RECOMMENDATIONS:  Multiple myeloma  - s/p C1D15 CyBorD on 5/6.    5/13 D22 due today - holding off on treatment based on pt condition.  She may need dose modifications of her CyBorD in the future based on her poor tolerance.     Diarrhea  - Cdiff neg  - GI panel and stool cx pending  - FOBT +ve  - antidiarrheals prn  5/10 Diarrhea resolved.  GI panel neg.  Stool cx-NG  RESOLVED    FOBT +ve  - PPI BID  - GI consulted  - Transfuse plt  5/11 No plans for endoscopy per GI, no active bleeding and pt declines procedure    VRE UTI  - Ucx 5/2 +VRE  - repeat UA neg  - on Linezolid  5/12 Completes Linezolid today (D5)  RESOLVED    Pancytopenia secondary to disease / chemotherapy  - Transfuse prn to keep Hgb >7 and Plt >10k or >50k with active bleeding    Hx urinary retention  - has forrest  - urology f/u  5/10 Orders placed to exchange forrest.  Needs OP urology f/u.    Dyspnea  5/10 O2 up to 6L, suspect volume overload.  CXR ordered.  DC IVF.  Lasix 40mg IV x 1 ordered.  Encouraged to get OOB.  5/11 Pt c/o worsening dyspnea.  ABG ordered - requiring BiPAP.  CXR with bibasilar consolidations and effusions.  Diuresed well s/p Lasix, UOP 3.6L.  RVP, BNP and CTA chest ordered.  Consult pulm.   5/12 Transferred to ICU yesterday for ongoing BiPAP.  Now on Airvo.  CTA chest with mild diffuse GGO, small b/l pleur eff, cardiomegaly, and stable thoracic compression fx.

## 2024-05-14 NOTE — PLAN OF CARE
Patient has remained A&O x 2. Pt slept much of the day despite window blind open and light and TV kept on. When awake, pt confused with periodic hallucinations.   -no fevers, urine still cloudy despite treatment for UTI, Kirstie Darden, NP notified, monitor for now.   -pt eating and drinking very little, appears to be getting weaker  -pt turned every 2 hours. Allyvens replaced to back and sacrum  -large loose, green BM with mucous. NP made aware.  Stool sample sent including C-diff.   -family meeting tomorrow with palliative care- status changed to DNR   -oxygen stable on 4 L NC    Patient rounded on hourly by myself or patient assistant and encouraged to call with any needs. No signs of distress noted. Bed low, locked, call bell within reach.   BSSR given to CHANTEL Lucas RN    Problem: Pain  Goal: Verbalizes/displays adequate comfort level or baseline comfort level  Outcome: Progressing     Problem: Skin/Tissue Integrity  Goal: Absence of new skin breakdown  Description: 1.  Monitor for areas of redness and/or skin breakdown  2.  Assess vascular access sites hourly  3.  Every 4-6 hours minimum:  Change oxygen saturation probe site  4.  Every 4-6 hours:  If on nasal continuous positive airway pressure, respiratory therapy assess nares and determine need for appliance change or resting period.  Outcome: Progressing     Problem: Safety - Adult  Goal: Free from fall injury  Outcome: Progressing  Flowsheets (Taken 5/14/2024 0800)  Free From Fall Injury:   Instruct family/caregiver on patient safety   Based on caregiver fall risk screen, instruct family/caregiver to ask for assistance with transferring infant if caregiver noted to have fall risk factors     Problem: Discharge Planning  Goal: Discharge to home or other facility with appropriate resources  Outcome: Progressing     Problem: Chronic Conditions and Co-morbidities  Goal: Patient's chronic conditions and co-morbidity symptoms are monitored and  maintained or improved  Outcome: Progressing

## 2024-05-14 NOTE — CONSULTS
Palliative Care    Patient: Tayla Escobar MRN: [id number]  SSN: [id number]    YOB: 1940  Age: 84 y.o.  Sex: female       Date of Request: 5/14/2024  Date of Consult:  5/14/2024  Reason for Consult:  pain and symptom management and goals of care  Requesting Physician: Ariana Lua NP     Assessment/Plan:     Principal Diagnosis:    Failure to Thrive  R62.7    Additional Diagnoses:   Acute Respiratory Failure, Unspecified  J96.00  Debility, Unspecified  R53.81  Fatigue, Lethargy  R53.83  Frailty  R54  Encounter for Palliative Care  Z51.5    Palliative Performance Scale (PPS):   30-40    Medical Decision Making:   Reviewed and summarized current hospitalization, labs, events.   Discussed case with appropriate providers. Spoke with Ariana Lua NP.    Pt somnolent, unable to arouse to have definitive conversation. She falls back asleep easily. Spoke with patient's daughter and HCPOA over the phone. She confirms that patient desires to be a DNR. Will change this code status in her chart.     Pt's daughter also expresses that the patient has been debating further treatment. They are very much considering a break from treatment for her to gain back strength. They are also interested in Palliative and home health services at home. Family meeting arranged for tomorrow morning to discuss GOC and patient desires. Hopefully by then patient will be less sedate and also children present.     Recommend stopping benzos. Could consider low dose Zyprexa or Seroquel for agitation at night. Would trial low dose oral disintegrating Zyprexa first.     Will discuss findings with members of the interdisciplinary team.  Will see tomorrow.     Thank you for this referral.         More than 50% of this 45 minute visit was spent counseling and coordination of care as outlined above.    Subjective:     History obtained from:  Family and Chart    Chief Complaint: Diarrhea    History of Present Illness:      Tayla LAKHANI  MD [facility] MAIN OR    MALIGNANT SKIN LESION EXCISION      OTHER SURGICAL HISTORY      skin cancer    UPPER GASTROINTESTINAL ENDOSCOPY N/A 4/13/2024    ESOPHAGOGASTRODUODENOSCOPY performed by Alejandro Vieyra MD at [facility] ENDOSCOPY    UPPER GASTROINTESTINAL ENDOSCOPY N/A 4/15/2024    ESOPHAGOGASTRODUODENOSCOPY performed by Alejandro Vieyra MD at [facility] ENDOSCOPY     Family History   Problem Relation Age of Onset    No Known Problems Sister     Ovarian Cancer Neg Hx     Cancer Sister         lung    Breast Cancer Neg Hx     Hypertension Mother     Arrhythmia Mother     Cancer Sister     Cancer Brother         lung    Cancer Father       Social History     Tobacco Use    Smoking status: Never    Smokeless tobacco: Former     Types: Chew     Quit date: 2018    Tobacco comments:     Quit smoking: dip snuff   Substance Use Topics    Alcohol use: No     Alcohol/week: 0.0 standard drinks of alcohol     Prior to Admission medications    Medication Sig Start Date End Date Taking? Authorizing Provider   mirtazapine (REMERON) 15 MG tablet Take 1 tablet by mouth nightly 5/6/24   Carin Stewart APRN - NP   aluminum & magnesium hydroxide-simethicone (MAALOX) 200-200-20 MG/5ML SUSP suspension Take 30 mLs by mouth every 6 hours as needed for Indigestion 5/4/24   Carin Stewart APRN - NP   loperamide (IMODIUM) 2 MG capsule Take 1 capsule by mouth as needed for Diarrhea 5/4/24 5/14/24  Carin Stewart APRN - NP   acyclovir (ZOVIRAX) 400 MG tablet Take 0.5 tablets by mouth 2 times daily 5/4/24 7/3/24  Carin Stewart APRN - NP   miconazole (MICOTIN) 2 % powder Apply topically 2 times daily. 5/4/24   Carin Stewart APRN - NP   mineral oil-hydrophilic petrolatum (AQUAPHOR) ointment Apply topically as needed. 5/4/24   Carin Stewart APRN - NP   furosemide (LASIX) 20 MG tablet Take 1 tablet by mouth in the morning and 1 tablet in the evening. 5/4/24   Carin Stewart APRN - NP   allopurinol (ZYLOPRIM) 100 MG tablet Take

## 2024-05-14 NOTE — PROGRESS NOTES
ICU Daily ProgressNOTE           5/14/2024    Tayla Escobar                        Date of Admission:  5/7/2024    The patient's chart is reviewed and the patient is discussed with the staff.    Hospital course:  Patient is a 84 y.o.  female seen and evaluated at the request of Nadia Vidal NP with oncology for dyspnea and hypoxia. PMX HTN, hyperlipidemia, CKD stage 2, multiple myeloma recently dx'd and on first round of CyBorD, last dose 5/6, Hx of DVT/PE on eliquis.  She presented with diarrhea, generalized abd pain x1 day.  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   Has had worsening SOB during admission.  CXR 5/10 with B pleural effusion, pulmonary vascular congestion, received 1 dose lasix.  Went for CTA 5/11 due to ongoing dyspnea and off eliquis due to thrombocytopenia.  Negative for PE, but B pleural effusion and pulmonary consolidations.    She was given lasix 5/10 with 3L of UOP after.   ABG with hypercapnea with pH 7.2, CO2 55 on 5/11, improved on repeat.      No lung history per daughter and son who provide history.  Diarrhea for <2 days then stopped.  Appetite is poor. She does snore and does not sleep well, more concerning for ANAM, not diagosed.  Hx of diastolic heart failure from echo 2023.  Moved to the ICU for BiPAP, improved and transferred back to the floor 5/12.    Subjective:     Given ativan last night, asked to avoid Benzos as she is very sensitive and required ICU over the weekend- discussed with RN  Family plans to discuss POC and goals as she has not tolerated treatment well per RN.   Decided against evaluating effusions/thora. On lasix and 3.5 lpm, sat 99%    Review of Systems  -Fever  -Headaches  -Chest pain  -Dyspnea,- wheezing, -cough  -Abdominal pain,- constipation  -Leg swelling  All other organ systems grossly normal.      Current Outpatient Medications   Medication Instructions    acetaminophen (TYLENOL) 500 mg, Oral,  procedures, documented information in EMR, independently interpreted images, and coordinated care. which accounted for 22 minutes clinical time.     Impression:   Pt down to 3.5L O2 but with sats in the upper 90's earlier. Not currently on monitor. She states she sometimes wears 1L o2 at home. Lasix being changed to PO today. Small amount of edema around her ankles.   Plts 57 today.   Repeat CXR tomorrow and if ongoing signs of effusion and plts > 50 will eval with US for possible thora given resolution of most of her edema on exam today.       Renato Villanueva MD

## 2024-05-14 NOTE — PROGRESS NOTES
PT was in bed asleep. Son was at bedside.  checked for unmet needs and offered support.  asked Son to let PT know PT and Family are in 's prayers.     Rev. Kathy Abdul M.Div.

## 2024-05-14 NOTE — PROGRESS NOTES
ACUTE PHYSICAL THERAPY GOALS:   (Developed with and agreed upon by patient and/or caregiver.)     Ms. Escobar will perform supine to sit and sit to supine independently in 7 days.    Ms. Escobar will perform sit to stand and bed to chair with least restrictive device independently in 7 days.    Ms. Escobar will perform gait with rolling walker 100 ft independently in 7 days.     PHYSICAL THERAPY: Daily Note AM   (Link to Caseload Tracking: PT Visit Days : 2  Time In/Out PT Charge Capture  Rehab Caseload Tracker  Orders    Tayla Escobar is a 84 y.o. female   PRIMARY DIAGNOSIS: Diarrhea  Hypokalemia [E87.6]  Acute diarrhea [R19.7]  Positive urine culture [R82.79]  Pancytopenia (HCC) [D61.818]  Diarrhea, unspecified type [R19.7]  Procedure(s) (LRB):  PROCEDURE CANCELED (N/A)  1 Day Post-Op  Inpatient: Payor: [facility] MEDICARE / Plan: AET MEDICARE-ADVANTAGE PPO / Product Type: Medicare /     ASSESSMENT:     REHAB RECOMMENDATIONS:   Recommendation to date pending progress:  Setting:  Home Health Therapy    Equipment:    To Be Determined     ASSESSMENT:  Ms. Escobar was received supine in bed, agreeable to therapy. She required maxA of 2 to sit at EOB due to lethargy but became more alert sitting up. She was able to stand twice and ambulate a few feet to transfer to chair with RW and Aiyana of 2. Pt fatigued quickly after transfer but HR and SpO2 WNL. She participated in seated dynamic balance activities but was unable to tolerate any more standing due to fatigue. If mobility does not progress further pt may benefit from a rehab stay prior to returning home pending goals of care and pt/family wishes.  Minimal progress, will continue to follow.      SUBJECTIVE:   Ms. Escobar states, \"I feel weak\"     Social/Functional Lives With: Family  Type of Home: House  Home Layout: One level  Home Access: Ramped entrance  Bathroom Shower/Tub: Walk-in shower  Bathroom Toilet: Standard  Bathroom Equipment: 3-in-1 commode, Grab bars

## 2024-05-14 NOTE — CARE COORDINATION
Pt chart reviewed. MSW spoke with pt son and family at bedside regarding request for palliative consult. Pt son (Romeo Rushing) reports sister (Anyi Felix) has reached out to [facility] and a meeting has been scheduled with family for 5/15/24 in the am to discuss services. No other needs expressed at this time for case management. Discharge to be determined. MSW will follow patient for care.

## 2024-05-15 ENCOUNTER — APPOINTMENT (OUTPATIENT)
Dept: GENERAL RADIOLOGY | Age: 84
DRG: 689 | End: 2024-05-15
Payer: MEDICARE

## 2024-05-15 PROBLEM — Z71.9 ENCOUNTER FOR COUNSELING: Status: ACTIVE | Noted: 2024-05-15

## 2024-05-15 PROBLEM — R63.0 ANOREXIA: Status: ACTIVE | Noted: 2024-01-01

## 2024-05-15 LAB
ABO + RH BLD: NORMAL
ALBUMIN SERPL-MCNC: 2.7 G/DL (ref 3.2–4.6)
ALBUMIN/GLOB SERPL: 1.4 (ref 1–1.9)
ALP SERPL-CCNC: 72 U/L (ref 35–104)
ALT SERPL-CCNC: 15 U/L (ref 12–65)
ANION GAP SERPL CALC-SCNC: 7 MMOL/L (ref 9–18)
APPEARANCE UR: ABNORMAL
AST SERPL-CCNC: 21 U/L (ref 15–37)
BACTERIA URNS QL MICRO: ABNORMAL /HPF
BASOPHILS # BLD: 0 K/UL (ref 0–0.2)
BASOPHILS NFR BLD: 0 % (ref 0–2)
BILIRUB SERPL-MCNC: 0.6 MG/DL (ref 0–1.2)
BILIRUB UR QL: NEGATIVE
BLOOD GROUP ANTIBODIES SERPL: NORMAL
BUN SERPL-MCNC: 8 MG/DL (ref 8–23)
CALCIUM SERPL-MCNC: 8.6 MG/DL (ref 8.8–10.2)
CHLORIDE SERPL-SCNC: 104 MMOL/L (ref 98–107)
CO2 SERPL-SCNC: 32 MMOL/L (ref 20–28)
COLOR UR: ABNORMAL
CREAT SERPL-MCNC: 1.01 MG/DL (ref 0.6–1.1)
DIFFERENTIAL METHOD BLD: ABNORMAL
EOSINOPHIL # BLD: 0.1 K/UL (ref 0–0.8)
EOSINOPHIL NFR BLD: 3 % (ref 0.5–7.8)
ERYTHROCYTE [DISTWIDTH] IN BLOOD BY AUTOMATED COUNT: 16.7 % (ref 11.9–14.6)
GLOBULIN SER CALC-MCNC: 2 G/DL (ref 2.3–3.5)
GLUCOSE SERPL-MCNC: 104 MG/DL (ref 70–99)
GLUCOSE UR STRIP.AUTO-MCNC: 100 MG/DL
HCT VFR BLD AUTO: 26.4 % (ref 35.8–46.3)
HGB BLD-MCNC: 8.4 G/DL (ref 11.7–15.4)
HGB UR QL STRIP: ABNORMAL
IMM GRANULOCYTES # BLD AUTO: 0 K/UL (ref 0–0.5)
IMM GRANULOCYTES NFR BLD AUTO: 1 % (ref 0–5)
KETONES UR QL STRIP.AUTO: NEGATIVE MG/DL
LEUKOCYTE ESTERASE UR QL STRIP.AUTO: ABNORMAL
LYMPHOCYTES # BLD: 0.1 K/UL (ref 0.5–4.6)
LYMPHOCYTES NFR BLD: 4 % (ref 13–44)
MAGNESIUM SERPL-MCNC: 1.4 MG/DL (ref 1.8–2.4)
MCH RBC QN AUTO: 29.6 PG (ref 26.1–32.9)
MCHC RBC AUTO-ENTMCNC: 31.8 G/DL (ref 31.4–35)
MCV RBC AUTO: 93 FL (ref 82–102)
MONOCYTES # BLD: 0.4 K/UL (ref 0.1–1.3)
MONOCYTES NFR BLD: 13 % (ref 4–12)
NEUTS SEG # BLD: 2.4 K/UL (ref 1.7–8.2)
NEUTS SEG NFR BLD: 79 % (ref 43–78)
NITRITE UR QL STRIP.AUTO: POSITIVE
NRBC # BLD: 0 K/UL (ref 0–0.2)
OTHER OBSERVATIONS: ABNORMAL
PH UR STRIP: 5.5 (ref 5–9)
PLATELET # BLD AUTO: 47 K/UL (ref 150–450)
PLATELET COMMENT: ABNORMAL
PMV BLD AUTO: 11.3 FL (ref 9.4–12.3)
POTASSIUM SERPL-SCNC: 3.8 MMOL/L (ref 3.5–5.1)
PROT SERPL-MCNC: 4.7 G/DL (ref 6.3–8.2)
PROT UR STRIP-MCNC: 100 MG/DL
RBC # BLD AUTO: 2.84 M/UL (ref 4.05–5.2)
RBC #/AREA URNS HPF: ABNORMAL /HPF
RBC MORPH BLD: ABNORMAL
SODIUM SERPL-SCNC: 142 MMOL/L (ref 136–145)
SP GR UR REFRACTOMETRY: 1.01 (ref 1–1.02)
SPECIMEN EXP DATE BLD: NORMAL
UROBILINOGEN UR QL STRIP.AUTO: 0.2 EU/DL (ref 0.2–1)
WBC # BLD AUTO: 3 K/UL (ref 4.3–11.1)
WBC MORPH BLD: ABNORMAL
WBC URNS QL MICRO: >100 /HPF
YEAST URNS QL MICRO: ABNORMAL

## 2024-05-15 PROCEDURE — 87088 URINE BACTERIA CULTURE: CPT

## 2024-05-15 PROCEDURE — P9037 PLATE PHERES LEUKOREDU IRRAD: HCPCS

## 2024-05-15 PROCEDURE — A4216 STERILE WATER/SALINE, 10 ML: HCPCS | Performed by: NURSE PRACTITIONER

## 2024-05-15 PROCEDURE — 2580000003 HC RX 258: Performed by: HOSPITALIST

## 2024-05-15 PROCEDURE — 87186 SC STD MICRODIL/AGAR DIL: CPT

## 2024-05-15 PROCEDURE — 99233 SBSQ HOSP IP/OBS HIGH 50: CPT | Performed by: INTERNAL MEDICINE

## 2024-05-15 PROCEDURE — APPSS30 APP SPLIT SHARED TIME 16-30 MINUTES: Performed by: NURSE PRACTITIONER

## 2024-05-15 PROCEDURE — 83735 ASSAY OF MAGNESIUM: CPT

## 2024-05-15 PROCEDURE — 99232 SBSQ HOSP IP/OBS MODERATE 35: CPT | Performed by: INTERNAL MEDICINE

## 2024-05-15 PROCEDURE — 86900 BLOOD TYPING SEROLOGIC ABO: CPT

## 2024-05-15 PROCEDURE — 6370000000 HC RX 637 (ALT 250 FOR IP): Performed by: INTERNAL MEDICINE

## 2024-05-15 PROCEDURE — 6370000000 HC RX 637 (ALT 250 FOR IP): Performed by: HOSPITALIST

## 2024-05-15 PROCEDURE — 87086 URINE CULTURE/COLONY COUNT: CPT

## 2024-05-15 PROCEDURE — 86901 BLOOD TYPING SEROLOGIC RH(D): CPT

## 2024-05-15 PROCEDURE — 1100000000 HC RM PRIVATE

## 2024-05-15 PROCEDURE — 81001 URINALYSIS AUTO W/SCOPE: CPT

## 2024-05-15 PROCEDURE — 99233 SBSQ HOSP IP/OBS HIGH 50: CPT | Performed by: NURSE PRACTITIONER

## 2024-05-15 PROCEDURE — 76604 US EXAM CHEST: CPT | Performed by: INTERNAL MEDICINE

## 2024-05-15 PROCEDURE — C9113 INJ PANTOPRAZOLE SODIUM, VIA: HCPCS | Performed by: NURSE PRACTITIONER

## 2024-05-15 PROCEDURE — 80053 COMPREHEN METABOLIC PANEL: CPT

## 2024-05-15 PROCEDURE — 71045 X-RAY EXAM CHEST 1 VIEW: CPT

## 2024-05-15 PROCEDURE — 85025 COMPLETE CBC W/AUTO DIFF WBC: CPT

## 2024-05-15 PROCEDURE — 36592 COLLECT BLOOD FROM PICC: CPT

## 2024-05-15 PROCEDURE — 2580000003 HC RX 258: Performed by: NURSE PRACTITIONER

## 2024-05-15 PROCEDURE — 36430 TRANSFUSION BLD/BLD COMPNT: CPT

## 2024-05-15 PROCEDURE — 86850 RBC ANTIBODY SCREEN: CPT

## 2024-05-15 PROCEDURE — 6370000000 HC RX 637 (ALT 250 FOR IP): Performed by: NURSE PRACTITIONER

## 2024-05-15 PROCEDURE — 51702 INSERT TEMP BLADDER CATH: CPT

## 2024-05-15 PROCEDURE — 6360000002 HC RX W HCPCS: Performed by: NURSE PRACTITIONER

## 2024-05-15 RX ORDER — MAGNESIUM SULFATE IN WATER 40 MG/ML
2000 INJECTION, SOLUTION INTRAVENOUS ONCE
Status: COMPLETED | OUTPATIENT
Start: 2024-05-15 | End: 2024-05-15

## 2024-05-15 RX ORDER — LINEZOLID 2 MG/ML
600 INJECTION, SOLUTION INTRAVENOUS EVERY 12 HOURS
Status: DISCONTINUED | OUTPATIENT
Start: 2024-05-15 | End: 2024-05-16

## 2024-05-15 RX ORDER — SODIUM CHLORIDE 9 MG/ML
INJECTION, SOLUTION INTRAVENOUS PRN
Status: DISCONTINUED | OUTPATIENT
Start: 2024-05-15 | End: 2024-05-15

## 2024-05-15 RX ORDER — SODIUM CHLORIDE 9 MG/ML
INJECTION, SOLUTION INTRAVENOUS PRN
Status: DISCONTINUED | OUTPATIENT
Start: 2024-05-15 | End: 2024-05-18 | Stop reason: HOSPADM

## 2024-05-15 RX ADMIN — POTASSIUM CHLORIDE 20 MEQ: 1500 TABLET, EXTENDED RELEASE ORAL at 09:04

## 2024-05-15 RX ADMIN — FUROSEMIDE 20 MG: 20 TABLET ORAL at 09:04

## 2024-05-15 RX ADMIN — SODIUM CHLORIDE, PRESERVATIVE FREE 10 ML: 5 INJECTION INTRAVENOUS at 22:03

## 2024-05-15 RX ADMIN — Medication 1 CAPSULE: at 09:04

## 2024-05-15 RX ADMIN — MAGNESIUM SULFATE HEPTAHYDRATE 2000 MG: 40 INJECTION, SOLUTION INTRAVENOUS at 09:16

## 2024-05-15 RX ADMIN — POTASSIUM CHLORIDE 20 MEQ: 1500 TABLET, EXTENDED RELEASE ORAL at 22:02

## 2024-05-15 RX ADMIN — LINEZOLID 600 MG: 600 INJECTION, SOLUTION INTRAVENOUS at 14:48

## 2024-05-15 RX ADMIN — SODIUM CHLORIDE, PRESERVATIVE FREE 40 MG: 5 INJECTION INTRAVENOUS at 22:02

## 2024-05-15 RX ADMIN — ACYCLOVIR 200 MG: 400 TABLET ORAL at 22:02

## 2024-05-15 RX ADMIN — SODIUM CHLORIDE, PRESERVATIVE FREE 40 MG: 5 INJECTION INTRAVENOUS at 09:05

## 2024-05-15 RX ADMIN — SODIUM CHLORIDE, PRESERVATIVE FREE 10 ML: 5 INJECTION INTRAVENOUS at 09:05

## 2024-05-15 RX ADMIN — ALLOPURINOL 300 MG: 300 TABLET ORAL at 09:04

## 2024-05-15 RX ADMIN — ACYCLOVIR 200 MG: 400 TABLET ORAL at 09:04

## 2024-05-15 ASSESSMENT — PAIN SCALES - GENERAL: PAINLEVEL_OUTOF10: 0

## 2024-05-15 NOTE — PROGRESS NOTES
ICU Daily ProgressNOTE           5/15/2024    Tayla Escobar                        Date of Admission:  5/7/2024    The patient's chart is reviewed and the patient is discussed with the staff.    Hospital course:  Patient is a 84 y.o.  female seen and evaluated at the request of Nadia Vidal NP with oncology for dyspnea and hypoxia. PMX HTN, hyperlipidemia, CKD stage 2, multiple myeloma recently dx'd and on first round of CyBorD, last dose 5/6, Hx of DVT/PE on eliquis.  She presented with diarrhea, generalized abd pain x1 day.  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   Has had worsening SOB during admission.  CXR 5/10 with B pleural effusion, pulmonary vascular congestion, received 1 dose lasix.  Went for CTA 5/11 due to ongoing dyspnea and off eliquis due to thrombocytopenia.  Negative for PE, but B pleural effusion and pulmonary consolidations.    She was given lasix 5/10 with 3L of UOP after.   ABG with hypercapnea with pH 7.2, CO2 55 on 5/11, improved on repeat.    No lung history per daughter and son who provide history.  Diarrhea for <2 days then stopped.  Appetite is poor. She does snore and does not sleep well, more concerning for ANAM, not diagosed.  Hx of diastolic heart failure from echo 2023.  Moved to the ICU for BiPAP, improved and transferred back to the floor 5/12.    Subjective:     On  3 lpm, 97%.  Has oxygen at home  Spoke with her daughter and they plan to take her home with [facility]      Review of Systems  -Dyspnea,- wheezing, -cough  -Abdominal pain,- constipation  -Leg swelling  All other organ systems grossly normal.      Current Outpatient Medications   Medication Instructions    acetaminophen (TYLENOL) 500 mg, Oral, EVERY 6 HOURS PRN    acyclovir (ZOVIRAX) 200 mg, Oral, 2 TIMES DAILY    allopurinol (ZYLOPRIM) 50 mg, Oral, TWICE WEEKLY    aluminum & magnesium hydroxide-simethicone (MAALOX) 200-200-20 MG/5ML SUSP

## 2024-05-15 NOTE — PROGRESS NOTES
Palliative Care Progress Note    Patient: Tayla Escobar MRN: [id number]  SSN: [id number]    YOB: 1940  Age: 84 y.o.  Sex: female       Assessment/Plan:     Chief Complaint/Interval History: alert, reports ongoing anorexia and fatigue       Principal Diagnosis:    Failure to Thrive  R62.7    Additional Diagnoses:   Anorexia  R63.0  Debility, Unspecified  R53.81  Fatigue, Lethargy  R53.83  Frailty  R54  Encounter for Palliative Care  Z51.5    Palliative Performance Scale (PPS)       Medical Decision Making:   Reviewed and summarized notes over last 24 hours   Discussed case with appropriate providers  Reviewed laboratory and x-ray data over last 24 hours     Pt alert, resting in bed.  Son and daughter at bedside.  Pt able to participate in discussion regarding her care.  She states that she wants to take a break from treatment to gain strength, and then resume treatment.  She endorses anorexia and poor oral intake since starting treatment.  Counseled on the importance of adequate intake in order to get stronger.  Encouraged frequent snacking on calorie dense foods, instead of trying to consume large quantities of food at scheduled meals.  Pt and family voiced understanding.  Pt was started on Remeron 7.5 mg daily at diagnosis, and this was increased to Remeron 15 mg daily during this admission.  We will continue to follow.     Will discuss findings with members of the interdisciplinary team.         More than 50% of this 50 minute visit was spent counseling and coordination of care as outlined above.    Subjective:     Review of Systems:  A comprehensive review of systems was negative except for:   Constitutional: Positive for fatigue, anorexia.     Objective:     Visit Vitals  /71   Pulse 90   Temp 97.5 °F (36.4 °C) (Oral)   Resp 18   Ht 1.651 m (5' 5\")   Wt 76.3 kg (168 lb 2 oz)   SpO2 97%   BMI 27.98 kg/m²       Physical Exam:    General:  Cooperative.  Debilitated and frail. No acute

## 2024-05-15 NOTE — PROGRESS NOTES
placed to exchange today.  Check UA/Ucx.    Dyspnea  5/10 O2 up to 6L, suspect volume overload.  CXR ordered.  DC IVF.  Lasix 40mg IV x 1 ordered.  Encouraged to get OOB.  5/11 Pt c/o worsening dyspnea.  ABG ordered - requiring BiPAP.  CXR with bibasilar consolidations and effusions.  Diuresed well s/p Lasix, UOP 3.6L.  RVP, BNP and CTA chest ordered.  Consult pulm.   5/12 Transferred to ICU yesterday for ongoing BiPAP.  Now on Airvo.  CTA chest with mild diffuse GGO, small b/l pleur eff, cardiomegaly, and stable thoracic compression fx.  RVP neg.  BNP 3128.  Pulm following.  5/13 Transferred back to floor yesterday.  On O2 @ 4L.  Pulm recommends to con't IS and if still requiring high O2, may need to consider thora.  5/15 On O2 @ 3L.  Possible thora today.    Confusion / Delirium  5/14 d/t sleeping during day and up at night. Nursing staff to keep stimulated during the day and cluster care at night. Son and daughter wish to have [facility] convo with palliative. Try to hold sedating medications so patient can participate in care.      Continue home meds  Ppx: Acyclovir  Supportive care  AC contraindicated d/t thrombocytopenia    Goals and plan of care reviewed with the patient.  All questions answered to the best of our ability.      Dispo:  Hospice consult pending.         RAQUEL Joshi - CNP   [facility]  [address] [place] [address]  Office : [phone]  Fax : [phone]  I personally saw, exammed and counselled the patient, and discussed with NP, agree with above history/assessment/plan. Exam: No acute distress, normal breathing effort and breath sounds, regular heart rate and heart sound, benign abd, normal ROM of limbs. 84 y.o.female multiple myeloma status post 3 doses of CyBorD admitted with diarrhea and hypoxia, was on BiPAP in ICU with improvement, transferred back to floor and continue above care, had hospital delirium and better managed now, family desires to hold  off further treatment and pursue hospice but the potential option of return to oncology if patient significantly improved for performance status, consult accordingly and continue above care.       [place] [address]  Office : [phone]  Fax : [phone]

## 2024-05-15 NOTE — INTERVAL H&P NOTE
Update History & Physical    The patient's History and Physical of May 11,  was reviewed with the patient and I examined the patient. There was no change. The surgical site was confirmed by the patient and me.     Plan: The risks, benefits, expected outcome, and alternative to the recommended procedure have been discussed with the patient. Patient understands and wants to proceed with the procedure.     Electronically signed by Renato Villanueva MD on 5/15/2024 at 11:55 AM

## 2024-05-15 NOTE — PLAN OF CARE
Problem: Pain  Goal: Verbalizes/displays adequate comfort level or baseline comfort level  Outcome: Progressing     Problem: Skin/Tissue Integrity  Goal: Absence of new skin breakdown  Description: 1.  Monitor for areas of redness and/or skin breakdown  2.  Assess vascular access sites hourly  3.  Every 4-6 hours minimum:  Change oxygen saturation probe site  4.  Every 4-6 hours:  If on nasal continuous positive airway pressure, respiratory therapy assess nares and determine need for appliance change or resting period.  Outcome: Progressing     Problem: Safety - Adult  Goal: Free from fall injury  Outcome: Progressing     Problem: Discharge Planning  Goal: Discharge to home or other facility with appropriate resources  Outcome: Progressing     Problem: Chronic Conditions and Co-morbidities  Goal: Patient's chronic conditions and co-morbidity symptoms are monitored and maintained or improved  Outcome: Progressing     Problem: ABCDS Injury Assessment  Goal: Absence of physical injury  Outcome: Progressing

## 2024-05-15 NOTE — OP NOTE
PROCEDURE:  DIAGNOSTIC ULTRASOUND OF CHEST    DIAGNOSIS:  bilateral PLEURAL EFFUSION    CHEST ULTRASOUND FINDINGS:    A Turbo-M, Sonosite ultrasound with a 5-16 mHz probe was used to image the chest and localize the pleural effusion on the bilateral chest.    A small anechoic space was seen on the bilateral chest consistent with an uncomplicated pleural effusion.    The patient's effusions were quite small and indistinct. Thoracentesis not felt to be necessary.     Renato Villanueva MD

## 2024-05-15 NOTE — CARE COORDINATION
RN CM met with the patient at the bedside and reviewed her discharge plan. She confirmed she wants to resume services with [facility] and to use [facility] for palliative services. RN CM informed her [facility] has accepted her for services. A referral was sent to [facility] and is pending review. She confirmed she has home oxygen. Per her report she is current with [facility].    RN CM spoke with [facility] and confirmed the patient is current with their services. She wears 1-2L O2 with exertion at baseline.

## 2024-05-15 NOTE — CARE COORDINATION
RN CASIMIRO spoke with [facility] and confirmed the patient is current with their services for home health PT/RN/SW.

## 2024-05-15 NOTE — CARE COORDINATION
Discharge planning was discussed with the General Surgery NP. The home health orders were confirmed with read back and were sent to [facility]. [facility] accepted the patient for services.

## 2024-05-15 NOTE — CARE COORDINATION
RN CM was approached by the patient's daughter Anyi Felix. She stated the patient decided to return home and wants to resume services with [facility]. She would like to use [facility] for palliative services. Referrals were sent and are pending review.    Phone call received from Isabela with [employer]. She confirmed she spoke with the patient and family this morning. She requested for orders to be faxed to [phone]. The fax number was confirmed with read back.

## 2024-05-15 NOTE — CARE COORDINATION
RN CM met with the patient and her two visitors at the bedside and discussed discharge planning. They confirmed they spoke with [employer] today and are debating between the patient discharging home with home health services and palliative care verses hospice. The patient was provided with a list of home health agencies and their quality metrics. They were also provided with a list of hospice agencies. RN CM encouraged them to ask questions and to contact case management once they've made a decision. The pulmonologist was at the bedside waiting to speak with the patient and visitors.

## 2024-05-16 LAB
ALBUMIN SERPL-MCNC: 2.9 G/DL (ref 3.2–4.6)
ALBUMIN/GLOB SERPL: 1.5 (ref 1–1.9)
ALP SERPL-CCNC: 81 U/L (ref 35–104)
ALT SERPL-CCNC: 14 U/L (ref 12–65)
ANION GAP SERPL CALC-SCNC: 5 MMOL/L (ref 9–18)
AST SERPL-CCNC: 19 U/L (ref 15–37)
BACTERIA SPEC CULT: NORMAL
BASOPHILS # BLD: 0 K/UL (ref 0–0.2)
BASOPHILS NFR BLD: 1 % (ref 0–2)
BILIRUB SERPL-MCNC: 0.5 MG/DL (ref 0–1.2)
BLD PROD TYP BPU: NORMAL
BLOOD BANK BLOOD PRODUCT EXPIRATION DATE: NORMAL
BLOOD BANK DISPENSE STATUS: NORMAL
BLOOD BANK ISBT PRODUCT BLOOD TYPE: 5100
BLOOD BANK PRODUCT CODE: NORMAL
BLOOD BANK UNIT TYPE AND RH: NORMAL
BPU ID: NORMAL
BUN SERPL-MCNC: 6 MG/DL (ref 8–23)
CALCIUM SERPL-MCNC: 8.9 MG/DL (ref 8.8–10.2)
CHLORIDE SERPL-SCNC: 98 MMOL/L (ref 98–107)
CO2 SERPL-SCNC: 33 MMOL/L (ref 20–28)
CREAT SERPL-MCNC: 0.93 MG/DL (ref 0.6–1.1)
DIFFERENTIAL METHOD BLD: ABNORMAL
EOSINOPHIL # BLD: 0.1 K/UL (ref 0–0.8)
EOSINOPHIL NFR BLD: 3 % (ref 0.5–7.8)
ERYTHROCYTE [DISTWIDTH] IN BLOOD BY AUTOMATED COUNT: 16.7 % (ref 11.9–14.6)
GLOBULIN SER CALC-MCNC: 2 G/DL (ref 2.3–3.5)
GLUCOSE SERPL-MCNC: 140 MG/DL (ref 70–99)
HCT VFR BLD AUTO: 26.7 % (ref 35.8–46.3)
HGB BLD-MCNC: 8.4 G/DL (ref 11.7–15.4)
IMM GRANULOCYTES # BLD AUTO: 0.1 K/UL (ref 0–0.5)
IMM GRANULOCYTES NFR BLD AUTO: 2 % (ref 0–5)
LYMPHOCYTES # BLD: 0.2 K/UL (ref 0.5–4.6)
LYMPHOCYTES NFR BLD: 4 % (ref 13–44)
MAGNESIUM SERPL-MCNC: 1.7 MG/DL (ref 1.8–2.4)
MCH RBC QN AUTO: 29.7 PG (ref 26.1–32.9)
MCHC RBC AUTO-ENTMCNC: 31.5 G/DL (ref 31.4–35)
MCV RBC AUTO: 94.3 FL (ref 82–102)
MONOCYTES # BLD: 0.6 K/UL (ref 0.1–1.3)
MONOCYTES NFR BLD: 13 % (ref 4–12)
NEUTS SEG # BLD: 3.2 K/UL (ref 1.7–8.2)
NEUTS SEG NFR BLD: 77 % (ref 43–78)
NRBC # BLD: 0 K/UL (ref 0–0.2)
PLATELET # BLD AUTO: 96 K/UL (ref 150–450)
PMV BLD AUTO: 10.8 FL (ref 9.4–12.3)
POTASSIUM SERPL-SCNC: 3.9 MMOL/L (ref 3.5–5.1)
PROT SERPL-MCNC: 4.9 G/DL (ref 6.3–8.2)
RBC # BLD AUTO: 2.83 M/UL (ref 4.05–5.2)
SERVICE CMNT-IMP: NORMAL
SODIUM SERPL-SCNC: 136 MMOL/L (ref 136–145)
UNIT DIVISION: 0
UNIT ISSUE DATE/TIME: NORMAL
WBC # BLD AUTO: 4.1 K/UL (ref 4.3–11.1)

## 2024-05-16 PROCEDURE — 6360000002 HC RX W HCPCS: Performed by: HOSPITALIST

## 2024-05-16 PROCEDURE — 99232 SBSQ HOSP IP/OBS MODERATE 35: CPT | Performed by: INTERNAL MEDICINE

## 2024-05-16 PROCEDURE — 36592 COLLECT BLOOD FROM PICC: CPT

## 2024-05-16 PROCEDURE — 85025 COMPLETE CBC W/AUTO DIFF WBC: CPT

## 2024-05-16 PROCEDURE — 6370000000 HC RX 637 (ALT 250 FOR IP): Performed by: HOSPITALIST

## 2024-05-16 PROCEDURE — 83735 ASSAY OF MAGNESIUM: CPT

## 2024-05-16 PROCEDURE — 51702 INSERT TEMP BLADDER CATH: CPT

## 2024-05-16 PROCEDURE — A4216 STERILE WATER/SALINE, 10 ML: HCPCS | Performed by: NURSE PRACTITIONER

## 2024-05-16 PROCEDURE — 2580000003 HC RX 258: Performed by: NURSE PRACTITIONER

## 2024-05-16 PROCEDURE — APPSS45 APP SPLIT SHARED TIME 31-45 MINUTES: Performed by: NURSE PRACTITIONER

## 2024-05-16 PROCEDURE — 80053 COMPREHEN METABOLIC PANEL: CPT

## 2024-05-16 PROCEDURE — 6360000002 HC RX W HCPCS: Performed by: NURSE PRACTITIONER

## 2024-05-16 PROCEDURE — 6370000000 HC RX 637 (ALT 250 FOR IP): Performed by: NURSE PRACTITIONER

## 2024-05-16 PROCEDURE — 6370000000 HC RX 637 (ALT 250 FOR IP): Performed by: PHYSICIAN ASSISTANT

## 2024-05-16 PROCEDURE — 1100000000 HC RM PRIVATE

## 2024-05-16 PROCEDURE — C9113 INJ PANTOPRAZOLE SODIUM, VIA: HCPCS | Performed by: NURSE PRACTITIONER

## 2024-05-16 PROCEDURE — 2580000003 HC RX 258: Performed by: HOSPITALIST

## 2024-05-16 PROCEDURE — 6370000000 HC RX 637 (ALT 250 FOR IP): Performed by: INTERNAL MEDICINE

## 2024-05-16 RX ORDER — CIPROFLOXACIN 250 MG/1
250 TABLET, FILM COATED ORAL 2 TIMES DAILY
Qty: 14 TABLET | Refills: 0 | Status: SHIPPED | OUTPATIENT
Start: 2024-05-16 | End: 2024-05-23

## 2024-05-16 RX ORDER — ALLOPURINOL 300 MG/1
300 TABLET ORAL DAILY
Qty: 30 TABLET | Refills: 0 | Status: SHIPPED | OUTPATIENT
Start: 2024-05-17

## 2024-05-16 RX ORDER — POTASSIUM CHLORIDE 20 MEQ/1
20 TABLET, EXTENDED RELEASE ORAL 2 TIMES DAILY
Qty: 60 TABLET | Refills: 0 | Status: SHIPPED | OUTPATIENT
Start: 2024-05-16

## 2024-05-16 RX ORDER — LOPERAMIDE HYDROCHLORIDE 2 MG/1
2 CAPSULE ORAL PRN
Status: DISCONTINUED | OUTPATIENT
Start: 2024-05-16 | End: 2024-05-18 | Stop reason: HOSPADM

## 2024-05-16 RX ORDER — LOPERAMIDE HYDROCHLORIDE 2 MG/1
2 CAPSULE ORAL 4 TIMES DAILY PRN
Status: DISCONTINUED | OUTPATIENT
Start: 2024-05-16 | End: 2024-05-16

## 2024-05-16 RX ORDER — MIRTAZAPINE 15 MG/1
15 TABLET, FILM COATED ORAL NIGHTLY
Status: DISCONTINUED | OUTPATIENT
Start: 2024-05-16 | End: 2024-05-18 | Stop reason: HOSPADM

## 2024-05-16 RX ORDER — FUROSEMIDE 10 MG/ML
20 INJECTION INTRAMUSCULAR; INTRAVENOUS ONCE
Status: COMPLETED | OUTPATIENT
Start: 2024-05-16 | End: 2024-05-16

## 2024-05-16 RX ORDER — FUROSEMIDE 20 MG/1
20 TABLET ORAL DAILY
Status: DISCONTINUED | OUTPATIENT
Start: 2024-05-17 | End: 2024-05-17

## 2024-05-16 RX ADMIN — ALLOPURINOL 300 MG: 300 TABLET ORAL at 10:32

## 2024-05-16 RX ADMIN — MIRTAZAPINE 15 MG: 15 TABLET, FILM COATED ORAL at 19:37

## 2024-05-16 RX ADMIN — ONDANSETRON 4 MG: 2 INJECTION INTRAMUSCULAR; INTRAVENOUS at 08:45

## 2024-05-16 RX ADMIN — SODIUM CHLORIDE, PRESERVATIVE FREE 40 MG: 5 INJECTION INTRAVENOUS at 22:46

## 2024-05-16 RX ADMIN — Medication 3 MG: at 00:06

## 2024-05-16 RX ADMIN — SODIUM CHLORIDE, PRESERVATIVE FREE 10 ML: 5 INJECTION INTRAVENOUS at 10:31

## 2024-05-16 RX ADMIN — SODIUM CHLORIDE, PRESERVATIVE FREE 40 MG: 5 INJECTION INTRAVENOUS at 10:30

## 2024-05-16 RX ADMIN — POTASSIUM CHLORIDE 20 MEQ: 1500 TABLET, EXTENDED RELEASE ORAL at 19:37

## 2024-05-16 RX ADMIN — PROCHLORPERAZINE EDISYLATE 5 MG: 5 INJECTION INTRAMUSCULAR; INTRAVENOUS at 10:41

## 2024-05-16 RX ADMIN — ACYCLOVIR 200 MG: 400 TABLET ORAL at 19:37

## 2024-05-16 RX ADMIN — Medication 1 CAPSULE: at 10:32

## 2024-05-16 RX ADMIN — WATER 1000 MG: 1 INJECTION INTRAMUSCULAR; INTRAVENOUS; SUBCUTANEOUS at 10:30

## 2024-05-16 RX ADMIN — SODIUM CHLORIDE, PRESERVATIVE FREE 10 ML: 5 INJECTION INTRAVENOUS at 19:46

## 2024-05-16 RX ADMIN — HYDROCODONE BITARTRATE AND ACETAMINOPHEN 1 TABLET: 5; 325 TABLET ORAL at 19:37

## 2024-05-16 RX ADMIN — FUROSEMIDE 20 MG: 10 INJECTION, SOLUTION INTRAMUSCULAR; INTRAVENOUS at 10:31

## 2024-05-16 RX ADMIN — ACYCLOVIR 200 MG: 400 TABLET ORAL at 10:32

## 2024-05-16 RX ADMIN — POTASSIUM CHLORIDE 20 MEQ: 1500 TABLET, EXTENDED RELEASE ORAL at 10:32

## 2024-05-16 RX ADMIN — LINEZOLID 600 MG: 600 INJECTION, SOLUTION INTRAVENOUS at 02:19

## 2024-05-16 ASSESSMENT — PAIN SCALES - GENERAL
PAINLEVEL_OUTOF10: 10
PAINLEVEL_OUTOF10: 6

## 2024-05-16 ASSESSMENT — PAIN DESCRIPTION - ORIENTATION: ORIENTATION: OUTER

## 2024-05-16 ASSESSMENT — PAIN DESCRIPTION - FREQUENCY: FREQUENCY: INTERMITTENT

## 2024-05-16 ASSESSMENT — PAIN DESCRIPTION - DESCRIPTORS: DESCRIPTORS: ACHING

## 2024-05-16 ASSESSMENT — PAIN DESCRIPTION - PAIN TYPE: TYPE: ACUTE PAIN

## 2024-05-16 ASSESSMENT — PAIN DESCRIPTION - ONSET: ONSET: GRADUAL

## 2024-05-16 ASSESSMENT — PAIN DESCRIPTION - LOCATION: LOCATION: HEAD

## 2024-05-16 ASSESSMENT — PAIN - FUNCTIONAL ASSESSMENT: PAIN_FUNCTIONAL_ASSESSMENT: PREVENTS OR INTERFERES SOME ACTIVE ACTIVITIES AND ADLS

## 2024-05-16 NOTE — PLAN OF CARE
Problem: Pain  Goal: Verbalizes/displays adequate comfort level or baseline comfort level  5/16/2024 0319 by Shayy Tong RN  Outcome: Progressing  Flowsheets (Taken 5/15/2024 1931)  Verbalizes/displays adequate comfort level or baseline comfort level:   Encourage patient to monitor pain and request assistance   Assess pain using appropriate pain scale  5/15/2024 1433 by Lenny Blair RN  Outcome: Progressing     Problem: Skin/Tissue Integrity  Goal: Absence of new skin breakdown  Description: 1.  Monitor for areas of redness and/or skin breakdown  2.  Assess vascular access sites hourly  3.  Every 4-6 hours minimum:  Change oxygen saturation probe site  4.  Every 4-6 hours:  If on nasal continuous positive airway pressure, respiratory therapy assess nares and determine need for appliance change or resting period.  5/16/2024 0319 by Shayy Tong, RN  Outcome: Progressing  5/15/2024 1433 by Lenny Blair RN  Outcome: Progressing     Problem: Safety - Adult  Goal: Free from fall injury  5/16/2024 0319 by Shayy Tong RN  Outcome: Progressing  5/15/2024 1433 by Lenny Blair RN  Outcome: Progressing

## 2024-05-16 NOTE — PROGRESS NOTES
Tayla Escobar  Admission Date: 5/7/2024         Daily Progress Note: 5/16/2024    The patient's chart is reviewed and the patient is discussed with the staff.    Background: Patient is a 84 y.o.  female seen and evaluated at the request of Nadia Vidal NP with oncology for dyspnea and hypoxia. PMX HTN, hyperlipidemia, CKD stage 2, multiple myeloma recently dx'd and on first round of CyBorD, last dose 5/6, Hx of DVT/PE on eliquis.  She presented with diarrhea, generalized abd pain x1 day.  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   Has had worsening SOB during admission.  CXR 5/10 with B pleural effusion, pulmonary vascular congestion, received 1 dose lasix.  Went for CTA 5/11 due to ongoing dyspnea and off eliquis due to thrombocytopenia.  Negative for PE, but B pleural effusion and pulmonary consolidations.    She was given lasix 5/10 with 3L of UOP after.   ABG with hypercapnea with pH 7.2, CO2 55 on 5/11, improved on repeat.    No lung history per daughter and son who provide history.  Diarrhea for <2 days then stopped.  Appetite is poor. She does snore and does not sleep well, more concerning for ANAM, not diagosed.  Hx of diastolic heart failure from echo 2023.  Moved to the ICU for BiPAP, improved and transferred back to the floor 5/12.    Subjective:     Currently on 3lpm, states she has had increased SOB since last night. Denies any frequent coughing, is able to clear secretions if needed.    Current Facility-Administered Medications   Medication Dose Route Frequency    mirtazapine (REMERON) tablet 15 mg  15 mg Oral Nightly    [START ON 5/17/2024] furosemide (LASIX) tablet 20 mg  20 mg Oral Daily    furosemide (LASIX) injection 20 mg  20 mg IntraVENous Once    cefTRIAXone (ROCEPHIN) 1,000 mg in sterile water 10 mL IV syringe  1,000 mg IntraVENous Q24H    0.9 % sodium chloride infusion   IntraVENous PRN    melatonin tablet 3 mg  3 mg Oral Nightly  673.33 ml     Physical Exam:   Constitutional:  the patient is well developed and in no acute distress  EENMT:  Sclera clear, pupils equal, oral mucosa moist  Respiratory: symmetric chest rise. Diminished in bases on 3lpm  Cardiovascular:  RRR without M,G,R. There is 1+ lower extremity edema.  Gastrointestinal: soft and non-tender; with positive bowel sounds.  Musculoskeletal: warm without cyanosis. Normal muscle tone.   Skin:  no jaundice or rashes  Neurologic: symmetric strength, fluent speech  Psychiatric:  calm, alert    Imaging: I performed an independent interpretation of the patient's images.  CXR: 5/15          5/12 vs 5/11 B/l chf and effusions               CT Chest:   5/2024 4/2024      LAB:  Recent Labs     05/14/24  0611 05/15/24  0335 05/16/24  0423   WBC 2.5* 3.0* 4.1*   HGB 9.5* 8.4* 8.4*   HCT 29.8* 26.4* 26.7*   PLT 57* 47* 96*     Recent Labs     05/14/24  0611 05/15/24  0335 05/16/24  0423    142 136   K 3.7 3.8 3.9    104 98   CO2 29* 32* 33*   BUN 10 8 6*   CREATININE 1.07 1.01 0.93   MG 1.6* 1.4* 1.7*   BILITOT 0.7 0.6 0.5   AST 28 21 19   ALT 18 15 14   ALKPHOS 81 72 81     No results for input(s): \"TROPHS\", \"NTPROBNP\", \"CRP\", \"ESR\" in the last 72 hours.  Recent Labs     05/14/24  0611 05/15/24  0335 05/16/24  0423   GLUCOSE 104* 104* 140*      Microbiology:   Recent Labs     05/14/24  1347 05/15/24  1043   CULTURE No Salmonella, Shigella, or Ecoli 0157 isolated. >100,000 COLONIES/mL Gram negative rods IDENTIFICATION AND SUSCEPTIBILITY TO FOLLOW*  <10,000 COLONIES/mL NORMAL SKIN VIANEY ISOLATED     No results for input(s): \"COVID19\" in the last 72 hours.  ECHO: 10/15/23    ECHO (TTE) COMPLETE (PRN CONTRAST/BUBBLE/STRAIN/3D) 10/16/2023 11:56 AM (Final)    Interpretation Summary    Left Ventricle: Normal left ventricular systolic function with a visually estimated EF of 60 - 65%. Left ventricle size is normal. Normal wall thickness. Normal wall motion. Abnormal diastolic

## 2024-05-16 NOTE — PROGRESS NOTES
[facility] [place] [facility]ematology & Oncology        Inpatient Hematology / Oncology Progress Note    Reason for Consult:  Hypokalemia [E87.6]  Acute diarrhea [R19.7]  Positive urine culture [R82.79]  Pancytopenia (HCC) [D61.818]  Diarrhea, unspecified type [R19.7]  Referring Physician:  Christine Galeana MD    24 Hour Events:  Afebrile, VSS, on O2 @ 3L  Ucx +GNR, I/S pending - Linezolid changed to CTX  No need for thora yesterday  C/o dyspnea this AM    Transfusions: None  Replacements: Mg++       ROS:  Constitutional: +fatigue, weakness. Negative for fever, chills.  CV: Negative for chest pain, palpitations.  Respiratory: +dyspnea. Negative for cough, wheezing.  GI: +diarrhea. Negative for nausea, abdominal pain.    10 point review of systems is otherwise negative with the exception of the elements mentioned above in the HPI.       No Known Allergies  Past Medical History:   Diagnosis Date    Acute respiratory failure with hypoxemia (HCC)     Benign essential hypertension 2/11/2015    medication    Bilateral leg edema 4/26/2017    Cancer (HCC)     skin    Hiatal hernia     \"large\"    Hypercholesteremia 2/11/2015    IFG (impaired fasting glucose) 4/26/2017    Iron deficiency anemia 5/12/2016    Low oxygen saturation     per office notes- patient's daughter reported O2 sat drops to 85% when supine, she uses O2    Mixed hyperlipidemia 2/11/2015    Morbid obesity (HCC) 10/26/2017    Nipple discharge     not current as of 7/17/13    Primary insomnia 4/26/2018    Primary osteoarthritis of both knees 4/26/2018    Pulmonary embolism (HCC) 10/15/2023    on eliquis- Dr. Arnold recommended eliquis full dose x 6 months    Stage 3 chronic kidney disease (HCC) 7/17/2019     Past Surgical History:   Procedure Laterality Date    HIATAL HERNIA REPAIR N/A 3/4/2024    ROBOTIC HIATAL HERNIA REPAIR performed by Thomas Lozano MD at [facility] MAIN OR    MALIGNANT SKIN LESION EXCISION      OTHER SURGICAL HISTORY      skin cancer    UPPER  improved.  Avoid benzos.    Continue home meds  Ppx: Acyclovir  Supportive care  AC contraindicated d/t thrombocytopenia    Goals and plan of care reviewed with the patient.  All questions answered to the best of our ability.      Dispo:  Had planned to discharge home today with HH/palliative care.  Pt wishes to remain admitted today.  Plan to discharge home tomorrow, 5/17.         Nadia Vidal, RAQUEL - CNP   [facility] Hematology & Oncology  [address] [place] [address]  Office : [phone]  Fax : [phone]  I personally saw, exammed and counselled the patient, and discussed with NP, agree with above history/assessment/plan. Exam: No acute distress, normal breathing effort and breath sounds, regular heart rate and heart sound, benign abd, normal ROM of limbs. 84 y.o.female multiple myeloma status post 3 doses of CyBorD admitted with diarrhea and hypoxia, was on BiPAP in ICU with improvement, transferred back to floor and continue above care, had hospital delirium and better managed now, family desires to hold off further treatment and pursue hospice but the potential option of return to oncology if patient significantly improved for performance status, patient not feeling well enough to discharge yet but plan to DC home with home health/palliative care tomorrow, continue above care.       Robert Galeana M.D.  [place] [address]  Office : [phone]  Fax : [phone]

## 2024-05-16 NOTE — DISCHARGE SUMMARY
this     potassium chloride 20 MEQ extended release tablet  Commonly known as: KLOR-CON M  Take 1 tablet by mouth 2 times daily  What changed:   medication strength  how much to take  when to take this            CONTINUE taking these medications      acetaminophen 500 MG tablet  Commonly known as: TYLENOL     acyclovir 400 MG tablet  Commonly known as: ZOVIRAX  Take 0.5 tablets by mouth 2 times daily     Allegra Allergy 180 MG tablet  Generic drug: fexofenadine     aluminum & magnesium hydroxide-simethicone 200-200-20 MG/5ML Susp suspension  Commonly known as: MAALOX  Take 30 mLs by mouth every 6 hours as needed for Indigestion     furosemide 20 MG tablet  Commonly known as: LASIX  Take 1 tablet by mouth in the morning and 1 tablet in the evening.     melatonin 3 MG Tabs tablet  Take 2 tablets by mouth nightly as needed (sleep)     miconazole 2 % powder  Commonly known as: MICOTIN  Apply topically 2 times daily.     mineral oil-hydrophilic petrolatum ointment  Apply topically as needed.     mirtazapine 15 MG tablet  Commonly known as: REMERON  Take 1 tablet by mouth nightly     ondansetron 8 MG tablet  Commonly known as: Zofran  Take 1 tablet by mouth every 8 hours as needed for Nausea or Vomiting     polyethylene glycol 17 g packet  Commonly known as: GLYCOLAX  Take 1 packet by mouth daily as needed for Constipation     rosuvastatin 10 MG tablet  Commonly known as: CRESTOR  TAKE 1 TABLET DAILY     vitamin D 25 MCG (1000 UT) Tabs tablet  Commonly known as: CHOLECALCIFEROL            STOP taking these medications      cephALEXin 250 MG capsule  Commonly known as: KEFLEX     loperamide 2 MG capsule  Commonly known as: IMODIUM               Where to Get Your Medications        These medications were sent to [facility] - [place], [place] - [address] [facility] - P [phone] - F [phone]  [address] [place] [address], [place] [facility] [address]      Phone: [phone]   allopurinol 300 MG tablet  ciprofloxacin 250 MG  tablet  potassium chloride 20 MEQ extended release tablet             OBJECTIVE:  Patient Vitals for the past 8 hrs:   BP Temp Temp src Pulse Resp SpO2 Weight   24 1112 123/74 -- Oral 95 17 97 % --   24 0718 128/74 98.2 °F (36.8 °C) Oral 98 18 99 % --   24 0430 124/76 98.4 °F (36.9 °C) Oral 97 20 100 % 73.3 kg (161 lb 8 oz)     Temp (24hrs), Av.9 °F (36.6 °C), Min:97.5 °F (36.4 °C), Max:98.4 °F (36.9 °C)     0701 -  1900  In: 0   Out: 300 [Urine:300]    Physical Exam:  Constitutional: Fatigued appearing elderly female in no acute distress, lying comfortably in the hospital bed.    HEENT: Normocephalic and atraumatic. Oropharynx is clear, mucous membranes are moist.  Extraocular muscles are intact.  Sclerae anicteric. Neck supple without JVD. No thyromegaly present.    Skin Warm and dry.  UE bruising and no rash noted.  No erythema.  No pallor.    Neuro Grossly nonfocal with no obvious sensory or motor deficits.   MSK Normal range of motion in general.     Psych Appropriate mood and affect.    Full exam per attending MD    ASSESSMENT:    Principal Problem:    Acute diarrhea  Active Problems:    Mixed hyperlipidemia    Benign essential hypertension    Pulmonary embolism without acute cor pulmonale, unspecified chronicity, unspecified pulmonary embolism type (HCC)    Moderate protein-calorie malnutrition (HCC)    Multiple myeloma not having achieved remission (HCC)    Thrombocytopenia (HCC)    Hypokalemia    Encounter for palliative care    Pancytopenia (HCC)    UTI (urinary tract infection) due to Enterococcus    Acute on chronic systolic congestive heart failure (HCC)    Acute hypoxemic respiratory failure (HCC)    Positive urine culture    Pleural effusion    Delirium    Anorexia    Encounter for counseling  Resolved Problems:    * No resolved hospital problems. *      DISPOSITION:  Discharging home with HH and palliative care services.  Follow up with Dr. Serrato on  (if pt chooses

## 2024-05-16 NOTE — CARE COORDINATION
RN CM met with the patient, son, and visitor at the bedside and informed her she has been accepted by [facility] and [facility] for palliative services. She verbalized understanding and agreement with the discharge plan. Her son confirmed the patient has portable oxygen tanks and a concentrator at home. She has been using oxygen above her baseline requirements during this admission. She is current with [facility] for home oxygen. RN CM encouraged the patient and family to ask questions. They verbalized understanding and agreement with the discharge plan.    The Important Message from Medicare letter was delivered and explained to the patient. She verbalized understanding of the information and signed the letter. The patient was given a copy of the letter and the original was placed in the chart.

## 2024-05-16 NOTE — PROGRESS NOTES
Physical Therapy Note:    Attempted to see patient this PM for physical therapy treatment  session. Patient politely declined due to fatigue. Plans to discharge home tomorrow. Will follow and re-attempt as schedule permits/patient available. Thank you,    CECILY YOUNGER, PT     Rehab Caseload Tracker

## 2024-05-16 NOTE — PROGRESS NOTES
Patient with increased dyspnea this morning. Pulmonology in support of giving Morphine, but Oncology isn't, so no Morphine was administered. Patient was hesitant to be discharged today out of fear that she will need to be readmitted again right away.

## 2024-05-16 NOTE — CARE COORDINATION
Discharge planning was discussed with the Oncology NP. The patient is discharging home today. She is pending an oxygen saturation walk test.

## 2024-05-16 NOTE — PROGRESS NOTES
Comprehensive Nutrition Assessment    Type and Reason for Visit: Reassess  Malnutrition Screening Tool: Malnutrition Screen  Have you recently lost weight without trying?: 2 to 13 pounds (1 point)  Have you been eating poorly because of a decreased appetite?: Yes (1 point)  Malnutrition Screening Tool Score: 2    Nutrition Recommendations/Plan:   Meals and Snacks:  Diet: Continue current order  Nutrition Supplement Therapy:  Medical food supplement therapy:  Continue Ensure Clear three times per day (this provides 240 kcal and 8 grams protein per bottle)     Malnutrition Assessment:  Malnutrition Status: Moderate malnutrition  Context:    Findings of clinical characteristics of malnutrition:   Energy Intake:  Mild decrease in energy intake (Comment)  Weight Loss:  No significant weight loss     Body Fat Loss:  Mild body fat loss Orbital, Buccal region, Triceps   Muscle Mass Loss:  Mild muscle mass loss Temples (temporalis), Clavicles (pectoralis & deltoids), Hand (interosseous)  Fluid Accumulation:  Unable to assess     Strength:  Not Performed  No tanika wasting  Nutrition Assessment:  Nutrition History: 4/11: Daughter assists patient in providing nutrition hx. For 2-3 weeks prior to hiatal hernia reports patient appetite was limited, but ever since she was discharged 3/11 patient has taken little to no PO on a daily basis. Patient endorses that she has had consistent nausea (varying in intensity) for the past month. Reports that when she eats solid foods they get \"stuck\" and points to her sternum. Daughter reports she is able to take some fluids, but has declined any oral nutrition supplements as of late. Drinking small amounts of fluids and pureed foods in the month prior to admission. Additionally reports constipation x 1 week pta which resolved 4/10 with medications.   5/8: Recent admission 4/10- 5/1-patient home about 1 week she reports minimal po intake at home and significant diarrhea. Discharged on  Regular diet     Do You Have Any Cultural, Orthodox, or Ethnic Food Preferences?: No   Weight History: Wt hx from EMR 5/1/24: 175# 4/10/24: 161#, 1/16/24: 180#, 11/27/23: 186#, 11/1/23: 187#, 9/18/23: 190#, 7/12/23: 196#,   Current bed weight again increased as patient gains weight  Nutrition Background:       Stage 2 PI to upper back  PMH: HTN, HLD, morbid obesity, pulmonary embolism. Recent admission for ANTWON during admission dx of multiple myeloma ands/p chemo 4/20. Patient presented with abdominal pain and diarrhea.  Admitted with acute diarrhea and hypokalemia.   Nutrition Interval:  Patient laying in bed with no family present. She continues to c/o no appetite. She states she did eat lunch but per recall ate 2 crackers with peanut butter. She has not had an Ensure Clear today and declined offer from floor stock. She states she plans to eat a little dinner. States the plan is home tomorrow and she is looking forward to it.     Current Nutrition Therapies:  ADULT ORAL NUTRITION SUPPLEMENT; Dinner, Lunch, Breakfast; Clear Liquid Oral Supplement  ADULT DIET; Regular    Current Intake:   Average Meal Intake: 1-25% Average Supplements Intake: 1-25%      Anthropometric Measures:  Height: 165.1 cm (5' 5\")  Current Body Wt: 77.8 kg (171 lb 8.3 oz) (5/15), Weight source: Bed Scale  BMI: 28.5, Overweight (BMI 25.0-29.9)  Admission Body Weight: 82 kg (180 lb 12.4 oz) (5/8 bed)  Ideal Body Weight (Kg) (Calculated): 57 kg (125 lbs), 144.6 %  BMI Category Overweight (BMI 25.0-29.9)  Estimated Daily Nutrient Needs:  Energy (kcal/day): 6939-6880 (20-25 kcal/kg) (Kcal/kg Weight used: 82 kg Current  Protein (g/day): 82-98 (1-1.2 g/kg) Weight Used: (Current) 82 kg  Fluid (ml/day):   (1 ml/kcal)    Nutrition Diagnosis:   Inadequate oral intake related to  (lack of appetite) as evidenced by diarrhea, poor intake prior to admission (reported po barriers)  Moderate malnutrition related to inadequate protein-energy intake as evidenced

## 2024-05-17 ENCOUNTER — APPOINTMENT (OUTPATIENT)
Dept: GENERAL RADIOLOGY | Age: 84
DRG: 689 | End: 2024-05-17
Payer: MEDICARE

## 2024-05-17 LAB
ALBUMIN SERPL-MCNC: 3.1 G/DL (ref 3.2–4.6)
ALBUMIN/GLOB SERPL: 1.4 (ref 1–1.9)
ALP SERPL-CCNC: 93 U/L (ref 35–104)
ALT SERPL-CCNC: 15 U/L (ref 12–65)
ANION GAP SERPL CALC-SCNC: 6 MMOL/L (ref 9–18)
AST SERPL-CCNC: 21 U/L (ref 15–37)
BASOPHILS # BLD: 0 K/UL (ref 0–0.2)
BASOPHILS NFR BLD: 1 % (ref 0–2)
BILIRUB SERPL-MCNC: 0.5 MG/DL (ref 0–1.2)
BUN SERPL-MCNC: 6 MG/DL (ref 8–23)
CALCIUM SERPL-MCNC: 9.3 MG/DL (ref 8.8–10.2)
CHLORIDE SERPL-SCNC: 99 MMOL/L (ref 98–107)
CO2 SERPL-SCNC: 35 MMOL/L (ref 20–28)
CREAT SERPL-MCNC: 0.95 MG/DL (ref 0.6–1.1)
DIFFERENTIAL METHOD BLD: ABNORMAL
EOSINOPHIL # BLD: 0.1 K/UL (ref 0–0.8)
EOSINOPHIL NFR BLD: 2 % (ref 0.5–7.8)
ERYTHROCYTE [DISTWIDTH] IN BLOOD BY AUTOMATED COUNT: 16.7 % (ref 11.9–14.6)
GLOBULIN SER CALC-MCNC: 2.1 G/DL (ref 2.3–3.5)
GLUCOSE SERPL-MCNC: 99 MG/DL (ref 70–99)
HCT VFR BLD AUTO: 29.4 % (ref 35.8–46.3)
HGB BLD-MCNC: 9.1 G/DL (ref 11.7–15.4)
IMM GRANULOCYTES # BLD AUTO: 0.2 K/UL (ref 0–0.5)
IMM GRANULOCYTES NFR BLD AUTO: 4 % (ref 0–5)
LYMPHOCYTES # BLD: 0.2 K/UL (ref 0.5–4.6)
LYMPHOCYTES NFR BLD: 4 % (ref 13–44)
MAGNESIUM SERPL-MCNC: 1.5 MG/DL (ref 1.8–2.4)
MCH RBC QN AUTO: 29.7 PG (ref 26.1–32.9)
MCHC RBC AUTO-ENTMCNC: 31 G/DL (ref 31.4–35)
MCV RBC AUTO: 96.1 FL (ref 82–102)
MONOCYTES # BLD: 0.5 K/UL (ref 0.1–1.3)
MONOCYTES NFR BLD: 11 % (ref 4–12)
NEUTS SEG # BLD: 3.1 K/UL (ref 1.7–8.2)
NEUTS SEG NFR BLD: 78 % (ref 43–78)
NRBC # BLD: 0 K/UL (ref 0–0.2)
PLATELET # BLD AUTO: 85 K/UL (ref 150–450)
PLATELET COMMENT: ABNORMAL
PMV BLD AUTO: 10.9 FL (ref 9.4–12.3)
POTASSIUM SERPL-SCNC: 4.4 MMOL/L (ref 3.5–5.1)
PROT SERPL-MCNC: 5.2 G/DL (ref 6.3–8.2)
RBC # BLD AUTO: 3.06 M/UL (ref 4.05–5.2)
RBC MORPH BLD: ABNORMAL
SODIUM SERPL-SCNC: 139 MMOL/L (ref 136–145)
WBC # BLD AUTO: 4.1 K/UL (ref 4.3–11.1)
WBC MORPH BLD: ABNORMAL

## 2024-05-17 PROCEDURE — 2700000000 HC OXYGEN THERAPY PER DAY

## 2024-05-17 PROCEDURE — 6360000002 HC RX W HCPCS: Performed by: HOSPITALIST

## 2024-05-17 PROCEDURE — 83735 ASSAY OF MAGNESIUM: CPT

## 2024-05-17 PROCEDURE — A4216 STERILE WATER/SALINE, 10 ML: HCPCS | Performed by: NURSE PRACTITIONER

## 2024-05-17 PROCEDURE — APPSS30 APP SPLIT SHARED TIME 16-30 MINUTES: Performed by: NURSE PRACTITIONER

## 2024-05-17 PROCEDURE — 6360000002 HC RX W HCPCS: Performed by: NURSE PRACTITIONER

## 2024-05-17 PROCEDURE — 99232 SBSQ HOSP IP/OBS MODERATE 35: CPT | Performed by: INTERNAL MEDICINE

## 2024-05-17 PROCEDURE — 71045 X-RAY EXAM CHEST 1 VIEW: CPT

## 2024-05-17 PROCEDURE — 36592 COLLECT BLOOD FROM PICC: CPT

## 2024-05-17 PROCEDURE — 80053 COMPREHEN METABOLIC PANEL: CPT

## 2024-05-17 PROCEDURE — 85025 COMPLETE CBC W/AUTO DIFF WBC: CPT

## 2024-05-17 PROCEDURE — 6370000000 HC RX 637 (ALT 250 FOR IP): Performed by: NURSE PRACTITIONER

## 2024-05-17 PROCEDURE — 2580000003 HC RX 258: Performed by: HOSPITALIST

## 2024-05-17 PROCEDURE — C9113 INJ PANTOPRAZOLE SODIUM, VIA: HCPCS | Performed by: NURSE PRACTITIONER

## 2024-05-17 PROCEDURE — 6370000000 HC RX 637 (ALT 250 FOR IP): Performed by: HOSPITALIST

## 2024-05-17 PROCEDURE — 2580000003 HC RX 258: Performed by: NURSE PRACTITIONER

## 2024-05-17 PROCEDURE — 1100000000 HC RM PRIVATE

## 2024-05-17 PROCEDURE — 6370000000 HC RX 637 (ALT 250 FOR IP): Performed by: INTERNAL MEDICINE

## 2024-05-17 PROCEDURE — 51702 INSERT TEMP BLADDER CATH: CPT

## 2024-05-17 RX ORDER — FUROSEMIDE 10 MG/ML
40 INJECTION INTRAMUSCULAR; INTRAVENOUS ONCE
Status: COMPLETED | OUTPATIENT
Start: 2024-05-17 | End: 2024-05-17

## 2024-05-17 RX ORDER — FUROSEMIDE 20 MG/1
20 TABLET ORAL 2 TIMES DAILY
Status: DISCONTINUED | OUTPATIENT
Start: 2024-05-17 | End: 2024-05-17

## 2024-05-17 RX ORDER — FUROSEMIDE 20 MG/1
20 TABLET ORAL 2 TIMES DAILY
Status: DISCONTINUED | OUTPATIENT
Start: 2024-05-18 | End: 2024-05-18 | Stop reason: HOSPADM

## 2024-05-17 RX ORDER — MAGNESIUM SULFATE IN WATER 40 MG/ML
2000 INJECTION, SOLUTION INTRAVENOUS ONCE
Status: COMPLETED | OUTPATIENT
Start: 2024-05-17 | End: 2024-05-17

## 2024-05-17 RX ADMIN — MIRTAZAPINE 15 MG: 15 TABLET, FILM COATED ORAL at 20:32

## 2024-05-17 RX ADMIN — SODIUM CHLORIDE: 9 INJECTION, SOLUTION INTRAVENOUS at 10:14

## 2024-05-17 RX ADMIN — SODIUM CHLORIDE, PRESERVATIVE FREE 10 ML: 5 INJECTION INTRAVENOUS at 10:00

## 2024-05-17 RX ADMIN — ALLOPURINOL 300 MG: 300 TABLET ORAL at 09:59

## 2024-05-17 RX ADMIN — Medication 1 CAPSULE: at 09:58

## 2024-05-17 RX ADMIN — ACYCLOVIR 200 MG: 400 TABLET ORAL at 20:32

## 2024-05-17 RX ADMIN — ONDANSETRON 4 MG: 2 INJECTION INTRAMUSCULAR; INTRAVENOUS at 17:18

## 2024-05-17 RX ADMIN — FUROSEMIDE 20 MG: 20 TABLET ORAL at 09:57

## 2024-05-17 RX ADMIN — POTASSIUM CHLORIDE 20 MEQ: 1500 TABLET, EXTENDED RELEASE ORAL at 09:59

## 2024-05-17 RX ADMIN — MAGNESIUM SULFATE HEPTAHYDRATE 2000 MG: 40 INJECTION, SOLUTION INTRAVENOUS at 10:16

## 2024-05-17 RX ADMIN — SODIUM CHLORIDE, PRESERVATIVE FREE 40 MG: 5 INJECTION INTRAVENOUS at 09:59

## 2024-05-17 RX ADMIN — FUROSEMIDE 40 MG: 10 INJECTION, SOLUTION INTRAMUSCULAR; INTRAVENOUS at 12:21

## 2024-05-17 RX ADMIN — POTASSIUM CHLORIDE 20 MEQ: 1500 TABLET, EXTENDED RELEASE ORAL at 20:32

## 2024-05-17 RX ADMIN — WATER 1000 MG: 1 INJECTION INTRAMUSCULAR; INTRAVENOUS; SUBCUTANEOUS at 09:57

## 2024-05-17 RX ADMIN — ACYCLOVIR 200 MG: 400 TABLET ORAL at 09:59

## 2024-05-17 RX ADMIN — SODIUM CHLORIDE, PRESERVATIVE FREE 10 ML: 5 INJECTION INTRAVENOUS at 20:38

## 2024-05-17 RX ADMIN — LOPERAMIDE HYDROCHLORIDE 2 MG: 2 CAPSULE ORAL at 15:32

## 2024-05-17 RX ADMIN — SODIUM CHLORIDE, PRESERVATIVE FREE 40 MG: 5 INJECTION INTRAVENOUS at 22:02

## 2024-05-17 ASSESSMENT — PAIN SCALES - WONG BAKER
WONGBAKER_NUMERICALRESPONSE: NO HURT
WONGBAKER_NUMERICALRESPONSE: NO HURT

## 2024-05-17 ASSESSMENT — PAIN SCALES - GENERAL
PAINLEVEL_OUTOF10: 0

## 2024-05-17 NOTE — CARE COORDINATION
Discharge planning was discussed with the Oncology NP. The patient is requiring oxygen above her baseline and is not yet medically ready for discharge. She is a potential discharge home tomorrow.

## 2024-05-17 NOTE — PLAN OF CARE
Problem: Pain  Goal: Verbalizes/displays adequate comfort level or baseline comfort level  Outcome: Progressing  Flowsheets (Taken 5/16/2024 1937)  Verbalizes/displays adequate comfort level or baseline comfort level:   Encourage patient to monitor pain and request assistance   Assess pain using appropriate pain scale     Problem: Skin/Tissue Integrity  Goal: Absence of new skin breakdown  Description: 1.  Monitor for areas of redness and/or skin breakdown  2.  Assess vascular access sites hourly  3.  Every 4-6 hours minimum:  Change oxygen saturation probe site  4.  Every 4-6 hours:  If on nasal continuous positive airway pressure, respiratory therapy assess nares and determine need for appliance change or resting period.  Outcome: Progressing     Problem: Safety - Adult  Goal: Free from fall injury  Outcome: Progressing

## 2024-05-17 NOTE — CARE COORDINATION
Discharge planning was discussed with the patient's son. He confirmed he is in agreement for the patient to discharge home with [facility] for palliative care. He will transport the patient home from the hospital. The patient is currently requiring oxygen above her baseline.

## 2024-05-17 NOTE — PLAN OF CARE
Pt turned every two hours for comfort   1532: Pt given Imodium for diarrhea  Additional Lasix given     1600: Pt ween down from 5 Liters of oxygen to 3 liters of oxygen with stable oxygen level.  Problem: Pain  Goal: Verbalizes/displays adequate comfort level or baseline comfort level  5/17/2024 1109 by Yanet Martin RN  Outcome: Progressing  Flowsheets (Taken 5/17/2024 0718)  Verbalizes/displays adequate comfort level or baseline comfort level: Encourage patient to monitor pain and request assistance  5/17/2024 0111 by Shayy Tong RN  Outcome: Progressing  Flowsheets (Taken 5/16/2024 1937)  Verbalizes/displays adequate comfort level or baseline comfort level:   Encourage patient to monitor pain and request assistance   Assess pain using appropriate pain scale     Problem: Skin/Tissue Integrity  Goal: Absence of new skin breakdown  Description: 1.  Monitor for areas of redness and/or skin breakdown  2.  Assess vascular access sites hourly  3.  Every 4-6 hours minimum:  Change oxygen saturation probe site  4.  Every 4-6 hours:  If on nasal continuous positive airway pressure, respiratory therapy assess nares and determine need for appliance change or resting period.  5/17/2024 1109 by Yanet Martin RN  Outcome: Progressing  5/17/2024 0111 by Shayy Tong RN  Outcome: Progressing     Problem: Safety - Adult  Goal: Free from fall injury  5/17/2024 1109 by Yanet Martin RN  Outcome: Progressing  5/17/2024 0111 by Shayy Tong, RN  Outcome: Progressing

## 2024-05-17 NOTE — PROGRESS NOTES
the care plan as documented above by Nadia Vidal N.P.  My findings are as follows: Patient appears stable, heart rate regular without murmurs, abdomen is non-tender, bowel sounds are positive.  84-year-old female, history of multiple myeloma on cycle 1 CyBorD regimen, on ceftriaxone for UT, needing more oxygen felt related to volume overload and will plan for IV Lasix today.  Mentation improved.  Likely discharge in next 24 to 48 hours                 Collin Horton MD  [facility] Hematology/Oncology  [address] [place] [address]  Office : [phone]  Fax : [phone]

## 2024-05-17 NOTE — PROGRESS NOTES
Tayla Escobar  Admission Date: 5/7/2024         Daily Progress Note: 5/17/2024    The patient's chart is reviewed and the patient is discussed with the staff.    Background: Patient is a 84 y.o.  female seen and evaluated at the request of Nadia Vidal NP with oncology for dyspnea and hypoxia. PMX HTN, hyperlipidemia, CKD stage 2, multiple myeloma recently dx'd and on first round of CyBorD, last dose 5/6, Hx of DVT/PE on eliquis.  She presented with diarrhea, generalized abd pain x1 day.  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   Has had worsening SOB during admission.  CXR 5/10 with B pleural effusion, pulmonary vascular congestion, received 1 dose lasix.  Went for CTA 5/11 due to ongoing dyspnea and off eliquis due to thrombocytopenia.  Negative for PE, but B pleural effusion and pulmonary consolidations.    She was given lasix 5/10 with 3L of UOP after.   ABG with hypercapnea with pH 7.2, CO2 55 on 5/11, improved on repeat.    No lung history per daughter and son who provide history.  Diarrhea for <2 days then stopped.  Appetite is poor. She does snore and does not sleep well, more concerning for ANAM, not diagosed.  Hx of diastolic heart failure from echo 2023.  Moved to the ICU for BiPAP, improved and transferred back to the floor 5/12.      Subjective:     Currently on 3 lpm, net negative  Effusions were small per ultrasound. Asking about going home. On 3 lpm    Current Facility-Administered Medications   Medication Dose Route Frequency    furosemide (LASIX) tablet 20 mg  20 mg Oral BID    magnesium sulfate 2000 mg in 50 mL IVPB premix  2,000 mg IntraVENous Once    mirtazapine (REMERON) tablet 15 mg  15 mg Oral Nightly    cefTRIAXone (ROCEPHIN) 1,000 mg in sterile water 10 mL IV syringe  1,000 mg IntraVENous Q24H    loperamide (IMODIUM) capsule 2 mg  2 mg Oral PRN    0.9 % sodium chloride infusion   IntraVENous PRN    melatonin tablet 3 mg  3 mg  counseled and educated the patient and/or family member, documented information in EMR, and coordinated care. which accounted for 17 minutes of clinical time.     RAQUEL Kinney - CNP      In this split/shared evaluation I performed reviewed the patients's H&P, available images, labs, cultures., discussed case in detail with NPP, performed a medically appropriate history and exam, counseled and educated the patient and/or family member, ordered and/or reviewed medications, tests or procedures, documented information in EMR, independently interpreted images, and coordinated care. which accounted for 20 minutes clinical time.     Impression:        Has already home oxygen. Effusions are small, continue lasix.    Discharge plan per Hem/onc. Still has her forrest. Okay for discharge from our standpoint- with [facility].   We will plan to sign off, please call if needed.       Saundra Lopes MD

## 2024-05-18 VITALS
HEART RATE: 97 BPM | HEIGHT: 65 IN | SYSTOLIC BLOOD PRESSURE: 115 MMHG | WEIGHT: 167.2 LBS | OXYGEN SATURATION: 97 % | TEMPERATURE: 97.2 F | RESPIRATION RATE: 16 BRPM | DIASTOLIC BLOOD PRESSURE: 71 MMHG | BODY MASS INDEX: 27.86 KG/M2

## 2024-05-18 LAB
ALBUMIN SERPL-MCNC: 3 G/DL (ref 3.2–4.6)
ALBUMIN/GLOB SERPL: 1.4 (ref 1–1.9)
ALP SERPL-CCNC: 97 U/L (ref 35–104)
ALT SERPL-CCNC: 10 U/L (ref 12–65)
ANION GAP SERPL CALC-SCNC: 9 MMOL/L (ref 9–18)
AST SERPL-CCNC: 23 U/L (ref 15–37)
BASOPHILS # BLD: 0 K/UL (ref 0–0.2)
BASOPHILS NFR BLD: 0 % (ref 0–2)
BILIRUB SERPL-MCNC: 0.6 MG/DL (ref 0–1.2)
BUN SERPL-MCNC: 7 MG/DL (ref 8–23)
CALCIUM SERPL-MCNC: 8.9 MG/DL (ref 8.8–10.2)
CHLORIDE SERPL-SCNC: 96 MMOL/L (ref 98–107)
CO2 SERPL-SCNC: 35 MMOL/L (ref 20–28)
CREAT SERPL-MCNC: 0.91 MG/DL (ref 0.6–1.1)
DIFFERENTIAL METHOD BLD: ABNORMAL
EOSINOPHIL # BLD: 0.1 K/UL (ref 0–0.8)
EOSINOPHIL NFR BLD: 2 % (ref 0.5–7.8)
ERYTHROCYTE [DISTWIDTH] IN BLOOD BY AUTOMATED COUNT: 16.6 % (ref 11.9–14.6)
GASTROINTESTINAL PATHOGEN PANEL: NORMAL
GLOBULIN SER CALC-MCNC: 2.1 G/DL (ref 2.3–3.5)
GLUCOSE SERPL-MCNC: 102 MG/DL (ref 70–99)
HCT VFR BLD AUTO: 29.1 % (ref 35.8–46.3)
HGB BLD-MCNC: 9.2 G/DL (ref 11.7–15.4)
IMM GRANULOCYTES # BLD AUTO: 0.1 K/UL (ref 0–0.5)
IMM GRANULOCYTES NFR BLD AUTO: 3 % (ref 0–5)
LYMPHOCYTES # BLD: 0.2 K/UL (ref 0.5–4.6)
LYMPHOCYTES NFR BLD: 5 % (ref 13–44)
MAGNESIUM SERPL-MCNC: 1.7 MG/DL (ref 1.8–2.4)
MCH RBC QN AUTO: 30 PG (ref 26.1–32.9)
MCHC RBC AUTO-ENTMCNC: 31.6 G/DL (ref 31.4–35)
MCV RBC AUTO: 94.8 FL (ref 82–102)
MONOCYTES # BLD: 0.6 K/UL (ref 0.1–1.3)
MONOCYTES NFR BLD: 12 % (ref 4–12)
NEUTS SEG # BLD: 3.9 K/UL (ref 1.7–8.2)
NEUTS SEG NFR BLD: 78 % (ref 43–78)
NRBC # BLD: 0 K/UL (ref 0–0.2)
PLATELET # BLD AUTO: 72 K/UL (ref 150–450)
PLATELET COMMENT: ABNORMAL
PMV BLD AUTO: 11.4 FL (ref 9.4–12.3)
POTASSIUM SERPL-SCNC: 4.2 MMOL/L (ref 3.5–5.1)
PROT SERPL-MCNC: 5.1 G/DL (ref 6.3–8.2)
RBC # BLD AUTO: 3.07 M/UL (ref 4.05–5.2)
RBC MORPH BLD: ABNORMAL
SODIUM SERPL-SCNC: 140 MMOL/L (ref 136–145)
WBC # BLD AUTO: 4.9 K/UL (ref 4.3–11.1)
WBC MORPH BLD: ABNORMAL

## 2024-05-18 PROCEDURE — 85025 COMPLETE CBC W/AUTO DIFF WBC: CPT

## 2024-05-18 PROCEDURE — 6360000002 HC RX W HCPCS: Performed by: HOSPITALIST

## 2024-05-18 PROCEDURE — 6370000000 HC RX 637 (ALT 250 FOR IP): Performed by: INTERNAL MEDICINE

## 2024-05-18 PROCEDURE — APPSS60 APP SPLIT SHARED TIME 46-60 MINUTES

## 2024-05-18 PROCEDURE — C9113 INJ PANTOPRAZOLE SODIUM, VIA: HCPCS | Performed by: NURSE PRACTITIONER

## 2024-05-18 PROCEDURE — 36592 COLLECT BLOOD FROM PICC: CPT

## 2024-05-18 PROCEDURE — 80053 COMPREHEN METABOLIC PANEL: CPT

## 2024-05-18 PROCEDURE — 6370000000 HC RX 637 (ALT 250 FOR IP): Performed by: NURSE PRACTITIONER

## 2024-05-18 PROCEDURE — 6370000000 HC RX 637 (ALT 250 FOR IP): Performed by: PHYSICIAN ASSISTANT

## 2024-05-18 PROCEDURE — 83735 ASSAY OF MAGNESIUM: CPT

## 2024-05-18 PROCEDURE — 2580000003 HC RX 258: Performed by: HOSPITALIST

## 2024-05-18 PROCEDURE — A4216 STERILE WATER/SALINE, 10 ML: HCPCS | Performed by: NURSE PRACTITIONER

## 2024-05-18 PROCEDURE — 6360000002 HC RX W HCPCS: Performed by: NURSE PRACTITIONER

## 2024-05-18 PROCEDURE — 2580000003 HC RX 258: Performed by: NURSE PRACTITIONER

## 2024-05-18 PROCEDURE — 99239 HOSP IP/OBS DSCHRG MGMT >30: CPT | Performed by: INTERNAL MEDICINE

## 2024-05-18 PROCEDURE — 51702 INSERT TEMP BLADDER CATH: CPT

## 2024-05-18 PROCEDURE — 6370000000 HC RX 637 (ALT 250 FOR IP): Performed by: HOSPITALIST

## 2024-05-18 RX ADMIN — Medication 3 MG: at 00:17

## 2024-05-18 RX ADMIN — ONDANSETRON 4 MG: 2 INJECTION INTRAMUSCULAR; INTRAVENOUS at 10:22

## 2024-05-18 RX ADMIN — ONDANSETRON 4 MG: 2 INJECTION INTRAMUSCULAR; INTRAVENOUS at 03:11

## 2024-05-18 RX ADMIN — SODIUM CHLORIDE, PRESERVATIVE FREE 40 MG: 5 INJECTION INTRAVENOUS at 10:22

## 2024-05-18 RX ADMIN — WATER 1000 MG: 1 INJECTION INTRAMUSCULAR; INTRAVENOUS; SUBCUTANEOUS at 08:26

## 2024-05-18 RX ADMIN — ACYCLOVIR 200 MG: 400 TABLET ORAL at 08:26

## 2024-05-18 RX ADMIN — ACETAMINOPHEN 650 MG: 325 TABLET ORAL at 00:17

## 2024-05-18 RX ADMIN — SODIUM CHLORIDE, PRESERVATIVE FREE 10 ML: 5 INJECTION INTRAVENOUS at 08:27

## 2024-05-18 RX ADMIN — POTASSIUM CHLORIDE 20 MEQ: 1500 TABLET, EXTENDED RELEASE ORAL at 08:26

## 2024-05-18 RX ADMIN — Medication 1 CAPSULE: at 08:26

## 2024-05-18 RX ADMIN — FUROSEMIDE 20 MG: 20 TABLET ORAL at 08:26

## 2024-05-18 RX ADMIN — ALLOPURINOL 300 MG: 300 TABLET ORAL at 08:26

## 2024-05-18 ASSESSMENT — PAIN DESCRIPTION - FREQUENCY: FREQUENCY: INTERMITTENT

## 2024-05-18 ASSESSMENT — PAIN - FUNCTIONAL ASSESSMENT: PAIN_FUNCTIONAL_ASSESSMENT: ACTIVITIES ARE NOT PREVENTED

## 2024-05-18 ASSESSMENT — PAIN SCALES - GENERAL
PAINLEVEL_OUTOF10: 3
PAINLEVEL_OUTOF10: 0
PAINLEVEL_OUTOF10: 0

## 2024-05-18 ASSESSMENT — PAIN DESCRIPTION - LOCATION: LOCATION: HEAD

## 2024-05-18 ASSESSMENT — PAIN DESCRIPTION - ORIENTATION: ORIENTATION: ANTERIOR;POSTERIOR

## 2024-05-18 ASSESSMENT — PAIN DESCRIPTION - ONSET: ONSET: GRADUAL

## 2024-05-18 ASSESSMENT — PAIN DESCRIPTION - PAIN TYPE: TYPE: ACUTE PAIN

## 2024-05-18 ASSESSMENT — PAIN DESCRIPTION - DESCRIPTORS: DESCRIPTORS: THROBBING

## 2024-05-18 NOTE — PLAN OF CARE
Problem: Pain  Goal: Verbalizes/displays adequate comfort level or baseline comfort level  5/17/2024 2355 by Tod Buck, RN  Outcome: Progressing  5/17/2024 1109 by Yanet Martin RN  Outcome: Progressing  Flowsheets (Taken 5/17/2024 0718)  Verbalizes/displays adequate comfort level or baseline comfort level: Encourage patient to monitor pain and request assistance     Problem: Skin/Tissue Integrity  Goal: Absence of new skin breakdown  Description: 1.  Monitor for areas of redness and/or skin breakdown  2.  Assess vascular access sites hourly  3.  Every 4-6 hours minimum:  Change oxygen saturation probe site  4.  Every 4-6 hours:  If on nasal continuous positive airway pressure, respiratory therapy assess nares and determine need for appliance change or resting period.  5/17/2024 2355 by Tod Buck, RN  Outcome: Progressing  5/17/2024 1109 by Yanet Martin RN  Outcome: Progressing     Problem: Safety - Adult  Goal: Free from fall injury  5/17/2024 2355 by Tod Buck, RN  Outcome: Progressing  5/17/2024 1109 by Yanet Martin RN  Outcome: Progressing

## 2024-05-18 NOTE — PROGRESS NOTES
Portable O2 tank at bedside. Awaiting family to return back to floor. RA PICC line forrest left in place prior to discharge. Aware to call desk when ready for wheelchair.

## 2024-05-18 NOTE — PROGRESS NOTES
Discharge orders noted. Discharge instruction reviewed and given to patient. Verbalizes understanding.  Prescriptions e-scribed to pt's choice of pharmacy by the provider. Peripheral IV removed with cath tip intact post removal. Exit site pink. No personal belongings or home medication to be returned back to patient prior to discharge. Pt to  schedule follow up appointment. Verbalizes understanding. Portable O2 tank at bedside. Garcia in place and pt is to follow up outpt with Urology.

## 2024-05-18 NOTE — PROGRESS NOTES
Discharged orders noted. Spoke with Ariana Gill regarding discharge. CM note reviewed and noted pt potential discharge tomorrow (see CM note for detail). Orders clarified and pt OK for discharge home as ordered as pt is now on her home baseline O2. To discharge as ordered.

## 2024-05-18 NOTE — CARE COORDINATION
MSN, CM:  patient to be discharged home with [facility].  Patient and family agree with this discharge plan.  Patient has met all milestones for this admission.  Family to transport patient home.    Patient given [facility] tank for transport home.  Patient is current with [facility] prior to admission.       05/18/24 5462   Service Assessment   Patient Orientation Alert and Oriented   Cognition Alert   Services At/After Discharge   Transition of Care Consult (CM Consult) Home Health   Services At/After Discharge PT;OT;Nursing services   Mode of Transport at Discharge Other (see comment)  (family to transport)   Confirm Follow Up Transport Family   Condition of Participation: Discharge Planning   The Plan for Transition of Care is related to the following treatment goals: Home Health and Palliative Care   The Patient and/or Patient Representative was provided with a Choice of Provider? Patient;Patient Representative   Name of the Patient Representative who was provided with the Choice of Provider and agrees with the Discharge Plan?  family   The Patient and/Or Patient Representative agree with the Discharge Plan? Yes   Freedom of Choice list was provided with basic dialogue that supports the patient's individualized plan of care/goals, treatment preferences, and shares the quality data associated with the providers?  Yes

## 2024-05-18 NOTE — PROGRESS NOTES
Bedside report received from night nurse Tod. Assessment done as noted  Respiration even and unlabored 20/min; denies pain or nausea at present. Encouraged to call with needs.

## 2024-05-18 NOTE — PROGRESS NOTES
Pt on Room Air at rest SAT-86%.  Pt on 1L at rest SAT-88%.  Pt on 2L at rest SAT-92%.  Pt on 2L with exertion SAT-90%.  Pt O2 2L recovering SAT-90%

## 2024-05-19 LAB
BACTERIA SPEC CULT: ABNORMAL
BACTERIA SPEC CULT: ABNORMAL
SERVICE CMNT-IMP: ABNORMAL

## 2024-05-20 ENCOUNTER — TELEPHONE (OUTPATIENT)
Dept: ONCOLOGY | Age: 84
End: 2024-05-20

## 2024-05-20 DIAGNOSIS — C90.00 MULTIPLE MYELOMA NOT HAVING ACHIEVED REMISSION (HCC): Primary | ICD-10-CM

## 2024-05-20 NOTE — TELEPHONE ENCOUNTER
Appy canceled today. Pt is scheduled again on 6/3. Navigation to call and discuss appointments with pt and family.

## 2024-05-20 NOTE — TELEPHONE ENCOUNTER
Encounter opened in Error.  Pt called and Cancelled office visits for today including nutrition consult.     Renetta Chacko RD, , LD  [phone]

## 2024-05-21 ENCOUNTER — TELEPHONE (OUTPATIENT)
Dept: ONCOLOGY | Age: 84
End: 2024-05-21

## 2024-05-21 ENCOUNTER — TELEPHONE (OUTPATIENT)
Dept: INTERNAL MEDICINE CLINIC | Facility: CLINIC | Age: 84
End: 2024-05-21

## 2024-05-21 NOTE — TELEPHONE ENCOUNTER
Received call from Leona [facility] nurse stating patient has forrest catheter and pic line. She needs orders to be able to touch either one of them. She states the pic line is for oncology. She states she has tried to contact there office to see if they were going to manage it, but she can not get a hold of them. Regarding the catheter patient was discharged with it due to weakness. She states the patient felt like her bladder was full, but was unable to urinate. Right before they were going to place the catheter she was able to void, but because the order had already been placed they went ahead and placed the catheter. Per Leona she does not think the patient needs the catheter. Please Advise on what you would have her to do regarding pic line and catheter.

## 2024-05-21 NOTE — TELEPHONE ENCOUNTER
Physician provider: Dr. Serrato  Reason for today's call (Please detail here patients chief complaint): Home health  Last office visit:N/A  Preferred pharmacy (If refill request): N/A  Calls to office within the last 48 hours?:No    Patient notified that their information will be routed to the [facility] clinical triage team for review. Patient is advised that they will receive a phone call from the triage department. If symptom related and symptoms worsen before receiving a call back, the patient has been advised to proceed to the nearest ED.    Leona with [facility] Pt came home from hospital with Picline  & she need to verify if our office is or will be managing the Picline. Pt needs to flush it once a week but wasn't given any supplies.  Leona can be reached at [phone]

## 2024-05-31 ENCOUNTER — CLINICAL DOCUMENTATION (OUTPATIENT)
Dept: CASE MANAGEMENT | Age: 84
End: 2024-05-31

## 2024-05-31 NOTE — PROGRESS NOTES
This RN tried reaching out to pt daughter andres to see how the pt was doing and if they were coming to her appt on monday, no answer

## 2024-06-03 ENCOUNTER — HOSPITAL ENCOUNTER (OUTPATIENT)
Dept: INFUSION THERAPY | Age: 84
Setting detail: INFUSION SERIES
End: 2024-06-03

## 2024-12-20 NOTE — PROGRESS NOTES
Occupational Therapy Note:    Attempted to see patient this AM for occupational therapy treatment  session. Patient stating she is nauseous and politely refusing.     PM attempt: pt off the floor    Will follow and re-attempt as schedule permits/patient available. Thank you,    Alicia aJcobson, OT    Rehab Caseload Tracker       Hypothyroidism

## (undated) DEVICE — ELECTRODE PT RET AD L9FT HI MOIST COND ADH HYDRGEL CORDED

## (undated) DEVICE — BLADE ES ELASTOMERIC COAT INSUL DURABLE BEND UPTO 90DEG

## (undated) DEVICE — BLOCK BITE AD 60FR W/ VELC STRP ADDRESSES MOST PT AND

## (undated) DEVICE — BLADELESS OBTURATOR: Brand: WECK VISTA

## (undated) DEVICE — HARMONIC ACE

## (undated) DEVICE — SUTURE ETHBND EXCEL SZ 0 L30IN NONABSORBABLE GRN L26MM SH X834H

## (undated) DEVICE — ADHESIVE SKIN CLSR 0.7ML TOP DERMBND ADV

## (undated) DEVICE — OBTURATOR ROBOTIC DIA8MM BLDELSS ENDOSCP DISP DA VINCI SI

## (undated) DEVICE — AIRLIFE™ OXYGEN TUBING 7 FEET (2.1 M) CRUSH RESISTANT OXYGEN TUBING, VINYL TIPPED: Brand: AIRLIFE™

## (undated) DEVICE — TUBING INSUFFLATOR HEAT HUMIDIFIED SMK EVAC SET PNEUMOCLEAR

## (undated) DEVICE — BALLOON US E LIN RNG O KT FOR FG-32UA

## (undated) DEVICE — TRAY CATH 16FR COMPLT CARE URIN M TEMP SENS STATLOK STBL

## (undated) DEVICE — UNDERGLOVE SURG SZ 7 FNGR THK0.21MIL GRN LTX BEAD CUF

## (undated) DEVICE — GAUZE,SPONGE,4"X4",12PLY,WOVEN,NS,LF: Brand: MEDLINE

## (undated) DEVICE — BLADE CLIPPER GEN PURP NS

## (undated) DEVICE — SUTURE MCRYL SZ 4-0 L27IN ABSRB UD L19MM PS-2 1/2 CIR PRIM Y426H

## (undated) DEVICE — TROCAR LAP L100MM DIA5MM BLDELSS W/ STBL SL ENDOPATH XCEL

## (undated) DEVICE — TUBING INSUFFLATION SMK EVAC HI FLO SET PNEUMOCLEAR

## (undated) DEVICE — ENDOSCOPIC KIT 1.1+ OP4 CA DE 2 GWN AAMI LEVEL 3

## (undated) DEVICE — GARMENT COMPR CALF MED 18 IN TRICOT PVC GRN VASOPRESS SYS

## (undated) DEVICE — TROCAR ENDOSCP SHFT L150MM DIA8MM TEAL BLDELSS W/ STBL

## (undated) DEVICE — LIQUIBAND RAPID ADHESIVE 36/CS 0.8ML: Brand: MEDLINE

## (undated) DEVICE — KENDALL RADIOLUCENT FOAM MONITORING ELECTRODE RECTANGULAR SHAPE: Brand: KENDALL

## (undated) DEVICE — MOUTHPIECE ENDOSCP L CTRL OPN AND SIDE PORTS DISP

## (undated) DEVICE — NEEDLE HYPO 25GA L1.5IN BLU POLYPR HUB S STL REG BVL STR

## (undated) DEVICE — ELECTRO LUBE IS A SINGLE PATIENT USE DEVICE THAT IS INTENDED TO BE USED ON ELECTROSURGICAL ELECTRODES TO REDUCE STICKING.: Brand: KEY SURGICAL ELECTRO LUBE

## (undated) DEVICE — ARM DRAPE

## (undated) DEVICE — SOLUTION ANTIFOG VIS SYS CLEARIFY LAPSCP

## (undated) DEVICE — Z DISC USE 2764362 SEAL ENDOSCP INSTR DIA5-8MM UNIV FOR CANN DA VINCI XI

## (undated) DEVICE — JACKSON-PRATT 100CC BULB RESERVOIR: Brand: CARDINAL HEALTH

## (undated) DEVICE — PUMP SUC IRR TBNG L10FT W/ HNDPC ASSEMB STRYKEFLOW 2

## (undated) DEVICE — DRAPE TOWEL: Brand: CONVERTORS

## (undated) DEVICE — TOWEL OR BLUEE 16X26IN ST 8 PACK ORB08 16X26ORTWL

## (undated) DEVICE — DRAIN PENROSE FLAT ST 18 X 1 4 IN

## (undated) DEVICE — CANNULA NSL ORAL AD FOR CAPNOFLEX CO2 O2 AIRLFE

## (undated) DEVICE — TROCAR: Brand: KII FIOS FIRST ENTRY

## (undated) DEVICE — COLUMN DRAPE

## (undated) DEVICE — DRESSING GERM DIA1IN CNTR H DIA7MM BLU CHG ANTIMIC PROTCT

## (undated) DEVICE — CONNECTOR TBNG OD5-7MM O2 END DISP

## (undated) DEVICE — GOWN,SIRUS,NONRNF,SETINSLV,XL,20/CS: Brand: MEDLINE

## (undated) DEVICE — JP CHANNEL DRAIN, 19FR HUBLESS: Brand: CARDINAL HEALTH

## (undated) DEVICE — GLOVE SURG SZ 7 L11.33IN FNGR THK9.8MIL STRW LTX POLYMER

## (undated) DEVICE — INTENDED FOR TISSUE SEPARATION, AND OTHER PROCEDURES THAT REQUIRE A SHARP SURGICAL BLADE TO PUNCTURE OR CUT.: Brand: BARD-PARKER ® STAINLESS STEEL BLADES

## (undated) DEVICE — RETRACTOR ENDOSCP SHFT L31MM DIA12MM BLK ATRAUM RECTANG

## (undated) DEVICE — LAPAROSCOPIC TROCAR SLEEVE/SINGLE USE: Brand: KII® LOW PROFILE OPTICAL ACCESS SYSTEM

## (undated) DEVICE — SINGLE PORT MANIFOLD: Brand: NEPTUNE 2

## (undated) DEVICE — Device

## (undated) DEVICE — SUCTION IRRIGATOR: Brand: ENDOWRIST

## (undated) DEVICE — DISPOSABLE GRASPER CARTRIDGE: Brand: DIRECT DRIVE REPOSABLE GRASPERS

## (undated) DEVICE — 3M™ WARMING BLANKET, LOWER BODY, 10 PER CASE, 42568: Brand: BAIR HUGGER™